# Patient Record
Sex: FEMALE | Race: ASIAN | NOT HISPANIC OR LATINO | Employment: FULL TIME | URBAN - METROPOLITAN AREA
[De-identification: names, ages, dates, MRNs, and addresses within clinical notes are randomized per-mention and may not be internally consistent; named-entity substitution may affect disease eponyms.]

---

## 2017-11-03 ENCOUNTER — HOSPITAL ENCOUNTER (OUTPATIENT)
Dept: RADIOLOGY | Age: 57
Discharge: HOME | End: 2017-11-03
Payer: COMMERCIAL

## 2017-11-03 DIAGNOSIS — C50.919 MALIGNANT NEOPLASM OF FEMALE BREAST (HCC): ICD-10-CM

## 2017-11-03 DIAGNOSIS — Z12.31 ENCOUNTER FOR SCREENING MAMMOGRAM FOR MALIGNANT NEOPLASM OF BREAST: ICD-10-CM

## 2017-11-03 PROCEDURE — G0202 SCR MAMMO BI INCL CAD: HCPCS

## 2017-12-23 ENCOUNTER — LAB CONVERSION - ENCOUNTER (OUTPATIENT)
Dept: OTHER | Facility: OTHER | Age: 57
End: 2017-12-23

## 2017-12-23 LAB
25(OH)D3 SERPL-MCNC: 23 NG/ML (ref 30–100)
A/G RATIO (HISTORICAL): 1.4 (CALC) (ref 1–2.5)
ALBUMIN SERPL BCP-MCNC: 4.2 G/DL (ref 3.6–5.1)
ALP SERPL-CCNC: 115 U/L (ref 33–130)
ALT SERPL W P-5'-P-CCNC: 18 U/L (ref 6–29)
AST SERPL W P-5'-P-CCNC: 20 U/L (ref 10–35)
BASOPHILS # BLD AUTO: 1.2 %
BASOPHILS # BLD AUTO: 78 CELLS/UL (ref 0–200)
BILIRUB SERPL-MCNC: 0.4 MG/DL (ref 0.2–1.2)
BUN SERPL-MCNC: 17 MG/DL (ref 7–25)
BUN/CREA RATIO (HISTORICAL): NORMAL (CALC) (ref 6–22)
CALCIUM SERPL-MCNC: 9.2 MG/DL (ref 8.6–10.4)
CHLORIDE SERPL-SCNC: 106 MMOL/L (ref 98–110)
CHOLEST SERPL-MCNC: 173 MG/DL
CHOLEST/HDLC SERPL: 3 (CALC)
CLINICAL COMMENT (HISTORICAL): NORMAL
CO2 SERPL-SCNC: 26 MMOL/L (ref 20–31)
COMMENT (HISTORICAL): ABNORMAL
CREAT SERPL-MCNC: 0.74 MG/DL (ref 0.5–1.05)
DEPRECATED RDW RBC AUTO: 19.4 % (ref 11–15)
EGFR AFRICAN AMERICAN (HISTORICAL): 103 ML/MIN/1.73M2
EGFR-AMERICAN CALC (HISTORICAL): 89 ML/MIN/1.73M2
EOSINOPHIL # BLD AUTO: 364 CELLS/UL (ref 15–500)
EOSINOPHIL # BLD AUTO: 5.6 %
GAMMA GLOBULIN (HISTORICAL): 3.1 G/DL (CALC) (ref 1.9–3.7)
GLUCOSE (HISTORICAL): 90 MG/DL (ref 65–99)
HCT VFR BLD AUTO: 37.8 % (ref 35–45)
HDLC SERPL-MCNC: 58 MG/DL
HGB BLD-MCNC: 11.6 G/DL (ref 11.7–15.5)
LDL CHOLESTEROL (HISTORICAL): 92 MG/DL (CALC)
LYMPHOCYTES # BLD AUTO: 2412 CELLS/UL (ref 850–3900)
LYMPHOCYTES # BLD AUTO: 37.1 %
MCH RBC QN AUTO: 19.3 PG (ref 27–33)
MCHC RBC AUTO-ENTMCNC: 30.7 G/DL (ref 32–36)
MCV RBC AUTO: 62.9 FL (ref 80–100)
MONOCYTES # BLD AUTO: 494 CELLS/UL (ref 200–950)
MONOCYTES (HISTORICAL): 7.6 %
NEUTROPHILS # BLD AUTO: 3153 CELLS/UL (ref 1500–7800)
NEUTROPHILS # BLD AUTO: 48.5 %
NON-HDL-CHOL (CHOL-HDL) (HISTORICAL): 115 MG/DL (CALC)
PLATELET # BLD AUTO: 292 THOUSAND/UL (ref 140–400)
PMV BLD AUTO: 12 FL (ref 7.5–12.5)
POTASSIUM SERPL-SCNC: 4.3 MMOL/L (ref 3.5–5.3)
RBC # BLD AUTO: 6.01 MILLION/UL (ref 3.8–5.1)
SODIUM SERPL-SCNC: 141 MMOL/L (ref 135–146)
T3 SERPL-MCNC: 107 NG/DL (ref 76–181)
TOTAL PROTEIN (HISTORICAL): 7.3 G/DL (ref 6.1–8.1)
TRIGL SERPL-MCNC: 133 MG/DL
TSH SERPL DL<=0.05 MIU/L-ACNC: 2 MIU/L (ref 0.4–4.5)
WBC # BLD AUTO: 6.5 THOUSAND/UL (ref 3.8–10.8)

## 2017-12-26 ENCOUNTER — LAB CONVERSION - ENCOUNTER (OUTPATIENT)
Dept: OTHER | Facility: OTHER | Age: 57
End: 2017-12-26

## 2017-12-26 LAB
25(OH)D3 SERPL-MCNC: 23 NG/ML (ref 30–100)
A/G RATIO (HISTORICAL): 1.4 (CALC) (ref 1–2.5)
ALBUMIN SERPL BCP-MCNC: 4.2 G/DL (ref 3.6–5.1)
ALP SERPL-CCNC: 115 U/L (ref 33–130)
ALT SERPL W P-5'-P-CCNC: 18 U/L (ref 6–29)
AST SERPL W P-5'-P-CCNC: 20 U/L (ref 10–35)
BASOPHILS # BLD AUTO: 1.2 %
BASOPHILS # BLD AUTO: 78 CELLS/UL (ref 0–200)
BILIRUB SERPL-MCNC: 0.4 MG/DL (ref 0.2–1.2)
BUN SERPL-MCNC: 17 MG/DL (ref 7–25)
BUN/CREA RATIO (HISTORICAL): NORMAL (CALC) (ref 6–22)
CALCIUM SERPL-MCNC: 9.2 MG/DL (ref 8.6–10.4)
CHLORIDE SERPL-SCNC: 106 MMOL/L (ref 98–110)
CHOLEST SERPL-MCNC: 173 MG/DL
CHOLEST/HDLC SERPL: 3 (CALC)
CLINICAL COMMENT (HISTORICAL): NORMAL
CO2 SERPL-SCNC: 26 MMOL/L (ref 20–31)
COMMENT (HISTORICAL): ABNORMAL
CREAT SERPL-MCNC: 0.74 MG/DL (ref 0.5–1.05)
DEPRECATED RDW RBC AUTO: 19.4 % (ref 11–15)
EGFR AFRICAN AMERICAN (HISTORICAL): 103 ML/MIN/1.73M2
EGFR-AMERICAN CALC (HISTORICAL): 89 ML/MIN/1.73M2
EOSINOPHIL # BLD AUTO: 364 CELLS/UL (ref 15–500)
EOSINOPHIL # BLD AUTO: 5.6 %
FERRITIN SERPL-MCNC: 27 NG/ML (ref 10–232)
GAMMA GLOBULIN (HISTORICAL): 3.1 G/DL (CALC) (ref 1.9–3.7)
GLUCOSE (HISTORICAL): 90 MG/DL (ref 65–99)
HCT VFR BLD AUTO: 37.8 % (ref 35–45)
HDLC SERPL-MCNC: 58 MG/DL
HGB BLD-MCNC: 11.6 G/DL (ref 11.7–15.5)
LDL CHOLESTEROL (HISTORICAL): 92 MG/DL (CALC)
LYMPHOCYTES # BLD AUTO: 2412 CELLS/UL (ref 850–3900)
LYMPHOCYTES # BLD AUTO: 37.1 %
MCH RBC QN AUTO: 19.3 PG (ref 27–33)
MCHC RBC AUTO-ENTMCNC: 30.7 G/DL (ref 32–36)
MCV RBC AUTO: 62.9 FL (ref 80–100)
MONOCYTES # BLD AUTO: 494 CELLS/UL (ref 200–950)
MONOCYTES (HISTORICAL): 7.6 %
NEUTROPHILS # BLD AUTO: 3153 CELLS/UL (ref 1500–7800)
NEUTROPHILS # BLD AUTO: 48.5 %
NON-HDL-CHOL (CHOL-HDL) (HISTORICAL): 115 MG/DL (CALC)
PLATELET # BLD AUTO: 292 THOUSAND/UL (ref 140–400)
PMV BLD AUTO: 12 FL (ref 7.5–12.5)
POTASSIUM SERPL-SCNC: 4.3 MMOL/L (ref 3.5–5.3)
RBC # BLD AUTO: 6.01 MILLION/UL (ref 3.8–5.1)
SODIUM SERPL-SCNC: 141 MMOL/L (ref 135–146)
T3 SERPL-MCNC: 107 NG/DL (ref 76–181)
TOTAL PROTEIN (HISTORICAL): 7.3 G/DL (ref 6.1–8.1)
TRIGL SERPL-MCNC: 133 MG/DL
TSH SERPL DL<=0.05 MIU/L-ACNC: 2 MIU/L (ref 0.4–4.5)
VIT B12 SERPL-MCNC: 474 PG/ML (ref 200–1100)
WBC # BLD AUTO: 6.5 THOUSAND/UL (ref 3.8–10.8)

## 2018-01-11 NOTE — RESULT NOTES
Verified Results  (1) TISSUE EXAM [id number] 03:30PM Cathleen Zelaya     Test Name Result Flag Reference   LAB AP CASE REPORT (Report)     Surgical Pathology Report             Case: [id number]                   Authorizing Provider: Neil Kiser DO    Collected:      09/26/2016 1530        Ordering Location:   [facility]    Received:      09/26/2016 1613                    [facility]                              Pathologist:      Tosha Gomez DO                               Specimens:  A) - Large Intestine, Cecum, Cecal bx                                 B) - Large Intestine, Right/Ascending Colon, Ascending colon bx                    C) - Large Intestine, Transverse Colon, Transverse colon bx                      D) - Large Intestine, Left/Descending Colon, Descending colon bx                    E) - Large Intestine, Sigmoid Colon, Sigmoid colon bx                         F) - Rectum, Rectal bx   LAB AP FINAL DIAGNOSIS (Report)     A-F: Colon, cecum, ascending, transverse, descending, sigmoid & rectum,   biopsies:   - Colonic mucosa with crypt distortion, cryptitis and crypt abscesses  - No granulomas or pathologic organisms identified   - No dysplasia identified    - See note  Note: Colonic mucosa with lamina propria expansion by lymphoplasmacytic   infiltrate with admixed eosinophils and neutrophils  Foci of cryptitis   and crypt abscesses are present  Basal plasmacytosis is present  Paneth   cell metaplasia is present  Granulomas and pathologic organisms are not   identified  The patient's recent travel to [facility] with subsequent development of   diarrhea is noted along with the colonoscopy impression of pancolitis  A   small proportion of persons with infectious colitis may develop subsequent   classic inflammatory bowel disease (IBD)   The features of the current   biopsy raise the possibility of IBD, however, other etiologies such as   drug induced colitis and helminth infection should be excluded  The   number of eosinophils is increased within the lamina propria, and involve   crypts  This may go along with a diagnosis of IBD, however, it may also   be seen in drug or helminth infections  Correlation with ova and parasite   exam and clinical impression needed  Intradepartmental consultation is in agreement with the above diagnosis   (LS)  Interpretation performed at [facility], [address] [place] [address]  Electronically signed by Lisa Aiken DO on 9/29/2016 at 11:34 AM   LAB AP SURGICAL ADDITIONAL INFORMATION (Report)     These tests were developed and their performance characteristics   determined by [facility] or [facility]  They may not be cleared or approved by the [place]  Food and   Drug Administration  The FDA has determined that such clearance or   approval is not necessary  These tests are used for clinical purposes  They should not be regarded as investigational or for research  This   laboratory has been approved by CLIA 88, designated as a high-complexity   laboratory and is qualified to perform these tests  LAB AP GROSS DESCRIPTION (Report)     A  The specimen is received in formalin, labeled with the patient's name   and hospital number, and is designated cecal biopsy  The specimen   consists of multiple tan soft tissue fragments measuring in aggregate 0 5   x 0 4 x 0 1 cm  Entirely submitted  One cassette  B  The specimen is received in formalin, labeled with the patient's name   and hospital number, and is designated ascending colon biopsy  The   specimen consists of multiple tan soft tissue fragments measuring in   aggregate 0 4 x 0 4 x 0 1 cm  Entirely submitted  One cassette  C  The specimen is received in formalin, labeled with the patient's name   and hospital number, and is designated transverse colon biopsy   The   specimen consists of multiple tan soft tissue fragments measuring in aggregate 0 5 x 0 4 x 0 1 cm  Entirely submitted  One cassette  D  The specimen is received in formalin, labeled with the patient's name   and hospital number, and is designated descending colon biopsy  The   specimen consists of multiple tan soft tissue fragments measuring in   aggregate 0 5 x 0 4 x 0 1 cm  Entirely submitted  One cassette  E  The specimen is received in formalin, labeled with the patient's name   and hospital number, and is designated sigmoid colon biopsy  The   specimen consists of 3 tan soft tissue fragments each measuring 0 3 cm  Entirely submitted  One cassette  F  The specimen is received in formalin, labeled with the patient's name   and hospital number, and is designated rectal biopsy  The specimen   consists of multiple tan soft tissue fragments measuring in aggregate 0 5   x 0 4 x 0 1 cm  Entirely submitted  One cassette  Note: The estimated total formalin fixation time based upon information   provided by the submitting clinician and the standard processing schedule   is 22 5 hours  MAC   LAB AP CLINICAL INFORMATION      Diffuse pan colitis throughout the entire colon     Active colitis

## 2018-04-10 ENCOUNTER — OFFICE VISIT (OUTPATIENT)
Dept: GASTROENTEROLOGY | Facility: CLINIC | Age: 58
End: 2018-04-10
Payer: COMMERCIAL

## 2018-04-10 VITALS
DIASTOLIC BLOOD PRESSURE: 88 MMHG | WEIGHT: 134.2 LBS | HEART RATE: 77 BPM | BODY MASS INDEX: 24.69 KG/M2 | SYSTOLIC BLOOD PRESSURE: 133 MMHG | HEIGHT: 62 IN | TEMPERATURE: 98.8 F

## 2018-04-10 DIAGNOSIS — R19.7 DIARRHEA, UNSPECIFIED TYPE: Primary | ICD-10-CM

## 2018-04-10 PROCEDURE — 99214 OFFICE O/P EST MOD 30 MIN: CPT | Performed by: PHYSICIAN ASSISTANT

## 2018-04-10 RX ORDER — CHOLESTYRAMINE 4 G/9G
1 POWDER, FOR SUSPENSION ORAL 2 TIMES DAILY WITH MEALS
Qty: 60 EACH | Refills: 1 | Status: SHIPPED | OUTPATIENT
Start: 2018-04-10 | End: 2018-04-10 | Stop reason: CLARIF

## 2018-04-10 RX ORDER — CHOLESTYRAMINE 4 G/9G
1 POWDER, FOR SUSPENSION ORAL
Qty: 60 EACH | Refills: 0 | Status: SHIPPED | OUTPATIENT
Start: 2018-04-10 | End: 2018-11-14

## 2018-04-10 NOTE — PROGRESS NOTES
[address] [facility]  Kodak Pearce 61 y o  female MRN: [id number]       CC: Acute diarrhea, last seen in 2016    HPI: James Canela is a 61year old female with a past history of breast cancer who was first seen in 2016 for similar symptoms  Patient developed acute diarrhea after traveling to [facility]  Her stools were positive for fecal WBCs  Unable to access results of C diff PCR  Patient underwent colonoscopy as she was not improved on antidiarrheals  Colonoscopy performed by Dr Adam Yang showed diffuse pan colitis  Biopsies showed cryptitis with mixed eosinophils and neutrophils  Per report, IBD was a possibility  She did not return for follow-up and reports feeling better gradually on her own  She is here now with similar symptoms  She is going up to 4 times daily  This is accompanied by left sided abdominal pain  She denies hematochezia, but there is bright red blood per rectum when she wipes  She denies fevers or chills  She denies recent travel or antibiotic use  She was using an old prescription of Lomotil, which was not helping her  Review of Systems:    CONSTITUTIONAL: Denies any fever, chills, or rigors  Good appetite, and no recent weight loss  HEENT: No earache or tinnitus  Denies hearing loss or visual disturbances  CARDIOVASCULAR: No chest pain or palpitations  RESPIRATORY: Denies any cough, hemoptysis, shortness of breath or dyspnea on exertion  GASTROINTESTINAL: As noted in the History of Present Illness  GENITOURINARY: No problems with urination  Denies any hematuria or dysuria  NEUROLOGIC: No dizziness or vertigo, denies headaches  MUSCULOSKELETAL: Denies any muscle or joint pain  SKIN: Denies skin rashes or itching  ENDOCRINE: Denies excessive thirst  Denies intolerance to heat or cold  PSYCHOSOCIAL: Denies depression or anxiety  Denies any recent memory loss         Current Outpatient Prescriptions   Medication Sig Dispense Refill    aspirin 81 MG tablet Take 81 mg by mouth daily   ATORVASTATIN CALCIUM PO Take 20 mg by mouth daily   metoprolol tartrate (LOPRESSOR) 25 mg tablet Take 25 mg by mouth 2 (two) times a day   mometasone (NASONEX) 50 mcg/act nasal spray 2 sprays into each nostril daily   cholestyramine (QUESTRAN) 4 g packet Take 1 packet (4 g total) by mouth 3 (three) times a day with meals 60 each 0     No current facility-administered medications for this visit  Past Medical History:   Diagnosis Date    Allergic rhinitis     Breast CA ([facility] [address] )     R breast CA-chemo/radiaition    Diarrhea     for 6 weeks-colonosocpy today 9/26/2016 and EGD    Hypertension     Thalassemia     Vitamin D deficiency      Past Surgical History:   Procedure Laterality Date    BREAST LUMPECTOMY Right     BREAST LUMPECTOMY Right     BREAST LUMPECTOMY Right     with lymph nodes  2007    COLONOSCOPY      CORONARY ARTERY BYPASS GRAFT      CORONARY ARTERY BYPASS GRAFT      x3, 11/2015    HYSTERECTOMY      b/l ovaries removed    [facility] COLONOSCOPY FLX DX W/COLLJ SPEC WHEN PFRMD N/A 9/26/2016    Procedure: COLONOSCOPY;  Surgeon: Jay Corona DO;  [facility]; Service: Gastroenterology     Social History     Social History    Marital status: /Civil Union     Spouse name: N/A    Number of children: N/A    Years of education: N/A     Social History Main Topics    Smoking status: Never Smoker    Smokeless tobacco: Never Used    Alcohol use 2 4 oz/week     4 Glasses of wine per week      Comment: per year    Drug use: No    Sexual activity: Not Asked     Other Topics Concern    None     Social History Narrative    None     History reviewed  No pertinent family history           PHYSICAL EXAM:    Vitals:    04/10/18 1528   BP: 133/88   BP Location: Left arm   Patient Position: Sitting   Cuff Size: Standard   Pulse: 77   Temp: 98 8 °F (37 1 °C)   TempSrc: Tympanic   Weight: 60 9 kg (134 lb 3 2 oz)   Height: 5' 2" (1 575 m)     General Appearance:   Alert and oriented x 3  Cooperative, and in no respiratory distress   HEENT:   Normocephalic, atraumatic, anicteric      Neck:  Supple, symmetrical, trachea midline   Lungs:   Clear to auscultation bilaterally; no rales, rhonchi or wheezing; respirations unlabored    Heart[de-identified]   S1 and S2 normal; regular rate and rhythm; no murmur, rub, or gallop  Abdomen:   Soft, non-tender, non-distended; normal bowel sounds; no masses, no organomegaly    Genitalia:   Deferred    Rectal:   Deferred    Extremities:  No cyanosis, clubbing or edema    Pulses:  2+ and symmetric all extremities    Skin:  Skin color, texture, turgor normal, no rashes or lesions    Lymph nodes:  No palpable cervical or supraclavicular lymphadenopathy        Lab Results:     Results from last 6 Months  Lab Units 12/22/17  0657   WBC Thousand/uL 6 5  6 5   HEMOGLOBIN g/dL 11 6*  11 6*   HEMATOCRIT % 37 8  37 8   PLATELETS Thousand/uL 292  292       Results from last 6 Months  Lab Units 12/22/17  0657   SODIUM mmol/L 141  141   POTASSIUM mmol/L 4 3  4 3   CHLORIDE mmol/L 106  106   CO2 mmol/L 26  26   BUN mg/dL 17  17   CREATININE mg/dL 0 74  0 74   CALCIUM mg/dL 9 2  9 2   TOTAL PROTEIN g/dL 7 3  7 3   BILIRUBIN TOTAL mg/dL 0 4  0 4   ALK PHOS U/L 115  115   ALT U/L 18  18   AST U/L 20  20               ASSESSMENT and PLAN:      1) Acute diarrhea - Patient was last seen by us in 2016 for possible infectious vs inflammatory colitis  Biopsies performed at that time suggestive of IBD  Dr Tim Singer impression seemed to be ulcerative colitis as she saw pancolitis on exam    - ESR, CRP  - Stool enteric panel, O&P and C diff PCR  - Fecal calprotectin  - Questran 4 g BID after stool testing comes back negative  - CT of the abdomen offered to the patient, but refused  She agrees to go to the ED if her abdominal pain worsens  I will also order CT of the abdomen if pain is persistent when I speak to her regarding results   She is agreeable to this plan            Follow up with Dr Akira Beasley

## 2018-04-11 ENCOUNTER — TELEPHONE (OUTPATIENT)
Dept: GASTROENTEROLOGY | Facility: CLINIC | Age: 58
End: 2018-04-11

## 2018-04-11 NOTE — PROGRESS NOTES
Chest 2V

 

FINDINGS: The heart and vascular structures are normal in appearance. No infiltrates or effusions ar
e demonstrated. The skeletal structures are unremarkable.

 

IMPRESSION: Negative exam. I called the pharmacy and left a message with patient information as well as physician and medication information to stop refilling the medication

## 2018-04-11 NOTE — TELEPHONE ENCOUNTER
DR BRUCE'S PT    Pt called because the [facility] need more information on the blood orders from yesterday visit  [phone]   Pt will be going back to the lab around 2pm

## 2018-04-12 ENCOUNTER — TELEPHONE (OUTPATIENT)
Dept: GASTROENTEROLOGY | Facility: CLINIC | Age: 58
End: 2018-04-12

## 2018-04-12 ENCOUNTER — TELEPHONE (OUTPATIENT)
Dept: GASTROENTEROLOGY | Facility: MEDICAL CENTER | Age: 58
End: 2018-04-12

## 2018-04-12 DIAGNOSIS — K58.0 IRRITABLE BOWEL SYNDROME WITH DIARRHEA: ICD-10-CM

## 2018-04-12 DIAGNOSIS — K51.00 ULCERATIVE PANCOLITIS WITHOUT COMPLICATION (HCC): Primary | ICD-10-CM

## 2018-04-12 DIAGNOSIS — R19.7 DIARRHEA, UNSPECIFIED TYPE: Primary | ICD-10-CM

## 2018-04-12 RX ORDER — PREDNISONE 10 MG/1
TABLET ORAL
Qty: 30 TABLET | Refills: 0 | Status: SHIPPED | OUTPATIENT
Start: 2018-04-12 | End: 2018-04-12 | Stop reason: CLARIF

## 2018-04-12 RX ORDER — PREDNISONE 10 MG/1
40 TABLET ORAL DAILY
Qty: 112 TABLET | Refills: 1 | Status: SHIPPED | OUTPATIENT
Start: 2018-04-12 | End: 2018-04-26 | Stop reason: HOSPADM

## 2018-04-12 NOTE — TELEPHONE ENCOUNTER
Dr Sheron Jones pt called stating the medication Rosie Bowen that was given to her on 04/10/18 is not working and wanted to know what else can she take or do  Pt is still having Diarrhea and becoming very frustrated   Pt would like to speak to someone and can be reached at [phone]

## 2018-04-12 NOTE — TELEPHONE ENCOUNTER
I spoke with Beau Moment  She was seen in office this week  She is still having diarrhea and wants to know what she can take  She had colonoscopy done in 9/2016 which revealed pancolitis, bx revealed colonic mucosa with crypt distortion, cryptitis and crypt abscesses concerning for IBD, she did not follow up after procedure  This acute diarrhea started 6 weeks ago  This could be a bacterial infection or IBD flare  I discussed that we are awaiting her stool studies and inflammatory markers and she is very insistent on having anti-diarrheals  I offered her questran, imodium, lomotil to use sparingly until c diff ruled out, which she states will not help her  She insists on xifaxan course, which I will try  She may need steroids, would recommend waiting until bacterial infection is ruled out

## 2018-04-12 NOTE — TELEPHONE ENCOUNTER
Spoke to patient regarding lab results available to me  Her C diff was negative  ESR and CRP elevated  We are still waiting on stool enteric and fecal calprotectin  I instructed her to not take Xifaxan  I will send prednisone 40 mg  She will need an office appointment before seeing Dr Jesica Mosqueda in May to ensure she is improving  She is to continue Questran three times daily  Patient understands this plan

## 2018-04-16 ENCOUNTER — TELEPHONE (OUTPATIENT)
Dept: GASTROENTEROLOGY | Facility: CLINIC | Age: 58
End: 2018-04-16

## 2018-04-17 ENCOUNTER — OFFICE VISIT (OUTPATIENT)
Dept: GASTROENTEROLOGY | Facility: CLINIC | Age: 58
End: 2018-04-17
Payer: COMMERCIAL

## 2018-04-17 VITALS
HEIGHT: 62 IN | BODY MASS INDEX: 24.03 KG/M2 | SYSTOLIC BLOOD PRESSURE: 116 MMHG | WEIGHT: 130.6 LBS | TEMPERATURE: 98.8 F | HEART RATE: 70 BPM | DIASTOLIC BLOOD PRESSURE: 78 MMHG

## 2018-04-17 DIAGNOSIS — K62.89 RECTAL DISCOMFORT: ICD-10-CM

## 2018-04-17 DIAGNOSIS — R10.815 PERIUMBILICAL ABDOMINAL TENDERNESS WITHOUT REBOUND TENDERNESS: ICD-10-CM

## 2018-04-17 DIAGNOSIS — K52.9 COLITIS: Primary | ICD-10-CM

## 2018-04-17 PROBLEM — R79.82 CRP ELEVATED: Status: ACTIVE | Noted: 2018-04-17

## 2018-04-17 PROCEDURE — 99214 OFFICE O/P EST MOD 30 MIN: CPT | Performed by: INTERNAL MEDICINE

## 2018-04-17 RX ORDER — DIPHENOXYLATE HYDROCHLORIDE AND ATROPINE SULFATE 2.5; .025 MG/1; MG/1
1 TABLET ORAL 3 TIMES DAILY
Qty: 90 TABLET | Refills: 0 | Status: SHIPPED | OUTPATIENT
Start: 2018-04-17 | End: 2018-04-26 | Stop reason: HOSPADM

## 2018-04-17 RX ORDER — DIPHENOXYLATE HYDROCHLORIDE AND ATROPINE SULFATE 2.5; .025 MG/1; MG/1
1 TABLET ORAL 3 TIMES DAILY
Qty: 90 TABLET | Refills: 4 | Status: SHIPPED | OUTPATIENT
Start: 2018-04-17 | End: 2018-04-17 | Stop reason: SDUPTHER

## 2018-04-17 RX ORDER — DICYCLOMINE HYDROCHLORIDE 10 MG/1
10 CAPSULE ORAL
Qty: 90 CAPSULE | Refills: 1 | Status: ON HOLD | OUTPATIENT
Start: 2018-04-17 | End: 2018-04-26

## 2018-04-17 NOTE — PATIENT INSTRUCTIONS
Colon scheduled with Dr Gauthier Counts 5/1/18 at [address] [place] [address] rescheduled COLON with Saravanan Yun at [facility] on 4/23/18

## 2018-04-17 NOTE — LETTER
April 17, 2018     Ariana Barbosa MD  [address] [place] [address]    Patient: Vita Ramirez   YOB: 1960   Date of Visit: 4/17/2018       Dear Dr Xavi Clark: Thank you for referring Vita Ramirez to me for evaluation  Below are my notes for this consultation  If you have questions, please do not hesitate to call me  I look forward to following your patient along with you  Sincerely,        Telly Parker MD        CC: Delila Peeks, Claudean Franklin, MD  4/17/2018 10:21 PM  Sign at close encounter  [facility] - Outpatient Follow-up Note  Vita Ramirez 61 y o  female MRN: [id number]  Encounter: [id number]          ASSESSMENT AND PLAN:      1  Colitis-differential diagnosis for colitis is likely related to inflammatory bowel disease versus acute infectious colitis  Currently symptoms have been ongoing for 6 weeks usually symptoms longer than 3 months are more typical of inflammatory bowel disease  She had 1 episode of pan colitis 2 years ago which was treated with Lomotil and resolved  Currently symptoms are debilitating but she is not interested in an extensive evaluation  I had extensive discussion that currently we are not treating his symptoms based on objective data but rather treating her disease by trying different medications without any definitive diagnosis  During the entire visit encouraged her to undergo colonoscopy evaluation so we could potentially make a diagnosis of inflammatory bowel disease or rule out infectious causes which may not be diagnosed by culture  Her daughter is getting  in June and she has several events planned prior to this  As a result she states that she is very busy and would like limited evaluation  She was offered CT scan and colonoscopy over the last week but has refused to deferred for now    She is well-known to Dr Andrew Quijano as she saw her 2 years ago but has been lost to follow-up as well  She has been asymptomatic for the last 2 years so this potentially could be another infectious process  But due to relapse of symptoms within 2 years with symptoms ongoing for more than 6 weeks something more is concerning  She does not have any extraintestinal manifestation of inflammatory bowel disease  She has tried anti diarrheal stools such as Imodium, cholestyramine without any significant benefit  She was also empirically started on steroids but has not been consistently taking 40 mg of prednisone but rather taking anywhere from 20-30 mg daily  She has not had any improvement from prednisone but has been only taking for 4 days  Previously she improved with Lomotil so requesting this at this time  She has had stool studies including C diff, ova parasites, enteric bacteria which have all been negative  CRP and ESR are elevated concerning for infectious versus inflammatory process  She is hoping for quick fix but I have emphasized that her symptoms require stepwise process for appropriate care  She plans to follow up with Dr Vik Thurman and has agreed to undergo colonoscopy  - Calprotectin,Fecal; Future  - diphenoxylate-atropine (LOMOTIL) 2 5-0 025 mg per tablet; Take 1 tablet by mouth 3 (three) times a day for 30 days  Dispense: 90 tablet; Refill: 0  - dicyclomine (BENTYL) 10 mg capsule; Take 1 capsule (10 mg total) by mouth 3 (three) times a day before meals for 30 days  Dispense: 90 capsule; Refill: 1    ______________________________________________________________________    SUBJECTIVE:      Mrs Ubaldo Voss is a 58-year-old female presents here for follow-up  She was seen by us 2 years ago for pan colitis at that time thought to be possibly secondary to infectious etiology but inflammatory bowel disease was a potential consideration  She was lost to follow-up    She presents here with similar episode of colitis but has not been compliant with her recommendation as she is very overwhelmed with her daughter's wedding which is in June  She has had stool studies which were negative including C diff and inflammatory markers are elevated  She was started empirically on steroids but has not been consistently taking 40 mg of prednisone  She has only been on steroids for 4 days and has not had significant improvement  She has also tried antispasm medications, cholestyramine, Imodium with limited response  Last episode of colitis 2 years ago she improved with Lomotil and is requesting this at this time  She needs evaluation to rule out inflammatory bowel disease versus infectious etiology  Currently she is interested in a quick fix for her symptoms but is willing to undergo extensive evaluation in May  No extraintestinal manifestation of IBD  She is currently having nonbloody diarrhea with some blood due to irritation secondary to outlet bleeding from hemorrhoids  She has started nifedipine/lidocaine ointment which has helped  She can have anywhere from over 10 episodes of nonbloody diarrhea on a daily basis  Denies any sick contact, travel  REVIEW OF SYSTEMS IS OTHERWISE NEGATIVE  Historical Information   Past Medical History:   Diagnosis Date    Allergic rhinitis     Breast CA ([facility] [address] )     R breast CA-chemo/radiaition    Diarrhea     for 6 weeks-colonosocpy today 9/26/2016 and EGD    Hypertension     Thalassemia     Vitamin D deficiency      Past Surgical History:   Procedure Laterality Date    BREAST LUMPECTOMY Right     BREAST LUMPECTOMY Right     BREAST LUMPECTOMY Right     with lymph nodes  2007    COLONOSCOPY      CORONARY ARTERY BYPASS GRAFT      CORONARY ARTERY BYPASS GRAFT      x3, 11/2015    HYSTERECTOMY      b/l ovaries removed    [place] COLONOSCOPY FLX DX W/COLLJ SPEC WHEN PFRMD N/A 9/26/2016    Procedure: COLONOSCOPY;  Surgeon: Jeffery Wise DO;  Location: [facility];   Service: Gastroenterology     Social History   History   Alcohol Use    2 4 oz/week    4 Glasses of wine per week     Comment: per year     History   Drug Use No     History   Smoking Status    Never Smoker   Smokeless Tobacco    Never Used     History reviewed  No pertinent family history  Meds/Allergies       Current Outpatient Prescriptions:     aspirin 81 MG tablet    cholestyramine (QUESTRAN) 4 g packet    predniSONE 10 mg tablet    ATORVASTATIN CALCIUM PO    dicyclomine (BENTYL) 10 mg capsule    diphenoxylate-atropine (LOMOTIL) 2 5-0 025 mg per tablet    metoprolol tartrate (LOPRESSOR) 25 mg tablet    mometasone (NASONEX) 50 mcg/act nasal spray    Allergies   Allergen Reactions    Iodinated Diagnostic Agents            Objective     Blood pressure 116/78, pulse 70, temperature 98 8 °F (37 1 °C), temperature source Tympanic, height 5' 2" (1 575 m), weight 59 2 kg (130 lb 9 6 oz)  Body mass index is 23 89 kg/m²  PHYSICAL EXAM:      General Appearance:   Alert, cooperative, no distress   HEENT:   Normocephalic, atraumatic, anicteric      Neck:  Supple, symmetrical, trachea midline   Lungs:   Clear to auscultation bilaterally; no rales, rhonchi or wheezing; respirations unlabored    Heart[de-identified]   Regular rate and rhythm; no murmur, rub, or gallop  Abdomen:   Soft, periumbilical discomfort, non-distended; normal bowel sounds; no masses, no organomegaly    Genitalia:   Deferred    Rectal:   Deferred    Extremities:  No cyanosis, clubbing or edema    Pulses:  2+ and symmetric    Skin:  No jaundice, rashes, or lesions    Lymph nodes:  No palpable cervical lymphadenopathy        Lab Results:   No visits with results within 1 Day(s) from this visit     Latest known visit with results is:   Lab Conversion - Encounter on 12/26/2017   Component Date Value    Vit D, 25-Hydroxy 12/22/2017 23*    TSH 3RD GENERATON 12/22/2017 2 00     CLINICAL COMMENT (HISTOR* 12/22/2017 Although an "automated CBC" was ordered, ourinstrumentation detected an abnormality onyour patient's specimen requiring us to performa manual review   Glucose 12/22/2017 90     BUN 12/22/2017 17     Creatinine 12/22/2017 0 74     EGFR-AMERICAN CALC 12/22/2017 89     eGFR  12/22/2017 103     BUN/CREA Ratio 07/03/2060 NOT APPLICABLE     Sodium 21/08/3546 141     Potassium 12/22/2017 4 3     Chloride 12/22/2017 106     CO2 12/22/2017 26     Calcium 12/22/2017 9 2     Total Protein 12/22/2017 7 3     Albumin 12/22/2017 4 2     GAMMA GLOBULIN 12/22/2017 3 1     A/G RATIO 12/22/2017 1 4     Total Bilirubin 12/22/2017 0 4     Alkaline Phosphatase 12/22/2017 115     AST 12/22/2017 20     ALT 12/22/2017 18     WBC 12/22/2017 6 5     RBC 12/22/2017 6 01*    Hemoglobin 12/22/2017 11 6*    Hematocrit 12/22/2017 37 8     MCV 12/22/2017 62 9*    MCH 12/22/2017 19 3*    MCHC 12/22/2017 30 7*    RDW 12/22/2017 19 4*    Platelets 82/59/8643 292     Neutrophils Absolute 12/22/2017 3153     Lymphocytes Absolute 12/22/2017 2412     Monocytes Absolute 12/22/2017 494     Eosinophils Absolute 12/22/2017 364     Basophils Absolute 12/22/2017 78     Neutrophils Absolute 12/22/2017 48 5     Lymphocytes Absolute 12/22/2017 37 1     MONOCYTES 12/22/2017 7 6     Eosinophils Absolute 12/22/2017 5 6     Basophils Relative 12/22/2017 1 2     COMMENT 12/22/2017 Evaluate results with caution  The white blood cell count and plate-let count may be altered due to interferences caused by the presenceof significant numbers of large/giant platelets   MPV 12/22/2017 12 0     Cholesterol 12/22/2017 173     HDL 12/22/2017 58     Triglycerides 12/22/2017 133     LDL CHOLESTEROL 12/22/2017 92     Chol/HDL Ratio 12/22/2017 3 0     NON-HDL-CHOL (CHOL-HDL) 12/22/2017 115     Vitamin B-12 12/22/2017 474     T3, Total 12/22/2017 107     Ferritin 12/22/2017 27          Radiology Results:   No results found

## 2018-04-18 ENCOUNTER — TELEPHONE (OUTPATIENT)
Dept: GASTROENTEROLOGY | Facility: CLINIC | Age: 58
End: 2018-04-18

## 2018-04-18 ENCOUNTER — ANESTHESIA EVENT (OUTPATIENT)
Dept: GASTROENTEROLOGY | Facility: MEDICAL CENTER | Age: 58
End: 2018-04-18

## 2018-04-18 NOTE — TELEPHONE ENCOUNTER
DR GOETZ'S PT    DR Julio Orlando from [facility] called requesting got speak to Dr Katie Bautista regarding pt care   [facility] was aware Dr Hallie Menchaca with pt  [phone]  Doctor was txt

## 2018-04-18 NOTE — PROGRESS NOTES
[facility] - Outpatient Follow-up Note  Madeline Santa 61 y o  female MRN: [id number]  Encounter: [id number]          ASSESSMENT AND PLAN:      1  Colitis-differential diagnosis for colitis is likely related to inflammatory bowel disease versus acute infectious colitis  Currently symptoms have been ongoing for 6 weeks usually symptoms longer than 3 months are more typical of inflammatory bowel disease  She had 1 episode of pan colitis 2 years ago which was treated with Lomotil and resolved  Currently symptoms are debilitating but she is not interested in an extensive evaluation  I had extensive discussion that currently we are not treating his symptoms based on objective data but rather treating her disease by trying different medications without any definitive diagnosis  During the entire visit encouraged her to undergo colonoscopy evaluation so we could potentially make a diagnosis of inflammatory bowel disease or rule out infectious causes which may not be diagnosed by culture  Her daughter is getting  in June and she has several events planned prior to this  As a result she states that she is very busy and would like limited evaluation  She was offered CT scan and colonoscopy over the last week but has refused to deferred for now  She is well-known to Dr Amado Diego as she saw her 2 years ago but has been lost to follow-up as well  She has been asymptomatic for the last 2 years so this potentially could be another infectious process  But due to relapse of symptoms within 2 years with symptoms ongoing for more than 6 weeks something more is concerning  She does not have any extraintestinal manifestation of inflammatory bowel disease  She has tried anti diarrheal stools such as Imodium, cholestyramine without any significant benefit    She was also empirically started on steroids but has not been consistently taking 40 mg of prednisone but rather taking anywhere from 20-30 mg daily  She has not had any improvement from prednisone but has been only taking for 4 days  Previously she improved with Lomotil so requesting this at this time  She has had stool studies including C diff, ova parasites, enteric bacteria which have all been negative  CRP and ESR are elevated concerning for infectious versus inflammatory process  She is hoping for quick fix but I have emphasized that her symptoms require stepwise process for appropriate care  She plans to follow up with Dr Jesica Mosqueda and has agreed to undergo colonoscopy  - Calprotectin,Fecal; Future  - diphenoxylate-atropine (LOMOTIL) 2 5-0 025 mg per tablet; Take 1 tablet by mouth 3 (three) times a day for 30 days  Dispense: 90 tablet; Refill: 0  - dicyclomine (BENTYL) 10 mg capsule; Take 1 capsule (10 mg total) by mouth 3 (three) times a day before meals for 30 days  Dispense: 90 capsule; Refill: 1    ______________________________________________________________________    SUBJECTIVE:      Mrs Arlin Downey is a 50-year-old female presents here for follow-up  She was seen by us 2 years ago for pan colitis at that time thought to be possibly secondary to infectious etiology but inflammatory bowel disease was a potential consideration  She was lost to follow-up  She presents here with similar episode of colitis but has not been compliant with her recommendation as she is very overwhelmed with her daughter's wedding which is in June  She has had stool studies which were negative including C diff and inflammatory markers are elevated  She was started empirically on steroids but has not been consistently taking 40 mg of prednisone  She has only been on steroids for 4 days and has not had significant improvement  She has also tried antispasm medications, cholestyramine, Imodium with limited response  Last episode of colitis 2 years ago she improved with Lomotil and is requesting this at this time    She needs evaluation to rule out inflammatory bowel disease versus infectious etiology  Currently she is interested in a quick fix for her symptoms but is willing to undergo extensive evaluation in May  No extraintestinal manifestation of IBD  She is currently having nonbloody diarrhea with some blood due to irritation secondary to outlet bleeding from hemorrhoids  She has started nifedipine/lidocaine ointment which has helped  She can have anywhere from over 10 episodes of nonbloody diarrhea on a daily basis  Denies any sick contact, travel  REVIEW OF SYSTEMS IS OTHERWISE NEGATIVE  Historical Information   Past Medical History:   Diagnosis Date    Allergic rhinitis     Breast CA ([address] )     R breast CA-chemo/radiaition    Diarrhea     for 6 weeks-colonosocpy today 2016 and EGD    Hypertension     Thalassemia     Vitamin D deficiency      Past Surgical History:   Procedure Laterality Date    BREAST LUMPECTOMY Right     BREAST LUMPECTOMY Right     BREAST LUMPECTOMY Right     with lymph nodes      COLONOSCOPY      CORONARY ARTERY BYPASS GRAFT      CORONARY ARTERY BYPASS GRAFT      x3, 2015    HYSTERECTOMY      b/l ovaries removed    OK COLONOSCOPY FLX DX W/COLLJ SPEC WHEN PFRMD N/A 2016    Procedure: COLONOSCOPY;  Surgeon: Louretta Apgar, DO;  Location: [facility]; Service: Gastroenterology     Social History   History   Alcohol Use    2 4 oz/week    4 Glasses of wine per week     Comment: per year     History   Drug Use No     History   Smoking Status    Never Smoker   Smokeless Tobacco    Never Used     History reviewed  No pertinent family history      Meds/Allergies       Current Outpatient Prescriptions:     aspirin 81 MG tablet    cholestyramine (QUESTRAN) 4 g packet    predniSONE 10 mg tablet    ATORVASTATIN CALCIUM PO    dicyclomine (BENTYL) 10 mg capsule    diphenoxylate-atropine (LOMOTIL) 2 5-0 025 mg per tablet    metoprolol tartrate (LOPRESSOR) 25 mg tablet    mometasone (NASONEX) 50 mcg/act nasal spray    Allergies   Allergen Reactions    Iodinated Diagnostic Agents            Objective     Blood pressure 116/78, pulse 70, temperature 98 8 °F (37 1 °C), temperature source Tympanic, height 5' 2" (1 575 m), weight 59 2 kg (130 lb 9 6 oz)  Body mass index is 23 89 kg/m²  PHYSICAL EXAM:      General Appearance:   Alert, cooperative, no distress   HEENT:   Normocephalic, atraumatic, anicteric      Neck:  Supple, symmetrical, trachea midline   Lungs:   Clear to auscultation bilaterally; no rales, rhonchi or wheezing; respirations unlabored    Heart[de-identified]   Regular rate and rhythm; no murmur, rub, or gallop  Abdomen:   Soft, periumbilical discomfort, non-distended; normal bowel sounds; no masses, no organomegaly    Genitalia:   Deferred    Rectal:   Deferred    Extremities:  No cyanosis, clubbing or edema    Pulses:  2+ and symmetric    Skin:  No jaundice, rashes, or lesions    Lymph nodes:  No palpable cervical lymphadenopathy        Lab Results:   No visits with results within 1 Day(s) from this visit  Latest known visit with results is:   Lab Conversion - Encounter on 12/26/2017   Component Date Value    Vit D, 25-Hydroxy 12/22/2017 23*    TSH 3RD GENERATON 12/22/2017 2 00     CLINICAL COMMENT (HISTOR* 12/22/2017 Although an "automated CBC" was ordered, ourinstrumentation detected an abnormality onyour patient's specimen requiring us to performa manual review       Glucose 12/22/2017 90     BUN 12/22/2017 17     Creatinine 12/22/2017 0 74     EGFR-AMERICAN CALC 12/22/2017 89     eGFR  12/22/2017 103     BUN/CREA Ratio 66/11/5642 NOT APPLICABLE     Sodium 60/37/3686 141     Potassium 12/22/2017 4 3     Chloride 12/22/2017 106     CO2 12/22/2017 26     Calcium 12/22/2017 9 2     Total Protein 12/22/2017 7 3     Albumin 12/22/2017 4 2     GAMMA GLOBULIN 12/22/2017 3 1     A/G RATIO 12/22/2017 1 4     Total Bilirubin 12/22/2017 0 4     Alkaline Phosphatase 12/22/2017 115     AST 12/22/2017 20     ALT 12/22/2017 18     WBC 12/22/2017 6 5     RBC 12/22/2017 6 01*    Hemoglobin 12/22/2017 11 6*    Hematocrit 12/22/2017 37 8     MCV 12/22/2017 62 9*    MCH 12/22/2017 19 3*    MCHC 12/22/2017 30 7*    RDW 12/22/2017 19 4*    Platelets 05/00/4847 292     Neutrophils Absolute 12/22/2017 3153     Lymphocytes Absolute 12/22/2017 2412     Monocytes Absolute 12/22/2017 494     Eosinophils Absolute 12/22/2017 364     Basophils Absolute 12/22/2017 78     Neutrophils Absolute 12/22/2017 48 5     Lymphocytes Absolute 12/22/2017 37 1     MONOCYTES 12/22/2017 7 6     Eosinophils Absolute 12/22/2017 5 6     Basophils Relative 12/22/2017 1 2     COMMENT 12/22/2017 Evaluate results with caution  The white blood cell count and plate-let count may be altered due to interferences caused by the presenceof significant numbers of large/giant platelets   MPV 12/22/2017 12 0     Cholesterol 12/22/2017 173     HDL 12/22/2017 58     Triglycerides 12/22/2017 133     LDL CHOLESTEROL 12/22/2017 92     Chol/HDL Ratio 12/22/2017 3 0     NON-HDL-CHOL (CHOL-HDL) 12/22/2017 115     Vitamin B-12 12/22/2017 474     T3, Total 12/22/2017 107     Ferritin 12/22/2017 27          Radiology Results:   No results found

## 2018-04-19 ENCOUNTER — APPOINTMENT (EMERGENCY)
Dept: RADIOLOGY | Facility: HOSPITAL | Age: 58
End: 2018-04-19
Payer: COMMERCIAL

## 2018-04-19 ENCOUNTER — APPOINTMENT (EMERGENCY)
Dept: CT IMAGING | Facility: HOSPITAL | Age: 58
End: 2018-04-19
Payer: COMMERCIAL

## 2018-04-19 ENCOUNTER — TELEPHONE (OUTPATIENT)
Dept: GASTROENTEROLOGY | Facility: CLINIC | Age: 58
End: 2018-04-19

## 2018-04-19 ENCOUNTER — HOSPITAL ENCOUNTER (EMERGENCY)
Facility: HOSPITAL | Age: 58
Discharge: HOME | End: 2018-04-19
Attending: EMERGENCY MEDICINE
Payer: COMMERCIAL

## 2018-04-19 VITALS
RESPIRATION RATE: 18 BRPM | HEART RATE: 70 BPM | WEIGHT: 138.45 LBS | SYSTOLIC BLOOD PRESSURE: 144 MMHG | TEMPERATURE: 98.1 F | BODY MASS INDEX: 25.32 KG/M2 | DIASTOLIC BLOOD PRESSURE: 87 MMHG | OXYGEN SATURATION: 96 %

## 2018-04-19 DIAGNOSIS — R07.9 CHEST PAIN: ICD-10-CM

## 2018-04-19 DIAGNOSIS — K52.9 COLITIS: ICD-10-CM

## 2018-04-19 DIAGNOSIS — R10.9 ABDOMINAL PAIN: Primary | ICD-10-CM

## 2018-04-19 LAB
ALBUMIN SERPL BCP-MCNC: 2.9 G/DL (ref 3.5–5)
ALP SERPL-CCNC: 96 U/L (ref 46–116)
ALT SERPL W P-5'-P-CCNC: 27 U/L (ref 12–78)
ANION GAP SERPL CALCULATED.3IONS-SCNC: 8 MMOL/L (ref 4–13)
AST SERPL W P-5'-P-CCNC: 19 U/L (ref 5–45)
ATRIAL RATE: 66 BPM
ATRIAL RATE: 68 BPM
BACTERIA UR QL AUTO: ABNORMAL /HPF
BASOPHILS # BLD MANUAL: 0.09 THOUSAND/UL (ref 0–0.1)
BASOPHILS NFR MAR MANUAL: 1 % (ref 0–1)
BILIRUB SERPL-MCNC: 0.4 MG/DL (ref 0.2–1)
BILIRUB UR QL STRIP: NEGATIVE
BUN SERPL-MCNC: 18 MG/DL (ref 5–25)
CALCIUM SERPL-MCNC: 8.3 MG/DL (ref 8.3–10.1)
CAOX CRY URNS QL MICRO: ABNORMAL /HPF
CHLORIDE SERPL-SCNC: 105 MMOL/L (ref 100–108)
CLARITY UR: CLEAR
CO2 SERPL-SCNC: 27 MMOL/L (ref 21–32)
COLOR UR: YELLOW
CREAT SERPL-MCNC: 0.83 MG/DL (ref 0.6–1.3)
DACRYOCYTES BLD QL SMEAR: PRESENT
EOSINOPHIL # BLD MANUAL: 0.09 THOUSAND/UL (ref 0–0.4)
EOSINOPHIL NFR BLD MANUAL: 1 % (ref 0–6)
ERYTHROCYTE [DISTWIDTH] IN BLOOD BY AUTOMATED COUNT: 16.4 % (ref 11.6–15.1)
GFR SERPL CREATININE-BSD FRML MDRD: 78 ML/MIN/1.73SQ M
GLUCOSE SERPL-MCNC: 117 MG/DL (ref 65–140)
GLUCOSE UR STRIP-MCNC: NEGATIVE MG/DL
HCT VFR BLD AUTO: 31.1 % (ref 34.8–46.1)
HGB BLD-MCNC: 10.2 G/DL (ref 11.5–15.4)
HGB UR QL STRIP.AUTO: ABNORMAL
HOLD SPECIMEN: NORMAL
HYALINE CASTS #/AREA URNS LPF: ABNORMAL /LPF
KETONES UR STRIP-MCNC: NEGATIVE MG/DL
LEUKOCYTE ESTERASE UR QL STRIP: NEGATIVE
LIPASE SERPL-CCNC: 85 U/L (ref 73–393)
LYMPHOCYTES # BLD AUTO: 1.02 THOUSAND/UL (ref 0.6–4.47)
LYMPHOCYTES # BLD AUTO: 11 % (ref 14–44)
MCH RBC QN AUTO: 18.9 PG (ref 26.8–34.3)
MCHC RBC AUTO-ENTMCNC: 32.8 G/DL (ref 31.4–37.4)
MCV RBC AUTO: 58 FL (ref 82–98)
METAMYELOCYTES NFR BLD MANUAL: 1 % (ref 0–1)
MONOCYTES # BLD AUTO: 0.47 THOUSAND/UL (ref 0–1.22)
MONOCYTES NFR BLD: 5 % (ref 4–12)
MUCOUS THREADS UR QL AUTO: ABNORMAL
MYELOCYTES NFR BLD MANUAL: 6 % (ref 0–1)
NEUTROPHILS # BLD MANUAL: 6.98 THOUSAND/UL (ref 1.85–7.62)
NEUTS BAND NFR BLD MANUAL: 38 % (ref 0–8)
NEUTS SEG NFR BLD AUTO: 37 % (ref 43–75)
NITRITE UR QL STRIP: NEGATIVE
NON-SQ EPI CELLS URNS QL MICRO: ABNORMAL /HPF
NRBC BLD AUTO-RTO: 1 /100 WBC (ref 0–2)
P AXIS: 43 DEGREES
P AXIS: 53 DEGREES
PH UR STRIP.AUTO: 5.5 [PH] (ref 4.5–8)
PLATELET # BLD AUTO: 293 THOUSANDS/UL (ref 149–390)
PLATELET BLD QL SMEAR: ADEQUATE
PMV BLD AUTO: 10.2 FL (ref 8.9–12.7)
POLYCHROMASIA BLD QL SMEAR: PRESENT
POTASSIUM SERPL-SCNC: 3.8 MMOL/L (ref 3.5–5.3)
PR INTERVAL: 146 MS
PR INTERVAL: 172 MS
PROT SERPL-MCNC: 6.9 G/DL (ref 6.4–8.2)
PROT UR STRIP-MCNC: NEGATIVE MG/DL
QRS AXIS: 30 DEGREES
QRS AXIS: 32 DEGREES
QRSD INTERVAL: 78 MS
QRSD INTERVAL: 82 MS
QT INTERVAL: 390 MS
QT INTERVAL: 398 MS
QTC INTERVAL: 408 MS
QTC INTERVAL: 442 MS
RBC # BLD AUTO: 5.41 MILLION/UL (ref 3.81–5.12)
RBC #/AREA URNS AUTO: ABNORMAL /HPF
SODIUM SERPL-SCNC: 140 MMOL/L (ref 136–145)
SP GR UR STRIP.AUTO: <=1.005 (ref 1–1.03)
T WAVE AXIS: 190 DEGREES
T WAVE AXIS: 62 DEGREES
TARGETS BLD QL SMEAR: PRESENT
TOTAL CELLS COUNTED SPEC: 100
TROPONIN I SERPL-MCNC: <0.02 NG/ML
UROBILINOGEN UR QL STRIP.AUTO: 0.2 E.U./DL
VENTRICULAR RATE: 63 BPM
VENTRICULAR RATE: 77 BPM
WBC # BLD AUTO: 9.3 THOUSAND/UL (ref 4.31–10.16)
WBC #/AREA URNS AUTO: ABNORMAL /HPF

## 2018-04-19 PROCEDURE — 74176 CT ABD & PELVIS W/O CONTRAST: CPT

## 2018-04-19 PROCEDURE — 99285 EMERGENCY DEPT VISIT HI MDM: CPT

## 2018-04-19 PROCEDURE — 96374 THER/PROPH/DIAG INJ IV PUSH: CPT

## 2018-04-19 PROCEDURE — 36415 COLL VENOUS BLD VENIPUNCTURE: CPT | Performed by: EMERGENCY MEDICINE

## 2018-04-19 PROCEDURE — 83690 ASSAY OF LIPASE: CPT | Performed by: EMERGENCY MEDICINE

## 2018-04-19 PROCEDURE — 85652 RBC SED RATE AUTOMATED: CPT | Performed by: EMERGENCY MEDICINE

## 2018-04-19 PROCEDURE — 93005 ELECTROCARDIOGRAM TRACING: CPT

## 2018-04-19 PROCEDURE — 85027 COMPLETE CBC AUTOMATED: CPT | Performed by: EMERGENCY MEDICINE

## 2018-04-19 PROCEDURE — 80053 COMPREHEN METABOLIC PANEL: CPT | Performed by: EMERGENCY MEDICINE

## 2018-04-19 PROCEDURE — 85007 BL SMEAR W/DIFF WBC COUNT: CPT | Performed by: EMERGENCY MEDICINE

## 2018-04-19 PROCEDURE — 71046 X-RAY EXAM CHEST 2 VIEWS: CPT

## 2018-04-19 PROCEDURE — 84484 ASSAY OF TROPONIN QUANT: CPT | Performed by: PHYSICIAN ASSISTANT

## 2018-04-19 PROCEDURE — 93010 ELECTROCARDIOGRAM REPORT: CPT | Performed by: INTERNAL MEDICINE

## 2018-04-19 PROCEDURE — 93005 ELECTROCARDIOGRAM TRACING: CPT | Performed by: PHYSICIAN ASSISTANT

## 2018-04-19 PROCEDURE — 81001 URINALYSIS AUTO W/SCOPE: CPT

## 2018-04-19 PROCEDURE — 96361 HYDRATE IV INFUSION ADD-ON: CPT

## 2018-04-19 PROCEDURE — 96375 TX/PRO/DX INJ NEW DRUG ADDON: CPT

## 2018-04-19 RX ORDER — ONDANSETRON 2 MG/ML
4 INJECTION INTRAMUSCULAR; INTRAVENOUS ONCE
Status: COMPLETED | OUTPATIENT
Start: 2018-04-19 | End: 2018-04-19

## 2018-04-19 RX ORDER — HYDROCODONE BITARTRATE AND ACETAMINOPHEN 5; 325 MG/1; MG/1
1 TABLET ORAL EVERY 6 HOURS PRN
Qty: 8 TABLET | Refills: 0 | Status: SHIPPED | OUTPATIENT
Start: 2018-04-19 | End: 2018-04-26 | Stop reason: HOSPADM

## 2018-04-19 RX ORDER — MORPHINE SULFATE 2 MG/ML
2 INJECTION, SOLUTION INTRAMUSCULAR; INTRAVENOUS ONCE
Status: COMPLETED | OUTPATIENT
Start: 2018-04-19 | End: 2018-04-19

## 2018-04-19 RX ORDER — METRONIDAZOLE 500 MG/1
500 TABLET ORAL 3 TIMES DAILY
Qty: 30 TABLET | Refills: 0 | Status: SHIPPED | OUTPATIENT
Start: 2018-04-19 | End: 2018-04-26 | Stop reason: HOSPADM

## 2018-04-19 RX ORDER — CIPROFLOXACIN 500 MG/1
500 TABLET, FILM COATED ORAL 2 TIMES DAILY
Qty: 20 TABLET | Refills: 0 | Status: SHIPPED | OUTPATIENT
Start: 2018-04-19 | End: 2018-04-26 | Stop reason: HOSPADM

## 2018-04-19 RX ORDER — ONDANSETRON HYDROCHLORIDE 8 MG/1
8 TABLET, FILM COATED ORAL EVERY 8 HOURS PRN
Qty: 9 TABLET | Refills: 0 | Status: SHIPPED | OUTPATIENT
Start: 2018-04-19 | End: 2018-11-14

## 2018-04-19 RX ADMIN — SODIUM CHLORIDE 500 ML: 0.9 INJECTION, SOLUTION INTRAVENOUS at 13:06

## 2018-04-19 RX ADMIN — IOHEXOL 50 ML: 240 INJECTION, SOLUTION INTRATHECAL; INTRAVASCULAR; INTRAVENOUS; ORAL at 14:49

## 2018-04-19 RX ADMIN — MORPHINE SULFATE 2 MG: 2 INJECTION, SOLUTION INTRAMUSCULAR; INTRAVENOUS at 16:56

## 2018-04-19 RX ADMIN — ONDANSETRON 4 MG: 2 INJECTION INTRAMUSCULAR; INTRAVENOUS at 16:44

## 2018-04-19 NOTE — ED PROVIDER NOTES
History  Chief Complaint   Patient presents with    Abdominal Pain     Pt  reports to ED via EMS with LUQ pain starting today  c/o decreased appetite and nausea  Due for a colonoscopy next week  Given 4mg Zofran and 4mg Morphine pain=310     30-year-old female presents emergency room for evaluation of left upper abdominal pain  Onset just over 3 hr ago  Pain is improved after ambulance gave morphine  At time of onset of pain it felt like a spasm and radiated a pressure to her sternum  Patient states she felt nauseous however did not throw up  She has been having a problem with diarrhea for the past month  States it is watery and quantitates it as 10 times every hour  States her appetite is decreased and she has lost about 7 or 8 lb  She has been seen by her primary physician as well as her GI doctor who performed cultures of the stool which was normal  She was prescribed prednisone and bentyl without relief  Denies recent travel, camping, abnormal food intake, or antibiotic use  History provided by:  Patient      Prior to Admission Medications   Prescriptions Last Dose Informant Patient Reported? Taking? ATORVASTATIN CALCIUM PO Past Month at Unknown time Self Yes Yes   Sig: Take 20 mg by mouth daily  aspirin 81 MG tablet Unknown at Unknown time Self Yes No   Sig: Take 81 mg by mouth daily  cholestyramine (QUESTRAN) 4 g packet 4/18/2018 at Unknown time Self No Yes   Sig: Take 1 packet (4 g total) by mouth 3 (three) times a day with meals   dicyclomine (BENTYL) 10 mg capsule Unknown at Unknown time  No No   Sig: Take 1 capsule (10 mg total) by mouth 3 (three) times a day before meals for 30 days   diphenoxylate-atropine (LOMOTIL) 2 5-0 025 mg per tablet 4/19/2018 at Unknown time  No Yes   Sig: Take 1 tablet by mouth 3 (three) times a day for 30 days   metoprolol tartrate (LOPRESSOR) 25 mg tablet 4/19/2018 at Unknown time Self Yes Yes   Sig: Take 25 mg by mouth 2 (two) times a day     predniSONE 10 mg tablet 4/19/2018 at Unknown time Self No Yes   Sig: Take 4 tablets (40 mg total) by mouth daily      Facility-Administered Medications: None       Past Medical History:   Diagnosis Date    Allergic rhinitis     Breast CA ([address] )     R breast CA-chemo/radiaition    Diarrhea     for 6 weeks-colonosocpy today 9/26/2016 and EGD    Hypertension     Thalassemia     Vitamin D deficiency        Past Surgical History:   Procedure Laterality Date    BREAST LUMPECTOMY Right     BREAST LUMPECTOMY Right     BREAST LUMPECTOMY Right     with lymph nodes  2007    COLONOSCOPY      CORONARY ARTERY BYPASS GRAFT      CORONARY ARTERY BYPASS GRAFT      x3, 11/2015    HYSTERECTOMY      b/l ovaries removed    NC COLONOSCOPY FLX DX W/COLLJ SPEC WHEN PFRMD N/A 9/26/2016    Procedure: COLONOSCOPY;  Surgeon: Jennifer Becker DO;  Location: [facility]; Service: Gastroenterology       History reviewed  No pertinent family history  I have reviewed and agree with the history as documented  Social History   Substance Use Topics    Smoking status: Never Smoker    Smokeless tobacco: Never Used    Alcohol use 2 4 oz/week     4 Glasses of wine per week      Comment: per year        Review of Systems   Constitutional: Negative for chills and fever  Respiratory: Negative for shortness of breath  Cardiovascular: Positive for chest pain  Gastrointestinal: Positive for abdominal pain, diarrhea and nausea  Negative for vomiting  Genitourinary: Negative for difficulty urinating  Musculoskeletal: Positive for back pain  Skin: Negative for rash and wound  Neurological: Negative for dizziness and syncope  All other systems reviewed and are negative        Physical Exam  ED Triage Vitals   Temperature Pulse Respirations Blood Pressure SpO2   04/19/18 1200 04/19/18 1115 04/19/18 1115 04/19/18 1115 04/19/18 1115   98 1 °F (36 7 °C) 65 16 152/88 97 %      Temp Source Heart Rate Source Patient Position - Orthostatic VS BP Location FiO2 (%)   04/19/18 1200 04/19/18 1115 04/19/18 1115 04/19/18 1115 --   Oral Monitor Lying Left arm       Pain Score       04/19/18 1115       4           Orthostatic Vital Signs  Vitals:    04/19/18 1500 04/19/18 1515 04/19/18 1530 04/19/18 1545   BP: 136/78  131/94    Pulse: 70 72 68 70   Patient Position - Orthostatic VS:           Physical Exam   Constitutional: She appears well-developed and well-nourished  HENT:   Right Ear: External ear normal    Left Ear: External ear normal    Eyes: Conjunctivae are normal    Neck: Neck supple  Cardiovascular: Normal rate, regular rhythm and normal heart sounds  Pulmonary/Chest: Effort normal and breath sounds normal  She exhibits no tenderness  Abdominal: Soft  Bowel sounds are normal  She exhibits no distension and no mass  There is tenderness in the left upper quadrant  There is no rebound, no guarding and no CVA tenderness  Musculoskeletal: She exhibits no edema  Neurological: She is alert  Skin: Skin is warm and dry  No rash noted  Psychiatric: She has a normal mood and affect  Nursing note and vitals reviewed        ED Medications  Medications   sodium chloride 0 9 % bolus 500 mL (0 mL Intravenous Stopped 4/19/18 1553)   iohexol (OMNIPAQUE) 240 MG/ML solution 50 mL (50 mL Oral Given 4/19/18 1449)   ondansetron (ZOFRAN) injection 4 mg (4 mg Intravenous Given 4/19/18 1644)   morphine injection 2 mg (2 mg Intravenous Given 4/19/18 1656)       Diagnostic Studies  Results Reviewed     Procedure Component Value Units Date/Time    Urine Microscopic [[id number]]  (Abnormal) Collected:  04/19/18 1412    Lab Status:  Final result Specimen:  Urine from Urine, Other Updated:  04/19/18 1500     RBC, UA 0-1 (A) /hpf      WBC, UA 0-5 /hpf      Epithelial Cells Occasional /hpf      Bacteria, UA Occasional /hpf      Hyaline Casts, UA 2-4 (A) /lpf      Ca Oxalate Aaliyah, UA Moderate (A) /hpf      MUCOUS THREADS Innumerable (A)    CBC and differential [[id number]] (Abnormal) Collected:  04/19/18 1419    Lab Status:  Final result Specimen:  Blood from Arm, Left Updated:  04/19/18 1459     WBC 9 30 Thousand/uL      RBC 5 41 (H) Million/uL      Hemoglobin 10 2 (L) g/dL      Hematocrit 31 1 (L) %      MCV 58 (L) fL      MCH 18 9 (L) pg      MCHC 32 8 g/dL      RDW 16 4 (H) %      MPV 10 2 fL      Platelets 200 Thousands/uL     ED Urine Macroscopic [[id number]]  (Abnormal) Collected:  04/19/18 1412    Lab Status:  Final result Specimen:  Urine Updated:  04/19/18 1411     Color, UA Yellow     Clarity, UA Clear     pH, UA 5 5     Leukocytes, UA Negative     Nitrite, UA Negative     Protein, UA Negative mg/dl      Glucose, UA Negative mg/dl      Ketones, UA Negative mg/dl      Urobilinogen, UA 0 2 E U /dl      Bilirubin, UA Negative     Blood, UA Trace (A)     Specific [place], UA <=1 005    Narrative:       CLINITEK RESULT    Troponin I [[id number]]  (Normal) Collected:  04/19/18 1347    Lab Status:  Final result Specimen:  Blood from Arm, Left Updated:  04/19/18 1407     Troponin I <0 02 ng/mL     Narrative:         [employer] Chemistry analyzer 99% cutoff is > 0 04 ng/mL in network labs    o cTnI 99% cutoff is useful only when applied to patients in the clinical setting of myocardial ischemia  o cTnI 99% cutoff should be interpreted in the context of clinical history, ECG findings and possibly cardiac imaging to establish correct diagnosis  o cTnI 99% cutoff may be suggestive but clearly not indicative of a coronary event without the clinical setting of myocardial ischemia      Lipase [[id number]]  (Normal) Collected:  04/19/18 1306    Lab Status:  Final result Specimen:  Blood from Arm, Left Updated:  04/19/18 1331     Lipase 85 u/L     Comprehensive metabolic panel [[id number]]  (Abnormal) Collected:  04/19/18 1306    Lab Status:  Final result Specimen:  Blood from Arm, Left Updated:  04/19/18 1331     Sodium 140 mmol/L      Potassium 3 8 mmol/L      Chloride 105 mmol/L      CO2 27 mmol/L Anion Gap 8 mmol/L      BUN 18 mg/dL      Creatinine 0 83 mg/dL      Glucose 117 mg/dL      Calcium 8 3 mg/dL      AST 19 U/L      ALT 27 U/L      Alkaline Phosphatase 96 U/L      Total Protein 6 9 g/dL      Albumin 2 9 (L) g/dL      Total Bilirubin 0 40 mg/dL      eGFR 78 ml/min/1 73sq m     Narrative:         National Kidney Disease Education Program recommendations are as follows:  GFR calculation is accurate only with a steady state creatinine  Chronic Kidney disease less than 60 ml/min/1 73 sq  meters  Kidney failure less than 15 ml/min/1 73 sq  meters  XR chest 2 views   ED Interpretation by Tiki Vanegas PA-C (04/19 1705)   No acute disease      CT abdomen pelvis wo contrast   Final Result by Maria E Smallwood MD (04/19 1653)      Questionable mild wall thickening of the descending colon with minimal pericolonic inflammatory stranding suspicious for an infectious or inflammatory colitis  No free air or free fluid  Workstation performed: [id number]                    Procedures  ECG 12 Lead Documentation  Date/Time: 4/19/2018 2:11 PM  Performed by: Hansa Wood  Authorized by: Yvette Villatoro     Indications / Diagnosis:  Chest pain  ECG reviewed by me, the ED Provider: yes    Patient location:  ED  Interpretation:     Interpretation: normal    Rate:     ECG rate:  63    ECG rate assessment: normal    Rhythm:     Rhythm: sinus rhythm    Ectopy:     Ectopy: none    QRS:     QRS axis:  Normal  Conduction:     Conduction: normal    ST segments:     ST segments:  Normal  T waves:     T waves: normal             Phone Contacts  ED Phone Contact    ED Course  ED Course as of Apr 19 1727   Thu Apr 19, 2018   1541 Reviewed current results with patient  She now admits to recent chest pain yesterday in addition to today, along with some shortness of breath which she did not actually have today  NO other new complaints  A/w CT  Will order CXR to r/o pnx, pna, or effusion  MDM  Number of Diagnoses or Management Options  Abdominal pain:   Chest pain:   Colitis: new and requires workup     Amount and/or Complexity of Data Reviewed  Clinical lab tests: reviewed and ordered  Tests in the radiology section of CPT®: ordered and reviewed  Review and summarize past medical records: yes    Risk of Complications, Morbidity, and/or Mortality  Presenting problems: moderate  Diagnostic procedures: moderate  Management options: moderate  General comments: Differential diagnosis includes but is not limited to: pancreatitis, cholecystitis, diverticulitis, colitis, ibs, crohns, acs    Patient Progress  Patient progress: stable    CritCare Time    Disposition  Final diagnoses:   Abdominal pain   Colitis   Chest pain     Time reflects when diagnosis was documented in both MDM as applicable and the Disposition within this note     Time User Action Codes Description Comment    4/19/2018  5:16 PM Conchis Whitt L Add [R10 9] Abdominal pain     4/19/2018  5:16 PM Cristina Villagomez Add [K52 9] Colitis     4/19/2018  5:16 PM Cristina Villagomez Add [R07 9] Chest pain       ED Disposition     ED Disposition Condition Comment    Discharge  [facility] discharge to home/self care      Condition at discharge: Good        Follow-up Information     Follow up With Specialties Details Why Contact Info Additional [address], DO Gastroenterology In 3 days  [address] [facility] 107 Emergency Department Emergency Medicine  If symptoms worsen [address]  [phone] [facility] ED, [address], [place], [place], [address]    Teresa Conrad MD Family Medicine In 3 days  [address]  [address] [facility]   [phone]           Patient's Medications   Discharge Prescriptions    CIPROFLOXACIN (CIPRO) 500 MG TABLET    Take 1 tablet (500 mg total) by mouth 2 (two) times a day for 10 days       Start Date: 4/19/2018 End Date: 4/29/2018       Order Dose: 500 mg       Quantity: 20 tablet    Refills: 0    HYDROCODONE-ACETAMINOPHEN (NORCO) 5-325 MG PER TABLET    Take 1 tablet by mouth every 6 (six) hours as needed for pain for up to 2 days Max Daily Amount: 4 tablets       Start Date: 4/19/2018 End Date: 4/21/2018       Order Dose: 1 tablet       Quantity: 8 tablet    Refills: 0    METRONIDAZOLE (FLAGYL) 500 MG TABLET    Take 1 tablet (500 mg total) by mouth 3 (three) times a day for 10 days       Start Date: 4/19/2018 End Date: 4/29/2018       Order Dose: 500 mg       Quantity: 30 tablet    Refills: 0    ONDANSETRON (ZOFRAN) 8 MG TABLET    Take 1 tablet (8 mg total) by mouth every 8 (eight) hours as needed for nausea or vomiting for up to 3 days       Start Date: 4/19/2018 End Date: 4/22/2018       Order Dose: 8 mg       Quantity: 9 tablet    Refills: 0     No discharge procedures on file      ED Provider  Electronically Signed by           Aniyah Kirkland PA-C  04/19/18 8992

## 2018-04-19 NOTE — ED NOTES
Patient to bathroom via wheelchair per patient request to urinate       [id number] [facility] 28, RN  04/19/18 6912

## 2018-04-19 NOTE — ED NOTES
Patient requesting pain medication and anti-nausea medication-will let miquel QUINN aware       [id number] [facility] 28, RN  04/19/18 7411

## 2018-04-19 NOTE — DISCHARGE INSTRUCTIONS
Colitis   WHAT YOU NEED TO KNOW:   Colitis is swelling and irritation of your colon  Colitis may be caused by ulcers or a problem with your immune system  Bacteria, a virus, or a parasite may also cause colitis  The cause may not be known  You may have diarrhea, abdominal pain, fever, or blood or mucus in your bowel movement  DISCHARGE INSTRUCTIONS:   Return to the emergency department if:   · You have sudden trouble breathing  · Your bowel movements are black or have blood in them  · You have blood in your vomit  · You have severe abdominal pain or your abdomen is swollen and feels hard  · You have any of the following signs of dehydration:     ¨ Dizziness or weakness    ¨ Dry mouth, cracked lips, or severe thirst    ¨ Fast heartbeat or breathing    ¨ Urinating very little or not at all  Contact your healthcare provider if:   · Your symptoms get worse or do not go away  · You have a fever, chills, cough, or feel weak and achy  · You suddenly lose weight without trying  · You have questions or concerns about your condition or care  Medicines:   · Medicines  may be given to decrease inflammation in your colon and treat diarrhea  · Take your medicine as directed  Contact your healthcare provider if you think your medicine is not helping or if you have side effects  Tell him of her if you are allergic to any medicine  Keep a list of the medicines, vitamins, and herbs you take  Include the amounts, and when and why you take them  Bring the list or the pill bottles to follow-up visits  Carry your medicine list with you in case of an emergency  Manage your symptoms:   · Drink liquids as directed  to help prevent dehydration  Good liquids to drink include water, juice, and broth  Ask how much liquid to drink each day  You may need to drink an oral rehydration solution (ORS)  An ORS contains a balance of water, salt, and sugar to replace body fluids lost during diarrhea       · Eat a variety of healthy foods  Healthy foods include fruits, vegetables, whole-grain breads, beans, low-fat dairy products, lean meats, and fish  You may need to eat several small meals throughout the day instead of large meals  Avoid spicy foods, caffeine, chocolate, and foods high in fat  · Talk to your healthcare provider before you take NSAIDs  NSAIDs can cause worsen your symptoms if ulcers are causing your colitis  · Start to exercise when you feel better  Regular exercise helps your bowels work normally  Ask about the best exercise plan for you  Follow up with your healthcare provider as directed: You may need to return for a colonoscopy or other tests  Write down how often you have a bowel movements and what they look like  Bring this to your follow-up visits  Write down your questions so you remember to ask them during your visits  © 2017 [employer] Information is for End User's use only and may not be sold, redistributed or otherwise used for commercial purposes  All illustrations and images included in CareNotes® are the copyrighted property of [employer]  or [employer]  The above information is an  only  It is not intended as medical advice for individual conditions or treatments  Talk to your doctor, nurse or pharmacist before following any medical regimen to see if it is safe and effective for you

## 2018-04-20 ENCOUNTER — HOSPITAL ENCOUNTER (INPATIENT)
Facility: HOSPITAL | Age: 58
LOS: 6 days | Discharge: HOME HEALTH | DRG: 386 | End: 2018-04-26
Attending: EMERGENCY MEDICINE | Admitting: INTERNAL MEDICINE
Payer: COMMERCIAL

## 2018-04-20 ENCOUNTER — APPOINTMENT (INPATIENT)
Dept: RADIOLOGY | Facility: HOSPITAL | Age: 58
DRG: 386 | End: 2018-04-20
Payer: COMMERCIAL

## 2018-04-20 DIAGNOSIS — R10.9 ABDOMINAL PAIN: ICD-10-CM

## 2018-04-20 DIAGNOSIS — M79.10 MYALGIA: Primary | ICD-10-CM

## 2018-04-20 DIAGNOSIS — D50.9 IRON DEFICIENCY ANEMIA, UNSPECIFIED IRON DEFICIENCY ANEMIA TYPE: ICD-10-CM

## 2018-04-20 DIAGNOSIS — K52.9 COLITIS: ICD-10-CM

## 2018-04-20 DIAGNOSIS — E87.6 HYPOKALEMIA: ICD-10-CM

## 2018-04-20 DIAGNOSIS — R79.89 ABNORMAL TSH: ICD-10-CM

## 2018-04-20 DIAGNOSIS — R63.4 WEIGHT LOSS: ICD-10-CM

## 2018-04-20 DIAGNOSIS — M54.10 BILATERAL RADIATING LEG PAIN: ICD-10-CM

## 2018-04-20 PROBLEM — I10 ESSENTIAL HYPERTENSION: Chronic | Status: ACTIVE | Noted: 2018-04-20

## 2018-04-20 PROBLEM — I25.10 CORONARY ARTERY DISEASE INVOLVING NATIVE CORONARY ARTERY OF NATIVE HEART WITHOUT ANGINA PECTORIS: Chronic | Status: ACTIVE | Noted: 2018-04-20

## 2018-04-20 LAB
ALBUMIN SERPL BCP-MCNC: 2.9 G/DL (ref 3.5–5)
ALP SERPL-CCNC: 96 U/L (ref 46–116)
ALT SERPL W P-5'-P-CCNC: 24 U/L (ref 12–78)
ANION GAP SERPL CALCULATED.3IONS-SCNC: 11 MMOL/L (ref 4–13)
AST SERPL W P-5'-P-CCNC: 27 U/L (ref 5–45)
BASOPHILS # BLD MANUAL: 0 THOUSAND/UL (ref 0–0.1)
BASOPHILS NFR MAR MANUAL: 0 % (ref 0–1)
BILIRUB SERPL-MCNC: 0.6 MG/DL (ref 0.2–1)
BUN SERPL-MCNC: 17 MG/DL (ref 5–25)
CALCIUM SERPL-MCNC: 8.4 MG/DL (ref 8.3–10.1)
CHLORIDE SERPL-SCNC: 98 MMOL/L (ref 100–108)
CK SERPL-CCNC: 38 U/L (ref 26–192)
CO2 SERPL-SCNC: 29 MMOL/L (ref 21–32)
CREAT SERPL-MCNC: 1.06 MG/DL (ref 0.6–1.3)
CRP SERPL QL: 71.8 MG/L
DEPRECATED D DIMER PPP: ABNORMAL NG/ML (FEU) (ref 0–424)
EOSINOPHIL # BLD MANUAL: 0.1 THOUSAND/UL (ref 0–0.4)
EOSINOPHIL NFR BLD MANUAL: 2 % (ref 0–6)
ERYTHROCYTE [DISTWIDTH] IN BLOOD BY AUTOMATED COUNT: 16 % (ref 11.6–15.1)
ERYTHROCYTE [SEDIMENTATION RATE] IN BLOOD: 20 MM/HOUR (ref 0–20)
GFR SERPL CREATININE-BSD FRML MDRD: 58 ML/MIN/1.73SQ M
GLUCOSE SERPL-MCNC: 104 MG/DL (ref 65–140)
HCT VFR BLD AUTO: 30.9 % (ref 34.8–46.1)
HGB BLD-MCNC: 10.1 G/DL (ref 11.5–15.4)
LIPASE SERPL-CCNC: 77 U/L (ref 73–393)
LYMPHOCYTES # BLD AUTO: 0.87 THOUSAND/UL (ref 0.6–4.47)
LYMPHOCYTES # BLD AUTO: 17 % (ref 14–44)
MAGNESIUM SERPL-MCNC: 2 MG/DL (ref 1.6–2.6)
MCH RBC QN AUTO: 18.6 PG (ref 26.8–34.3)
MCHC RBC AUTO-ENTMCNC: 32.7 G/DL (ref 31.4–37.4)
MCV RBC AUTO: 57 FL (ref 82–98)
METAMYELOCYTES NFR BLD MANUAL: 4 % (ref 0–1)
MONOCYTES # BLD AUTO: 0.77 THOUSAND/UL (ref 0–1.22)
MONOCYTES NFR BLD: 15 % (ref 4–12)
MYELOCYTES NFR BLD MANUAL: 3 % (ref 0–1)
NEUTROPHILS # BLD MANUAL: 3.01 THOUSAND/UL (ref 1.85–7.62)
NEUTS BAND NFR BLD MANUAL: 15 % (ref 0–8)
NEUTS SEG NFR BLD AUTO: 44 % (ref 43–75)
PLATELET # BLD AUTO: 194 THOUSANDS/UL (ref 149–390)
PLATELET # BLD AUTO: 203 THOUSANDS/UL (ref 149–390)
PLATELET BLD QL SMEAR: ADEQUATE
PMV BLD AUTO: 10 FL (ref 8.9–12.7)
PMV BLD AUTO: 10.7 FL (ref 8.9–12.7)
POLYCHROMASIA BLD QL SMEAR: PRESENT
POTASSIUM SERPL-SCNC: 3 MMOL/L (ref 3.5–5.3)
PROT SERPL-MCNC: 7 G/DL (ref 6.4–8.2)
RBC # BLD AUTO: 5.42 MILLION/UL (ref 3.81–5.12)
SODIUM SERPL-SCNC: 138 MMOL/L (ref 136–145)
TOTAL CELLS COUNTED SPEC: 100
TROPONIN I SERPL-MCNC: <0.02 NG/ML
WBC # BLD AUTO: 5.1 THOUSAND/UL (ref 4.31–10.16)

## 2018-04-20 PROCEDURE — 99223 1ST HOSP IP/OBS HIGH 75: CPT | Performed by: INTERNAL MEDICINE

## 2018-04-20 PROCEDURE — 82550 ASSAY OF CK (CPK): CPT | Performed by: PHYSICIAN ASSISTANT

## 2018-04-20 PROCEDURE — 85049 AUTOMATED PLATELET COUNT: CPT | Performed by: PHYSICIAN ASSISTANT

## 2018-04-20 PROCEDURE — 72072 X-RAY EXAM THORAC SPINE 3VWS: CPT

## 2018-04-20 PROCEDURE — 96375 TX/PRO/DX INJ NEW DRUG ADDON: CPT

## 2018-04-20 PROCEDURE — 72100 X-RAY EXAM L-S SPINE 2/3 VWS: CPT

## 2018-04-20 PROCEDURE — 96374 THER/PROPH/DIAG INJ IV PUSH: CPT

## 2018-04-20 PROCEDURE — 85027 COMPLETE CBC AUTOMATED: CPT | Performed by: EMERGENCY MEDICINE

## 2018-04-20 PROCEDURE — 85379 FIBRIN DEGRADATION QUANT: CPT | Performed by: EMERGENCY MEDICINE

## 2018-04-20 PROCEDURE — 84484 ASSAY OF TROPONIN QUANT: CPT | Performed by: EMERGENCY MEDICINE

## 2018-04-20 PROCEDURE — 99285 EMERGENCY DEPT VISIT HI MDM: CPT

## 2018-04-20 PROCEDURE — 86140 C-REACTIVE PROTEIN: CPT | Performed by: EMERGENCY MEDICINE

## 2018-04-20 PROCEDURE — 80053 COMPREHEN METABOLIC PANEL: CPT | Performed by: EMERGENCY MEDICINE

## 2018-04-20 PROCEDURE — 36415 COLL VENOUS BLD VENIPUNCTURE: CPT | Performed by: EMERGENCY MEDICINE

## 2018-04-20 PROCEDURE — 93005 ELECTROCARDIOGRAM TRACING: CPT | Performed by: EMERGENCY MEDICINE

## 2018-04-20 PROCEDURE — 83735 ASSAY OF MAGNESIUM: CPT | Performed by: EMERGENCY MEDICINE

## 2018-04-20 PROCEDURE — 83690 ASSAY OF LIPASE: CPT | Performed by: EMERGENCY MEDICINE

## 2018-04-20 PROCEDURE — 85007 BL SMEAR W/DIFF WBC COUNT: CPT | Performed by: EMERGENCY MEDICINE

## 2018-04-20 RX ORDER — SODIUM CHLORIDE 9 MG/ML
100 INJECTION, SOLUTION INTRAVENOUS ONCE
Status: COMPLETED | OUTPATIENT
Start: 2018-04-20 | End: 2018-04-21

## 2018-04-20 RX ORDER — LORAZEPAM 2 MG/ML
1 INJECTION INTRAMUSCULAR EVERY 6 HOURS PRN
Status: DISCONTINUED | OUTPATIENT
Start: 2018-04-20 | End: 2018-04-26 | Stop reason: HOSPADM

## 2018-04-20 RX ORDER — ACETAMINOPHEN 325 MG/1
650 TABLET ORAL ONCE
Status: COMPLETED | OUTPATIENT
Start: 2018-04-20 | End: 2018-04-20

## 2018-04-20 RX ORDER — METHOCARBAMOL 500 MG/1
500 TABLET, FILM COATED ORAL EVERY 6 HOURS PRN
Status: DISCONTINUED | OUTPATIENT
Start: 2018-04-20 | End: 2018-04-26 | Stop reason: HOSPADM

## 2018-04-20 RX ORDER — POTASSIUM CHLORIDE 14.9 MG/ML
20 INJECTION INTRAVENOUS ONCE
Status: COMPLETED | OUTPATIENT
Start: 2018-04-20 | End: 2018-04-21

## 2018-04-20 RX ORDER — DICYCLOMINE HYDROCHLORIDE 10 MG/1
10 CAPSULE ORAL
Status: DISCONTINUED | OUTPATIENT
Start: 2018-04-20 | End: 2018-04-26 | Stop reason: HOSPADM

## 2018-04-20 RX ORDER — KETOROLAC TROMETHAMINE 30 MG/ML
15 INJECTION, SOLUTION INTRAMUSCULAR; INTRAVENOUS ONCE
Status: COMPLETED | OUTPATIENT
Start: 2018-04-20 | End: 2018-04-20

## 2018-04-20 RX ORDER — ONDANSETRON 2 MG/ML
4 INJECTION INTRAMUSCULAR; INTRAVENOUS EVERY 6 HOURS PRN
Status: DISCONTINUED | OUTPATIENT
Start: 2018-04-20 | End: 2018-04-26 | Stop reason: HOSPADM

## 2018-04-20 RX ORDER — ASPIRIN 81 MG/1
81 TABLET, CHEWABLE ORAL DAILY
Status: DISCONTINUED | OUTPATIENT
Start: 2018-04-21 | End: 2018-04-21

## 2018-04-20 RX ORDER — LIDOCAINE 50 MG/G
1 PATCH TOPICAL DAILY
Status: DISCONTINUED | OUTPATIENT
Start: 2018-04-21 | End: 2018-04-26 | Stop reason: HOSPADM

## 2018-04-20 RX ORDER — ATORVASTATIN CALCIUM 20 MG/1
20 TABLET, FILM COATED ORAL DAILY
Status: DISCONTINUED | OUTPATIENT
Start: 2018-04-21 | End: 2018-04-24

## 2018-04-20 RX ORDER — CHOLESTYRAMINE LIGHT 4 G/5.7G
4 POWDER, FOR SUSPENSION ORAL 3 TIMES DAILY
Status: DISCONTINUED | OUTPATIENT
Start: 2018-04-20 | End: 2018-04-26 | Stop reason: HOSPADM

## 2018-04-20 RX ORDER — KETOROLAC TROMETHAMINE 30 MG/ML
15 INJECTION, SOLUTION INTRAMUSCULAR; INTRAVENOUS EVERY 6 HOURS PRN
Status: COMPLETED | OUTPATIENT
Start: 2018-04-20 | End: 2018-04-21

## 2018-04-20 RX ORDER — ACETAMINOPHEN 325 MG/1
650 TABLET ORAL EVERY 6 HOURS PRN
Status: DISCONTINUED | OUTPATIENT
Start: 2018-04-20 | End: 2018-04-26 | Stop reason: HOSPADM

## 2018-04-20 RX ORDER — POTASSIUM CHLORIDE 20 MEQ/1
40 TABLET, EXTENDED RELEASE ORAL ONCE
Status: COMPLETED | OUTPATIENT
Start: 2018-04-20 | End: 2018-04-20

## 2018-04-20 RX ADMIN — DICYCLOMINE HYDROCHLORIDE 10 MG: 10 CAPSULE ORAL at 16:34

## 2018-04-20 RX ADMIN — ACETAMINOPHEN 650 MG: 325 TABLET, FILM COATED ORAL at 11:47

## 2018-04-20 RX ADMIN — METOPROLOL TARTRATE 25 MG: 25 TABLET, FILM COATED ORAL at 17:40

## 2018-04-20 RX ADMIN — LORAZEPAM 1 MG: 2 INJECTION INTRAMUSCULAR; INTRAVENOUS at 22:46

## 2018-04-20 RX ADMIN — CHOLESTYRAMINE 4 G: 4 POWDER, FOR SUSPENSION ORAL at 16:33

## 2018-04-20 RX ADMIN — POTASSIUM CHLORIDE 20 MEQ: 200 INJECTION, SOLUTION INTRAVENOUS at 15:17

## 2018-04-20 RX ADMIN — ONDANSETRON 4 MG: 2 INJECTION INTRAMUSCULAR; INTRAVENOUS at 16:31

## 2018-04-20 RX ADMIN — LORAZEPAM 1 MG: 2 INJECTION INTRAMUSCULAR; INTRAVENOUS at 16:40

## 2018-04-20 RX ADMIN — KETOROLAC TROMETHAMINE 15 MG: 30 INJECTION, SOLUTION INTRAMUSCULAR at 16:38

## 2018-04-20 RX ADMIN — KETOROLAC TROMETHAMINE 15 MG: 30 INJECTION, SOLUTION INTRAMUSCULAR at 22:46

## 2018-04-20 RX ADMIN — KETOROLAC TROMETHAMINE 15 MG: 30 INJECTION, SOLUTION INTRAMUSCULAR at 12:56

## 2018-04-20 RX ADMIN — POTASSIUM CHLORIDE 40 MEQ: 1500 TABLET, EXTENDED RELEASE ORAL at 15:27

## 2018-04-20 RX ADMIN — METHOCARBAMOL 500 MG: 500 TABLET ORAL at 19:49

## 2018-04-20 RX ADMIN — ACETAMINOPHEN 650 MG: 325 TABLET, FILM COATED ORAL at 18:57

## 2018-04-20 RX ADMIN — HYDROMORPHONE HYDROCHLORIDE 1 MG: 1 INJECTION, SOLUTION INTRAMUSCULAR; INTRAVENOUS; SUBCUTANEOUS at 12:03

## 2018-04-20 RX ADMIN — SODIUM CHLORIDE 100 ML/HR: 0.9 INJECTION, SOLUTION INTRAVENOUS at 16:35

## 2018-04-20 NOTE — H&P
[facility]  H&P- [facility] 1960, 61 y o  female MRN: [id number]    Unit/Bed#: -01 Encounter: [id number]    Primary Care Provider: Saint Coma, MD   Date and time admitted to hospital: 4/20/2018 10:28 AM        * Bilateral radiating leg pain   Assessment & Plan    · ? Present prior to admission as well as after admission to ER  According to RN patient was not having leg complaints upon entering the hospital, however she had noted that the patient only started complaining and writhing in pain after a certain relative entered the room  Possibly had started after the patient had taken a dosage of Hydrocodone this morning, however continues to beg for pain medication and ask what pain medication I will be giving her  Patient moans and writhes, however her speech is mumbling  She and her  had noticed improvement with Toradol given in the ER  No prior occurrence  No decreased passive ROM  Active ROM is absent, however when holding the patient's heels and asking her to raise a leg there is no downward force on the contralateral leg  PDMP reviewed and she has only filled the Lomotil and Hydrocodone in the last year, however there is some suspicion for seeking behavior  · Admit patient to med/surg under inpatient status   · Hold narcotics at this time given suspicious behavior, non-focal exam with possible symptom magnification, and possibly etiology of complaint   · Toradol 15 mg IV Q6 PRN severe pain   · Monitor GFR closely   · Lidoderm  · Robaxin 500 mg Q6 PRN   · Ativan 1 mg IV Q6 PRN agitation/anxiety   · Check CT scan thoracic and lumbar spine   · Check CK-MB to rule out rhabdo          Colitis   Assessment & Plan    · POA, noted on CT scan  Follows Dr Louise Selby  No fever or leukocytosis noted  No signs of sepsis     · Given concerning symptoms that may or may not have been related to antibiotics will hold for now   · Would consider restarting antibiotics if she spikes a temp  · Consult gastroenterology   · Clear liquid diet   · Supportive care   · Bentyl         Coronary artery disease involving native coronary artery of native heart without angina pectoris   Assessment & Plan    · Patient with history of triple bypass  No symptoms  EKG and troponin within normal limits  · Continue home regimen   · Metoprolol   · Lipitor   · ASA         Essential hypertension   Assessment & Plan    · BP slightly elevated likely secondary to pain   · Continue Metoprolol        Hypokalemia   Assessment & Plan    · POA, 3 0   · Repleted in ER  · Recheck BMP in AM            VTE Prophylaxis: Enoxaparin (Lovenox)  / sequential compression device   Code Status: Full Code   POLST: POLST form is not discussed and not completed at this time  Discussion with family: Discussed with  at bedside     Anticipated Length of Stay:  Patient will be admitted on an Inpatient basis with an anticipated length of stay of  Greater than 2 midnights  Justification for Hospital Stay: Intractable pain in legs, colitis needing further emergent work up     Total Time for Visit, including Counseling / Coordination of Care: 1 hour  Greater than 50% of this total time spent on direct patient counseling and coordination of care  Chief Complaint:   Bilateral Leg Pain     History of Present Illness:    Go Mejia is a 61 y o  female with a history of Coronary Artery Disease, HTN who presents with bilateral leg pain  The patient reports that she had been seen yesterday in the ER for abdominal pain  She had been having diarrhea for 6 weeks and had been following a GI specialist  She was worked up in the ER, diagnosed with colitis, and sent home with PO antibiotics (Cipro and Flagyl), Norco, and Colace   She reports that this morning she took her medications and shortly after, the Norco specifically she mentioned, she started having bilateral shooting leg pain that radiated up and down her legs as well as other random areas of her body  She reports that she was unable to walk  Patient continues to writhe on the bed while speaking  She denies prior occurrence of this happening  She denies numbness or tingling in any of her extremities  Both her and her  noted improvement with toradol earlier in the ER  As for her abdominal complaints she reports that she has minimal abdominal pain, denies nausea and vomiting, and only had a small amount of diarrhea this morning that was non-bloody  She denies fevers or chills  Denies recent falls or injury  Of note, the nurse caring for the patient in the ER reports that when the patient came in she was not behaving this way  She had noticed that the patient had only started moaning and writhing after a certain relative had come in  She too reports that the patient's  had come out after the patient had received Toradol and said "I think you've got it with that"  Patient's PDMP reviewed and during the last 365 days the patient had only filled Norce prescribed yesterday and Lomotil from her GI specialist      Review of Systems:    Review of Systems   Constitutional: Negative for appetite change, chills, diaphoresis, fatigue and fever  HENT: Negative for congestion, rhinorrhea and sore throat  Eyes: Negative for visual disturbance  Respiratory: Negative for cough, chest tightness, shortness of breath and wheezing  Cardiovascular: Negative for chest pain, palpitations and leg swelling  Gastrointestinal: Positive for abdominal pain and diarrhea  Negative for blood in stool, constipation, nausea and vomiting  Genitourinary: Negative for dysuria  Musculoskeletal: Positive for arthralgias, gait problem and myalgias  Negative for joint swelling  Neurological: Positive for weakness  Negative for dizziness, syncope, light-headedness, numbness and headaches  All other systems reviewed and are negative        Past Medical and Surgical History:     Past Medical History: Diagnosis Date    Allergic rhinitis     Breast CA ([facility] [address] )     R breast CA-chemo/radiaition    Diarrhea     for 6 weeks-colonosocpy today 9/26/2016 and EGD    Hypertension     Thalassemia     Vitamin D deficiency        Past Surgical History:   Procedure Laterality Date    BREAST LUMPECTOMY Right     BREAST LUMPECTOMY Right     BREAST LUMPECTOMY Right     with lymph nodes  2007    COLONOSCOPY      CORONARY ARTERY BYPASS GRAFT      CORONARY ARTERY BYPASS GRAFT      x3, 11/2015    HYSTERECTOMY      b/l ovaries removed    [place] COLONOSCOPY FLX DX W/COLLJ SPEC WHEN PFRMD N/A 9/26/2016    Procedure: COLONOSCOPY;  Surgeon: Diane Deng DO;  Location: [facility]; Service: Gastroenterology       Meds/Allergies:    Prior to Admission medications    Medication Sig Start Date End Date Taking? Authorizing Provider   aspirin 81 MG tablet Take 81 mg by mouth daily  Historical Provider, MD   ATORVASTATIN CALCIUM PO Take 20 mg by mouth daily  Historical Provider, MD   cholestyramine (QUESTRAN) 4 g packet Take 1 packet (4 g total) by mouth 3 (three) times a day with meals 4/10/18   Kenia Dudley PA-C   ciprofloxacin (CIPRO) 500 mg tablet Take 1 tablet (500 mg total) by mouth 2 (two) times a day for 10 days 4/19/18 4/29/18  Jamar Damon PA-C   dicyclomine (BENTYL) 10 mg capsule Take 1 capsule (10 mg total) by mouth 3 (three) times a day before meals for 30 days 4/17/18 5/17/18  Russ Jim MD   diphenoxylate-atropine (LOMOTIL) 2 5-0 025 mg per tablet Take 1 tablet by mouth 3 (three) times a day for 30 days 4/17/18 5/17/18  Russ Jim MD   HYDROcodone-acetaminophen (NORCO) 5-325 mg per tablet Take 1 tablet by mouth every 6 (six) hours as needed for pain for up to 2 days Max Daily Amount: 4 tablets 4/19/18 4/21/18  Jamar Damon PA-C   metoprolol tartrate (LOPRESSOR) 25 mg tablet Take 25 mg by mouth 2 (two) times a day      Historical Provider, MD   metroNIDAZOLE (FLAGYL) 500 mg tablet Take 1 tablet (500 mg total) by mouth 3 (three) times a day for 10 days 4/19/18 4/29/18  Jimmy Robbins PA-C   ondansetron [facility]) 8 mg tablet Take 1 tablet (8 mg total) by mouth every 8 (eight) hours as needed for nausea or vomiting for up to 3 days 4/19/18 4/22/18  Jimmy Robbins PA-C   predniSONE 10 mg tablet Take 4 tablets (40 mg total) by mouth daily 4/12/18   Kylee Rowland PA-C     I have reviewed home medications with patient personally  Allergies: Allergies   Allergen Reactions    Iodinated Diagnostic Agents Anaphylaxis       Social History:     Marital Status: /Civil Union   Occupation: noncontributory   Patient Pre-hospital Living Situation: Home with    Patient Pre-hospital Level of Mobility: Full up until now  Patient Pre-hospital Diet Restrictions: None  Substance Use History:   History   Alcohol Use    2 4 oz/week    4 Glasses of wine per week     Comment: per year     History   Smoking Status    Never Smoker   Smokeless Tobacco    Never Used     History   Drug Use No       Family History:    History reviewed  No pertinent family history  Physical Exam:     Vitals:   Blood Pressure: 162/96 (04/20/18 1500)  Pulse: 64 (04/20/18 1500)  Temperature: 99 6 °F (37 6 °C) (04/20/18 1041)  Temp Source: Oral (04/20/18 1041)  Respirations: 22 (04/20/18 1500)  Weight - Scale: 62 3 kg (137 lb 5 6 oz) (04/20/18 1041)  SpO2: 95 % (04/20/18 1500)    Physical Exam   Constitutional: She is oriented to person, place, and time  Vital signs are normal  She appears well-developed and well-nourished  Non-toxic appearance  She appears distressed  HENT:   Head: Normocephalic and atraumatic  Mouth/Throat: Mucous membranes are not dry  Eyes: Conjunctivae and EOM are normal  Pupils are equal, round, and reactive to light  Right pupil is round and reactive  Left pupil is round and reactive  Pupils are equal    Neck: Neck supple     Cardiovascular: Normal rate, regular rhythm, S1 normal, S2 normal, normal heart sounds and intact distal pulses  Exam reveals no S3 and no S4  No murmur heard  Pulmonary/Chest: Effort normal and breath sounds normal  No accessory muscle usage  No respiratory distress  She has no decreased breath sounds  She has no wheezes  She has no rhonchi  She has no rales  She exhibits no tenderness  Abdominal: Soft  Bowel sounds are normal  She exhibits no distension and no mass  There is tenderness in the left lower quadrant  There is no rigidity, no rebound and no guarding  Musculoskeletal:        Right hip: She exhibits normal range of motion (passive intact, no active ROM)  Left hip: She exhibits normal range of motion (passive intact, no active ROM)  Active ROM is absent in all lower extremity joints  Passive ROM is intact in all lower extremity joints  There are no signs of any crepitus or joint effusions in the lower extremity  There is no contralateral downward pressure during [employer]  Neurological: She is alert and oriented to person, place, and time  She is not disoriented  GCS eye subscore is 4  GCS verbal subscore is 5  GCS motor subscore is 6  Skin: Skin is warm and dry  Additional Data:     Lab Results: I have personally reviewed pertinent reports  Results from last 7 days  Lab Units 04/20/18  1201   WBC Thousand/uL 5 10   HEMOGLOBIN g/dL 10 1*   HEMATOCRIT % 30 9*   PLATELETS Thousands/uL 203   LYMPHO PCT % 17   MONO PCT MAN % 15*   EOSINO PCT MANUAL % 2       Results from last 7 days  Lab Units 04/20/18  1240   SODIUM mmol/L 138   POTASSIUM mmol/L 3 0*   CHLORIDE mmol/L 98*   CO2 mmol/L 29   BUN mg/dL 17   CREATININE mg/dL 1 06   CALCIUM mg/dL 8 4   TOTAL PROTEIN g/dL 7 0   BILIRUBIN TOTAL mg/dL 0 60   ALK PHOS U/L 96   ALT U/L 24   AST U/L 27   GLUCOSE RANDOM mg/dL 104           Imaging: I have personally reviewed pertinent reports        XR spine thoracic 3 vw    (Results Pending)   XR spine lumbar 2 or 3 views injury    (Results Pending) EKG, Pathology, and Other Studies Reviewed on Admission:   · EKG: NSR  · CT abdomen pelvis without contrast: Possible wall thickening of descending colon with minimal pericolonic inflammatory stranding suspicous for infection/inflammatory colitis  No free air/fluid   · CXR: No active pulmonary disease     Allscripts / Epic Records Reviewed: Yes     ** Please Note: This note has been constructed using a voice recognition system   **

## 2018-04-20 NOTE — ED PROVIDER NOTES
History  Chief Complaint   Patient presents with    Pain     Pt here with c/o pain to her left arm, bilateral legs, and lower back  Was just here for for abd pain yesterday and had full work up  Sent home dx colitis  58-year-old female presents with chief complaint of diffuse myalgias worse and bilateral thighs and low back  Patient is also having pain in her shoulder girdle as well  Onset of pain was last night  Patient was seen here yesterday for abdominal pain was diagnosed with mild colitis and discharged with ciprofloxacin and metronidazole  Patient developed pain a few hours after initial dosing of these medications  Patient took Tylenol with codeine without relief of pain  I spoke with the patient's primary care physician, Dr Manish Deleon, who indicated the patient has been worked up for her abdominal pain and has a colonoscopy/endoscopy scheduled for this upcoming week  Dr Manish Deleon indicated the patient is not overly histrionic or traumatic and Dr del toro experience  Patient is quite demonstrably in distress during my evaluation  History provided by:  Patient   used: No    Medical Problem   Location:  Bilateral thighs, shoulders, upper arm and lower back  Quality:  Severe crampy muscle pain  Severity:  Severe  Onset quality:  Sudden  Duration:  2 days  Timing:  Constant  Progression:  Unchanged  Chronicity:  New  Context:  Recent pain   Relieved by:  Nothing  Worsened by:  Nothing  Ineffective treatments:  Tylenol with codeine  Associated symptoms: abdominal pain (chronic), headaches, myalgias and nausea (chronic)    Associated symptoms: no chest pain, no diarrhea, no fever, no rash, no shortness of breath and no vomiting        Prior to Admission Medications   Prescriptions Last Dose Informant Patient Reported? Taking? ATORVASTATIN CALCIUM PO  Self Yes No   Sig: Take 20 mg by mouth daily     HYDROcodone-acetaminophen (NORCO) 5-325 mg per tablet   No No   Sig: Take 1 tablet by mouth every 6 (six) hours as needed for pain for up to 2 days Max Daily Amount: 4 tablets   aspirin 81 MG tablet  Self Yes No   Sig: Take 81 mg by mouth daily  cholestyramine (QUESTRAN) 4 g packet  Self No No   Sig: Take 1 packet (4 g total) by mouth 3 (three) times a day with meals   ciprofloxacin (CIPRO) 500 mg tablet   No No   Sig: Take 1 tablet (500 mg total) by mouth 2 (two) times a day for 10 days   dicyclomine (BENTYL) 10 mg capsule   No No   Sig: Take 1 capsule (10 mg total) by mouth 3 (three) times a day before meals for 30 days   diphenoxylate-atropine (LOMOTIL) 2 5-0 025 mg per tablet   No No   Sig: Take 1 tablet by mouth 3 (three) times a day for 30 days   metoprolol tartrate (LOPRESSOR) 25 mg tablet  Self Yes No   Sig: Take 25 mg by mouth 2 (two) times a day  metroNIDAZOLE (FLAGYL) 500 mg tablet   No No   Sig: Take 1 tablet (500 mg total) by mouth 3 (three) times a day for 10 days   ondansetron (ZOFRAN) 8 mg tablet   No No   Sig: Take 1 tablet (8 mg total) by mouth every 8 (eight) hours as needed for nausea or vomiting for up to 3 days   predniSONE 10 mg tablet  Self No No   Sig: Take 4 tablets (40 mg total) by mouth daily      Facility-Administered Medications: None       Past Medical History:   Diagnosis Date    Allergic rhinitis     Breast CA ([address] )     R breast CA-chemo/radiaition    Diarrhea     for 6 weeks-colonosocpy today 9/26/2016 and EGD    Hypertension     Thalassemia     Vitamin D deficiency        Past Surgical History:   Procedure Laterality Date    BREAST LUMPECTOMY Right     BREAST LUMPECTOMY Right     BREAST LUMPECTOMY Right     with lymph nodes  2007    COLONOSCOPY      CORONARY ARTERY BYPASS GRAFT      CORONARY ARTERY BYPASS GRAFT      x3, 11/2015    HYSTERECTOMY      b/l ovaries removed    LA COLONOSCOPY FLX DX W/COLLJ SPEC WHEN PFRMD N/A 9/26/2016    Procedure: COLONOSCOPY;  Surgeon: Dulce Olivares DO;  Location: [facility];   Service: Gastroenterology History reviewed  No pertinent family history  I have reviewed and agree with the history as documented  Social History   Substance Use Topics    Smoking status: Never Smoker    Smokeless tobacco: Never Used    Alcohol use 2 4 oz/week     4 Glasses of wine per week      Comment: per year        Review of Systems   Constitutional: Negative for chills, diaphoresis and fever  Respiratory: Negative for shortness of breath  Cardiovascular: Negative for chest pain and palpitations  Gastrointestinal: Positive for abdominal pain (chronic) and nausea (chronic)  Negative for diarrhea and vomiting  Genitourinary: Negative for dysuria and frequency  Musculoskeletal: Positive for arthralgias (bilateral knees, elbows and shoulder) and myalgias  Negative for joint swelling and neck stiffness  Skin: Negative for rash  Neurological: Positive for headaches  All other systems reviewed and are negative  Physical Exam  ED Triage Vitals [04/20/18 1041]   Temperature Pulse Respirations Blood Pressure SpO2   99 6 °F (37 6 °C) 66 18 140/79 98 %      Temp Source Heart Rate Source Patient Position - Orthostatic VS BP Location FiO2 (%)   Oral Monitor Sitting Right arm --      Pain Score       Worst Possible Pain           Orthostatic Vital Signs  Vitals:    04/20/18 1041 04/20/18 1200 04/20/18 1300   BP: 140/79 108/67 163/82   Pulse: 66 66 68   Patient Position - Orthostatic VS: Sitting Lying Lying       Physical Exam   Constitutional: She is oriented to person, place, and time  She appears well-developed and well-nourished  She appears distressed (severe)  HENT:   Head: Normocephalic and atraumatic  Eyes: EOM are normal  Pupils are equal, round, and reactive to light  Neck: Normal range of motion  No JVD present  Cardiovascular: Normal rate, regular rhythm, normal heart sounds and intact distal pulses  Exam reveals no gallop and no friction rub  No murmur heard    Pulmonary/Chest: Effort normal and breath sounds normal  No respiratory distress  She has no wheezes  She has no rales  She exhibits no tenderness  Musculoskeletal: Normal range of motion  She exhibits no tenderness  Neurological: She is alert and oriented to person, place, and time  Skin: Skin is warm and dry  Psychiatric: She has a normal mood and affect  Her behavior is normal  Judgment and thought content normal    Nursing note and vitals reviewed  ED Medications  Medications   potassium chloride (K-DUR,KLOR-CON) CR tablet 40 mEq (not administered)   potassium chloride 20 mEq IVPB (premix) (not administered)   acetaminophen (TYLENOL) tablet 650 mg (650 mg Oral Given 4/20/18 1147)   HYDROmorphone (DILAUDID) injection 1 mg (1 mg Intravenous Given 4/20/18 1203)   ketorolac (TORADOL) injection 15 mg (15 mg Intravenous Given 4/20/18 1256)       Diagnostic Studies  Results Reviewed     Procedure Component Value Units Date/Time    Comprehensive metabolic panel [[id number]]  (Abnormal) Collected:  04/20/18 1240    Lab Status:  Final result Specimen:  Blood from Arm, Left Updated:  04/20/18 1340     Sodium 138 mmol/L      Potassium 3 0 (L) mmol/L      Chloride 98 (L) mmol/L      CO2 29 mmol/L      Anion Gap 11 mmol/L      BUN 17 mg/dL      Creatinine 1 06 mg/dL      Glucose 104 mg/dL      Calcium 8 4 mg/dL      AST 27 U/L      ALT 24 U/L      Alkaline Phosphatase 96 U/L      Total Protein 7 0 g/dL      Albumin 2 9 (L) g/dL      Total Bilirubin 0 60 mg/dL      eGFR 58 ml/min/1 73sq m     Narrative:         National Kidney Disease Education Program recommendations are as follows:  GFR calculation is accurate only with a steady state creatinine  Chronic Kidney disease less than 60 ml/min/1 73 sq  meters  Kidney failure less than 15 ml/min/1 73 sq  meters      Lipase [[id number]]  (Normal) Collected:  04/20/18 1240    Lab Status:  Final result Specimen:  Blood from Arm, Left Updated:  04/20/18 1340     Lipase 77 u/L     Magnesium [[id number]] (Normal) Collected:  04/20/18 1240    Lab Status:  Final result Specimen:  Blood from Arm, Left Updated:  04/20/18 1340     Magnesium 2 0 mg/dL     C-reactive protein [[id number]]  (Abnormal) Collected:  04/20/18 1240    Lab Status:  Final result Specimen:  Blood from Arm, Left Updated:  04/20/18 1340     CRP 71 8 (H) mg/L     Sedimentation rate, automated [[id number]]  (Normal) Collected:  04/19/18 1419    Lab Status:  Final result Specimen:  Blood from Arm, Left Updated:  04/20/18 1340     Sed Rate 20 mm/hour     Troponin I [[id number]]  (Normal) Collected:  04/20/18 1240    Lab Status:  Final result Specimen:  Blood from Arm, Left Updated:  04/20/18 1314     Troponin I <0 02 ng/mL     Narrative:         [employer] Chemistry analyzer 99% cutoff is > 0 04 ng/mL in network labs    o cTnI 99% cutoff is useful only when applied to patients in the clinical setting of myocardial ischemia  o cTnI 99% cutoff should be interpreted in the context of clinical history, ECG findings and possibly cardiac imaging to establish correct diagnosis  o cTnI 99% cutoff may be suggestive but clearly not indicative of a coronary event without the clinical setting of myocardial ischemia  CBC and differential [[id number]]  (Abnormal) Collected:  04/20/18 1201    Lab Status:  Final result Specimen:  Blood from Arm, Left Updated:  04/20/18 1254     WBC 5 10 Thousand/uL      RBC 5 42 (H) Million/uL      Hemoglobin 10 1 (L) g/dL      Hematocrit 30 9 (L) %      MCV 57 (L) fL      MCH 18 6 (L) pg      MCHC 32 7 g/dL      RDW 16 0 (H) %      MPV 10 7 fL      Platelets 328 Thousands/uL     D-dimer, quantitative [[id number]] Collected:  04/20/18 1201    Lab Status:   In process Specimen:  Blood from Arm, Left Updated:  04/20/18 1206                 No orders to display              Procedures  ECG 12 Lead Documentation  Date/Time: 4/20/2018 12:08 PM  Performed by: Raul Rizzo by: Gabby Forrest     Indications / Diagnosis:  Diffuse pain, including chest  ECG reviewed by me, the ED Provider: yes    Patient location:  ED  Previous ECG:     Previous ECG:  Unavailable  Interpretation:     Interpretation: normal    Rate:     ECG rate:  68    ECG rate assessment: normal    Rhythm:     Rhythm: sinus rhythm    Ectopy:     Ectopy: none    QRS:     QRS axis:  Normal    QRS intervals:  Normal  Conduction:     Conduction: normal    ST segments:     ST segments:  Normal  T waves:     T waves: normal             Phone Contacts  ED Phone Contact    ED Course  ED Course                                MDM  Number of Diagnoses or Management Options  Abdominal pain: new and requires workup  Hypokalemia: new and requires workup  Myalgia: new and requires workup  Weight loss: new and requires workup  Diagnosis management comments: Background: 61 y o  female presents with generalized myalgias (bilateral thighs, low back, shoulder girdle)  Differential DX includes but is not limited to: hypokalemia, inflammatory disease, side effect of medication    Plan: cbc, metabolic panel, symptomatic treatment         Amount and/or Complexity of Data Reviewed  Clinical lab tests: ordered and reviewed    Risk of Complications, Morbidity, and/or Mortality  Diagnostic procedures: high  Management options: high  General comments: I found a case report of ciprofloxacin induced severe myalgias in the International Journal of Clinical Pharmacology and Therapeutics titled Ciprofloxacin-related acute severe myalgia necessitating emergency care treatment: a case report and review of the literature      Patient Progress  Patient progress: improved    CritCare Time    Disposition  Final diagnoses:   Myalgia - acute severe diffuse   Abdominal pain   Hypokalemia   Weight loss     Time reflects when diagnosis was documented in both MDM as applicable and the Disposition within this note     Time User Action Codes Description Comment    4/20/2018  2:26 PM Clyde Canela Add [M79 1] Myalgia 4/20/2018  2:26 PM Kassidycarmen Callum POWELL Modify [M79 1] Myalgia acute severe diffuse    4/20/2018  2:26 PM Niya POWELL Add [R10 9] Abdominal pain     4/20/2018  2:26 PM Geovani Andrew Add [E87 6] Hypokalemia     4/20/2018  2:26 PM Niya POWELL Add [R63 4] Weight loss       ED Disposition     ED Disposition Condition Comment    Admit  Case was discussed with Dr Ana Snowden and the patient's admission status was agreed to be Admission Status: inpatient status to the service of Dr Ana Snowden   Follow-up Information    None       Patient's Medications   Discharge Prescriptions    No medications on file     No discharge procedures on file      ED Provider  Electronically Signed by           Sadiq Tan MD  04/20/18 [address] [facility] [place] [address], [place]  04/20/18 9993

## 2018-04-20 NOTE — ASSESSMENT & PLAN NOTE
· ?Present prior to admission as well as after admission to ER  According to RN patient was not having leg complaints upon entering the hospital, however she had noted that the patient only started complaining and writhing in pain after a certain relative entered the room  Possibly had started after the patient had taken a dosage of Hydrocodone this morning, however continues to beg for pain medication and ask what pain medication I will be giving her  Patient moans and writhes, however her speech is mumbling  She and her  had noticed improvement with Toradol given in the ER  No prior occurrence  No decreased passive ROM  Active ROM is absent, however when holding the patient's heels and asking her to raise a leg there is no downward force on the contralateral leg  PDMP reviewed and she has only filled the Lomotil and Hydrocodone in the last year, however there is some suspicion for seeking behavior    · Admit patient to med/surg under inpatient status   · Hold narcotics at this time given suspicious behavior, non-focal exam with possible symptom magnification, and possibly etiology of complaint   · Toradol 15 mg IV Q6 PRN severe pain   · Monitor GFR closely   · Lidoderm  · Robaxin 500 mg Q6 PRN   · Ativan 1 mg IV Q6 PRN agitation/anxiety   · Check CT scan thoracic and lumbar spine   · Check CK-MB to rule out rhabdo

## 2018-04-21 ENCOUNTER — APPOINTMENT (INPATIENT)
Dept: ULTRASOUND IMAGING | Facility: HOSPITAL | Age: 58
DRG: 386 | End: 2018-04-21
Payer: COMMERCIAL

## 2018-04-21 PROBLEM — I25.10 CORONARY ARTERY DISEASE: Status: ACTIVE | Noted: 2018-04-20

## 2018-04-21 PROBLEM — R79.89 ELEVATED D-DIMER: Status: ACTIVE | Noted: 2018-04-21

## 2018-04-21 PROBLEM — M06.9 RHEUMATOID ARTHRITIS (HCC): Status: ACTIVE | Noted: 2018-04-21

## 2018-04-21 LAB
ANION GAP SERPL CALCULATED.3IONS-SCNC: 11 MMOL/L (ref 4–13)
ATRIAL RATE: 71 BPM
BUN SERPL-MCNC: 10 MG/DL (ref 5–25)
CALCIUM SERPL-MCNC: 8.3 MG/DL (ref 8.3–10.1)
CHLORIDE SERPL-SCNC: 102 MMOL/L (ref 100–108)
CO2 SERPL-SCNC: 25 MMOL/L (ref 21–32)
CREAT SERPL-MCNC: 0.76 MG/DL (ref 0.6–1.3)
ERYTHROCYTE [DISTWIDTH] IN BLOOD BY AUTOMATED COUNT: 16 % (ref 11.6–15.1)
FERRITIN SERPL-MCNC: 326 NG/ML (ref 8–388)
FOLATE SERPL-MCNC: 10.7 NG/ML (ref 3.1–17.5)
GFR SERPL CREATININE-BSD FRML MDRD: 87 ML/MIN/1.73SQ M
GLUCOSE SERPL-MCNC: 104 MG/DL (ref 65–140)
HCT VFR BLD AUTO: 32.7 % (ref 34.8–46.1)
HGB BLD-MCNC: 10.7 G/DL (ref 11.5–15.4)
IRON SATN MFR SERPL: 5 %
IRON SERPL-MCNC: 13 UG/DL (ref 50–170)
MCH RBC QN AUTO: 18.6 PG (ref 26.8–34.3)
MCHC RBC AUTO-ENTMCNC: 32.7 G/DL (ref 31.4–37.4)
MCV RBC AUTO: 57 FL (ref 82–98)
P AXIS: 53 DEGREES
PLATELET # BLD AUTO: 163 THOUSANDS/UL (ref 149–390)
PMV BLD AUTO: 10 FL (ref 8.9–12.7)
POTASSIUM SERPL-SCNC: 4 MMOL/L (ref 3.5–5.3)
PR INTERVAL: 166 MS
QRS AXIS: 56 DEGREES
QRSD INTERVAL: 84 MS
QT INTERVAL: 390 MS
QTC INTERVAL: 415 MS
RBC # BLD AUTO: 5.76 MILLION/UL (ref 3.81–5.12)
SODIUM SERPL-SCNC: 138 MMOL/L (ref 136–145)
T WAVE AXIS: 63 DEGREES
TIBC SERPL-MCNC: 269 UG/DL (ref 250–450)
VENTRICULAR RATE: 68 BPM
VIT B12 SERPL-MCNC: 1346 PG/ML (ref 100–900)
WBC # BLD AUTO: 4.84 THOUSAND/UL (ref 4.31–10.16)

## 2018-04-21 PROCEDURE — 82306 VITAMIN D 25 HYDROXY: CPT | Performed by: INTERNAL MEDICINE

## 2018-04-21 PROCEDURE — 83550 IRON BINDING TEST: CPT | Performed by: INTERNAL MEDICINE

## 2018-04-21 PROCEDURE — 87493 C DIFF AMPLIFIED PROBE: CPT | Performed by: INTERNAL MEDICINE

## 2018-04-21 PROCEDURE — 80048 BASIC METABOLIC PNL TOTAL CA: CPT | Performed by: PHYSICIAN ASSISTANT

## 2018-04-21 PROCEDURE — 99254 IP/OBS CNSLTJ NEW/EST MOD 60: CPT | Performed by: INTERNAL MEDICINE

## 2018-04-21 PROCEDURE — 82607 VITAMIN B-12: CPT | Performed by: INTERNAL MEDICINE

## 2018-04-21 PROCEDURE — 83020 HEMOGLOBIN ELECTROPHORESIS: CPT | Performed by: INTERNAL MEDICINE

## 2018-04-21 PROCEDURE — 83540 ASSAY OF IRON: CPT | Performed by: INTERNAL MEDICINE

## 2018-04-21 PROCEDURE — 82728 ASSAY OF FERRITIN: CPT | Performed by: INTERNAL MEDICINE

## 2018-04-21 PROCEDURE — 85027 COMPLETE CBC AUTOMATED: CPT | Performed by: PHYSICIAN ASSISTANT

## 2018-04-21 PROCEDURE — 82746 ASSAY OF FOLIC ACID SERUM: CPT | Performed by: INTERNAL MEDICINE

## 2018-04-21 PROCEDURE — 99232 SBSQ HOSP IP/OBS MODERATE 35: CPT | Performed by: INTERNAL MEDICINE

## 2018-04-21 PROCEDURE — 93010 ELECTROCARDIOGRAM REPORT: CPT | Performed by: INTERNAL MEDICINE

## 2018-04-21 PROCEDURE — 93970 EXTREMITY STUDY: CPT

## 2018-04-21 RX ORDER — ASPIRIN 81 MG/1
81 TABLET, CHEWABLE ORAL DAILY
Status: DISCONTINUED | OUTPATIENT
Start: 2018-04-22 | End: 2018-04-26 | Stop reason: HOSPADM

## 2018-04-21 RX ORDER — KETOROLAC TROMETHAMINE 30 MG/ML
15 INJECTION, SOLUTION INTRAMUSCULAR; INTRAVENOUS EVERY 6 HOURS PRN
Status: DISPENSED | OUTPATIENT
Start: 2018-04-21 | End: 2018-04-22

## 2018-04-21 RX ORDER — ZOLPIDEM TARTRATE 5 MG/1
5 TABLET ORAL
Status: DISCONTINUED | OUTPATIENT
Start: 2018-04-21 | End: 2018-04-26 | Stop reason: HOSPADM

## 2018-04-21 RX ORDER — KETOROLAC TROMETHAMINE 30 MG/ML
15 INJECTION, SOLUTION INTRAMUSCULAR; INTRAVENOUS EVERY 6 HOURS SCHEDULED
Status: DISCONTINUED | OUTPATIENT
Start: 2018-04-21 | End: 2018-04-21

## 2018-04-21 RX ORDER — METRONIDAZOLE 500 MG/1
500 TABLET ORAL EVERY 8 HOURS SCHEDULED
Status: DISCONTINUED | OUTPATIENT
Start: 2018-04-21 | End: 2018-04-26 | Stop reason: HOSPADM

## 2018-04-21 RX ADMIN — LIDOCAINE 1 PATCH: 50 PATCH TOPICAL at 08:56

## 2018-04-21 RX ADMIN — METOPROLOL TARTRATE 25 MG: 25 TABLET, FILM COATED ORAL at 08:56

## 2018-04-21 RX ADMIN — CHOLESTYRAMINE 4 G: 4 POWDER, FOR SUSPENSION ORAL at 16:09

## 2018-04-21 RX ADMIN — METHOCARBAMOL 500 MG: 500 TABLET ORAL at 09:00

## 2018-04-21 RX ADMIN — DICYCLOMINE HYDROCHLORIDE 10 MG: 10 CAPSULE ORAL at 08:57

## 2018-04-21 RX ADMIN — LORAZEPAM 1 MG: 2 INJECTION INTRAMUSCULAR; INTRAVENOUS at 16:10

## 2018-04-21 RX ADMIN — DICYCLOMINE HYDROCHLORIDE 10 MG: 10 CAPSULE ORAL at 16:10

## 2018-04-21 RX ADMIN — METHOCARBAMOL 500 MG: 500 TABLET ORAL at 16:10

## 2018-04-21 RX ADMIN — METRONIDAZOLE 500 MG: 500 TABLET ORAL at 17:30

## 2018-04-21 RX ADMIN — ASPIRIN 81 MG 81 MG: 81 TABLET ORAL at 08:56

## 2018-04-21 RX ADMIN — METOPROLOL TARTRATE 25 MG: 25 TABLET, FILM COATED ORAL at 18:26

## 2018-04-21 RX ADMIN — KETOROLAC TROMETHAMINE 15 MG: 30 INJECTION, SOLUTION INTRAMUSCULAR at 11:56

## 2018-04-21 RX ADMIN — METHOCARBAMOL 500 MG: 500 TABLET ORAL at 02:01

## 2018-04-21 RX ADMIN — METHOCARBAMOL 500 MG: 500 TABLET ORAL at 23:56

## 2018-04-21 RX ADMIN — DICYCLOMINE HYDROCHLORIDE 10 MG: 10 CAPSULE ORAL at 11:56

## 2018-04-21 RX ADMIN — KETOROLAC TROMETHAMINE 15 MG: 30 INJECTION, SOLUTION INTRAMUSCULAR at 05:21

## 2018-04-21 RX ADMIN — ENOXAPARIN SODIUM 40 MG: 40 INJECTION SUBCUTANEOUS at 08:57

## 2018-04-21 RX ADMIN — CEFTRIAXONE 1000 MG: 1 INJECTION, SOLUTION INTRAVENOUS at 17:30

## 2018-04-21 RX ADMIN — KETOROLAC TROMETHAMINE 15 MG: 30 INJECTION, SOLUTION INTRAMUSCULAR at 18:27

## 2018-04-21 RX ADMIN — LORAZEPAM 1 MG: 2 INJECTION INTRAMUSCULAR; INTRAVENOUS at 23:57

## 2018-04-21 RX ADMIN — METRONIDAZOLE 500 MG: 500 TABLET ORAL at 21:44

## 2018-04-21 RX ADMIN — CHOLESTYRAMINE 4 G: 4 POWDER, FOR SUSPENSION ORAL at 08:56

## 2018-04-21 RX ADMIN — CHOLESTYRAMINE 4 G: 4 POWDER, FOR SUSPENSION ORAL at 21:44

## 2018-04-21 NOTE — ASSESSMENT & PLAN NOTE
· POA, noted on CT scan from 4/19  · GI following - discussed with them today  · There was concern on admission that the above symptoms may have been related to antibiotics? · GI recommended discussion with ID regarding abx choice   Case discussed - will place on ceftriaxone and flagyl  · GI planning for scope Monday

## 2018-04-21 NOTE — CASE MANAGEMENT
Thank you,  [facility] in the [employer] by [place] for 2017  Network Utilization Review Department  Phone: [phone]; Fax [phone]  ATTENTION: The Network Utilization Review Department is now centralized for our 7 Facilities  Make a note that we have a new phone and fax numbers for our Department  Please call with any questions or concerns to [phone] and carefully follow the prompts so that you are directed to the right person  All voicemails are confidential  Fax any determinations, approvals, denials, and requests for initial or continue stay review clinical to [phone]  Due to HIGH CALL volume, it would be easier if you could please send faxed requests to expedite your requests and in part, help us provide discharge notifications faster     ================================================================    Initial Clinical Review    Admission: Date/Time/Statement: 4/20/18 @ 1427   Orders Placed This Encounter   Procedures    Inpatient Admission (expected length of stay for this patient is greater than two midnights)     Standing Status:   Standing     Number of Occurrences:   1     Order Specific Question:   Admitting Physician     Answer:   Juanito Redmond     Order Specific Question:   Level of Care     Answer:   Med Surg [16]     Order Specific Question:   Estimated length of stay     Answer:   More than 2 Midnights     Order Specific Question:   Certification     Answer:   I certify that inpatient services are medically necessary for this patient for a duration of greater than two midnights  See H&P and MD Progress Notes for additional information about the patient's course of treatment           ED: Date/Time/Mode of Arrival:   ED Arrival Information     Expected Arrival Acuity Means of Arrival Escorted By Service Admission Type    - 4/20/2018 10:28 Emergent Ambulance [facility] Medicine Emergency    Arrival Complaint    - leg pain          Chief Complaint:   Chief Complaint   Patient presents with    Pain     Pt here with c/o pain to her left arm, bilateral legs, and lower back  Was just here for for abd pain yesterday and had full work up  Sent home dx colitis  History of Illness:   Rosey Lopez is a 61 y o  female with a history of Coronary Artery Disease, HTN who presents with bilateral leg pain  The patient reports that she had been seen yesterday in the ER for abdominal pain  She had been having diarrhea for 6 weeks and had been following a GI specialist  She was worked up in the ER, diagnosed with colitis, and sent home with PO antibiotics (Cipro and Flagyl), Norco, and Colace  She reports that this morning she took her medications and shortly after, the Norco specifically she mentioned, she started having bilateral shooting leg pain that radiated up and down her legs as well as other random areas of her body  She reports that she was unable to walk  Patient continues to writhe on the bed while speaking  She denies prior occurrence of this happening  She denies numbness or tingling in any of her extremities  Both her and her  noted improvement with toradol earlier in the ER   As for her abdominal complaints she reports that she has minimal abdominal pain,    ED Vital Signs:   ED Triage Vitals [04/20/18 1041]   Temperature Pulse Respirations Blood Pressure SpO2   99 6 °F (37 6 °C) 66 18 140/79 98 %      Temp Source Heart Rate Source Patient Position - Orthostatic VS BP Location FiO2 (%)   Oral Monitor Sitting Right arm --      Pain Score       Worst Possible Pain        Wt Readings from Last 1 Encounters:   04/20/18 62 3 kg (137 lb 5 6 oz)       Abnormal Labs/Diagnostic Test Results:   WBC Thousand/uL 5 10   HEMOGLOBIN g/dL 10 1*   HEMATOCRIT % 30 9*   PLATELETS Thousands/uL 203     SODIUM mmol/L 138   POTASSIUM mmol/L 3 0*   CHLORIDE mmol/L 98*   CO2 mmol/L 29   BUN mg/dL 17   CREATININE mg/dL 1 06   CALCIUM mg/dL 8 4   TOTAL PROTEIN g/dL 7 0   BILIRUBIN TOTAL mg/dL 0 60   ALK PHOS U/L 96   ALT U/L 24   AST U/L 27   GLUCOSE RANDOM mg/dL 104     EKG: NSR    CT abdomen pelvis without contrast: Possible wall thickening of descending colon with minimal pericolonic inflammatory stranding suspicous for infection/inflammatory colitis  No free air/fluid     CXR: No active pulmonary disease       ED Treatment:   Medication Administration from 04/20/2018 1028 to 04/20/2018 1545       Date/Time Order Dose Route Action Action by Comments     04/20/2018 1147 acetaminophen (TYLENOL) tablet 650 mg 650 mg Oral Given April  Edgardo Caceres RN      04/20/2018 1203 HYDROmorphone (DILAUDID) injection 1 mg 1 mg Intravenous Given April  Edgardo Caceres RN      04/20/2018 1256 ketorolac (TORADOL) injection 15 mg 15 mg Intravenous Given April  Edgardo Caceres RN      04/20/2018 1527 potassium chloride (K-DUR,KLOR-CON) CR tablet 40 mEq 40 mEq Oral Given April ELE Caceres RN      04/20/2018 1517 potassium chloride 20 mEq IVPB (premix) 20 mEq Intravenous New Bag April John Anne RN           Past Medical/Surgical History: Active Ambulatory Problems     Diagnosis Date Noted    Colitis 87/43/3828    Periumbilic abdominal tenderness 04/17/2018    Rectal discomfort 04/17/2018    CRP elevated 04/17/2018     Past Medical History:   Diagnosis Date    Allergic rhinitis     Breast CA ([address] )     Diarrhea     Hypertension     Thalassemia     Vitamin D deficiency        Admitting Diagnosis: Hypokalemia [E87 6]  Myalgia [M79 1]  Colitis [K52 9]  Abdominal pain [R10 9]  Multiple complaints [R68 89]  Weight loss [R63 4]    Age/Sex: 61 y o  female    Assessment/Plan:        * Bilateral radiating leg pain   Assessment & Plan     · ? Present prior to admission as well as after admission to ER   According to RN patient was not having leg complaints upon entering the hospital, however she had noted that the patient only started complaining and writhing in pain after a certain relative entered the room  Possibly had started after the patient had taken a dosage of Hydrocodone this morning, however continues to beg for pain medication and ask what pain medication I will be giving her  Patient moans and writhes, however her speech is mumbling  She and her  had noticed improvement with Toradol given in the ER  No prior occurrence  No decreased passive ROM  Active ROM is absent, however when holding the patient's heels and asking her to raise a leg there is no downward force on the contralateral leg  PDMP reviewed and she has only filled the Lomotil and Hydrocodone in the last year, however there is some suspicion for seeking behavior  ? Admit patient to med/surg under inpatient status   ? Hold narcotics at this time given suspicious behavior, non-focal exam with possible symptom magnification, and possibly etiology of complaint   ? Toradol 15 mg IV Q6 PRN severe pain   § Monitor GFR closely   ? Lidoderm  ? Robaxin 500 mg Q6 PRN   ? Ativan 1 mg IV Q6 PRN agitation/anxiety   ? Check CT scan thoracic and lumbar spine   ? Check CK-MB to rule out rhabdo            Colitis   Assessment & Plan     · POA, noted on CT scan  Follows Dr Seun Shetty  No fever or leukocytosis noted  No signs of sepsis  ? Given concerning symptoms that may or may not have been related to antibiotics will hold for now   ? Would consider restarting antibiotics if she spikes a temp  ? Consult gastroenterology   ? Clear liquid diet   ? Supportive care   § Bentyl           Coronary artery disease involving native coronary artery of native heart without angina pectoris   Assessment & Plan     · Patient with history of triple bypass  No symptoms  EKG and troponin within normal limits  ? Continue home regimen   § Metoprolol   § Lipitor   § ASA           Essential hypertension   Assessment & Plan     · BP slightly elevated likely secondary to pain   ?  Continue Metoprolol          Hypokalemia Assessment & Plan     · POA, 3 0   ? Repleted in ER  ? Recheck BMP in AM                VTE Prophylaxis: Enoxaparin (Lovenox)  / harvey sequential compression device      Anticipated Length of Stay:  Patient will be admitted on an Inpatient basis with an anticipated length of stay of  Greater than 2 midnights  Justification for Hospital Stay: Intractable pain in legs, colitis needing further emergent work up        Admission Orders:  Scheduled Meds:   Current Facility-Administered Medications:  acetaminophen 650 mg Oral Q6H PRN Thelma Tate PA-C   [START ON 4/22/2018] aspirin 81 mg Oral Daily Tyler Quinn MD   atorvastatin 20 mg Oral Daily Bharat Ignacio PA-C   cholestyramine sugar free 4 g Oral TID Bharat Ignacio PA-C   dicyclomine 10 mg Oral TID AC Bharat Godoy PA-C   enoxaparin 40 mg Subcutaneous Daily Bharat Ignacio PA-C   ketorolac 15 mg Intravenous Q6H PRN Catalinawilfredo Armstrong PA-C   lidocaine 1 patch Transdermal Daily Bharat Godoy PA-C   LORazepam 1 mg Intravenous Q6H PRN Bharat Ignacio PA-C   methocarbamol 500 mg Oral Q6H PRN Bharat Ignacio PA-C   metoprolol tartrate 25 mg Oral BID Bharat Godoy PA-C   ondansetron 4 mg Intravenous Q6H PRN Thelma Tate PA-C     Lower limb venous duplex: pending  Consult PT  Consult neuro      04/21/208  Consult GI  ASSESSMENT/PLAN:   1  Watery Diarrhea for 6 weeks, with elevated CRP and bandemia on most recent labs:  Concerning for infection versus inflammatory bowel disease  Patient underwent colonoscopy in 2016 and was noted to have pan colitis, biopsies showed crypt distortion however there was concern that it may have been infectious in etiology as well as patient had recent travel  Patient was then lost to follow-up and was only recently seen by Dr Amelia Sainz in the office  She was started on prednisone which she only took intermittently    She was scheduled for colonoscopy this coming Monday with Dr Noni Hand to assess whether this is inflammatory bowel disease versus infection  Stool studies including ova and parasites, bacteria were negative  I do not see results of C diff yet  Inflammatory markers including ESR and CRP were elevated in the outpatient setting  CT scan 2 days ago showed mild thickening of the descending colon with minimal pericolonic inflammatory stranding  Given persistent symptoms, would recommend to check stool for C diff  Most recently, she was in the emergency room 2 days ago and was discharged home with Cipro and Flagyl however patient developed severe bilateral lower extremity pain and is currently admitted for the same  It is unclear if lower extremity pain is secondary to an extra intestinal manifestation of inflammatory bowel disease versus separate rheumatologic process   -would recommend IV antibiotics given bandemia, diarrhea and elevated inflammatory markers however given onset of lower extremity weakness followed by Cipro and Flagyl, would defer to Infectious Disease regarding appropriate antibiotic choice  Would hold off on cipro due to side effect of tendon rupture   -pending workup for lower extremity weakness, she may benefit from inpatient colonoscopy if C diff is negative   -this can be scheduled tentatively for Monday if LE weakness improves, this was discussed with the patient and family   -check vitamin-D levels, vitamin B12, folate and iron studies      2  Severe microcytic anemia:  As per patient report this is chronic, denies melena, hematemesis or hematochezia  Hemoglobin is at baseline   -check vitamin B12, folate, iron studies

## 2018-04-21 NOTE — PROGRESS NOTES
Progress Note - Homa Ocampo 1960, 61 y o  female MRN: [id number]    Unit/Bed#: -01 Encounter: [id number]    Primary Care Provider: Jacob Fuller MD   Date and time admitted to hospital: 4/20/2018 10:28 AM        * Bilateral radiating leg pain   Assessment & Plan    · Please refer to H&P in regards to onset of these symptoms  · Patient reports improvement in pain with Toradol 15 mg IV Q6 PRN - continue   · Renal function stable  · XR thoracic and lumbar spine without acute findings  · I discussed with neurology today - they recommended MRI thoracic and lumbar spine with gadolinium  I informed neurology of patient's documented anaphylactic allergy to iodinated diagnostic agents  Per neurology, gadolinium OK  · Neurology consult in AM  · Will check vitamin D, B12, folate  · Patient also reports history of RA, not on any medications, follows at [facility]  · ESR 20, CRP 71 8        Colitis   Assessment & Plan    · POA, noted on CT scan from 4/19  · GI following - discussed with them today  · There was concern on admission that the above symptoms may have been related to antibiotics? · GI recommended discussion with ID regarding abx choice  Case discussed - will place on ceftriaxone and flagyl  · GI planning for scope Monday        Elevated d-dimer   Assessment & Plan    · D-dimer noted to be >10,000 on admission  · Given b/l LE pain, will check dopplers  · Patient not hypoxic  Denies CP   If respiratory symptoms or hypoxia, would check VQ (patient has anaphylactic allergy to iodinated diagnostic agents)        Rheumatoid arthritis [facility])   Assessment & Plan    · Follows doctor at [facility]  · Patient denies being on any medications for this as outpatient  · If symptoms persist and negative work up as per above, rheumatology consult        Hypokalemia   Assessment & Plan    · Resolved with repletion        Essential hypertension   Assessment & Plan    · /89  · Continue Metoprolol Coronary artery disease   Assessment & Plan    · S/p 3-vessel CABG 2015  · Continue BB, aspirin, statin  · Trop <0 02          VTE Pharmacologic Prophylaxis:   Pharmacologic: Enoxaparin (Lovenox)  Mechanical VTE Prophylaxis in Place: No - painful b/l LE    Patient Centered Rounds: I have performed bedside rounds with nursing staff today  Discussions with Specialists or Other Care Team Provider: I have discussed this case with GI, neurology, ID, and my attending  My attending to see and evaluate the patient  Nursing  Case management  Education and Discussions with Family / Patient: Patient and her , daughter, and sister present at bedside    Time Spent for Care: 45 minutes  More than 50% of total time spent on counseling and coordination of care as described above  Current Length of Stay: 1 day(s)    Current Patient Status: Inpatient   Certification Statement: The patient will continue to require additional inpatient hospital stay due to plan as per above    Discharge Plan: Pending clinical course    Code Status: Level 1 - Full Code      Subjective:   Ms Rosendo Holman reports continued bilateral lower extremity pain  She describes is as being "pounded with a hammer"  Pain is from her hips down to her feet  It is an 10, down from 11/10 on admission she reports  She also reports weakness of LE and "numbness"  She denies any pain in her arms  Prior, she was ambulating without difficulty and working out  She denies any bowel or bladder incontinence or pelvic or anal numbness/tingling  She denies back pain  Objective:     Vitals:   Temp (24hrs), Av °F (37 2 °C), Min:98 5 °F (36 9 °C), Max:99 8 °F (37 7 °C)    HR:  [] 107  Resp:  [18] 18  BP: (119-155)/(81-89) 133/89  SpO2:  [92 %-97 %] 92 %  Body mass index is 25 12 kg/m²  Input and Output Summary (last 24 hours):        Intake/Output Summary (Last 24 hours) at 18 1645  Last data filed at 18 1100   Gross per 24 hour Intake              480 ml   Output                0 ml   Net              480 ml       Physical Exam:     Physical Exam   Constitutional: She is oriented to person, place, and time  Patient seen lying on her back in bed, uncomfortable appearing, rocking back and forth in bed   Eyes: Pupils are equal, round, and reactive to light  Cardiovascular: Normal rate and regular rhythm  Pulmonary/Chest: Effort normal and breath sounds normal  No respiratory distress  She has no wheezes  Abdominal: Soft  Bowel sounds are normal  There is no tenderness  Musculoskeletal: She exhibits tenderness (bilateral lower extremities diffusely )  She exhibits no edema  She does not sit up in bed, but when she rolls on to her right side, she denies tenderness to palpation of spine   Neurological: She is alert and oriented to person, place, and time  Patient is alert and oriented to person, place, year, and current [address] [facility] president  She reports equal sensation to light touch on b/l LE diffusely but that light touch causes her pain  She initially states that she is unable to straighten her legs, however she is able to move her legs from flexed at the hips and knees to straight out in bed on her own  With her hips and knees flexed she was able to rock her legs laterally from side to side  Lower extremity strength with poor effort: patient is able to lift her legs off the bed bilaterally and plantar and dorsiflex her feet bilaterally against resistance but only does so for a second - exam is limited as a result but the strength she exhibits appears equal b/l  She is able to move her upper extremities and denies weakness or pain there but again has poor effort and only shrugs her shoulders briefly, squeezes hands for  strength, and flexes and extends at the elbows  Speech is soft but understandable   Skin: Skin is warm  Vitals reviewed        Additional Data:     Labs:      Results from last 7 days  Lab Units 04/21/18  6201 04/20/18  1201   WBC Thousand/uL 4 84  --  5 10   HEMOGLOBIN g/dL 10 7*  --  10 1*   HEMATOCRIT % 32 7*  --  30 9*   PLATELETS Thousands/uL 163  < > 203   LYMPHO PCT %  --   --  17   MONO PCT MAN %  --   --  15*   EOSINO PCT MANUAL %  --   --  2   < > = values in this interval not displayed  Results from last 7 days  Lab Units 04/21/18  0449 04/20/18  1240   SODIUM mmol/L 138 138   POTASSIUM mmol/L 4 0 3 0*   CHLORIDE mmol/L 102 98*   CO2 mmol/L 25 29   BUN mg/dL 10 17   CREATININE mg/dL 0 76 1 06   CALCIUM mg/dL 8 3 8 4   TOTAL PROTEIN g/dL  --  7 0   BILIRUBIN TOTAL mg/dL  --  0 60   ALK PHOS U/L  --  96   ALT U/L  --  24   AST U/L  --  27   GLUCOSE RANDOM mg/dL 104 104           * I Have Reviewed All Lab Data Listed Above  * Additional Pertinent Lab Tests Reviewed:  All Labs Within Last 24 Hours Reviewed    Imaging:    Imaging Reports Reviewed Today Include: XR thoracic spine, XR lumbar spine, CT ab/pelv  Imaging Personally Reviewed by Myself Includes:  None    Recent Cultures (last 7 days):           Last 24 Hours Medication List:     Current Facility-Administered Medications:  acetaminophen 650 mg Oral Q6H PRN Sonal Etienne PA-C   [START ON 4/22/2018] aspirin 81 mg Oral Daily Maisha Summers MD   atorvastatin 20 mg Oral Daily Bharat Savilla Pro, [place]   cefTRIAXone 1,000 mg Intravenous Q24H Najma Trujillo PA-C   cholestyramine sugar free 4 g Oral TID Bharat Savilla Pro, [place]   dicyclomine 10 mg Oral TID AC Bharat Godoy PA-C   enoxaparin 40 mg Subcutaneous Daily Bharat Savilla Pro, [place]   ketorolac 15 mg Intravenous Q6H PRN Najma Trujillo PA-C   lidocaine 1 patch Transdermal Daily Bharat Godoy PA-C   LORazepam 1 mg Intravenous Q6H PRN Bharat Savilla Pro, [place]   methocarbamol 500 mg Oral Q6H PRN Bharat Savilla Pro, [place]   metoprolol tartrate 25 mg Oral BID Bharat Godoy PA-C   metroNIDAZOLE 500 mg Oral Q8H [facility] Najma Trujillo PA-C   ondansetron 4 mg Intravenous Q6H PRN Sonal Etienne PA-C        Today, Patient Was Seen By: Jessica Ellis Thedore Cabot, PA-C    ** Please Note: Dictation voice to text software may have been used in the creation of this document   **

## 2018-04-21 NOTE — ASSESSMENT & PLAN NOTE
· Please refer to H&P in regards to onset of these symptoms  · Patient reports improvement in pain with Toradol 15 mg IV Q6 PRN - continue   · Renal function stable  · XR thoracic and lumbar spine without acute findings  · I discussed with neurology today - they recommended MRI thoracic and lumbar spine with gadolinium  I informed neurology of patient's documented anaphylactic allergy to iodinated diagnostic agents   Per neurology, gadolinium OK  · Neurology consult in AM  · Will check vitamin D, B12, folate  · Patient also reports history of RA, not on any medications, follows at [facility]  · ESR 20, CRP 71 8

## 2018-04-21 NOTE — ASSESSMENT & PLAN NOTE
· D-dimer noted to be >10,000 on admission  · Given b/l LE pain, will check dopplers  · Patient not hypoxic  Denies CP   If respiratory symptoms or hypoxia, would check VQ (patient has anaphylactic allergy to iodinated diagnostic agents)

## 2018-04-21 NOTE — CONSULTS
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, biopsies showed crypt distortion however there was concern that it may have been infectious in etiology as well as patient had recent travel  Patient was then lost to follow-up and was only recently seen by Dr Parviz Stringer in the office  She was started on prednisone which she only took intermittently  She was scheduled for colonoscopy this coming Monday with Dr Melissa Recinos to assess whether this is inflammatory bowel disease versus infection  Stool studies including ova and parasites, bacteria were negative  I do not see results of C diff yet  Inflammatory markers including ESR and CRP were elevated in the outpatient setting  CT scan 2 days ago showed mild thickening of the descending colon with minimal pericolonic inflammatory stranding  Given persistent symptoms, would recommend to check stool for C diff  Most recently, she was in the emergency room 2 days ago and was discharged home with Cipro and Flagyl however patient developed severe bilateral lower extremity pain and is currently admitted for the same  It is unclear if lower extremity pain is secondary to an extra intestinal manifestation of inflammatory bowel disease versus separate rheumatologic process   -would recommend IV antibiotics given bandemia, diarrhea and elevated inflammatory markers however given onset of lower extremity weakness followed by Cipro and Flagyl, would defer to Infectious Disease regarding appropriate antibiotic choice   Would hold off on cipro due to side effect of tendon rupture   -pending workup for lower extremity weakness, she may benefit from inpatient colonoscopy if C diff is negative   -this can be scheduled tentatively for Monday if LE weakness improves, this was discussed with the patient and family   -check vitamin-D levels, vitamin B12, folate and iron studies  2   Severe microcytic anemia:  As per patient report this is chronic, denies melena, hematemesis or hematochezia  Hemoglobin is at baseline   -check vitamin B12, folate, iron studies  ______________________________________________________________________    Reason for Consult / Principal Problem: Bilateral radiating leg pain    HPI: Graeme Hernández is a 61y o  year old female who presents with Bilateral radiating leg pain  This is a 30-year-old female with history of CAD, hypertension comes in with acute worsening of bilateral lower extremity pain  Patient has been seen by GI recently for chronic diarrhea  Patient states that she began having nonbloody diarrhea 6 weeks ago  She states that symptoms began suddenly, with no recent travel  She underwent stool testing in outpatient setting including ova and parasites, culture which were negative  Inflammatory markers were elevated and patient was subsequently started on prednisone which she only took intermittently for 3-4 days  She subsequently came to the emergency room 2 days ago with similar complaints  She underwent CT of abdomen and pelvis and was noted to have a mild left-sided colitis and was discharged home with Cipro and Flagyl  Patient states that she took this for 1 day -followed by immediate onset of bilateral lower extremity pain and weakness  She describes it as shooting pain from the hip stool her ankles  She denies numbness or tingling  She denies fever, chills, nausea or vomiting  She denies any abdominal pain  She had undergone colonoscopy in 2016 and was noted to have pan colitis  Biopsies at that time were concerning for crypt distortion which is suggestive of inflammatory bowel disease however in setting of a recent travel at that time, there was concern that it may have been infectious in etiology  Patient was subsequently lost to follow-up until recently in our office  On admission, she was hemodynamically stable and afebrile, most recently however she has been tachycardic however blood pressure has maintained and she has remained afebrile  Labs are notable for WBC of 5 1, hemoglobin 10 1, MCV of 57, bands of 15 from 38 the day prior  Liver function tests are normal, potassium was low at 3  CRP is 71 8  Review of Systems: The remainder of the review of systems was negative except for the pertinent positives noted in HPI  Historical Information   Past Medical History:   Diagnosis Date    Allergic rhinitis     Breast CA ([facility] [address] )     R breast CA-chemo/radiaition    Diarrhea     for 6 weeks-colonosocpy today 9/26/2016 and EGD    Hypertension     Thalassemia     Vitamin D deficiency      Past Surgical History:   Procedure Laterality Date    BREAST LUMPECTOMY Right     BREAST LUMPECTOMY Right     BREAST LUMPECTOMY Right     with lymph nodes  2007    COLONOSCOPY      CORONARY ARTERY BYPASS GRAFT      CORONARY ARTERY BYPASS GRAFT      x3, 11/2015    HYSTERECTOMY      b/l ovaries removed    PA COLONOSCOPY FLX DX W/COLLJ SPEC WHEN PFRMD N/A 9/26/2016    Procedure: COLONOSCOPY;  Surgeon: Kathe Reeder DO;  Location: [facility]; Service: Gastroenterology     Social History   History   Alcohol Use    2 4 oz/week    4 Glasses of wine per week     Comment: per year     History   Drug Use No     History   Smoking Status    Never Smoker   Smokeless Tobacco    Never Used     History reviewed  No pertinent family history      Meds/Allergies     Prescriptions Prior to Admission   Medication    aspirin 81 MG tablet    ATORVASTATIN CALCIUM PO    cholestyramine (QUESTRAN) 4 g packet    ciprofloxacin (CIPRO) 500 mg tablet    dicyclomine (BENTYL) 10 mg capsule    diphenoxylate-atropine (LOMOTIL) 2 5-0 025 mg per tablet    HYDROcodone-acetaminophen (NORCO) 5-325 mg per tablet    metoprolol tartrate (LOPRESSOR) 25 mg tablet    metroNIDAZOLE (FLAGYL) 500 mg tablet    ondansetron (ZOFRAN) 8 mg tablet    predniSONE 10 mg tablet     Current Facility-Administered Medications   Medication Dose Route Frequency    acetaminophen (TYLENOL) tablet 650 mg  650 mg Oral Q6H PRN    [START ON 4/22/2018] aspirin chewable tablet 81 mg  81 mg Oral Daily    atorvastatin (LIPITOR) tablet 20 mg  20 mg Oral Daily    cefTRIAXone (ROCEPHIN) IVPB (premix) 1,000 mg  1,000 mg Intravenous Q24H    cholestyramine sugar free (QUESTRAN LIGHT) packet 4 g  4 g Oral TID    dicyclomine (BENTYL) capsule 10 mg  10 mg Oral TID AC    enoxaparin (LOVENOX) subcutaneous injection 40 mg  40 mg Subcutaneous Daily    ketorolac (TORADOL) injection 15 mg  15 mg Intravenous Q6H PRN    lidocaine (LIDODERM) 5 % patch 1 patch  1 patch Transdermal Daily    LORazepam (ATIVAN) 2 mg/mL injection 1 mg  1 mg Intravenous Q6H PRN    methocarbamol (ROBAXIN) tablet 500 mg  500 mg Oral Q6H PRN    metoprolol tartrate (LOPRESSOR) tablet 25 mg  25 mg Oral BID    metroNIDAZOLE (FLAGYL) tablet 500 mg  500 mg Oral Q8H [facility]    ondansetron (ZOFRAN) injection 4 mg  4 mg Intravenous Q6H PRN       Allergies   Allergen Reactions    Iodinated Diagnostic Agents Anaphylaxis       Objective     Blood pressure 133/89, pulse (!) 107, temperature 99 8 °F (37 7 °C), temperature source Oral, resp  rate 18, height 5' 2" (1 575 m), weight 62 3 kg (137 lb 5 6 oz), SpO2 92 %        Intake/Output Summary (Last 24 hours) at 04/21/18 1643  Last data filed at 04/21/18 1100   Gross per 24 hour   Intake              480 ml   Output                0 ml   Net              480 ml       PHYSICAL EXAM     GEN: well nourished, well developed, no acute distress  HEENT: anicteric, MMM, no thrush  CV: RRR, no m/r/g  CHEST: CTA b/l, no WRR  ABD: +BS, soft, NT/ND, no ascites, no hepatomegaly    EXT: no c/c/e  SKIN: no rashes,  NEURO: aaox3    Lab Results:   Admission on 04/20/2018   Component Date Value    WBC 04/20/2018 5 10     RBC 04/20/2018 5 42*    Hemoglobin 04/20/2018 10 1*    Hematocrit 04/20/2018 30 9*    MCV 04/20/2018 57*    MCH 04/20/2018 18 6*    MCHC 04/20/2018 32 7     RDW 04/20/2018 16 0*    MPV 04/20/2018 10 7     Platelets 92/32/3227 203     Sodium 04/20/2018 138     Potassium 04/20/2018 3 0*    Chloride 04/20/2018 98*    CO2 04/20/2018 29     Anion Gap 04/20/2018 11     BUN 04/20/2018 17     Creatinine 04/20/2018 1 06     Glucose 04/20/2018 104     Calcium 04/20/2018 8 4     AST 04/20/2018 27     ALT 04/20/2018 24     Alkaline Phosphatase 04/20/2018 96     Total Protein 04/20/2018 7 0     Albumin 04/20/2018 2 9*    Total Bilirubin 04/20/2018 0 60     eGFR 04/20/2018 58     Lipase 04/20/2018 77     Magnesium 04/20/2018 2 0     Sed Rate 04/19/2018 20     CRP 04/20/2018 71 8*    D-Dimer, Quant 04/20/2018 >07420*    Troponin I 04/20/2018 <0 02     Segmented % 04/20/2018 44     Bands % 04/20/2018 15*    Lymphocytes % 04/20/2018 17     Monocytes % 04/20/2018 15*    Eosinophils % 04/20/2018 2     Basophils % 04/20/2018 0     Metamyelocytes% 04/20/2018 4*    Myelocytes % 04/20/2018 3*    Absolute Neutrophils 04/20/2018 3 01     Lymphocytes Absolute 04/20/2018 0 87     Monocytes Absolute 04/20/2018 0 77     Eosinophils Absolute 04/20/2018 0 10     Basophils Absolute 04/20/2018 0 00     Total Counted 04/20/2018 100     Polychromasia 04/20/2018 Present     Platelet Estimate 28/90/0552 Adequate     Platelets 00/06/8122 194     MPV 04/20/2018 10 0     Total CK 04/20/2018 38     Sodium 04/21/2018 138     Potassium 04/21/2018 4 0     Chloride 04/21/2018 102     CO2 04/21/2018 25     Anion Gap 04/21/2018 11     BUN 04/21/2018 10     Creatinine 04/21/2018 0 76     Glucose 04/21/2018 104     Calcium 04/21/2018 8 3     eGFR 04/21/2018 87     WBC 04/21/2018 4 84     RBC 04/21/2018 5 76*    Hemoglobin 04/21/2018 10 7*    Hematocrit 04/21/2018 32 7*    MCV 04/21/2018 57*    MCH 04/21/2018 18 6*    MCHC 04/21/2018 32 7     RDW 04/21/2018 16 0*    Platelets 80/78/4792 163     MPV 04/21/2018 10 0      Imaging Studies: I have personally reviewed pertinent films in PACS

## 2018-04-22 ENCOUNTER — APPOINTMENT (INPATIENT)
Dept: MRI IMAGING | Facility: HOSPITAL | Age: 58
DRG: 386 | End: 2018-04-22
Payer: COMMERCIAL

## 2018-04-22 PROBLEM — R10.9 ABDOMINAL PAIN: Status: ACTIVE | Noted: 2018-04-20

## 2018-04-22 LAB — C DIFF TOX GENS STL QL NAA+PROBE: NORMAL

## 2018-04-22 PROCEDURE — 83655 ASSAY OF LEAD: CPT | Performed by: PSYCHIATRY & NEUROLOGY

## 2018-04-22 PROCEDURE — 84207 ASSAY OF VITAMIN B-6: CPT | Performed by: PSYCHIATRY & NEUROLOGY

## 2018-04-22 PROCEDURE — 82175 ASSAY OF ARSENIC: CPT | Performed by: PSYCHIATRY & NEUROLOGY

## 2018-04-22 PROCEDURE — 83825 ASSAY OF MERCURY: CPT | Performed by: PSYCHIATRY & NEUROLOGY

## 2018-04-22 PROCEDURE — 99232 SBSQ HOSP IP/OBS MODERATE 35: CPT | Performed by: INTERNAL MEDICINE

## 2018-04-22 PROCEDURE — 93970 EXTREMITY STUDY: CPT | Performed by: SURGERY

## 2018-04-22 PROCEDURE — 82300 ASSAY OF CADMIUM: CPT | Performed by: PSYCHIATRY & NEUROLOGY

## 2018-04-22 PROCEDURE — 99223 1ST HOSP IP/OBS HIGH 75: CPT | Performed by: PSYCHIATRY & NEUROLOGY

## 2018-04-22 RX ORDER — LORAZEPAM 2 MG/ML
1 INJECTION INTRAMUSCULAR
Status: COMPLETED | OUTPATIENT
Start: 2018-04-22 | End: 2018-04-22

## 2018-04-22 RX ORDER — DOCUSATE SODIUM 100 MG/1
100 CAPSULE, LIQUID FILLED ORAL 2 TIMES DAILY
Status: DISCONTINUED | OUTPATIENT
Start: 2018-04-22 | End: 2018-04-26 | Stop reason: HOSPADM

## 2018-04-22 RX ORDER — FERROUS SULFATE 325(65) MG
325 TABLET ORAL 2 TIMES DAILY
Status: DISCONTINUED | OUTPATIENT
Start: 2018-04-22 | End: 2018-04-26 | Stop reason: HOSPADM

## 2018-04-22 RX ORDER — DIPHENHYDRAMINE HYDROCHLORIDE 50 MG/ML
25 INJECTION INTRAMUSCULAR; INTRAVENOUS ONCE
Status: COMPLETED | OUTPATIENT
Start: 2018-04-22 | End: 2018-04-22

## 2018-04-22 RX ADMIN — LORAZEPAM 1 MG: 2 INJECTION INTRAMUSCULAR; INTRAVENOUS at 17:29

## 2018-04-22 RX ADMIN — LIDOCAINE 1 PATCH: 50 PATCH TOPICAL at 08:02

## 2018-04-22 RX ADMIN — POLYETHYLENE GLYCOL 3350, SODIUM SULFATE ANHYDROUS, SODIUM BICARBONATE, SODIUM CHLORIDE, POTASSIUM CHLORIDE 4000 ML: 236; 22.74; 6.74; 5.86; 2.97 POWDER, FOR SOLUTION ORAL at 22:30

## 2018-04-22 RX ADMIN — BISACODYL 10 MG: 5 TABLET, COATED ORAL at 22:24

## 2018-04-22 RX ADMIN — METRONIDAZOLE 500 MG: 500 TABLET ORAL at 22:24

## 2018-04-22 RX ADMIN — METRONIDAZOLE 500 MG: 500 TABLET ORAL at 06:00

## 2018-04-22 RX ADMIN — METHOCARBAMOL 500 MG: 500 TABLET ORAL at 23:24

## 2018-04-22 RX ADMIN — CEFTRIAXONE 1000 MG: 1 INJECTION, SOLUTION INTRAVENOUS at 18:44

## 2018-04-22 RX ADMIN — ACETAMINOPHEN 650 MG: 325 TABLET, FILM COATED ORAL at 14:21

## 2018-04-22 RX ADMIN — METHOCARBAMOL 500 MG: 500 TABLET ORAL at 10:12

## 2018-04-22 RX ADMIN — DICYCLOMINE HYDROCHLORIDE 10 MG: 10 CAPSULE ORAL at 06:00

## 2018-04-22 RX ADMIN — ACETAMINOPHEN 650 MG: 325 TABLET, FILM COATED ORAL at 08:02

## 2018-04-22 RX ADMIN — CHOLESTYRAMINE 4 G: 4 POWDER, FOR SUSPENSION ORAL at 08:02

## 2018-04-22 RX ADMIN — DICYCLOMINE HYDROCHLORIDE 10 MG: 10 CAPSULE ORAL at 16:11

## 2018-04-22 RX ADMIN — KETOROLAC TROMETHAMINE 15 MG: 30 INJECTION, SOLUTION INTRAMUSCULAR at 10:12

## 2018-04-22 RX ADMIN — CHOLESTYRAMINE 4 G: 4 POWDER, FOR SUSPENSION ORAL at 22:24

## 2018-04-22 RX ADMIN — METOPROLOL TARTRATE 25 MG: 25 TABLET, FILM COATED ORAL at 08:02

## 2018-04-22 RX ADMIN — ATORVASTATIN CALCIUM 20 MG: 20 TABLET, FILM COATED ORAL at 08:02

## 2018-04-22 RX ADMIN — DIPHENHYDRAMINE HYDROCHLORIDE 25 MG: 50 INJECTION, SOLUTION INTRAMUSCULAR; INTRAVENOUS at 16:53

## 2018-04-22 RX ADMIN — LORAZEPAM 1 MG: 2 INJECTION INTRAMUSCULAR; INTRAVENOUS at 16:53

## 2018-04-22 RX ADMIN — CHOLESTYRAMINE 4 G: 4 POWDER, FOR SUSPENSION ORAL at 16:11

## 2018-04-22 RX ADMIN — METRONIDAZOLE 500 MG: 500 TABLET ORAL at 14:21

## 2018-04-22 NOTE — PROGRESS NOTES
At [phone] initiated preparation of patient for MRI  Patient administered Ativan and Benedryl pre-procedure as ordered by neuro  Received call from [address] in MRI control room verbalizing patient was restless and uncooperative  Administered additional 1 mg Ativan as ordered  Patient remained combative and uncontrollably restless  At [phone] patient deemed unable to endure study  Barry Rattler covering for [employer]  If appropriate consider transfer to [place] for study under anesthesia  Safely returned patient to bed, bed alarm initiated, call bell within reach and patient's family retrieved from cafeteria

## 2018-04-22 NOTE — PROGRESS NOTES
[facility]  Progress Note - Victoriano Valadez 61 y o  female MRN: [id number]    Unit/Bed#: -01 Encounter: [id number]    Assessment/Plan:  1  Diarrhea for 6 weeks along with history of pan colitis on colonoscopy 2 years ago concerning for inflammatory bowel disease -but lost to follow-up  Now with recurrent symptoms  Only took prednisone intermittently for few days  On admission, noted to have bandemia and left-sided colitis and has therefore been started on antibiotics  C diff is pending  I suspect symptoms may be due to infectious process versus inflammatory bowel disease  Has had watery 5-6 bowel movements today  Will plan for colonoscopy tomorrow if cleared from neurology standpoint  Patient is scheduled for MRI at 5:00 p m   If the MRI is unremarkable, will prep for colonoscopy for tomorrow  Meanwhile, aggressive IV fluid resuscitation and continue antibiotics  Follow up blood cultures  Follow-up stool studies including ova and parasites, culture and C diff  Plan discussed with the patient and the nurse  Clear liquid diet today  NPO past midnight  Subjective:   Patient seen and examined at bedside  She states that she has had 5-6 nonbloody bowel movements today  She continues to complain of bilateral leg pain and weakness  She requires assistance with walking  Neurology consult has been called  She is awaiting MRI at 5:00 p m  today  She had low-grade fever of 100 6 this morning and continues to be tachycardic  Objective:     Vitals: Blood pressure 142/72, pulse 100, temperature (!) 100 6 °F (38 1 °C), temperature source Oral, resp  rate 18, height 5' 2" (1 575 m), weight 62 3 kg (137 lb 5 6 oz), SpO2 92 %, not currently breastfeeding  ,Body mass index is 25 12 kg/m²        Intake/Output Summary (Last 24 hours) at 04/22/18 1357  Last data filed at 04/22/18 1042   Gross per 24 hour   Intake              480 ml   Output                0 ml   Net 480 ml       Physical Exam:    GEN: wn/wd, NAD  HEENT: MMM, anciteric  ABD: +BS, soft, NT/ND, no ascites  Extremities:  No cyanosis, clubbing or edema    NEURO: aaox3      Invasive Devices     Peripheral Intravenous Line            Peripheral IV 04/20/18 Left Antecubital 2 days                        Lab, Imaging and other studies:     Admission on 04/20/2018   Component Date Value    WBC 04/20/2018 5 10     RBC 04/20/2018 5 42*    Hemoglobin 04/20/2018 10 1*    Hematocrit 04/20/2018 30 9*    MCV 04/20/2018 57*    MCH 04/20/2018 18 6*    MCHC 04/20/2018 32 7     RDW 04/20/2018 16 0*    MPV 04/20/2018 10 7     Platelets 22/63/9772 203     Sodium 04/20/2018 138     Potassium 04/20/2018 3 0*    Chloride 04/20/2018 98*    CO2 04/20/2018 29     Anion Gap 04/20/2018 11     BUN 04/20/2018 17     Creatinine 04/20/2018 1 06     Glucose 04/20/2018 104     Calcium 04/20/2018 8 4     AST 04/20/2018 27     ALT 04/20/2018 24     Alkaline Phosphatase 04/20/2018 96     Total Protein 04/20/2018 7 0     Albumin 04/20/2018 2 9*    Total Bilirubin 04/20/2018 0 60     eGFR 04/20/2018 58     Lipase 04/20/2018 77     Magnesium 04/20/2018 2 0     Sed Rate 04/19/2018 20     CRP 04/20/2018 71 8*    D-Dimer, Quant 04/20/2018 >71351*    Ventricular Rate 04/20/2018 68     Atrial Rate 04/20/2018 71     OK Interval 04/20/2018 166     QRSD Interval 04/20/2018 84     QT Interval 04/20/2018 390     QTC Interval 04/20/2018 415     P Axis 04/20/2018 53     QRS Axis 04/20/2018 56     T Wave Axis 04/20/2018 63     Troponin I 04/20/2018 <0 02     Segmented % 04/20/2018 44     Bands % 04/20/2018 15*    Lymphocytes % 04/20/2018 17     Monocytes % 04/20/2018 15*    Eosinophils % 04/20/2018 2     Basophils % 04/20/2018 0     Metamyelocytes% 04/20/2018 4*    Myelocytes % 04/20/2018 3*    Absolute Neutrophils 04/20/2018 3 01     Lymphocytes Absolute 04/20/2018 0 87     Monocytes Absolute 04/20/2018 0 77     Eosinophils Absolute 04/20/2018 0 10     Basophils Absolute 04/20/2018 0 00     Total Counted 04/20/2018 100     Polychromasia 04/20/2018 Present     Platelet Estimate 00/35/4439 Adequate     Platelets 84/60/1917 194     MPV 04/20/2018 10 0     Total CK 04/20/2018 38     Sodium 04/21/2018 138     Potassium 04/21/2018 4 0     Chloride 04/21/2018 102     CO2 04/21/2018 25     Anion Gap 04/21/2018 11     BUN 04/21/2018 10     Creatinine 04/21/2018 0 76     Glucose 04/21/2018 104     Calcium 04/21/2018 8 3     eGFR 04/21/2018 87     WBC 04/21/2018 4 84     RBC 04/21/2018 5 76*    Hemoglobin 04/21/2018 10 7*    Hematocrit 04/21/2018 32 7*    MCV 04/21/2018 57*    MCH 04/21/2018 18 6*    MCHC 04/21/2018 32 7     RDW 04/21/2018 16 0*    Platelets 68/37/2202 163     MPV 04/21/2018 10 0     Folate 04/21/2018 10 7     Vitamin B-12 04/21/2018 1346*    Iron Saturation 04/21/2018 5     TIBC 04/21/2018 269     Iron 04/21/2018 13*    Ferritin 04/21/2018 326          I have personally reviewed pertinent films in PACS    Current Facility-Administered Medications   Medication Dose Route Frequency    acetaminophen (TYLENOL) tablet 650 mg  650 mg Oral Q6H PRN    aspirin chewable tablet 81 mg  81 mg Oral Daily    atorvastatin (LIPITOR) tablet 20 mg  20 mg Oral Daily    bisacodyl (DULCOLAX) EC tablet 10 mg  10 mg Oral Once    cefTRIAXone (ROCEPHIN) IVPB (premix) 1,000 mg  1,000 mg Intravenous Q24H    cholestyramine sugar free (QUESTRAN LIGHT) packet 4 g  4 g Oral TID    dicyclomine (BENTYL) capsule 10 mg  10 mg Oral TID AC    diphenhydrAMINE (BENADRYL) injection 25 mg  25 mg Intravenous Once    docusate sodium (COLACE) capsule 100 mg  100 mg Oral BID    enoxaparin (LOVENOX) subcutaneous injection 40 mg  40 mg Subcutaneous Daily    ferrous sulfate tablet 325 mg  325 mg Oral BID    ketorolac (TORADOL) injection 15 mg  15 mg Intravenous Q6H PRN    lidocaine (LIDODERM) 5 % patch 1 patch  1 patch Transdermal Daily    LORazepam (ATIVAN) 2 mg/mL injection 1 mg  1 mg Intravenous Q6H PRN    LORazepam (ATIVAN) 2 mg/mL injection 1 mg  1 mg Intravenous On Call    methocarbamol (ROBAXIN) tablet 500 mg  500 mg Oral Q6H PRN    metoprolol tartrate (LOPRESSOR) tablet 25 mg  25 mg Oral BID    metroNIDAZOLE (FLAGYL) tablet 500 mg  500 mg Oral Q8H [address]    ondansetron (ZOFRAN) injection 4 mg  4 mg Intravenous Q6H PRN    polyethylene glycol (GOLYTELY) bowel prep 4,000 mL  4,000 mL Oral Once    zolpidem (AMBIEN) tablet 5 mg  5 mg Oral HS PRN

## 2018-04-22 NOTE — CONSULTS
Consultation - Neurology   Madeline Santa 61 y o  female MRN: [id number]  Unit/Bed#: -01 Encounter: [id number]      Assessment/Plan   Assessment/Plan:  39-year-old female with bilateral leg pain  Patient is unable to provide many qualitative details regarding her symptoms  Strength is slightly decreased in the bilateral lower extremities right worse than left, however it may be limited by pain  No Upper or lower motor neuron signs present on exam   Reflexes are intact  -unclear etiology,  MRI T and L-spine with gadolinium pending    -vitamin-D deficiency? Documented muscle cramps/pain associated with low vitamin-D levels  -patient with microcytic anemia, MCV of 57 and iron of 13, concerns for iron deficiency anemia superimposed on patient's history of thalassemia?  -lead toxicity? Can cause gastroenteritis and neuropathies  Heavy metal screen pending  -vitamin B6 level pending  -B12, folate, CK, ESR were normal   -preserved reflexes make a Guillain-Benzonia picture less likely  Can defer lp for now   -will follow above-mentioned workup  -PT OT eval and treat     History of Present Illness     Reason for Consult / Principal Problem:  Leg weakness  Hx and PE limited by:   Not applicable  HPI:(as per H&P verbatim, patient, , daughter )  Madeline Santa is a 61 y o  right handed female with a history of Coronary Artery Disease, HTN who presents with bilateral leg pain  The patient reports that she had been seen yesterday in the ER for abdominal pain  She had been having diarrhea for 6 weeks and had been following a GI specialist  She was worked up in the ER, diagnosed with colitis, and sent home with PO antibiotics (Cipro and Flagyl), Norco, and Colace  She reports that this morning she took her medications and shortly after, the Norco specifically she mentioned, she started having bilateral shooting leg pain that radiated up and down her legs as well as other random areas of her body   She reports that she was unable to walk  Patient continues to writhe on the bed while speaking  She denies prior occurrence of this happening  She denies numbness or tingling in any of her extremities  Both her and her  noted improvement with toradol earlier in the ER  As for her abdominal complaints she reports that she has minimal abdominal pain, denies nausea and vomiting, and only had a small amount of diarrhea this morning that was non-bloody  She denies fevers or chills  Denies recent falls or injury       Of note, the nurse caring for the patient in the ER reports that when the patient came in she was not behaving this way  She had noticed that the patient had only started moaning and writhing after a certain relative had come in  Inpatient consult to Neurology  Consult performed by: Mahad Quiñonez ordered by: Hillary Snowden          Review of Systems   Constitutional: Negative for chills and fever  HENT: Negative for facial swelling and hearing loss  Eyes: Negative for pain and discharge  Respiratory: Negative for cough, choking and shortness of breath  Cardiovascular: Negative for chest pain  Gastrointestinal: Positive for diarrhea  Negative for constipation, nausea and vomiting  Endocrine: Negative for cold intolerance and heat intolerance  Genitourinary: Negative for difficulty urinating, frequency and urgency  Musculoskeletal: Positive for gait problem and myalgias  Skin: Negative for color change and rash  Neurological: Positive for weakness  Negative for dizziness, facial asymmetry, light-headedness, numbness and headaches  All other systems reviewed and are negative        Historical Information   Past Medical History:   Diagnosis Date    Allergic rhinitis     Breast CA ([facility] [address] )     R breast CA-chemo/radiaition    Diarrhea     for 6 weeks-colonosocpy today 9/26/2016 and EGD    Hypertension     Thalassemia     Vitamin D deficiency      Past Surgical History: Procedure Laterality Date    BREAST LUMPECTOMY Right     BREAST LUMPECTOMY Right     BREAST LUMPECTOMY Right     with lymph nodes  2007    COLONOSCOPY      CORONARY ARTERY BYPASS GRAFT      CORONARY ARTERY BYPASS GRAFT      x3, 11/2015    HYSTERECTOMY      b/l ovaries removed    [place] COLONOSCOPY FLX DX W/COLLJ SPEC WHEN PFRMD N/A 9/26/2016    Procedure: COLONOSCOPY;  Surgeon: [place] Life Jose, DO;  Location: [facility]; Service: Gastroenterology     Social History   History   Alcohol Use    2 4 oz/week    4 Glasses of wine per week     Comment: per year     History   Drug Use No     History   Smoking Status    Never Smoker   Smokeless Tobacco    Never Used     Family History: History reviewed  No pertinent family history      Meds/Allergies   current meds:   Current Facility-Administered Medications   Medication Dose Route Frequency    acetaminophen (TYLENOL) tablet 650 mg  650 mg Oral Q6H PRN    aspirin chewable tablet 81 mg  81 mg Oral Daily    atorvastatin (LIPITOR) tablet 20 mg  20 mg Oral Daily    cefTRIAXone (ROCEPHIN) IVPB (premix) 1,000 mg  1,000 mg Intravenous Q24H    cholestyramine sugar free (QUESTRAN LIGHT) packet 4 g  4 g Oral TID    dicyclomine (BENTYL) capsule 10 mg  10 mg Oral TID AC    diphenhydrAMINE (BENADRYL) injection 25 mg  25 mg Intravenous Once    docusate sodium (COLACE) capsule 100 mg  100 mg Oral BID    enoxaparin (LOVENOX) subcutaneous injection 40 mg  40 mg Subcutaneous Daily    ferrous sulfate tablet 325 mg  325 mg Oral BID    ketorolac (TORADOL) injection 15 mg  15 mg Intravenous Q6H PRN    lidocaine (LIDODERM) 5 % patch 1 patch  1 patch Transdermal Daily    LORazepam (ATIVAN) 2 mg/mL injection 1 mg  1 mg Intravenous Q6H PRN    LORazepam (ATIVAN) 2 mg/mL injection 1 mg  1 mg Intravenous On Call    methocarbamol (ROBAXIN) tablet 500 mg  500 mg Oral Q6H PRN    metoprolol tartrate (LOPRESSOR) tablet 25 mg  25 mg Oral BID    metroNIDAZOLE (FLAGYL) tablet 500 mg 500 mg Oral Q8H [address]    ondansetron (ZOFRAN) injection 4 mg  4 mg Intravenous Q6H PRN    zolpidem (AMBIEN) tablet 5 mg  5 mg Oral HS PRN       Allergies   Allergen Reactions    Iodinated Diagnostic Agents Anaphylaxis       Objective   Vitals:Blood pressure 142/72, pulse 100, temperature (!) 100 6 °F (38 1 °C), temperature source Oral, resp  rate 18, height 5' 2" (1 575 m), weight 62 3 kg (137 lb 5 6 oz), SpO2 92 %, not currently breastfeeding  ,Body mass index is 25 12 kg/m²  Intake/Output Summary (Last 24 hours) at 04/22/18 1306  Last data filed at 04/22/18 1042   Gross per 24 hour   Intake              480 ml   Output                0 ml   Net              480 ml       Invasive Devices: Invasive Devices     Peripheral Intravenous Line            Peripheral IV 04/20/18 Left Antecubital 2 days                Physical Exam   Constitutional: Vital signs are normal  She appears well-developed and well-nourished  Afebrile  HENT:   Head: Normocephalic and atraumatic  Cardiovascular: Normal rate, regular rhythm and normal heart sounds  No murmur heard  Pulmonary/Chest: Effort normal and breath sounds normal      Neurologic Exam     Mental Status   - Oriented to person, place, and date  - level of consciousness alert  - follows commands appropriately  - Attention normal  - Fund of knowledge appropriate    - No evidence of Aphasia  Cranial Nerves   -Visual Fields full, PERRLA, EOMI  -Fundoscopic exam attempted, unable to visualize disks  -Face is symmetric with respect to motor and sensory   -Audition intact and symmetric    -Tongue, palate, uvula midline   -Shoulder shrug intact and symmetric        Motor Exam - 4/5 strength in all in all muscle groups of the right leg   - 4+/5 strength in all muscle groups of the left leg   - 5/5 in the bilateral upper extremities   - normal bulk throughout  - normal tone throughout     Sensory Exam   Sensation intact to touch, pin, temp, vib, in all four extremities  Gait, Coordination, and Reflexes - DTR's in all four extremities are intact and symmetric   - Toes down-going bilaterally  - No Gross ataxia per finger to nose    - gait defered  Lab Results: I have personally reviewed pertinent reports  Imaging Studies: I have personally reviewed pertinent films in PACS  EKG, Pathology, and Other Studies: I have personally reviewed pertinent reports      VTE Prophylaxis: Sequential compression device (Venodyne)

## 2018-04-22 NOTE — PROGRESS NOTES
[facility] Internal Medicine Progress Note  Patient: Rinku Frausto 61 y o  female   MRN: [id number]  PCP: Onelia Amador MD  Unit/Bed#: -01 Encounter: [id number]  Date Of Visit: 04/22/18    Assessment:    Principal Problem:    Bilateral radiating leg pain  Active Problems:    Colitis    Coronary artery disease    Essential hypertension    Hypokalemia    Rheumatoid arthritis (HCC)    Elevated d-dimer      Plan:    · Lower extremity pain ambulatory dysfunction follow-up on MRI thoracic and lumbar spine, neurology consulted, physical therapy, analgesics for pain relief  · Colitis follow up on stool studies, GI input noted continue IV antibiotics for possible colonoscopy tomorrow  · Elevated D-dimer  · History of rheumatoid arthritis  · Anemia of chronic disease  · Microcytosis MCV disproportionately low to hematocrit follow-up on hemoglobin electrophoresis  · Carotid disease status post CABG continue aspirin statin beta-blocker       VTE Pharmacologic Prophylaxis:   Pharmacologic: Enoxaparin (Lovenox)  Mechanical VTE Prophylaxis in Place: Yes    Patient Centered Rounds: I have performed bedside rounds with nursing staff today  Discussions with Specialists or Other Care Team Provider:     Education and Discussions with Family / Patient:  Discussed with the patient and  at bedside, questions answered    Time Spent for Care: 30 minutes  More than 50% of total time spent on counseling and coordination of care as described above      Current Length of Stay: 2 day(s)    Current Patient Status: Inpatient   Certification Statement: The patient will continue to require additional inpatient hospital stay due to As mentioned    Discharge Plan / Estimated Discharge Date:  Patient with lower extremity pain ambulatory dysfunction colitis likely requiring colonoscopy tomorrow further workup of her lower extremity symptoms, neurology consultation    Code Status: Level 1 - Full Code      Subjective:     Reports mild improvement in lower extremity pain still with significant ambulatory dysfunction unable to bear weight, "my knees give away"  Still with diarrhea  Appetite fair    Objective:     Vitals:   Temp (24hrs), Av 2 °F (37 9 °C), Min:99 8 °F (37 7 °C), Max:100 6 °F (38 1 °C)    HR:  [100-107] 100  Resp:  [18] 18  BP: (133-142)/(72-89) 142/72  SpO2:  [92 %] 92 %  Body mass index is 25 12 kg/m²  Input and Output Summary (last 24 hours): Intake/Output Summary (Last 24 hours) at 18 1234  Last data filed at 18 1042   Gross per 24 hour   Intake              480 ml   Output                0 ml   Net              480 ml       Physical Exam:     Physical Exam    Comfortably lying in bed  Neck supple  Lungs clear to auscultation diminished breath sounds at bases  Heart sounds S1 and S2 noted   Abdomen soft nontender  Pulses present  Awake obeys simple commands  No rash  No pedal edema    Additional Data:     Labs:      Results from last 7 days  Lab Units 18  0449  18  1201   WBC Thousand/uL 4 84  --  5 10   HEMOGLOBIN g/dL 10 7*  --  10 1*   HEMATOCRIT % 32 7*  --  30 9*   PLATELETS Thousands/uL 163  < > 203   LYMPHO PCT %  --   --  17   MONO PCT MAN %  --   --  15*   EOSINO PCT MANUAL %  --   --  2   < > = values in this interval not displayed  Results from last 7 days  Lab Units 18  0449 18  1240   SODIUM mmol/L 138 138   POTASSIUM mmol/L 4 0 3 0*   CHLORIDE mmol/L 102 98*   CO2 mmol/L 25 29   BUN mg/dL 10 17   CREATININE mg/dL 0 76 1 06   CALCIUM mg/dL 8 3 8 4   TOTAL PROTEIN g/dL  --  7 0   BILIRUBIN TOTAL mg/dL  --  0 60   ALK PHOS U/L  --  96   ALT U/L  --  24   AST U/L  --  27   GLUCOSE RANDOM mg/dL 104 104           * I Have Reviewed All Lab Data Listed Above  * Additional Pertinent Lab Tests Reviewed:  All Labs Within Last 24 Hours Reviewed    Imaging:    Imaging Reports Reviewed Today Include:   Imaging Personally Reviewed by Myself Includes:    Recent Cultures (last 7 days): Last 24 Hours Medication List:     Current Facility-Administered Medications:  acetaminophen 650 mg Oral Q6H PRN Bharat Neil Flurry, [place]    aspirin 81 mg Oral Daily Qiana Griffin MD    atorvastatin 20 mg Oral Daily Bharat Neil Flurry, PA-TERESE    cefTRIAXone 1,000 mg Intravenous Q24H Mirza Arshad, [place] Last Rate: 1,000 mg (04/21/18 1730)   cholestyramine sugar free 4 g Oral TID Bharat Neil Flurry, PA-C    dicyclomine 10 mg Oral TID AC Bharat P Godoy, PA-TERESE    enoxaparin 40 mg Subcutaneous Daily Bharat Neil Flurry, [place]    ketorolac 15 mg Intravenous Q6H PRN Mirza Arshad, PA-TERESE    lidocaine 1 patch Transdermal Daily Bharat Godoy, PA-TERESE    LORazepam 1 mg Intravenous Q6H PRN Bharat Neil Flurry, PA-C    methocarbamol 500 mg Oral Q6H PRN Bharat Neil Flurry, PA-TERESE    metoprolol tartrate 25 mg Oral BID Bharat [facility] Godoy, [place]    metroNIDAZOLE 500 mg Oral [facility] Mirza Arshad, [place]    ondansetron 4 mg Intravenous Q6H PRN Bharat Neil Flurry, PA-TERESE    zolpidem 5 mg Oral HS PRN Qiana Griffin MD         Today, Patient Was Seen By: Qiana Griffin MD    ** Please Note: This note has been constructed using a voice recognition system   **

## 2018-04-22 NOTE — PROGRESS NOTES
Pt asleep upon arrival for bedside rounds with previous RN  Pt was in bed with call bell within reach and bed alarm activated to ensure safety  Respiratory Therapist found Pt self ambulating to commode (bed alarm sounding) and talked to RN about provide assistance for Pt to commode  No complaints of pain reported at that time  RN went into Pt room to administer evening/night medications  Pt stated needed "sleeping pill", RN attempted to administer PRN ambien to aid with sleep, pt raised voice and cried out about legs being in pain and wanted all medications at once  RN provided education and gave PRN ativan and Robaxin to assist with pain  During hourly round at 0000 pt found asleep in room with call bell within reach, will continue to monitor

## 2018-04-23 ENCOUNTER — ANESTHESIA (INPATIENT)
Dept: GASTROENTEROLOGY | Facility: HOSPITAL | Age: 58
DRG: 386 | End: 2018-04-23
Payer: COMMERCIAL

## 2018-04-23 ENCOUNTER — APPOINTMENT (INPATIENT)
Dept: MRI IMAGING | Facility: HOSPITAL | Age: 58
DRG: 386 | End: 2018-04-23
Payer: COMMERCIAL

## 2018-04-23 ENCOUNTER — ANESTHESIA EVENT (INPATIENT)
Dept: GASTROENTEROLOGY | Facility: HOSPITAL | Age: 58
DRG: 386 | End: 2018-04-23
Payer: COMMERCIAL

## 2018-04-23 LAB
ANION GAP SERPL CALCULATED.3IONS-SCNC: 11 MMOL/L (ref 4–13)
BUN SERPL-MCNC: 16 MG/DL (ref 5–25)
CALCIUM SERPL-MCNC: 8.3 MG/DL (ref 8.3–10.1)
CHLORIDE SERPL-SCNC: 99 MMOL/L (ref 100–108)
CO2 SERPL-SCNC: 25 MMOL/L (ref 21–32)
CREAT SERPL-MCNC: 0.75 MG/DL (ref 0.6–1.3)
GFR SERPL CREATININE-BSD FRML MDRD: 88 ML/MIN/1.73SQ M
GLUCOSE SERPL-MCNC: 112 MG/DL (ref 65–140)
HCT VFR BLD AUTO: 30.3 % (ref 34.8–46.1)
HGB BLD-MCNC: 9.8 G/DL (ref 11.5–15.4)
POTASSIUM SERPL-SCNC: 3.3 MMOL/L (ref 3.5–5.3)
SODIUM SERPL-SCNC: 135 MMOL/L (ref 136–145)

## 2018-04-23 PROCEDURE — 0DBE8ZX EXCISION OF LARGE INTESTINE, VIA NATURAL OR ARTIFICIAL OPENING ENDOSCOPIC, DIAGNOSTIC: ICD-10-PCS | Performed by: INTERNAL MEDICINE

## 2018-04-23 PROCEDURE — 72158 MRI LUMBAR SPINE W/O & W/DYE: CPT

## 2018-04-23 PROCEDURE — 99232 SBSQ HOSP IP/OBS MODERATE 35: CPT | Performed by: INTERNAL MEDICINE

## 2018-04-23 PROCEDURE — 85014 HEMATOCRIT: CPT | Performed by: INTERNAL MEDICINE

## 2018-04-23 PROCEDURE — A9585 GADOBUTROL INJECTION: HCPCS | Performed by: PHYSICIAN ASSISTANT

## 2018-04-23 PROCEDURE — 80048 BASIC METABOLIC PNL TOTAL CA: CPT | Performed by: PHYSICIAN ASSISTANT

## 2018-04-23 PROCEDURE — 88305 TISSUE EXAM BY PATHOLOGIST: CPT | Performed by: PATHOLOGY

## 2018-04-23 PROCEDURE — 85018 HEMOGLOBIN: CPT | Performed by: INTERNAL MEDICINE

## 2018-04-23 PROCEDURE — 99233 SBSQ HOSP IP/OBS HIGH 50: CPT | Performed by: PSYCHIATRY & NEUROLOGY

## 2018-04-23 PROCEDURE — 45380 COLONOSCOPY AND BIOPSY: CPT | Performed by: INTERNAL MEDICINE

## 2018-04-23 RX ORDER — DEXTROSE AND SODIUM CHLORIDE 5; .9 G/100ML; G/100ML
75 INJECTION, SOLUTION INTRAVENOUS CONTINUOUS
Status: DISCONTINUED | OUTPATIENT
Start: 2018-04-23 | End: 2018-04-26

## 2018-04-23 RX ORDER — SODIUM CHLORIDE 9 MG/ML
125 INJECTION, SOLUTION INTRAVENOUS CONTINUOUS
Status: DISCONTINUED | OUTPATIENT
Start: 2018-04-23 | End: 2018-04-25

## 2018-04-23 RX ORDER — LORAZEPAM 2 MG/ML
2 INJECTION INTRAMUSCULAR
Status: COMPLETED | OUTPATIENT
Start: 2018-04-23 | End: 2018-04-23

## 2018-04-23 RX ORDER — PROPOFOL 10 MG/ML
INJECTION, EMULSION INTRAVENOUS AS NEEDED
Status: DISCONTINUED | OUTPATIENT
Start: 2018-04-23 | End: 2018-04-23 | Stop reason: SURG

## 2018-04-23 RX ORDER — METHYLPREDNISOLONE SODIUM SUCCINATE 40 MG/ML
20 INJECTION, POWDER, LYOPHILIZED, FOR SOLUTION INTRAMUSCULAR; INTRAVENOUS EVERY 8 HOURS SCHEDULED
Status: DISCONTINUED | OUTPATIENT
Start: 2018-04-23 | End: 2018-04-26 | Stop reason: HOSPADM

## 2018-04-23 RX ORDER — ONDANSETRON 2 MG/ML
INJECTION INTRAMUSCULAR; INTRAVENOUS AS NEEDED
Status: DISCONTINUED | OUTPATIENT
Start: 2018-04-23 | End: 2018-04-23 | Stop reason: SURG

## 2018-04-23 RX ORDER — SODIUM CHLORIDE 9 MG/ML
INJECTION, SOLUTION INTRAVENOUS CONTINUOUS PRN
Status: DISCONTINUED | OUTPATIENT
Start: 2018-04-23 | End: 2018-04-23 | Stop reason: SURG

## 2018-04-23 RX ORDER — PROPOFOL 10 MG/ML
INJECTION, EMULSION INTRAVENOUS CONTINUOUS PRN
Status: DISCONTINUED | OUTPATIENT
Start: 2018-04-23 | End: 2018-04-23 | Stop reason: SURG

## 2018-04-23 RX ORDER — MAGNESIUM CARB/ALUMINUM HYDROX 105-160MG
296 TABLET,CHEWABLE ORAL ONCE
Status: COMPLETED | OUTPATIENT
Start: 2018-04-23 | End: 2018-04-23

## 2018-04-23 RX ADMIN — CHOLESTYRAMINE 4 G: 4 POWDER, FOR SUSPENSION ORAL at 21:15

## 2018-04-23 RX ADMIN — ACETAMINOPHEN 650 MG: 325 TABLET, FILM COATED ORAL at 09:27

## 2018-04-23 RX ADMIN — DICYCLOMINE HYDROCHLORIDE 10 MG: 10 CAPSULE ORAL at 16:06

## 2018-04-23 RX ADMIN — METOPROLOL TARTRATE 25 MG: 25 TABLET, FILM COATED ORAL at 09:28

## 2018-04-23 RX ADMIN — LIDOCAINE 1 PATCH: 50 PATCH TOPICAL at 09:27

## 2018-04-23 RX ADMIN — FERROUS SULFATE TAB 325 MG (65 MG ELEMENTAL FE) 325 MG: 325 (65 FE) TAB at 17:19

## 2018-04-23 RX ADMIN — METRONIDAZOLE 500 MG: 500 TABLET ORAL at 21:14

## 2018-04-23 RX ADMIN — LORAZEPAM 2 MG: 2 INJECTION INTRAMUSCULAR; INTRAVENOUS at 17:20

## 2018-04-23 RX ADMIN — CHOLESTYRAMINE 4 G: 4 POWDER, FOR SUSPENSION ORAL at 16:06

## 2018-04-23 RX ADMIN — ASPIRIN 81 MG 81 MG: 81 TABLET ORAL at 09:28

## 2018-04-23 RX ADMIN — METHYLPREDNISOLONE SODIUM SUCCINATE 20 MG: 40 INJECTION, POWDER, FOR SOLUTION INTRAMUSCULAR; INTRAVENOUS at 17:27

## 2018-04-23 RX ADMIN — CEFTRIAXONE 1000 MG: 1 INJECTION, SOLUTION INTRAVENOUS at 16:28

## 2018-04-23 RX ADMIN — PROPOFOL 60 MG: 10 INJECTION, EMULSION INTRAVENOUS at 15:02

## 2018-04-23 RX ADMIN — DOCUSATE SODIUM 100 MG: 100 CAPSULE, LIQUID FILLED ORAL at 09:28

## 2018-04-23 RX ADMIN — METHYLPREDNISOLONE SODIUM SUCCINATE 20 MG: 40 INJECTION, POWDER, FOR SOLUTION INTRAMUSCULAR; INTRAVENOUS at 21:15

## 2018-04-23 RX ADMIN — METRONIDAZOLE 500 MG: 500 TABLET ORAL at 06:01

## 2018-04-23 RX ADMIN — GADOBUTROL 6 ML: 604.72 INJECTION INTRAVENOUS at 19:22

## 2018-04-23 RX ADMIN — METRONIDAZOLE 500 MG: 500 TABLET ORAL at 16:06

## 2018-04-23 RX ADMIN — METOPROLOL TARTRATE 25 MG: 25 TABLET, FILM COATED ORAL at 17:19

## 2018-04-23 RX ADMIN — ONDANSETRON 4 MG: 2 INJECTION INTRAMUSCULAR; INTRAVENOUS at 15:06

## 2018-04-23 RX ADMIN — PROPOFOL 100 MCG/KG/MIN: 10 INJECTION, EMULSION INTRAVENOUS at 15:02

## 2018-04-23 RX ADMIN — MAGESIUM CITRATE 296 ML: 1.75 LIQUID ORAL at 11:01

## 2018-04-23 RX ADMIN — SODIUM CHLORIDE: 0.9 INJECTION, SOLUTION INTRAVENOUS at 14:58

## 2018-04-23 RX ADMIN — DEXTROSE AND SODIUM CHLORIDE 125 ML/HR: 5; .9 INJECTION, SOLUTION INTRAVENOUS at 16:29

## 2018-04-23 RX ADMIN — FERROUS SULFATE TAB 325 MG (65 MG ELEMENTAL FE) 325 MG: 325 (65 FE) TAB at 09:28

## 2018-04-23 RX ADMIN — ENOXAPARIN SODIUM 40 MG: 40 INJECTION SUBCUTANEOUS at 09:27

## 2018-04-23 RX ADMIN — ATORVASTATIN CALCIUM 20 MG: 20 TABLET, FILM COATED ORAL at 09:28

## 2018-04-23 RX ADMIN — METHOCARBAMOL 500 MG: 500 TABLET ORAL at 06:01

## 2018-04-23 NOTE — ANESTHESIA PREPROCEDURE EVALUATION
Review of Systems/Medical History  Patient summary reviewed  Chart reviewed  History of anesthetic complications PONV    Cardiovascular  Hypertension , No past MI , CAD, , History of CABG (2015), No angina ,    Pulmonary  Negative pulmonary ROS        GI/Hepatic    Bowel prep  Comment: Long standing diarrhea, colitis - inflammatory vs infectious, for colonoscopy          Endo/Other     GYN    Hysterectomy, Breast cancer (right)        Hematology  Anemia (thalassemia) ,     Musculoskeletal    Comment: Leg pain, being followed acutely by neurology but no dx at this time Arthritis     Neurology   Psychology         Physical Exam    Airway    Mallampati score: III  TM Distance: >3 FB  Neck ROM: full     Dental   No notable dental hx     Cardiovascular      Pulmonary      Other Findings       Lab Results   Component Value Date    WBC 4 84 04/21/2018    HGB 10 7 (L) 04/21/2018     04/21/2018     Lab Results   Component Value Date     (L) 04/23/2018    K 3 3 (L) 04/23/2018    BUN 16 04/23/2018    CREATININE 0 75 04/23/2018    GLUCOSE 112 04/23/2018     Anesthesia Plan  ASA Score- 2     Anesthesia Type- IV sedation with anesthesia with ASA Monitors  Additional Monitors:   Airway Plan:     Comment: Pt requests zofran for hx PONV  Plan Factors-    Induction- intravenous  Postoperative Plan-     Informed Consent- Anesthetic plan and risks discussed with patient  I personally reviewed this patient with the CRNA  Discussed and agreed on the Anesthesia Plan with the CRNA  Marianne Damon

## 2018-04-23 NOTE — PROGRESS NOTES
Patient's  concerned that bowel prep will make patient weaker after being on a clear liquid diet for three days  [facility]IM paged and they called GI  Colonoscopy canceled for tomorrow, prep will not be given  Will continue to monitor

## 2018-04-23 NOTE — PROGRESS NOTES
Discussed with on-call gastroenterologist, Dr Romeo Frederick  Patient did not have MRI this evening  Per GI, hold off colonoscopy until MRI completed  Bowel prep discontinued  Discontinue NPO  RN aware

## 2018-04-23 NOTE — PROGRESS NOTES
[facility] Internal Medicine Progress Note  Patient: Rosey Lopez 61 y o  female   MRN: [id number]  PCP: Milana Carrasco MD  Unit/Bed#: -01 Encounter: [id number]  Date Of Visit: 18    Assessment:    Principal Problem:    Bilateral radiating leg pain  Active Problems:    Colitis    Coronary artery disease    Essential hypertension    Hypokalemia    Rheumatoid arthritis (HCC)    Elevated d-dimer    Abdominal pain      Plan:    · Colitis for colonoscopy later today, continue antibiotics, GI following  · Lower extremity pain with ambulatory dysfunction follow-up on imaging studies, neurology following analgesics for pain relief  · Elevated D-dimer likely nonspecific monitor  · Microcytosis follow hemoglobin  · History of rheumatoid arthritis  · Coronary artery disease status post CABG continue statin beta-blocker aspirin  · Out of bed as able       VTE Pharmacologic Prophylaxis:   Pharmacologic: Enoxaparin (Lovenox)  Mechanical VTE Prophylaxis in Place: Yes    Patient Centered Rounds: I have performed bedside rounds with nursing staff today  Discussions with Specialists or Other Care Team Provider:     Education and Discussions with Family / Patient:  Discussed with the patient and family at bedside in detail questions answered    Time Spent for Care: 30 minutes  More than 50% of total time spent on counseling and coordination of care as described above      Current Length of Stay: 3 day(s)    Current Patient Status: Inpatient   Certification Statement: The patient will continue to require additional inpatient hospital stay due to As mentioned    Discharge Plan / Estimated Discharge Date:  For colonoscopy later today    Code Status: Level 1 - Full Code      Subjective:     Complains of lower extremity pain      Objective:     Vitals:   Temp (24hrs), Av 4 °F (37 4 °C), Min:98 2 °F (36 8 °C), Max:100 2 °F (37 9 °C)    HR:  [] 103  Resp:  [18-19] 19  BP: (106-136)/(74-90) 124/82  SpO2:  [93 %-95 %] 93 %  Body mass index is 25 12 kg/m²  Input and Output Summary (last 24 hours):     No intake or output data in the 24 hours ending 04/23/18 1338    Physical Exam:     Physical Exam    Patient reports distress due to pain  Neck supple  Lungs diminished breath sounds at bases no additional sounds  Heart sounds S1-S2 noted  Abdomen soft nontender  Awake alert obeys simple commands  No rash  No pedal edema    Additional Data:     Labs:      Results from last 7 days  Lab Units 04/21/18  0449  04/20/18  1201   WBC Thousand/uL 4 84  --  5 10   HEMOGLOBIN g/dL 10 7*  --  10 1*   HEMATOCRIT % 32 7*  --  30 9*   PLATELETS Thousands/uL 163  < > 203   LYMPHO PCT %  --   --  17   MONO PCT MAN %  --   --  15*   EOSINO PCT MANUAL %  --   --  2   < > = values in this interval not displayed  Results from last 7 days  Lab Units 04/23/18  0543  04/20/18  1240   SODIUM mmol/L 135*  < > 138   POTASSIUM mmol/L 3 3*  < > 3 0*   CHLORIDE mmol/L 99*  < > 98*   CO2 mmol/L 25  < > 29   BUN mg/dL 16  < > 17   CREATININE mg/dL 0 75  < > 1 06   CALCIUM mg/dL 8 3  < > 8 4   TOTAL PROTEIN g/dL  --   --  7 0   BILIRUBIN TOTAL mg/dL  --   --  0 60   ALK PHOS U/L  --   --  96   ALT U/L  --   --  24   AST U/L  --   --  27   GLUCOSE RANDOM mg/dL 112  < > 104   < > = values in this interval not displayed  * I Have Reviewed All Lab Data Listed Above  * Additional Pertinent Lab Tests Reviewed: All Labs Within Last 24 Hours Reviewed    Imaging:    Imaging Reports Reviewed Today Include:   Imaging Personally Reviewed by Myself Includes:     Recent Cultures (last 7 days):       Results from last 7 days  Lab Units 04/21/18 2020   C DIFF TOXIN B  NEGATIVE for C difficle toxin by PCR          Last 24 Hours Medication List:     Current Facility-Administered Medications:  acetaminophen 650 mg Oral Q6H PRN Bharat Desouza PA-C    aspirin 81 mg Oral Daily Dianna Eldridge MD    atorvastatin 20 mg Oral Daily Bharat Desouza PA-C cefTRIAXone 1,000 mg Intravenous Q24H Mo Martini PA-C Last Rate: 1,000 mg (04/22/18 1844)   cholestyramine sugar free 4 g Oral TID Bharat Godoy PA-C    dicyclomine 10 mg Oral TID AC Bharat Godoy PA-C    docusate sodium 100 mg Oral BID Nicolas Faust, DO    enoxaparin 40 mg Subcutaneous Daily Bharat Godoy PA-C    ferrous sulfate 325 mg Oral BID Nicolas Faust, DO    lidocaine 1 patch Transdermal Daily Bharat Godoy PA-C    LORazepam 1 mg Intravenous Q6H PRN Bharat Fisher PA-C    methocarbamol 500 mg Oral Q6H PRN Bharat Fisher PA-C    metoprolol tartrate 25 mg Oral BID Bharat Godoy PA-C    metroNIDAZOLE 500 mg Oral [facility] Mo Martini PA-C    ondansetron 4 mg Intravenous Q6H PRN Bharat Fisher PA-C    zolpidem 5 mg Oral HS PRN Christian Romero MD         Today, Patient Was Seen By: Christian Romero MD    ** Please Note: This note has been constructed using a voice recognition system   **

## 2018-04-23 NOTE — OP NOTE
**** GI/ENDOSCOPY REPORT ****     PATIENT NAME: Roman Mcintosh ------ VISIT ID:  Patient ID:   [id number] YOB: 1960     INTRODUCTION: Colonoscopy - A 61 female patient presents for an inpatient   Colonoscopy at [facility]  PREVIOUS COLONOSCOPY:     INDICATIONS: Rectal bleeding  CONSENT:  The benefits, risks, and alternatives to the procedure were   discussed and informed consent was obtained from the patient  PREPARATION: EKG, pulse, pulse oximetry and blood pressure were monitored   throughout the procedure  The patient was identified by myself both   verbally and by visual inspection of ID band  ASA Classification: Class 2   - Patient has mild to moderate systemic disturbance that may or may not be   related to the disorder requiring surgery  Airway Assessment   Classification: Airway class 2 - Visualization of the soft palate, fauces   and uvula  MEDICATIONS: Anesthesia-check records     PROCEDURE:  The endoscope was passed without difficulty through the anus   under direct visualization and advanced to the terminal ileum  The scope   was withdrawn and the mucosa was carefully examined  The quality of the   preparation was adequate prep  Cecal Intubation Time: 4 minutes(s) Scope   Withdrawal Time: 12 minutes(s)     RECTAL EXAM: Normal rectal exam      FINDINGS:  The terminal ileum appeared to be normal  Moderate inflammation   with increased vascularity noted throughout; more diffuse in right and   left colon, but patchy in rectum, transverse colon and cecum with areas of   normal mucosa s/p multiple biopsies  COMPLICATIONS: There were no complications  IMPRESSIONS: Normal terminal ileum  Moderate inflammation with increased   vascularity noted throughout; more diffuse in right and left colon, but   patchy in rectum, transverse colon and cecum with areas of normal mucosa   s/p multiple biopsies       RECOMMENDATIONS: Follow-up on the results of the biopsy specimens  Start   on steroids  PATHOLOGY SPECIMENS:     PROCEDURE CODES:     ICD-9 Codes: 569 3 Hemorrhage of rectum and anus     ICD-10 Codes: K62 5 Hemorrhage of anus and rectum     PERFORMED BY: ELE Sotelo  on 04/23/2018  Version 1, electronically signed by ELE Hull  on 04/23/2018   at 15:27

## 2018-04-23 NOTE — PROGRESS NOTES
Patient's family requesting to speak with provider  I informed them that per my discussion with GI, colonoscopy was cancelled for tomorrow as the patient did not have her MRI  The patient's family discussed with Dr Radha Hilton  I spoke with Dr Radha Hilton again this evening, who informed me that after her discussion with the family, the patient is placed back on the schedule for colonoscopy tomorrow afternoon  Per GI recs, orders placed for PO Dulcolax 10mg, GoLytely 4,000mL 1 cup every 15 minutes until completed  Patient made NPO after midnight  Discussed with nursing

## 2018-04-23 NOTE — PROGRESS NOTES
Progress Note - Neurology   Mita Bar 61 y o  female MRN: [id number]  Unit/Bed#: ENDO POOL Encounter: [id number]    Assessment/Plan:  1  B/l leg pain  -Considering myopathy from viral etiology (GI related) vs medication induced (atorvastatin)  Patient recently on steroids, but not long enough to cause steroid induced myopathy  -MRI T- and L-spine w and wo contrast pending  Patient unable to tolerate yesterday secondary to anxiety  Will attempt again with Ativan 2mg 1 hour prior to study   -Serum studies:   -Abnormal: CRP elevated at 71 8    -WNL: B12, folate, initial CK, ESR   -Pending: TSH, PTH, ACTH, CK, Aldolase, LFTs, LDH pending, heavy metal screen, B6, vitamin D  -Considering medication induced myalgias secondary to atorvastatin  If above labs negative, will likely d/c atorvastatin to see if symptoms resolved  -PT/OT eval and treat  -Continue to monitor and notify with changes    Subjective:   No acute events overnight  Continues to complain of b/l leg pain  Denies CP, SOB, headache, dizziness, vision changes  ROS:  12 point ROS performed, as stated above, all others negative  Medications:   All current active meds have been reviewed and current meds:  Scheduled Meds:  Current Facility-Administered Medications:  acetaminophen 650 mg Oral Q6H PRN Bharat Fallon PA-C    aspirin 81 mg Oral Daily Donya Barnes MD    atorvastatin 20 mg Oral Daily Bharat Fallon PA-C    cefTRIAXone 1,000 mg Intravenous Q24H Melissa Gentile PA-C Last Rate: 1,000 mg (04/22/18 1844)   cholestyramine sugar free 4 g Oral TID Bharat Flalon PA-C    dicyclomine 10 mg Oral TID AC Bharat Godoy PA-C    docusate sodium 100 mg Oral BID Nicolas Faust, DO    enoxaparin 40 mg Subcutaneous Daily Bharat Fallon PA-C    ferrous sulfate 325 mg Oral BID Nicolas Faust, DO    lidocaine 1 patch Transdermal Daily Bharat Godoy PA-C    LORazepam 1 mg Intravenous Q6H PRN Bharat Godoy PA-C    methocarbamol 500 mg Oral Q6H PRN Bharat MELENDEZ Rafael Sparks PA-C    metoprolol tartrate 25 mg Oral BID Bharat Godoy PA-C    metroNIDAZOLE 500 mg Oral [facility] PA-C    ondansetron 4 mg Intravenous Q6H PRN Bharat Woo PA-C    zolpidem 5 mg Oral HS PRN Angelo Dsouza MD      Continuous Infusions:   PRN Meds:   acetaminophen    LORazepam    methocarbamol    ondansetron    zolpidem     Vitals: Blood pressure 131/91, pulse 81, temperature 97 8 °F (36 6 °C), resp  rate 18, height 5' 2" (1 575 m), weight 62 3 kg (137 lb 5 6 oz), SpO2 93 %, not currently breastfeeding  ,Body mass index is 25 12 kg/m²  Physical Exam:  Exam limited secondary to seeing patient prior to colonoscopy  Physical Exam   Constitutional: No distress  HENT:   Head: Normocephalic and atraumatic  Neck: Normal range of motion  Neck supple  Musculoskeletal:   No significant tenderness to palpation to bilateral lower extremities  Skin: Skin is warm and dry  She is not diaphoretic  Psychiatric: She has a normal mood and affect  Her behavior is normal  Judgment and thought content normal        Neurologic Exam     Mental Status   Patient alert and oriented  Following commands  Attention and concentration intact  Speech full with no dysarthria or difficulty word finding  Cranial Nerves   Cranial nerves II-XII grossly intact  Motor Exam   Moving all extremities x4 to antigravity  Sensory Exam   Sensation grossly intact  Gait, Coordination, and Reflexes   Gait deferred  Lab Results: I have personally reviewed pertinent reports    Recent Results (from the past 24 hour(s))   Basic metabolic panel    Collection Time: 04/23/18  5:43 AM   Result Value Ref Range    Sodium 135 (L) 136 - 145 mmol/L    Potassium 3 3 (L) 3 5 - 5 3 mmol/L    Chloride 99 (L) 100 - 108 mmol/L    CO2 25 21 - 32 mmol/L    Anion Gap 11 4 - 13 mmol/L    BUN 16 5 - 25 mg/dL    Creatinine 0 75 0 60 - 1 30 mg/dL    Glucose 112 65 - 140 mg/dL    Calcium 8 3 8 3 - 10 1 mg/dL    eGFR 88 ml/min/1 73sq m     Imaging Studies: No new neuro imaging for review    EKG, Pathology, and Other Studies: I have personally reviewed pertinent reports      VTE Prophylaxis: Enoxaparin (Lovenox)

## 2018-04-23 NOTE — PROGRESS NOTES
PIERRE lucas  talked to  over phone to explain reasoning for holding colonoscopy  Family has decided to go ahead with the procedure without doing MRI  RN talked to GI over phone to confirm and made SLIM aware  SLIM reordered prep  Will be NPO at midnight and will continue to monitor

## 2018-04-23 NOTE — PROGRESS NOTES
Paged GI due to patient having 3 small bowel movements which were bright red blood  Spoke to Dr Becki Jones and was instructed to continue to monitor her over the next couple of hours and call him back if this gets worse  Dr Becki Jones will order an H&H and we will notify him again if needed

## 2018-04-23 NOTE — PLAN OF CARE

## 2018-04-23 NOTE — ANESTHESIA POSTPROCEDURE EVALUATION
Post-Op Assessment Note      CV Status:  Stable    Mental Status:  Somnolent    Hydration Status:  Euvolemic    PONV Controlled:  Controlled    Airway Patency:  Patent    Post Op Vitals Reviewed: Yes          Staff: CRNA, Anesthesiologist           BP   140/88   Temp      Pulse  80   Resp   18   SpO2   99

## 2018-04-24 ENCOUNTER — APPOINTMENT (INPATIENT)
Dept: MRI IMAGING | Facility: HOSPITAL | Age: 58
DRG: 386 | End: 2018-04-24
Payer: COMMERCIAL

## 2018-04-24 ENCOUNTER — TELEPHONE (OUTPATIENT)
Dept: GASTROENTEROLOGY | Facility: CLINIC | Age: 58
End: 2018-04-24

## 2018-04-24 PROBLEM — D64.9 ANEMIA: Status: ACTIVE | Noted: 2018-04-24

## 2018-04-24 PROBLEM — R79.89 ABNORMAL TSH: Status: ACTIVE | Noted: 2018-04-24

## 2018-04-24 LAB
ABO GROUP BLD: NORMAL
ALBUMIN SERPL BCP-MCNC: 1.9 G/DL (ref 3.5–5)
ALP SERPL-CCNC: 108 U/L (ref 46–116)
ALT SERPL W P-5'-P-CCNC: 39 U/L (ref 12–78)
AST SERPL W P-5'-P-CCNC: 31 U/L (ref 5–45)
BASOPHILS # BLD AUTO: 0.01 THOUSANDS/ΜL (ref 0–0.1)
BASOPHILS NFR BLD AUTO: 0 % (ref 0–1)
BILIRUB DIRECT SERPL-MCNC: 0.13 MG/DL (ref 0–0.2)
BILIRUB SERPL-MCNC: 0.3 MG/DL (ref 0.2–1)
BLD GP AB SCN SERPL QL: NEGATIVE
CK SERPL-CCNC: 8 U/L (ref 26–192)
EOSINOPHIL # BLD AUTO: 0 THOUSAND/ΜL (ref 0–0.61)
EOSINOPHIL NFR BLD AUTO: 0 % (ref 0–6)
ERYTHROCYTE [DISTWIDTH] IN BLOOD BY AUTOMATED COUNT: 14.9 % (ref 11.6–15.1)
HCT VFR BLD AUTO: 25.6 % (ref 34.8–46.1)
HGB BLD-MCNC: 8.3 G/DL (ref 11.5–15.4)
INR PPP: 1.28 (ref 0.86–1.16)
LYMPHOCYTES # BLD AUTO: 1.06 THOUSANDS/ΜL (ref 0.6–4.47)
LYMPHOCYTES NFR BLD AUTO: 22 % (ref 14–44)
MCH RBC QN AUTO: 18.4 PG (ref 26.8–34.3)
MCHC RBC AUTO-ENTMCNC: 32.4 G/DL (ref 31.4–37.4)
MCV RBC AUTO: 57 FL (ref 82–98)
MONOCYTES # BLD AUTO: 0.6 THOUSAND/ΜL (ref 0.17–1.22)
MONOCYTES NFR BLD AUTO: 13 % (ref 4–12)
NEUTROPHILS # BLD AUTO: 3.1 THOUSANDS/ΜL (ref 1.85–7.62)
NEUTS SEG NFR BLD AUTO: 65 % (ref 43–75)
PLATELET # BLD AUTO: 92 THOUSANDS/UL (ref 149–390)
PROT SERPL-MCNC: 5.6 G/DL (ref 6.4–8.2)
PROTHROMBIN TIME: 16.4 SECONDS (ref 12.1–14.4)
PTH-INTACT SERPL-MCNC: 104.5 PG/ML (ref 18.4–80.1)
RBC # BLD AUTO: 4.52 MILLION/UL (ref 3.81–5.12)
RH BLD: POSITIVE
SPECIMEN EXPIRATION DATE: NORMAL
T4 FREE SERPL-MCNC: 1.52 NG/DL (ref 0.76–1.46)
TSH SERPL DL<=0.05 MIU/L-ACNC: 0.33 UIU/ML (ref 0.36–3.74)
WBC # BLD AUTO: 4.77 THOUSAND/UL (ref 4.31–10.16)

## 2018-04-24 PROCEDURE — 83970 ASSAY OF PARATHORMONE: CPT | Performed by: PHYSICIAN ASSISTANT

## 2018-04-24 PROCEDURE — 97163 PT EVAL HIGH COMPLEX 45 MIN: CPT

## 2018-04-24 PROCEDURE — 86900 BLOOD TYPING SEROLOGIC ABO: CPT | Performed by: INTERNAL MEDICINE

## 2018-04-24 PROCEDURE — 80076 HEPATIC FUNCTION PANEL: CPT | Performed by: PHYSICIAN ASSISTANT

## 2018-04-24 PROCEDURE — 82085 ASSAY OF ALDOLASE: CPT | Performed by: PHYSICIAN ASSISTANT

## 2018-04-24 PROCEDURE — G8978 MOBILITY CURRENT STATUS: HCPCS

## 2018-04-24 PROCEDURE — G8979 MOBILITY GOAL STATUS: HCPCS

## 2018-04-24 PROCEDURE — 82550 ASSAY OF CK (CPK): CPT | Performed by: PHYSICIAN ASSISTANT

## 2018-04-24 PROCEDURE — 84439 ASSAY OF FREE THYROXINE: CPT | Performed by: PHYSICIAN ASSISTANT

## 2018-04-24 PROCEDURE — 83615 LACTATE (LD) (LDH) ENZYME: CPT | Performed by: PHYSICIAN ASSISTANT

## 2018-04-24 PROCEDURE — 99232 SBSQ HOSP IP/OBS MODERATE 35: CPT | Performed by: FAMILY MEDICINE

## 2018-04-24 PROCEDURE — 84443 ASSAY THYROID STIM HORMONE: CPT | Performed by: PHYSICIAN ASSISTANT

## 2018-04-24 PROCEDURE — 86901 BLOOD TYPING SEROLOGIC RH(D): CPT | Performed by: INTERNAL MEDICINE

## 2018-04-24 PROCEDURE — 99232 SBSQ HOSP IP/OBS MODERATE 35: CPT | Performed by: INTERNAL MEDICINE

## 2018-04-24 PROCEDURE — 82024 ASSAY OF ACTH: CPT | Performed by: PHYSICIAN ASSISTANT

## 2018-04-24 PROCEDURE — 85025 COMPLETE CBC W/AUTO DIFF WBC: CPT | Performed by: PHYSICIAN ASSISTANT

## 2018-04-24 PROCEDURE — 85610 PROTHROMBIN TIME: CPT | Performed by: INTERNAL MEDICINE

## 2018-04-24 PROCEDURE — 83625 ASSAY OF LDH ENZYMES: CPT | Performed by: PHYSICIAN ASSISTANT

## 2018-04-24 PROCEDURE — 86850 RBC ANTIBODY SCREEN: CPT | Performed by: INTERNAL MEDICINE

## 2018-04-24 PROCEDURE — 99233 SBSQ HOSP IP/OBS HIGH 50: CPT | Performed by: PSYCHIATRY & NEUROLOGY

## 2018-04-24 RX ORDER — PANTOPRAZOLE SODIUM 40 MG/1
40 TABLET, DELAYED RELEASE ORAL
Status: DISCONTINUED | OUTPATIENT
Start: 2018-04-24 | End: 2018-04-26 | Stop reason: HOSPADM

## 2018-04-24 RX ORDER — PANTOPRAZOLE SODIUM 40 MG/1
40 TABLET, DELAYED RELEASE ORAL
Status: DISCONTINUED | OUTPATIENT
Start: 2018-04-25 | End: 2018-04-24

## 2018-04-24 RX ADMIN — DICYCLOMINE HYDROCHLORIDE 10 MG: 10 CAPSULE ORAL at 12:24

## 2018-04-24 RX ADMIN — METRONIDAZOLE 500 MG: 500 TABLET ORAL at 14:33

## 2018-04-24 RX ADMIN — METRONIDAZOLE 500 MG: 500 TABLET ORAL at 06:23

## 2018-04-24 RX ADMIN — FERROUS SULFATE TAB 325 MG (65 MG ELEMENTAL FE) 325 MG: 325 (65 FE) TAB at 16:53

## 2018-04-24 RX ADMIN — DEXTROSE AND SODIUM CHLORIDE 125 ML/HR: 5; .9 INJECTION, SOLUTION INTRAVENOUS at 19:03

## 2018-04-24 RX ADMIN — CHOLESTYRAMINE 4 G: 4 POWDER, FOR SUSPENSION ORAL at 16:51

## 2018-04-24 RX ADMIN — METRONIDAZOLE 500 MG: 500 TABLET ORAL at 22:54

## 2018-04-24 RX ADMIN — PANTOPRAZOLE SODIUM 40 MG: 40 TABLET, DELAYED RELEASE ORAL at 16:51

## 2018-04-24 RX ADMIN — METHYLPREDNISOLONE SODIUM SUCCINATE 20 MG: 40 INJECTION, POWDER, FOR SOLUTION INTRAMUSCULAR; INTRAVENOUS at 22:55

## 2018-04-24 RX ADMIN — DICYCLOMINE HYDROCHLORIDE 10 MG: 10 CAPSULE ORAL at 16:51

## 2018-04-24 RX ADMIN — METHYLPREDNISOLONE SODIUM SUCCINATE 20 MG: 40 INJECTION, POWDER, FOR SOLUTION INTRAMUSCULAR; INTRAVENOUS at 06:24

## 2018-04-24 RX ADMIN — METHYLPREDNISOLONE SODIUM SUCCINATE 20 MG: 40 INJECTION, POWDER, FOR SOLUTION INTRAMUSCULAR; INTRAVENOUS at 14:33

## 2018-04-24 RX ADMIN — CEFTRIAXONE 1000 MG: 1 INJECTION, SOLUTION INTRAVENOUS at 16:51

## 2018-04-24 RX ADMIN — DEXTROSE AND SODIUM CHLORIDE 125 ML/HR: 5; .9 INJECTION, SOLUTION INTRAVENOUS at 02:12

## 2018-04-24 RX ADMIN — CHOLESTYRAMINE 4 G: 4 POWDER, FOR SUSPENSION ORAL at 22:54

## 2018-04-24 RX ADMIN — LIDOCAINE 1 PATCH: 50 PATCH TOPICAL at 09:34

## 2018-04-24 RX ADMIN — DEXTROSE AND SODIUM CHLORIDE 125 ML/HR: 5; .9 INJECTION, SOLUTION INTRAVENOUS at 10:50

## 2018-04-24 NOTE — CASE MANAGEMENT
Continued Stay Review    Date: 4/24/2018  CC: Patient reports mild improvement in her abdominal pain  She did have 2 bms this am around 5-6am with bright red bood   She has continued pain in her legs  Vital Signs: /63 (BP Location: Left arm)   Pulse 85   Temp 99 3 °F (37 4 °C) (Oral)   Resp 19   Ht 5' 2" (1 575 m)   Wt 62 3 kg (137 lb 5 6 oz)   SpO2 95%   Breastfeeding?  No   BMI 25 12 kg/m²     Medications:   Scheduled Meds:   Current Facility-Administered Medications:  acetaminophen 650 mg Oral Q6H PRN Bharat Merida PA-C    aspirin 81 mg Oral Daily Qiana Griffin MD    atorvastatin 20 mg Oral Daily Bharat Merida PA-C    cefTRIAXone 1,000 mg Intravenous Q24H Mirza Arshad PA-C Last Rate: 1,000 mg (04/23/18 1628)   cholestyramine sugar free 4 g Oral TID Bharat Godoy PA-C    dextrose 5 % and sodium chloride 0 9 % 125 mL/hr Intravenous Continuous Nikolai Austin MD Last Rate: 125 mL/hr (04/24/18 1050)   dicyclomine 10 mg Oral TID AC Bharat Godoy PA-C    docusate sodium 100 mg Oral BID Nicolas Faust, DO    ferrous sulfate 325 mg Oral BID Nicolas Ritenuti, DO    lidocaine 1 patch Transdermal Daily Bharat Godoy PA-C    LORazepam 1 mg Intravenous Q6H PRN Bharat Merida PA-C    methocarbamol 500 mg Oral Q6H PRN Bharat Merida PA-C    methylPREDNISolone sodium succinate 20 mg Intravenous Q8H Saida Hill MD    metoprolol tartrate 25 mg Oral BID Bharat Godoy PA-C    metroNIDAZOLE 500 mg Oral [facility] Mirza Arshad PA-C    ondansetron 4 mg Intravenous Q6H PRN Bharat Merida PA-C    sodium chloride 125 mL/hr Intravenous Continuous Kam Yost DO Last Rate: Stopped (04/23/18 1728)   zolpidem 5 mg Oral HS PRN Qiana Griffin MD      Continuous Infusions:   dextrose 5 % and sodium chloride 0 9 % 125 mL/hr Last Rate: 125 mL/hr (04/24/18 1050)   sodium chloride 125 mL/hr Last Rate: Stopped (04/23/18 1728)     PRN Meds: not used:    acetaminophen    LORazepam    methocarbamol   ondansetron    zolpidem    Abnormal Labs/Diagnostic Results:   INR 1 28  TSH 3rd generation 0 331  Free T4 - 1 52   5  Total CK 8  Total protein 5 6  Albumin 1 9  hgb 8 3, hct 25 6  Platelets 92    Age/Sex: 61 y o  female     Assessment/Plan:  Per GI:  Colitis  -suspect IBD given chronicity of symptoms and appearance on colonoscopy  -colonoscopy 4/23 with moderate inflammation throughout the colon with normal terminal ileum  -f/u pathology, rule out CMV colitis  -Continue solumedrol 20mg q8 hours     Acute blood loss anemia  Rectal bleeding  -Did have 2 episodes of rectal bleeding this AM that has resolved  -Continue to monitor bms  -Monitor H/H  -Okay for clear liquid diet at this time  Discharge Plan: to be determined  Thank you,  [facility] in the [employer] [facility] [place] for 2017  Network Utilization Review Department  Phone: [phone]; Fax [phone]  ATTENTION: The Network Utilization Review Department is now centralized for our 7 Facilities  Make a note that we have a new phone and fax numbers for our Department  Please call with any questions or concerns to [phone] and carefully follow the prompts so that you are directed to the right person  All voicemails are confidential  Fax any determinations, approvals, denials, and requests for initial or continue stay review clinical to [phone]  Due to HIGH CALL volume, it would be easier if you could please send faxed requests to expedite your requests and in part, help us provide discharge notifications faster

## 2018-04-24 NOTE — ASSESSMENT & PLAN NOTE
· Please refer to H&P in regards to onset of these symptoms  · Patient reports improvement in pain with Toradol 15 mg IV Q6 PRN - continue   · Neurology evaluation appreciated-MRI lumbar spine reviewed    Plan is to hold off on the thoracic spine MRI  · Patient also reports history of RA, not on any medications, follows at [facility]  · ESR 20, CRP 71 8  · Continue with PT OT evaluation and therapy

## 2018-04-24 NOTE — PROGRESS NOTES
Progress Note - Neurology   Angel Solis 61 y o  female MRN: [id number]  Unit/Bed#: -01 Encounter: [id number]    Assessment/Plan:   3 61year old female with B/L LE pain    - Considering myopathy from viral etiology (GI related) vs medication induced (atorvastatin)   - MRI lumbar spine with and without contrast completed with no cause for symptoms identified     - Lab results thus far: CRP elevated at 71 8  CK normal at 38, folate normal at 10 7, Vit B12 1346,    - Pending labs: Vit D panel, hemoglobin electrophoresis, heavy metals screen, Vit B6, ACTH, PTH, aldolase  - Can defer MRI T spine at this time, do not feel spinal pathology cause of symptoms     - Further plan as per attending neurologist      2  Abdominal pain and nausea   - Management as per GI team      Subjective:   Angel Solis is a 61year old female with PMH of CAD, HTN who presents with B/L LE pain  Patient notes that bilateral lower extremity pain is slightly improved compared to yesterday, but that she still notes discomfort from her hips down to her knees, and then her knees down to her feet and at times the pain will move upwards  She notes that the pain is aching in nature  She continues to note left-sided abdominal pain and nausea      ROS:  General ROS: negative for - chills, fatigue or fever  Ophthalmic ROS: negative for - blurry vision, decreased vision or double vision  Respiratory ROS: negative for - shortness of breath  Cardiovascular ROS: negative for - chest pain  Gastrointestinal ROS: positive for - abdominal pain and nausea/vomiting  Musculoskeletal ROS: positive for - muscle pain  negative for - muscular weakness  Neurological ROS: negative for - headaches, numbness/tingling, speech problems, visual changes or weakness    Medications  Scheduled Meds:  Current Facility-Administered Medications:  acetaminophen 650 mg Oral Q6H PRN Bharat Godoy PA-C    aspirin 81 mg Oral Daily Asaf Zapata MD    atorvastatin 20 mg Oral Daily Bharat Ignacio PA-C    cefTRIAXone 1,000 mg Intravenous Q24H Catalina Armstrong PA-C Last Rate: 1,000 mg (04/23/18 1628)   cholestyramine sugar free 4 g Oral TID Bharat Godoy PA-C    dextrose 5 % and sodium chloride 0 9 % 125 mL/hr Intravenous Continuous Emerson Ortiz MD Last Rate: 125 mL/hr (04/24/18 0944)   dicyclomine 10 mg Oral TID AC Bharat Godoy PA-C    docusate sodium 100 mg Oral BID Nicolas Faust, DO    enoxaparin 40 mg Subcutaneous Daily Bharat Godoy PA-C    ferrous sulfate 325 mg Oral BID Nicolas Faust, DO    lidocaine 1 patch Transdermal Daily Bharat Gdooy PA-C    LORazepam 1 mg Intravenous Q6H PRN Bharat Ignacio PA-C    methocarbamol 500 mg Oral Q6H PRN Bharat Ignacio PA-C    methylPREDNISolone sodium succinate 20 mg Intravenous Q8H Jn Taylor MD    metoprolol tartrate 25 mg Oral BID Bharat Godoy PA-C    metroNIDAZOLE 500 mg Oral [facility] Catalina Armstrong PA-C    ondansetron 4 mg Intravenous Q6H PRN Bharat Ignacio PA-C    sodium chloride 125 mL/hr Intravenous Continuous Saniya Hartland, DO Last Rate: Stopped (04/23/18 1728)   zolpidem 5 mg Oral HS PRN Tyler Quinn MD      Continuous Infusions:  dextrose 5 % and sodium chloride 0 9 % 125 mL/hr Last Rate: 125 mL/hr (04/24/18 0212)   sodium chloride 125 mL/hr Last Rate: Stopped (04/23/18 1728)     PRN Meds:   acetaminophen    LORazepam    methocarbamol    ondansetron    zolpidem    Vitals: Blood pressure 104/70, pulse 84, temperature 98 3 °F (36 8 °C), temperature source Oral, resp  rate 18, height 5' 2" (1 575 m), weight 62 3 kg (137 lb 5 6 oz), SpO2 93 %, not currently breastfeeding  ,Body mass index is 25 12 kg/m²  Physical Exam: /63 (BP Location: Left arm)   Pulse 85   Temp 99 3 °F (37 4 °C) (Oral)   Resp 19   Ht 5' 2" (1 575 m)   Wt 62 3 kg (137 lb 5 6 oz)   SpO2 95%   Breastfeeding?  No   BMI 25 12 kg/m²   General appearance: alert and oriented, in no acute distress  Head: Normocephalic, without obvious abnormality, atraumatic  Eyes: negative findings: lids and lashes normal, conjunctivae and sclerae normal and pupils equal, round, reactive to light and accomodation  Lungs: clear to auscultation bilaterally  Heart: regular rate and rhythm  Abdomen: soft, non-tender; bowel sounds normal; no masses,  no organomegaly  Extremities: extremities normal, warm and well-perfused; no cyanosis, clubbing, or edema  Skin: Skin color, texture, turgor normal  No rashes or lesions  Neurologic: Mental status: Alert, oriented, thought content appropriate  Cranial nerves: II: pupils equal, round, reactive to light and accommodation, III,VII: ptosis no ptosis noted, III,IV,VI: extraocular muscles extra-ocular motions intact, VII: upper facial muscle function normal bilaterally, VII: lower facial muscle function normal bilaterally, XII: tongue strength normal  Sensory: normal, Grossly intact to crude touch  Motor: Motor strength 5/5 B/L UE and LE    Labs  Recent Results (from the past 24 hour(s))   Hemoglobin and hematocrit, blood    Collection Time: 04/23/18  6:17 PM   Result Value Ref Range    Hemoglobin 9 8 (L) 11 5 - 15 4 g/dL    Hematocrit 30 3 (L) 34 8 - 46 1 %   TSH, 3rd generation with T4 reflex    Collection Time: 04/24/18  4:03 AM   Result Value Ref Range    TSH 3RD GENERATON 0 331 (L) 0 358 - 3 740 uIU/mL   CK (with reflex to MB)    Collection Time: 04/24/18  4:03 AM   Result Value Ref Range    Total CK 8 (L) 26 - 192 U/L   Hepatic function panel    Collection Time: 04/24/18  4:03 AM   Result Value Ref Range    Total Bilirubin 0 30 0 20 - 1 00 mg/dL    Bilirubin, Direct 0 13 0 00 - 0 20 mg/dL    Alkaline Phosphatase 108 46 - 116 U/L    AST 31 5 - 45 U/L    ALT 39 12 - 78 U/L    Total Protein 5 6 (L) 6 4 - 8 2 g/dL    Albumin 1 9 (L) 3 5 - 5 0 g/dL   CBC and differential    Collection Time: 04/24/18  4:03 AM   Result Value Ref Range    WBC 4 77 4 31 - 10 16 Thousand/uL    RBC 4 52 3 81 - 5 12 Million/uL    Hemoglobin 8 3 (L) 11 5 - 15 4 g/dL    Hematocrit 25 6 (L) 34 8 - 46 1 %    MCV 57 (L) 82 - 98 fL    MCH 18 4 (L) 26 8 - 34 3 pg    MCHC 32 4 31 4 - 37 4 g/dL    RDW 14 9 11 6 - 15 1 %    Platelets 92 (L) 180 - 390 Thousands/uL    Neutrophils Relative 65 43 - 75 %    Lymphocytes Relative 22 14 - 44 %    Monocytes Relative 13 (H) 4 - 12 %    Eosinophils Relative 0 0 - 6 %    Basophils Relative 0 0 - 1 %    Neutrophils Absolute 3 10 1 85 - 7 62 Thousands/µL    Lymphocytes Absolute 1 06 0 60 - 4 47 Thousands/µL    Monocytes Absolute 0 60 0 17 - 1 22 Thousand/µL    Eosinophils Absolute 0 00 0 00 - 0 61 Thousand/µL    Basophils Absolute 0 01 0 00 - 0 10 Thousands/µL     Imaging   Personally reviewed images and reports in PACs  MRI lumbar spine with and without contrast- Mild non-compressive lumbar degenerative change at L4-5 and L5-S1  Incidentally noted fatty filum terminale  VTE Prophylaxis: Enoxaparin (Lovenox)    Counseling / Coordination of Care  Total time spent today 24 minutes  Greater than 50% of total time was spent with the patient and / or family counseling and / or coordination of care  A description of the counseling / coordination of care: Reviewed results of MRI L spine, no pathology noted to account for patient's symptoms  Awaiting further labs at this time

## 2018-04-24 NOTE — ASSESSMENT & PLAN NOTE
· Status post colonoscopy  · Status post biopsy-likely inflammatory bowel disease   Continue with the steroid  · Continue with PPI  · Discussed with Gastroenterology  · Symptomatic management

## 2018-04-24 NOTE — SOCIAL WORK
CM met with patient at bedside  Patient lives in a 2 story home with 2 BIB with hand rail  Patient reports her main bedroom and bathroom are on the 2nd floor but she has the ability to stay on the first floor in the guest room  Patient has no HX of DME or STR although in 2015 she went to outpatient therapy at [place] [address]  Patient had VNA back in 2015  Patient has RX coverage and uses [facility] with no issues  Patient denies any Mental Health or Substance Abuse issues  Patient does not have a POA but reported her  would make decisions  Patient works part time as a  and is able to drive herself  CM reviewed PT recommendation of STR  Patient at this time does not feel she will need this  A STR list was left for patient to review in case she decided she wanted STR  CM also discussed setting up VNA with home PT if she decides to go home vs  STR  Patient is agreeable to the referral but stated to CM that she isn't sure if she will need this  CM explained that referral can be cancelled if she chooses to do so  CM reviewed d/c planning process including the following: identifying help at home, patient preference for d/c planning needs, [facility] Meds to Bed program, availability of treatment team to discuss questions or concerns patient and/or family may have regarding diagnosis, plan for care, old or new medications and discharge planning   CM name and role reviewed  CM will follow needs at discharge

## 2018-04-24 NOTE — PHYSICAL THERAPY NOTE
PHYSICAL THERAPY EVALUATION  NAME: Yaritza   AGE:   61 y o  MRN:  [id number]  ADMIT DX: Hypokalemia [E87 6]  Myalgia [M79 1]  Colitis [K52 9]  Abdominal pain [R10 9]  Multiple complaints [R68 89]  Weight loss [R63 4]    PMH:   Past Medical History:   Diagnosis Date    Allergic rhinitis     Breast CA ([address] )     R breast CA-chemo/radiaition    Diarrhea     for 6 weeks-colonosocpy today 2016 and EGD    Hypertension     Thalassemia     Vitamin D deficiency      LENGTH OF STAY: 4     18 1426   Pain Assessment   Pain Assessment 0-10   Pain Score 1   Pain Type Acute pain   Pain Location Leg;Back   Hospital Pain Intervention(s) Ambulation/increased activity;Repositioned   Response to Interventions tolerated   Home Living   [facility];Stairs to enter with rails  (2 BIB)   Additional Comments Ambulates independently without AD at baseline  Prior Function   Level of [place] Independent with ADLs and functional mobility   Lives With Spouse   Receives Help From Family   ADL Assistance Independent   IADLs Independent   Falls in the last 6 months 0   Vocational Full time employment  ()   Restrictions/Precautions   Wells Pema Bearing Precautions Per Order No   Other Precautions Multiple lines;Telemetry; Fall Risk;Pain   General   Family/Caregiver Present Yes   Cognition   Overall Cognitive Status WFL   Arousal/Participation Cooperative   Orientation Level Oriented X4   Memory Within functional limits   Following Commands Follows all commands and directions without difficulty   Comments Pt identified by name and   RLE Assessment   RLE Assessment X   Strength RLE   RLE Overall Strength 4-/5  (grossly)   LLE Assessment   LLE Assessment X   Strength LLE   LLE Overall Strength 3+/5  (grossly)   Bed Mobility   Supine to Sit 5  Supervision   Additional items HOB elevated; Increased time required;Verbal cues   Sit to Supine Unable to assess  (left OOB in chair post evaluation )   Transfers   Sit to Stand 4  Minimal assistance   Additional items Assist x 1; Increased time required;Verbal cues   Stand to Sit 4  Minimal assistance   Additional items Assist x 1; Increased time required;Verbal cues   Stand pivot 4  Minimal assistance   Additional items Assist x 1; Increased time required;Verbal cues   Ambulation/Elevation   Gait pattern Improper Weight shift;L Knee Tacho;Decreased foot clearance;Decreased L stance; Short stride; Excessively slow   Gait Assistance 4  Minimal assist   Additional items Assist x 1;Verbal cues   Assistive Device (IV pole for support, declining RW at this time)   Distance 10` x1 and 12` x1    Balance   Static Sitting Fair +   Dynamic Sitting Fair   Static Standing Fair -   Dynamic Standing Poor +   Ambulatory Poor +   Endurance Deficit   Endurance Deficit Yes   Endurance Deficit Description limited ambulation distance   Activity Tolerance   Activity Tolerance Patient limited by fatigue   Nurse Made Aware Per RN, pt appropriate to evaluate   Assessment   Prognosis Good   Problem List Decreased strength;Decreased endurance; Impaired balance;Decreased mobility;Pain   Goals   Patient Goals to get stronger   STG Expiration Date 05/03/18   Short Term Goal #1 Pt will be able to: (1) perform bed mobililty with mod I (2) perform sit to stand with mod I (3) ambulate 300` with mod I and least restrictive AD (4) initiate HEP (5) PT to see to assess stair negotiation as appropriate    Treatment Day 0   Plan   Treatment/Interventions Functional transfer training;LE strengthening/ROM; Therapeutic exercise; Endurance training;Patient/family training;Equipment eval/education; Bed mobility;Gait training   PT Frequency 5x/wk   Recommendation   Recommendation Post acute IP rehab  (vs  home PT pending progress)   Equipment Recommended Walker   Barthel Index   Feeding 10   Bathing 0   Grooming Score 5   Dressing Score 5   Bladder Score 10   Bowels Score 10   Toilet Use Score 5 Transfers (Bed/Chair) Score 10   Mobility (Level Surface) Score 0   Stairs Score 0   Barthel Index Score 55       Assessment: Pt is a 61 y o  female seen for PT evaluation s/p admit to [address] [place] [address] on 4/20/2018 w/ Bilateral radiating leg pain  Order placed for PT  Comorbidities affecting pt's physical performance at time of assessment listed above  Personal factors affecting pt at time of IE include: steps to enter environment, multi-level environment, limited home support, inability to perform IADLs, inability to perform ADLs and inability to ambulate household distances  Prior to admission, pt was was independent w/ all functional mobility w/ out AD, ambulated unrestricted distances and all terrain, lived in multi-level home, had 2 BIB 1 railing and lived with   Upon evaluation: Pt requires SBA for bed mobility, min A for sit to stand, and min A for ambulation with IV pole for support  Currently declining RW use, however will most likely benefit from RW trial   (Please find full objective findings from PT assessment regarding body systems outlined above)  Impairments and limitations also listed above, especially due to  weakness, impaired balance, decreased endurance, gait deviations, pain, decreased activity tolerance and fall risk  The following objective measures performed on IE also reveal limitations: Barthel Index 55/100  Pt's clinical presentation is currently unstable/unpredictable seen in pt's presentation of L knee buckling with ambulation and abnormal lab values  Pt to benefit from continued skilled PT tx while in hospital and upon DC to address deficits as defined above and maximize level of functional mobility  From PT/mobility standpoint, recommendation at time of d/c would be inpatient rehab vs  Home PT pending progress in order to maximize pt's functional independence and consistency w/ mobility in order to facilitate return to PLOF    Recommend progression of ambulation, initiation of HEP, and PT to see for stair negotiation as appropriate        Maryam Goldberg, PT,DPT

## 2018-04-24 NOTE — PLAN OF CARE
Problem: Potential for Falls  Goal: Patient will remain free of falls  INTERVENTIONS:  - Assess patient frequently for physical needs  -  Identify cognitive and physical deficits and behaviors that affect risk of falls  -  [place] fall precautions as indicated by assessment   - Educate patient/family on patient safety including physical limitations  - Instruct patient to call for assistance with activity based on assessment  - Modify environment to reduce risk of injury  - Consider OT/PT consult to assist with strengthening/mobility   Outcome: Progressing      Problem: Nutrition/Hydration-ADULT  Goal: Nutrient/Hydration intake appropriate for improving, restoring or maintaining nutritional needs  Monitor and assess patient's nutrition/hydration status for malnutrition (ex- brittle hair, bruises, dry skin, pale skin and conjunctiva, muscle wasting, smooth red tongue, and disorientation)  Collaborate with interdisciplinary team and initiate plan and interventions as ordered  Monitor patient's weight and dietary intake as ordered or per policy  Utilize nutrition screening tool and intervene per policy  Determine patient's food preferences and provide high-protein, high-caloric foods as appropriate       INTERVENTIONS:  - Monitor oral intake, urinary output, labs, and treatment plans  - Assess nutrition and hydration status and recommend course of action  - Evaluate amount of meals eaten  - Assist patient with eating if necessary   - Allow adequate time for meals  - Recommend/ encourage appropriate diets, oral nutritional supplements, and vitamin/mineral supplements  - Order, calculate, and assess calorie counts as needed  - Recommend, monitor, and adjust tube feedings and TPN/PPN based on assessed needs  - Assess need for intravenous fluids  - Provide specific nutrition/hydration education as appropriate  - Include patient/family/caregiver in decisions related to nutrition   Outcome: Progressing      Problem: PAIN - ADULT  Goal: Verbalizes/displays adequate comfort level or baseline comfort level  Interventions:  - Encourage patient to monitor pain and request assistance  - Assess pain using appropriate pain scale  - Administer analgesics based on type and severity of pain and evaluate response  - Implement non-pharmacological measures as appropriate and evaluate response  - Consider cultural and social influences on pain and pain management  - Notify physician/advanced practitioner if interventions unsuccessful or patient reports new pain   Outcome: Progressing      Problem: INFECTION - ADULT  Goal: Absence or prevention of progression during hospitalization  INTERVENTIONS:  - Assess and monitor for signs and symptoms of infection  - Monitor lab/diagnostic results  - Monitor all insertion sites, i e  indwelling lines, tubes, and drains  - Monitor endotracheal (as able) and nasal secretions for changes in amount and color  - [place] appropriate cooling/warming therapies per order  - Administer medications as ordered  - Instruct and encourage patient and family to use good hand hygiene technique  - Identify and instruct in appropriate isolation precautions for identified infection/condition   Outcome: Progressing    Goal: Absence of fever/infection during neutropenic period  INTERVENTIONS:  - Monitor WBC  - Implement neutropenic guidelines   Outcome: Progressing      Problem: SAFETY ADULT  Goal: Maintain or return to baseline ADL function  INTERVENTIONS:  -  Assess patient's ability to carry out ADLs; assess patient's baseline for ADL function and identify physical deficits which impact ability to perform ADLs (bathing, care of mouth/teeth, toileting, grooming, dressing, etc )  - Assess/evaluate cause of self-care deficits   - Assess range of motion  - Assess patient's mobility; develop plan if impaired  - Assess patient's need for assistive devices and provide as appropriate  - Encourage maximum independence but intervene and supervise when necessary  ¯ Involve family in performance of ADLs  ¯ Assess for home care needs following discharge   ¯ Request OT consult to assist with ADL evaluation and planning for discharge  ¯ Provide patient education as appropriate   Outcome: Progressing    Goal: Maintain or return mobility status to optimal level  INTERVENTIONS:  - Assess patient's baseline mobility status (ambulation, transfers, stairs, etc )    - Identify cognitive and physical deficits and behaviors that affect mobility  - Identify mobility aids required to assist with transfers and/or ambulation (gait belt, sit-to-stand, lift, walker, cane, etc )  - [place] fall precautions as indicated by assessment  - Record patient progress and toleration of activity level on Mobility SBAR; progress patient to next Phase/Stage  - Instruct patient to call for assistance with activity based on assessment  - Request Rehabilitation consult to assist with strengthening/weightbearing, etc    Outcome: Progressing

## 2018-04-24 NOTE — ASSESSMENT & PLAN NOTE
· POA, noted on CT scan from 4/19  · GI following   · Status post colonoscopy yesterday  · Currently having bright red blood per rectum-plan is to hold Lovenox sq-likely from the biopsy  · Monitor H and H  · Monitor the blood loss/bleeding

## 2018-04-24 NOTE — PLAN OF CARE
Problem: DISCHARGE PLANNING - CARE MANAGEMENT  Goal: Discharge to post-acute care or home with appropriate resources  INTERVENTIONS:  - Conduct assessment to determine patient/family and health care team treatment goals, and need for post-acute services based on payer coverage, community resources, and patient preferences, and barriers to discharge  - Address psychosocial, clinical, and financial barriers to discharge as identified in assessment in conjunction with the patient/family and health care team  - Arrange appropriate level of post-acute services according to patients   needs and preference and payer coverage in collaboration with the physician and health care team  - Communicate with and update the patient/family, physician, and health care team regarding progress on the discharge plan  - Arrange appropriate transportation to post-acute venues  Outcome: Progressing  CM met with patient at bedside  Patient lives in a 2 story home with 2 BIB with hand rail  Patient reports her main bedroom and bathroom are on the 2nd floor but she has the ability to stay on the first floor in the guest room  Patient has no HX of DME or STR although in 2015 she went to outpatient therapy at [facility]  Patient had VNA back in 2015  Patient has RX coverage and uses [facility] with no issues  Patient denies any Mental Health or Substance Abuse issues  Patient does not have a POA but reported her  would make decisions  Patient works part time as a  and is able to drive herself  CM reviewed PT recommendation of STR  Patient at this time does not feel she will need this  A STR list was left for patient to review in case she decided she wanted STR  CM also discussed setting up VNA with home PT if she decides to go home vs  STR  Patient is agreeable to the referral but stated to CM that she isn't sure if she will need this    CM explained that referral can be cancelled if she chooses to do so     CM reviewed d/c planning process including the following: identifying help at home, patient preference for d/c planning needs, [facility] Meds to Bed program, availability of treatment team to discuss questions or concerns patient and/or family may have regarding diagnosis, plan for care, old or new medications and discharge planning   CM name and role reviewed  CM will follow needs at discharge

## 2018-04-24 NOTE — ASSESSMENT & PLAN NOTE
· Follows doctor at [facility]  · Patient denies being on any medications for this as outpatient  · If symptoms persist and negative work up as per above, rheumatology consult

## 2018-04-24 NOTE — PROGRESS NOTES
Progress Note - Madeline Santa 61 y o  female MRN: [id number]    Unit/Bed#: -01 Encounter: [id number]      Subjective:    Patient reports mild improvement in her abdominal pain  She did have 2 bms this am around 5-6am with bright red bood without recurrence  No symptoms overnight  She has continued pain in her legs  Objective:     Vitals: Blood pressure 100/63, pulse 85, temperature 99 3 °F (37 4 °C), temperature source Oral, resp  rate 19, height 5' 2" (1 575 m), weight 62 3 kg (137 lb 5 6 oz), SpO2 95 %, not currently breastfeeding  ,Body mass index is 25 12 kg/m²        Intake/Output Summary (Last 24 hours) at 04/24/18 1111  Last data filed at 04/24/18 1050   Gross per 24 hour   Intake          2943 75 ml   Output              500 ml   Net          2443 75 ml       Physical Exam:    General Appearance: Alert, appears stated age and cooperative  Lungs: Clear to auscultation bilaterally, no rales or rhonchi, no labored breathing/accessory muscle use  Heart: Regular rate and rhythm, S1, S2 normal, no murmur, click, rub or gallop  Abdomen: Soft, mild llq tenderness, non-distended; bowel sounds normal; no masses or no organomegaly  Extremities: No cyanosis, edema    Invasive Devices     Peripheral Intravenous Line            Peripheral IV 04/23/18 Left Forearm less than 1 day                Lab Results:      Results from last 7 days  Lab Units 04/24/18  0403   WBC Thousand/uL 4 77   HEMOGLOBIN g/dL 8 3*   HEMATOCRIT % 25 6*   PLATELETS Thousands/uL 92*   NEUTROS PCT % 65   LYMPHS PCT % 22   MONOS PCT % 13*   EOS PCT % 0       Results from last 7 days  Lab Units 04/24/18  0403 04/23/18  0543   SODIUM mmol/L  --  135*   POTASSIUM mmol/L  --  3 3*   CHLORIDE mmol/L  --  99*   CO2 mmol/L  --  25   BUN mg/dL  --  16   CREATININE mg/dL  --  0 75   CALCIUM mg/dL  --  8 3   TOTAL PROTEIN g/dL 5 6*  --    BILIRUBIN TOTAL mg/dL 0 30  --    ALK PHOS U/L 108  --    ALT U/L 39  --    AST U/L 31  --    GLUCOSE RANDOM mg/dL --  112       Results from last 7 days  Lab Units 04/24/18  0917   INR  1 28*       Results from last 7 days  Lab Units 04/20/18  1240   LIPASE u/L 77       Imaging Studies: I have personally reviewed pertinent imaging studies  Xr Spine Thoracic 3 Vw    Result Date: 4/21/2018  Impression: No acute osseous abnormality  Workstation performed: [id number]     Xr Spine Lumbar 2 Or 3 Views Injury    Result Date: 4/21/2018  Impression: 1  Mild disc space narrowing at L4-L5 consistent with degenerative disc disease 2  No compression fracture or subluxation Workstation performed: [id number]     Mri Lumbar Spine W Wo Contrast    Result Date: 4/23/2018  Impression: Mild noncompressive lumbar degenerative change at L4-5 and L5-S1  Incidentally noted fatty filum terminale  Workstation performed: [id number]       ASSESMENT/ PLAN:   Principal Problem:    Bilateral radiating leg pain  Active Problems:    Colitis    Coronary artery disease    Essential hypertension    Hypokalemia    Rheumatoid arthritis (HCC)    Elevated d-dimer    Abdominal pain    Colitis  -suspect IBD given chronicity of symptoms and appearance on colonoscopy  -colonoscopy 4/23 with moderate inflammation throughout the colon with normal terminal ileum  -f/u pathology, rule out CMV colitis  -Continue solumedrol 20mg q8 hours    Acute blood loss anemia  Rectal bleeding  -Did have 2 episodes of rectal bleeding this AM that has resolved  -Continue to monitor bms  -Monitor H/H  -Okay for clear liquid diet at this time

## 2018-04-24 NOTE — NURSING NOTE
Patient had 2 bowel movements which consisted of deep red liquid  GI was consulted and will see patient this morning

## 2018-04-24 NOTE — PLAN OF CARE
Problem: PHYSICAL THERAPY ADULT  Goal: Performs mobility at highest level of function for planned discharge setting  See evaluation for individualized goals  Treatment/Interventions: Functional transfer training, LE strengthening/ROM, Therapeutic exercise, Endurance training, Patient/family training, Equipment eval/education, Bed mobility, Gait training  Equipment Recommended: [facility] Elizabeth       See flowsheet documentation for full assessment, interventions and recommendations  Prognosis: Good  Problem List: Decreased strength, Decreased endurance, Impaired balance, Decreased mobility, Pain  Assessment: Pt is a 61 y o  female seen for PT evaluation s/p admit to [address] [place] [address] on 4/20/2018 w/ Bilateral radiating leg pain  Order placed for PT  Comorbidities affecting pt's physical performance at time of assessment listed above  Personal factors affecting pt at time of IE include: steps to enter environment, multi-level environment, limited home support, inability to perform IADLs, inability to perform ADLs and inability to ambulate household distances  Prior to admission, pt was was independent w/ all functional mobility w/ out AD, ambulated unrestricted distances and all terrain, lived in multi-level home, had 2 BIB 1 railing and lived with   Upon evaluation: Pt requires SBA for bed mobility, min A for sit to stand, and min A for ambulation with IV pole for support  Currently declining RW use, however will most likely benefit from RW trial   (Please find full objective findings from PT assessment regarding body systems outlined above)  Impairments and limitations also listed above, especially due to  weakness, impaired balance, decreased endurance, gait deviations, pain, decreased activity tolerance and fall risk  The following objective measures performed on IE also reveal limitations: Barthel Index 55/100   Pt's clinical presentation is currently unstable/unpredictable seen in pt's presentation of L knee buckling with ambulation and abnormal lab values  Pt to benefit from continued skilled PT tx while in hospital and upon DC to address deficits as defined above and maximize level of functional mobility  From PT/mobility standpoint, recommendation at time of d/c would be inpatient rehab vs  Home PT pending progress in order to maximize pt's functional independence and consistency w/ mobility in order to facilitate return to PLOF  Recommend progression of ambulation, initiation of HEP, and PT to see for stair negotiation as appropriate  Recommendation: Post acute IP rehab (vs  home PT pending progress)          See flowsheet documentation for full assessment

## 2018-04-25 LAB
ACTH PLAS-MCNC: <1.1 PG/ML (ref 7.2–63.3)
ALDOLASE SERPL-CCNC: 5.3 U/L (ref 3.3–10.3)
BASOPHILS # BLD MANUAL: 0 THOUSAND/UL (ref 0–0.1)
BASOPHILS NFR MAR MANUAL: 0 % (ref 0–1)
EOSINOPHIL # BLD MANUAL: 0 THOUSAND/UL (ref 0–0.4)
EOSINOPHIL NFR BLD MANUAL: 0 % (ref 0–6)
ERYTHROCYTE [DISTWIDTH] IN BLOOD BY AUTOMATED COUNT: 15.1 % (ref 11.6–15.1)
GLUCOSE SERPL-MCNC: 132 MG/DL (ref 65–140)
GLUCOSE SERPL-MCNC: 217 MG/DL (ref 65–140)
HBV CORE IGM SER QL: NORMAL
HBV SURFACE AB SER-ACNC: 3.13 MIU/ML
HBV SURFACE AG SER QL: NORMAL
HCT VFR BLD AUTO: 24 % (ref 34.8–46.1)
HGB A MFR BLD: 5.2 % (ref 1.8–3.2)
HGB A MFR BLD: 94.8 % (ref 96.4–98.8)
HGB BLD-MCNC: 7.6 G/DL (ref 11.5–15.4)
HGB C MFR BLD: 0 %
HGB F MFR BLD: 0 % (ref 0–2)
HGB FRACT BLD-IMP: ABNORMAL
HGB S BLD QL SOLY: NEGATIVE
HGB S MFR BLD: 0 %
HYPERCHROMIA BLD QL SMEAR: PRESENT
LDH SERPL-CCNC: 310 IU/L (ref 119–226)
LDH1 CFR SERPL ELPH: 17 % (ref 17–32)
LDH2 CFR SERPL ELPH: 38 % (ref 25–40)
LDH3 CFR SERPL ELPH: 27 % (ref 17–27)
LDH4 CFR SERPL ELPH: 8 % (ref 5–13)
LDH5 CFR SERPL ELPH: 10 % (ref 4–20)
LG PLATELETS BLD QL SMEAR: PRESENT
LYMPHOCYTES # BLD AUTO: 1.62 THOUSAND/UL (ref 0.6–4.47)
LYMPHOCYTES # BLD AUTO: 18 % (ref 14–44)
MAGNESIUM SERPL-MCNC: 1.8 MG/DL (ref 1.6–2.6)
MCH RBC QN AUTO: 18.4 PG (ref 26.8–34.3)
MCHC RBC AUTO-ENTMCNC: 31.7 G/DL (ref 31.4–37.4)
MCV RBC AUTO: 58 FL (ref 82–98)
METAMYELOCYTES NFR BLD MANUAL: 2 % (ref 0–1)
MONOCYTES # BLD AUTO: 0.9 THOUSAND/UL (ref 0–1.22)
MONOCYTES NFR BLD: 10 % (ref 4–12)
NEUTROPHILS # BLD MANUAL: 6.2 THOUSAND/UL (ref 1.85–7.62)
NEUTS BAND NFR BLD MANUAL: 3 % (ref 0–8)
NEUTS SEG NFR BLD AUTO: 66 % (ref 43–75)
NRBC BLD AUTO-RTO: 2 /100 WBC (ref 0–2)
PLATELET # BLD AUTO: 169 THOUSANDS/UL (ref 149–390)
PLATELET BLD QL SMEAR: ADEQUATE
POLYCHROMASIA BLD QL SMEAR: PRESENT
RBC # BLD AUTO: 4.13 MILLION/UL (ref 3.81–5.12)
T3FREE SERPL-MCNC: 0.87 PG/ML (ref 2.3–4.2)
T4 FREE SERPL-MCNC: 1.45 NG/DL (ref 0.76–1.46)
TOTAL CELLS COUNTED SPEC: 100
TSH SERPL DL<=0.05 MIU/L-ACNC: 0.75 UIU/ML (ref 0.36–3.74)
VARIANT LYMPHS # BLD AUTO: 1 %
WBC # BLD AUTO: 8.98 THOUSAND/UL (ref 4.31–10.16)

## 2018-04-25 PROCEDURE — 97166 OT EVAL MOD COMPLEX 45 MIN: CPT

## 2018-04-25 PROCEDURE — 86705 HEP B CORE ANTIBODY IGM: CPT | Performed by: INTERNAL MEDICINE

## 2018-04-25 PROCEDURE — 86480 TB TEST CELL IMMUN MEASURE: CPT | Performed by: INTERNAL MEDICINE

## 2018-04-25 PROCEDURE — 87340 HEPATITIS B SURFACE AG IA: CPT | Performed by: INTERNAL MEDICINE

## 2018-04-25 PROCEDURE — 99232 SBSQ HOSP IP/OBS MODERATE 35: CPT | Performed by: FAMILY MEDICINE

## 2018-04-25 PROCEDURE — 99232 SBSQ HOSP IP/OBS MODERATE 35: CPT | Performed by: INTERNAL MEDICINE

## 2018-04-25 PROCEDURE — 83735 ASSAY OF MAGNESIUM: CPT | Performed by: FAMILY MEDICINE

## 2018-04-25 PROCEDURE — 84439 ASSAY OF FREE THYROXINE: CPT | Performed by: INTERNAL MEDICINE

## 2018-04-25 PROCEDURE — 84443 ASSAY THYROID STIM HORMONE: CPT | Performed by: INTERNAL MEDICINE

## 2018-04-25 PROCEDURE — 99222 1ST HOSP IP/OBS MODERATE 55: CPT | Performed by: INTERNAL MEDICINE

## 2018-04-25 PROCEDURE — 82948 REAGENT STRIP/BLOOD GLUCOSE: CPT

## 2018-04-25 PROCEDURE — 86706 HEP B SURFACE ANTIBODY: CPT | Performed by: INTERNAL MEDICINE

## 2018-04-25 PROCEDURE — 84481 FREE ASSAY (FT-3): CPT | Performed by: INTERNAL MEDICINE

## 2018-04-25 PROCEDURE — G8989 SELF CARE D/C STATUS: HCPCS

## 2018-04-25 PROCEDURE — G8988 SELF CARE GOAL STATUS: HCPCS

## 2018-04-25 PROCEDURE — G8987 SELF CARE CURRENT STATUS: HCPCS

## 2018-04-25 PROCEDURE — 85007 BL SMEAR W/DIFF WBC COUNT: CPT | Performed by: FAMILY MEDICINE

## 2018-04-25 PROCEDURE — 85027 COMPLETE CBC AUTOMATED: CPT | Performed by: FAMILY MEDICINE

## 2018-04-25 RX ORDER — MESALAMINE 400 MG/1
1200 CAPSULE, DELAYED RELEASE ORAL 3 TIMES DAILY
Status: DISCONTINUED | OUTPATIENT
Start: 2018-04-25 | End: 2018-04-26

## 2018-04-25 RX ADMIN — PANTOPRAZOLE SODIUM 40 MG: 40 TABLET, DELAYED RELEASE ORAL at 06:30

## 2018-04-25 RX ADMIN — METRONIDAZOLE 500 MG: 500 TABLET ORAL at 13:35

## 2018-04-25 RX ADMIN — DEXTROSE AND SODIUM CHLORIDE 125 ML/HR: 5; .9 INJECTION, SOLUTION INTRAVENOUS at 11:34

## 2018-04-25 RX ADMIN — CHOLESTYRAMINE 4 G: 4 POWDER, FOR SUSPENSION ORAL at 16:22

## 2018-04-25 RX ADMIN — DICYCLOMINE HYDROCHLORIDE 10 MG: 10 CAPSULE ORAL at 11:33

## 2018-04-25 RX ADMIN — METHYLPREDNISOLONE SODIUM SUCCINATE 20 MG: 40 INJECTION, POWDER, FOR SOLUTION INTRAMUSCULAR; INTRAVENOUS at 06:30

## 2018-04-25 RX ADMIN — METRONIDAZOLE 500 MG: 500 TABLET ORAL at 21:06

## 2018-04-25 RX ADMIN — METHYLPREDNISOLONE SODIUM SUCCINATE 20 MG: 40 INJECTION, POWDER, FOR SOLUTION INTRAMUSCULAR; INTRAVENOUS at 13:35

## 2018-04-25 RX ADMIN — MESALAMINE 1200 MG: 400 CAPSULE, DELAYED RELEASE ORAL at 16:21

## 2018-04-25 RX ADMIN — FERROUS SULFATE TAB 325 MG (65 MG ELEMENTAL FE) 325 MG: 325 (65 FE) TAB at 18:26

## 2018-04-25 RX ADMIN — LIDOCAINE 1 PATCH: 50 PATCH TOPICAL at 08:21

## 2018-04-25 RX ADMIN — ASPIRIN 81 MG 81 MG: 81 TABLET ORAL at 08:20

## 2018-04-25 RX ADMIN — DEXTROSE AND SODIUM CHLORIDE 125 ML/HR: 5; .9 INJECTION, SOLUTION INTRAVENOUS at 03:01

## 2018-04-25 RX ADMIN — DEXTROSE AND SODIUM CHLORIDE 75 ML/HR: 5; .9 INJECTION, SOLUTION INTRAVENOUS at 23:34

## 2018-04-25 RX ADMIN — MESALAMINE 1200 MG: 400 CAPSULE, DELAYED RELEASE ORAL at 21:06

## 2018-04-25 RX ADMIN — DICYCLOMINE HYDROCHLORIDE 10 MG: 10 CAPSULE ORAL at 16:21

## 2018-04-25 RX ADMIN — CHOLESTYRAMINE 4 G: 4 POWDER, FOR SUSPENSION ORAL at 21:06

## 2018-04-25 RX ADMIN — METRONIDAZOLE 500 MG: 500 TABLET ORAL at 06:30

## 2018-04-25 RX ADMIN — CEFTRIAXONE 1000 MG: 1 INJECTION, SOLUTION INTRAVENOUS at 16:22

## 2018-04-25 RX ADMIN — FERROUS SULFATE TAB 325 MG (65 MG ELEMENTAL FE) 325 MG: 325 (65 FE) TAB at 08:20

## 2018-04-25 RX ADMIN — PANTOPRAZOLE SODIUM 40 MG: 40 TABLET, DELAYED RELEASE ORAL at 16:21

## 2018-04-25 RX ADMIN — CHOLESTYRAMINE 4 G: 4 POWDER, FOR SUSPENSION ORAL at 08:21

## 2018-04-25 RX ADMIN — DICYCLOMINE HYDROCHLORIDE 10 MG: 10 CAPSULE ORAL at 06:30

## 2018-04-25 RX ADMIN — METHYLPREDNISOLONE SODIUM SUCCINATE 20 MG: 40 INJECTION, POWDER, FOR SOLUTION INTRAMUSCULAR; INTRAVENOUS at 21:07

## 2018-04-25 NOTE — CASE MANAGEMENT
Please note this was originally faxed on 4/21/18 @17:22 by our RN Chente to same fax that Luna used on 4/24/18      Chana Shah, RN Registered Nurse Signed   Case Management Date of Service: 4/21/2018  4:39 PM         []Hide copied text  Thank you,  [facility] in the [facility] [place] for 2017  Network Utilization Review Department  Phone: [phone]; Fax [phone]  ATTENTION: The Network Utilization Review Department is now centralized for our 7 Facilities  Make a note that we have a new phone and fax numbers for our Department  Please call     with any questions or concerns to [phone] and carefully follow the prompts so that you are directed to the right person  All voicemails are confidential  Fax any determinations, approvals, denials, and requests for initial or continue stay review clinical to [phone]  Due to HIGH CALL volume, it would be easier if you could please send faxed requests to expedite your requests and in part, help us provide discharge notifications faster      ================================================================     Initial Clinical Review     Admission: Date/Time/Statement: 4/20/18 @ 1427         Orders Placed This Encounter   Procedures    Inpatient Admission (expected length of stay for this patient is greater than two midnights)       Standing Status:   Standing       Number of Occurrences:   1       Order Specific Question:   Admitting Physician       Answer:   Meme Naylor       Order Specific Question:   Level of Care       Answer:   Med Surg [16]       Order Specific Question:   Estimated length of stay       Answer:   More than 2 Midnights       Order Specific Question:   Certification       Answer:   I certify that inpatient services are medically necessary for this patient for a duration of greater than two midnights   See H&P and MD Progress Notes for additional information about the patient's course of treatment             ED: Date/Time/Mode of Arrival:             ED Arrival Information      Expected Arrival Acuity Means of Arrival Escorted By Service Admission Type     - 4/20/2018 10:28 Emergent Ambulance [facility] General Medicine Emergency     Arrival Complaint     - leg pain                Chief Complaint:        Chief Complaint   Patient presents with    Pain       Pt here with c/o pain to her left arm, bilateral legs, and lower back  Was just here for for abd pain yesterday and had full work up  Sent home dx colitis          History of Illness:   Beau Zhou is a 61 y  o  female with a history of Coronary Artery Disease, HTN who presents with bilateral leg pain  The patient reports that she had been seen yesterday in the ER for abdominal pain  She had been having diarrhea for 6 weeks and had been following a GI specialist  She was worked up in the ER, diagnosed with colitis, and sent home with PO antibiotics (Cipro and Flagyl), Norco, and Colace  She reports that this morning she took her medications and shortly after, the Norco specifically she mentioned, she started having bilateral shooting leg pain that radiated up and down her legs as well as other random areas of her body  She reports that she was unable to walk  Patient continues to writhe on the bed while speaking  She denies prior occurrence of this happening  She denies numbness or tingling in any of her extremities  Both her and her  noted improvement with toradol earlier in the ER   As for her abdominal complaints she reports that she has minimal abdominal pain,     ED Vital Signs:            ED Triage Vitals [04/20/18 1041]   Temperature Pulse Respirations Blood Pressure SpO2   99 6 °F (37 6 °C) 66 18 140/79 98 %       Temp Source Heart Rate Source Patient Position - Orthostatic VS BP Location FiO2 (%)   Oral Monitor Sitting Right arm --       Pain Score           Worst Possible Pain                Wt Readings from Last 1 Encounters:   04/20/18 62 3 kg (137 lb 5 6 oz)         Abnormal Labs/Diagnostic Test Results:   WBC Thousand/uL 5 10   HEMOGLOBIN g/dL 10 1*   HEMATOCRIT % 30 9*   PLATELETS Thousands/uL 203      SODIUM mmol/L 138   POTASSIUM mmol/L 3 0*   CHLORIDE mmol/L 98*   CO2 mmol/L 29   BUN mg/dL 17   CREATININE mg/dL 1 06   CALCIUM mg/dL 8 4   TOTAL PROTEIN g/dL 7 0   BILIRUBIN TOTAL mg/dL 0 60   ALK PHOS U/L 96   ALT U/L 24   AST U/L 27   GLUCOSE RANDOM mg/dL 104      EKG: NSR     CT abdomen pelvis without contrast: Possible wall thickening of descending colon with minimal pericolonic inflammatory stranding suspicous for infection/inflammatory colitis  No free air/fluid      CXR: No active pulmonary disease         ED Treatment:              Medication Administration from 04/20/2018 1028 to 04/20/2018 1545        Date/Time Order Dose Route Action Action by Comments       04/20/2018 1147 acetaminophen (TYLENOL) tablet 650 mg 650 mg Oral Given April ELE Caceres RN         04/20/2018 1203 HYDROmorphone (DILAUDID) injection 1 mg 1 mg Intravenous Given Kerri Caceres RN         04/20/2018 1256 ketorolac (TORADOL) injection 15 mg 15 mg Intravenous Given Kerri Caceres RN         04/20/2018 1527 potassium chloride (K-DUR,KLOR-CON) CR tablet 40 mEq 40 mEq Oral Given Kerri Caceres RN         04/20/2018 1517 potassium chloride 20 mEq IVPB (premix) 20 mEq Intravenous New Bag April Kenny Belle RN               Past Medical/Surgical History:         Active Ambulatory Problems     Diagnosis Date Noted    Colitis 08/52/5110    Periumbilic abdominal tenderness 04/17/2018    Rectal discomfort 04/17/2018    CRP elevated 04/17/2018           Past Medical History:   Diagnosis Date    Allergic rhinitis      Breast CA (HCC)      Diarrhea      Hypertension      Thalassemia      Vitamin D deficiency           Admitting Diagnosis: Hypokalemia [E87 6]  Myalgia [M79 1]  Colitis [K52 9]  Abdominal pain [R10 9]  Multiple complaints [R68 89]  Weight loss [R63 4]     Age/Sex: 61 y o  female     Assessment/Plan:          * Bilateral radiating leg pain   Assessment & Plan     · ? Present prior to admission as well as after admission to ER  According to RN patient was not having leg complaints upon entering the hospital, however she had noted that the patient only started complaining and writhing in pain after a certain relative entered the room  Possibly had started after the patient had taken a dosage of Hydrocodone this morning, however continues to beg for pain medication and ask what pain medication I will be giving her  Patient moans and writhes, however her speech is mumbling  She and her  had noticed improvement with Toradol given in the ER  No prior occurrence  No decreased passive ROM  Active ROM is absent, however when holding the patient's heels and asking her to raise a leg there is no downward force on the contralateral leg  PDMP reviewed and she has only filled the Lomotil and Hydrocodone in the last year, however there is some suspicion for seeking behavior  ? Admit patient to med/surg under inpatient status   ? Hold narcotics at this time given suspicious behavior, non-focal exam with possible symptom magnification, and possibly etiology of complaint   ? Toradol 15 mg IV Q6 PRN severe pain   § Monitor GFR closely   ? Lidoderm  ? Robaxin 500 mg Q6 PRN   ? Ativan 1 mg IV Q6 PRN agitation/anxiety   ? Check CT scan thoracic and lumbar spine   ? Check CK-MB to rule out rhabdo            Colitis   Assessment & Plan     · POA, noted on CT scan  Follows Dr Gonsalo Morrison  No fever or leukocytosis noted  No signs of sepsis  ? Given concerning symptoms that may or may not have been related to antibiotics will hold for now   ? Would consider restarting antibiotics if she spikes a temp  ? Consult gastroenterology   ? Clear liquid diet   ?  Supportive care   § Bentyl           Coronary artery disease involving native coronary artery of native heart without angina pectoris   Assessment & Plan     · Patient with history of triple bypass  No symptoms  EKG and troponin within normal limits  ? Continue home regimen   § Metoprolol   § Lipitor   § ASA           Essential hypertension   Assessment & Plan     · BP slightly elevated likely secondary to pain   ? Continue Metoprolol          Hypokalemia   Assessment & Plan     · POA, 3 0   ? Repleted in ER  ? Recheck BMP in AM                VTE Prophylaxis: Enoxaparin (Lovenox)  / harvey sequential compression device      Anticipated Length of Stay: Eleanor Bishop will be admitted on an Inpatient basis with an anticipated length of stay of  Greater than 2 midnights    Justification for Hospital Stay: Intractable pain in legs, colitis needing further emergent work up         Admission Orders:  Scheduled Meds:   Current Facility-Administered Medications:  acetaminophen 650 mg Oral Q6H PRN Marco Iqbal PA-C   [START ON 4/22/2018] aspirin 81 mg Oral Daily Karley Zaragoza MD   atorvastatin 20 mg Oral Daily Bharat Laguna PA-C   cholestyramine sugar free 4 g Oral TID Bharat Laguna PA-C   dicyclomine 10 mg Oral TID AC Bharat Godoy PA-C   enoxaparin 40 mg Subcutaneous Daily Bharat Laguna PA-C   ketorolac 15 mg Intravenous Q6H PRN Nathan Lucas PA-C   lidocaine 1 patch Transdermal Daily Bharat Godoy PA-C   LORazepam 1 mg Intravenous Q6H PRN Bharat Laguna PA-C   methocarbamol 500 mg Oral Q6H PRN Bharat Laguna PA-C   metoprolol tartrate 25 mg Oral BID Bharat Godoy PA-C   ondansetron 4 mg Intravenous Q6H PRN Bharat Laguna PA-C      Lower limb venous duplex: pending  Consult PT  Consult neuro        04/21/208  Consult GI  ASSESSMENT/PLAN:   1   Watery Diarrhea for 6 weeks, with elevated CRP and bandemia on most recent labs:  Concerning for infection versus inflammatory bowel disease   Patient underwent colonoscopy in 2016 and was noted to have pan colitis, biopsies showed crypt distortion however there was concern that it may have been infectious in etiology as well as patient had recent travel  Augie Soria was then lost to follow-up and was only recently seen by Dr Lizette Perla the office  Ivy Gonzales was started on prednisone which she only took intermittently  Ivy Gonzales was scheduled for colonoscopy this coming Monday with Dr Nadia Negron to assess whether this is inflammatory bowel disease versus infection   Stool studies including ova and parasites, bacteria were negative   I do not see results of C diff yet   Inflammatory markers including ESR and CRP were elevated in the outpatient setting   CT scan 2 days ago showed mild thickening of the descending colon with minimal pericolonic inflammatory stranding   Given persistent symptoms, would recommend to check stool for C diff   Most recently, she was in the emergency room 2 days ago and was discharged home with Cipro and Flagyl however patient developed severe bilateral lower extremity pain and is currently admitted for the same  Daved Loges is unclear if lower extremity pain is secondary to an extra intestinal manifestation of inflammatory bowel disease versus separate rheumatologic process   -would recommend IV antibiotics given bandemia, diarrhea and elevated inflammatory markers however given onset of lower extremity weakness followed by Cipro and Flagyl, would defer to Infectious Disease regarding appropriate antibiotic choice  Would hold off on cipro due to side effect of tendon rupture   -pending workup for lower extremity weakness, she may benefit from inpatient colonoscopy if C diff is negative   -this can be scheduled tentatively for Monday if LE weakness improves, this was discussed with the patient and family   -check vitamin-D levels, vitamin B12, folate and iron studies      2   Severe microcytic anemia:  As per patient report this is chronic, denies melena, hematemesis or hematochezia   Hemoglobin is at baseline   -check vitamin B12, folate, iron studies

## 2018-04-25 NOTE — PROGRESS NOTES
Progress Note - Rupert Joel 61 y o  female MRN: [id number]    Unit/Bed#: -01 Encounter: [id number]      Subjective:    She had an episode of diarrhea this am with a small amount of bright red blood  She has persistent left sided abdominal discomfort    Objective:     Vitals: Blood pressure 103/69, pulse 69, temperature 98 4 °F (36 9 °C), temperature source Oral, resp  rate 18, height 5' 2" (1 575 m), weight 62 3 kg (137 lb 5 6 oz), SpO2 96 %, not currently breastfeeding  ,Body mass index is 25 12 kg/m²        Intake/Output Summary (Last 24 hours) at 04/25/18 0827  Last data filed at 04/24/18 2300   Gross per 24 hour   Intake          4353 75 ml   Output              900 ml   Net          3453 75 ml       Physical Exam:    General Appearance: Alert, appears stated age and cooperative  Lungs: Clear to auscultation bilaterally, no rales or rhonchi, no labored breathing/accessory muscle use  Heart: Regular rate and rhythm, S1, S2 normal, no murmur, click, rub or gallop  Abdomen: Soft, mild llq tenderness, non-distended; bowel sounds normal; no masses or no organomegaly  Extremities: No cyanosis, edema    Invasive Devices     Peripheral Intravenous Line            Peripheral IV 04/23/18 Left Forearm 1 day                Lab Results:      Results from last 7 days  Lab Units 04/25/18  0643 04/24/18  0403   WBC Thousand/uL 8 98 4 77   HEMOGLOBIN g/dL 7 6* 8 3*   HEMATOCRIT % 24 0* 25 6*   PLATELETS Thousands/uL 169 92*   NEUTROS PCT %  --  65   LYMPHS PCT %  --  22   LYMPHO PCT % 18  --    MONOS PCT %  --  13*   MONO PCT MAN % 10  --    EOS PCT %  --  0   EOSINO PCT MANUAL % 0  --        Results from last 7 days  Lab Units 04/24/18  0403 04/23/18  0543   SODIUM mmol/L  --  135*   POTASSIUM mmol/L  --  3 3*   CHLORIDE mmol/L  --  99*   CO2 mmol/L  --  25   BUN mg/dL  --  16   CREATININE mg/dL  --  0 75   CALCIUM mg/dL  --  8 3   TOTAL PROTEIN g/dL 5 6*  --    BILIRUBIN TOTAL mg/dL 0 30  --    ALK PHOS U/L 108  -- ALT U/L 39  --    AST U/L 31  --    GLUCOSE RANDOM mg/dL  --  112       Results from last 7 days  Lab Units 04/24/18  0917   INR  1 28*       Results from last 7 days  Lab Units 04/20/18  1240   LIPASE u/L 77       Imaging Studies: I have personally reviewed pertinent imaging studies  Xr Spine Thoracic 3 Vw    Result Date: 4/21/2018  Impression: No acute osseous abnormality  Workstation performed: [id number]     Xr Spine Lumbar 2 Or 3 Views Injury    Result Date: 4/21/2018  Impression: 1  Mild disc space narrowing at L4-L5 consistent with degenerative disc disease 2  No compression fracture or subluxation Workstation performed: [id number]     Mri Lumbar Spine W Wo Contrast    Result Date: 4/23/2018  Impression: Mild noncompressive lumbar degenerative change at L4-5 and L5-S1  Incidentally noted fatty filum terminale   Workstation performed: [id number]       ASSESMENT/ PLAN:   Principal Problem:    Bilateral radiating leg pain  Active Problems:    Colitis    Coronary artery disease    Essential hypertension    Hypokalemia    Rheumatoid arthritis (HCC)    Elevated d-dimer    Abdominal pain    Abnormal TSH    Anemia    Colitis  -suspect IBD (possibly crohns given patchy appearance) given chronicity of symptoms and appearance on colonoscopy  -colonoscopy 4/23 with moderate inflammation throughout the colon with normal terminal ileum  -f/u pathology, rule out CMV colitis  -Continue solumedrol 20mg q8 hours    Acute blood loss anemia  Rectal bleeding  -Did have episodes of rectal bleeding, possibly secondary to biopsies on lovenox  -Continue to monitor bms  -Monitor H/H  -Okay for soft diet at this time

## 2018-04-25 NOTE — OCCUPATIONAL THERAPY NOTE
[address]  Evaluation     Patient Name: Wendi Trujillo  TTKLU'B Date: 4/25/2018  Problem List  Patient Active Problem List   Diagnosis    Colitis    Periumbilic abdominal tenderness    Rectal discomfort    CRP elevated    Bilateral radiating leg pain    Coronary artery disease    Essential hypertension    Hypokalemia    Rheumatoid arthritis (HCC)    Elevated d-dimer    Abdominal pain    Abnormal TSH    Anemia     Past Medical History  Past Medical History:   Diagnosis Date    Allergic rhinitis     Breast CA ([address] )     R breast CA-chemo/radiaition    Diarrhea     for 6 weeks-colonosocpy today 9/26/2016 and EGD    Hypertension     Thalassemia     Vitamin D deficiency      Past Surgical History  Past Surgical History:   Procedure Laterality Date    BREAST LUMPECTOMY Right     BREAST LUMPECTOMY Right     BREAST LUMPECTOMY Right     with lymph nodes  2007    COLONOSCOPY      COLONOSCOPY N/A 4/23/2018    Procedure: COLONOSCOPY;  Surgeon: Lisa Padilla MD;  [facility]; Service: Gastroenterology    CORONARY ARTERY BYPASS GRAFT      CORONARY ARTERY BYPASS GRAFT      x3, 11/2015    HYSTERECTOMY      b/l ovaries removed    IN COLONOSCOPY FLX DX W/COLLJ SPEC WHEN PFRMD N/A 9/26/2016    Procedure: COLONOSCOPY;  Surgeon: Neil Kiser DO;  Location: [facility]; Service: Gastroenterology      04/25/18 6098   Note Type   Note type Eval only   Restrictions/Precautions   Weight Bearing Precautions Per Order No   Other Precautions Fall Risk;Pain   Pain Assessment   Pain Assessment No/denies pain   Pain Score No Pain   Home Living   Type of [address] [place] [address] Two level;Stairs to enter with rails; Other (Comment); Bed/bath upstairs  (1 BIB)   Bathroom Shower/Tub Walk-in shower   Bathroom Toilet Standard   Bathroom Equipment (no DME)   Bathroom Accessibility (up flight of stairs; bath on first floor)   Home Equipment Other (Comment)  (no AD or DME)   Additional Comments Pt reports living w/ her  in [facility] w/ 1 BIB   Prior Function   Level of [place] Independent with ADLs and functional mobility   Lives With Spouse   ADL Assistance Independent   IADLs Independent   Falls in the last 6 months 0   Vocational Full time employment   Comments Pt reports I w/ ADL/ IADL including driving   Lifestyle   Autonomy Pt reports I w/ADL/ IADL including driving and working full time   Reciprocal Relationships Pt reports living w/ spouse  Pt's mother present during eval   Service to Others 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   Intrinsic Gratification Pt reports enjoying active lifestyle including [employer] and spining classes  Pt added that she is looking forward to her daughter's wedding in June   ADL   Eating Assistance 6  Modified independent   Eating Deficit Setup   Grooming Assistance 6  Modified Independent   Grooming Deficit Increased time to complete;Setup   UB Bathing Assistance Unable to assess   LB Bathing Assistance Unable to assess   UB Dressing Assistance 6  Modified independent   UB Dressing Deficit Setup; Increased time to complete   LB Dressing Assistance 6  Modified independent   LB Dressing Deficit Increased time to complete;Setup   Bed Mobility   Supine to Sit Unable to assess   Sit to Supine Unable to assess   Additional Comments Pt seated OOB in chair post eval w/ call bell in reach w/ today's newspaper   Transfers   Sit to Stand 5  Supervision   Additional items Assist x 1; Armrests; Increased time required   Stand to Sit 5  Supervision   Additional items Assist x 1; Armrests; Increased time required   Functional Mobility   Functional Mobility 5  Supervision   Additional Comments houshold distances; slow   Pt engaged in functional mobility to elevators and back to room   Additional items (IV pole)   Balance   Static Sitting Good   Dynamic Sitting Fair   Static Standing Fair   Ambulatory Fair -   Activity Tolerance   Activity Tolerance Patient limited by pain   Medical Staff Made Aware spoke to PT, Stoney Perez and Mark MCCLENDON [address] spoke to RN, Luis A Davison / Cintia Oliva   RUE Assessment   RUE Assessment [facility]   RUE Strength   RUE Overall Strength Within Functional Limits - able to perform ADL tasks with strength   LUE Assessment   LUE Assessment WFL   LUE Strength   LUE Overall Strength Within Functional Limits - able to perform ADL tasks with strength   Hand Function   Fine Motor Coordination Functional   Sensation   Light Touch No apparent deficits  (B UE)   Sharp/Dull Not tested   Vision-Basic Assessment   Current Vision Wears glasses all the time   Cognition   Overall Cognitive Status [facility]   Arousal/Participation Alert; Cooperative   Attention Within functional limits   Orientation Level Oriented X4   Memory Within functional limits   Following Commands Follows all commands and directions without difficulty   Comments Identified pt by full name and birthdate  Pt able to participate in conersation appropriately and follow directions  Pt able to provide social history   Assessment   Assessment Pt is a 60yo female [facility] 4/20/2018  Pt presents w/ bilateral radiating leg pain and significant PMH impacting her occupational performance including CAD, s/p CABG, HTN, breast ca hx and / or treatment  Pt reports living w/ spouse in [address] w/ 1 BIB PTA  Pt reports I w/ ADL/ IADL including working full time  Upon evaluation, pt engaged in functional mobiltiy household distances using IV pole w/ S; slow  Pt completed UBD/ LBD w/ mod I after set- up to don robe, socks, pants  Pt reports using bathroom including personal hygiene w/ S for IV pole from nursing  Pt completing ADL at or near baseline level of I  From an OT perspective, pt can return home to [facility] when medically stable for discharge from acute care  No OT needs at this time   D/C OT   Goals   Patient Goals to go home and get a pedicure   Recommendation   OT Discharge Recommendation Home with family support Equipment Recommended Tub seat with back   OT - OK to Discharge Yes  (when medically stable)   Barthel Index   Feeding 10   Bathing 5   Grooming Score 5   Dressing Score 10   Bladder Score 10   Bowels Score 10   Toilet Use Score 5   Transfers (Bed/Chair) Score 10   Mobility (Level Surface) Score 10   Stairs Score 0   Barthel Index Score 75   Modified [place] Scale   Modified [place] Scale 2   No OT needs at this time   D/C OT    Shweta Husain, OT

## 2018-04-25 NOTE — CONSULTS
Consultation - Paola Akhtar 61 y o  female MRN: [id number]    Unit/Bed#: -01 Encounter: [id number]      Assessment/Plan     Assessment: This is a 61y o -year-old female with multiple medical problems, with low TSH in slightly elevated free T4  Repeat TSH is 0 7 which is within the normal limits, previous TSH could be slightly low from steroid use, as steroids 10 to suppressed TSH  I have also added free T4 and free T3 to the blood work which was done in the morning, it is pending  Will also order free T4 by equilibrium dialysis as it is more reliable in acute sitting, or during hospitalization  Plan:  -will monitor thyroid blood work, which is still in pending  -patient is clinically euthyroid  -will continue to monitor, no need to start any medication for thyroid at this time  -will follow-up and make further suggestions  -as she was started on steroids high dose, I started insulin sliding scale with meals and at bedtime as well as fingerstick monitoring 4 times a day , if blood sugar is continuously about target, will recommend to  start basal insulin at bedtime  Discussed with patient as well in detail    Thank you for consulting Endocrinology    CC: Diabetes Consult    History of Present Illness     HPI: Paola kAhtar is a 61y o  year old female with history of beta thalassemia, history of right breast cancer status post chemo and radiation, history of coronary artery disease was admitted to [facility] for ongoing complaint of diarrhea for past 6 weeks, with weight loss  She was evaluated by Gi, and underwent colonoscopy, which showed moderate inflammation throughout the colon, biopsies pending, she was started on Solu-Medrol 20 mg q 8 hours possible for Crohn's disease given patchy appearance, and chronicity of symptoms    TSH was drawn after steroid was started, and it was 0 3 which was mildly on lower side of normal limit  Free T4 was slightly elevated  She endocrinology consult was called for abnormal thyroid function test   She denies any history of thyroid problems in the past, she denies family history of thyroid problem  Last thyroid blood work which was done in December 2017 which was normal   She denies any a recent upper respiratory tract infection or tenderness over thyroid area suggestive of thyroiditis  She denies taking over-the-counter thyroid blisters or any supplementation which could interact with thyroid blood work  She does have symptoms of weight loss however she is trying intentionally to lose weight, also she has been having diarrhea for last 6 weeks  , She denies palpitations,chest pain anxiety or tremulousness      Consults    Review of Systems   Constitutional: Positive for activity change, appetite change, fatigue and unexpected weight change  Negative for fever  HENT: Negative  Eyes: Negative for visual disturbance  Respiratory: Negative for cough, shortness of breath and wheezing  Cardiovascular: Negative for chest pain, palpitations and leg swelling  Gastrointestinal: Positive for abdominal pain, anal bleeding and diarrhea  Negative for nausea  Endocrine: Negative for polydipsia, polyphagia and polyuria  Genitourinary: Negative  Musculoskeletal: Negative  Neurological: Negative for dizziness, tremors, weakness and numbness  Hematological: Negative  Psychiatric/Behavioral: Negative          Historical Information   Past Medical History:   Diagnosis Date    Allergic rhinitis     Breast CA ([facility] [address] )     R breast CA-chemo/radiaition    Diarrhea     for 6 weeks-colonosocpy today 9/26/2016 and EGD    Hypertension     Thalassemia     Vitamin D deficiency      Past Surgical History:   Procedure Laterality Date    BREAST LUMPECTOMY Right     BREAST LUMPECTOMY Right     BREAST LUMPECTOMY Right     with lymph nodes  2007    COLONOSCOPY      COLONOSCOPY N/A 4/23/2018    Procedure: COLONOSCOPY;  Surgeon: Dylan Vasquez MD;  Location: [facility]; Service: Gastroenterology    CORONARY ARTERY BYPASS GRAFT      CORONARY ARTERY BYPASS GRAFT      x3, 11/2015    HYSTERECTOMY      b/l ovaries removed    [place] COLONOSCOPY FLX DX W/COLLJ SPEC WHEN PFRMD N/A 9/26/2016    Procedure: COLONOSCOPY;  Surgeon: John Belle DO;  Location: [facility]; Service: Gastroenterology     Social History   History   Alcohol Use    2 4 oz/week    4 Glasses of wine per week     Comment: per year     History   Drug Use No     History   Smoking Status    Never Smoker   Smokeless Tobacco    Never Used     Family History: History reviewed  No pertinent family history      Meds/Allergies   Current Facility-Administered Medications   Medication Dose Route Frequency Provider Last Rate Last Dose    acetaminophen (TYLENOL) tablet 650 mg  650 mg Oral Q6H PRN Radha Anderson PA-C   650 mg at 04/23/18 7605    aspirin chewable tablet 81 mg  81 mg Oral Daily Sabrina Salazar MD   81 mg at 04/25/18 0820    cefTRIAXone (ROCEPHIN) IVPB (premix) 1,000 mg  1,000 mg Intravenous Q24H Arlyn Diaz PA-C 100 mL/hr at 04/24/18 1651 1,000 mg at 04/24/18 1651    cholestyramine sugar free (QUESTRAN LIGHT) packet 4 g  4 g Oral TID Radha Anderson PA-C   4 g at 04/25/18 1451    dextrose 5 % and sodium chloride 0 9 % infusion  75 mL/hr Intravenous Continuous Patsy Hinton MD 75 mL/hr at 04/25/18 1439 75 mL/hr at 04/25/18 1439    dicyclomine (BENTYL) capsule 10 mg  10 mg Oral TID AC Bharat Godoy PA-C   10 mg at 04/25/18 1133    docusate sodium (COLACE) capsule 100 mg  100 mg Oral BID Nicolas Faust DO   100 mg at 04/23/18 8348    ferrous sulfate tablet 325 mg  325 mg Oral BID Nicolas Faust DO   325 mg at 04/25/18 0820    insulin lispro (HumaLOG) 100 units/mL subcutaneous injection 1-6 Units  1-6 Units Subcutaneous TID AC Josefina Hernandes MD        lidocaine (LIDODERM) 5 % patch 1 patch  1 patch Transdermal Daily Radha Anderson PA-C   1 patch at 04/25/18 0821    LORazepam (ATIVAN) 2 mg/mL injection 1 mg  1 mg Intravenous Q6H PRN Miguel A Monge PA-C   1 mg at 04/22/18 1729    mesalamine (DELZICOL) delayed release capsule 1,200 mg  1,200 mg Oral TID Rafael Ashraf PA-C        methocarbamol (ROBAXIN) tablet 500 mg  500 mg Oral Q6H PRN Miguel A Monge PA-C   500 mg at 04/23/18 0601    methylPREDNISolone sodium succinate (Solu-MEDROL) injection 20 mg  20 mg Intravenous [facility] Marylene Alstrom, MD   20 mg at 04/25/18 1335    metoprolol tartrate (LOPRESSOR) tablet 25 mg  25 mg Oral BID Miguel A Monge PA-C   25 mg at 04/23/18 1719    metroNIDAZOLE (FLAGYL) tablet 500 mg  500 mg Oral [facility] Jordan Magallanes PA-C   500 mg at 04/25/18 1335    ondansetron (ZOFRAN) injection 4 mg  4 mg Intravenous Q6H PRN Miguel A Monge PA-C   4 mg at 04/20/18 1631    pantoprazole (PROTONIX) EC tablet 40 mg  40 mg Oral BID AC Ehsan Gale MD   40 mg at 04/25/18 0630    zolpidem (AMBIEN) tablet 5 mg  5 mg Oral HS PRN Ehsan Gale MD         Allergies   Allergen Reactions    Iodinated Diagnostic Agents Anaphylaxis       Objective   Vitals: Blood pressure 104/67, pulse 76, temperature 98 6 °F (37 °C), temperature source Oral, resp  rate 18, height 5' 2" (1 575 m), weight 62 3 kg (137 lb 5 6 oz), SpO2 95 %, not currently breastfeeding  Intake/Output Summary (Last 24 hours) at 04/25/18 1521  Last data filed at 04/24/18 2300   Gross per 24 hour   Intake             1460 ml   Output              900 ml   Net              560 ml     Invasive Devices     Peripheral Intravenous Line            Peripheral IV 04/23/18 Left Forearm 2 days                Physical Exam   Constitutional: She is oriented to person, place, and time  She appears well-developed and well-nourished  No distress  HENT:   Head: Normocephalic and atraumatic  Eyes: Conjunctivae and EOM are normal  Right eye exhibits no discharge  Left eye exhibits no discharge  Neck: Normal range of motion  Neck supple  No thyromegaly present     Cardiovascular: Normal rate, regular rhythm and normal heart sounds  No murmur heard  Pulmonary/Chest: Effort normal and breath sounds normal  No respiratory distress  She has no wheezes  Abdominal: Soft  Bowel sounds are normal  She exhibits no distension  There is no tenderness  Musculoskeletal: Normal range of motion  She exhibits no edema, tenderness or deformity  Neurological: She is alert and oriented to person, place, and time  She exhibits normal muscle tone  Skin: Skin is warm and dry  No rash noted  She is not diaphoretic  No erythema  Psychiatric: She has a normal mood and affect  Her behavior is normal    Vitals reviewed  The history was obtained from the review of the chart, patient  Lab Results:       Lab Results   Component Value Date    WBC 8 98 04/25/2018    HGB 7 6 (L) 04/25/2018    HCT 24 0 (L) 04/25/2018    MCV 58 (L) 04/25/2018     04/25/2018     Lab Results   Component Value Date/Time    BUN 16 04/23/2018 05:43 AM    BUN 17 12/22/2017 06:57 AM    BUN 17 12/22/2017 06:57 AM     (L) 04/23/2018 05:43 AM     12/22/2017 06:57 AM     12/22/2017 06:57 AM    K 3 3 (L) 04/23/2018 05:43 AM    K 4 3 12/22/2017 06:57 AM    K 4 3 12/22/2017 06:57 AM    CL 99 (L) 04/23/2018 05:43 AM     12/22/2017 06:57 AM     12/22/2017 06:57 AM    CO2 25 04/23/2018 05:43 AM    CO2 26 12/22/2017 06:57 AM    CO2 26 12/22/2017 06:57 AM    CREATININE 0 75 04/23/2018 05:43 AM    CREATININE 0 74 12/22/2017 06:57 AM    CREATININE 0 74 12/22/2017 06:57 AM    AST 31 04/24/2018 04:03 AM    AST 20 12/22/2017 06:57 AM    AST 20 12/22/2017 06:57 AM    ALT 39 04/24/2018 04:03 AM    ALT 18 12/22/2017 06:57 AM    ALT 18 12/22/2017 06:57 AM    ALB 1 9 (L) 04/24/2018 04:03 AM    ALB 4 2 12/22/2017 06:57 AM    ALB 4 2 12/22/2017 06:57 AM     No results for input(s): CHOL, HDL, LDL, TRIG, VLDL in the last 72 hours    No results found for: Melba Beal  POC Glucose   Date Value   04/25/2018 132 mg/dl 11/12/2015 91 mg/dL   11/12/2015 114 mg/dL   11/12/2015 128 mg/dL   11/12/2015 119 mg/dL   11/12/2015 104 mg/dL   11/12/2015 90 mg/dL   11/12/2015 104 mg/dL       Imaging Studies: I have personally reviewed pertinent reports  CT scan of abdomen showed  ABDOMEN     LOWER CHEST:  No clinically significant abnormality identified in the visualized lower chest      LIVER/BILIARY TREE:  Unremarkable      GALLBLADDER:  No calcified gallstones  No pericholecystic inflammatory change      SPLEEN:  Unremarkable      PANCREAS:  Unremarkable      ADRENAL GLANDS:  Unremarkable      KIDNEYS/URETERS:  Unremarkable  No hydronephrosis      STOMACH AND BOWEL:  There is questionable mild wall thickening of the descending colon with minimal pericolonic inflammatory stranding suspicious for a mild infectious or inflammatory colitis  However, evaluation of the colon was limited by under   distention  At the time of this study, the administered oral contrast had extended only to the level of the cecum  There is no large or small bowel obstruction      APPENDIX:  No findings to suggest appendicitis      ABDOMINOPELVIC CAVITY:  No ascites or free intraperitoneal air  No lymphadenopathy      VESSELS:  Atherosclerotic changes are present  No evidence of aneurysm      PELVIS     REPRODUCTIVE ORGANS:  Unremarkable for patient's age      URINARY BLADDER:  Unremarkable      ABDOMINAL WALL/INGUINAL REGIONS:  There is a tiny fat-containing umbilical hernia      OSSEOUS STRUCTURES:  No acute fracture or destructive osseous lesion      IMPRESSION:     Questionable mild wall thickening of the descending colon with minimal pericolonic inflammatory stranding suspicious for an infectious or inflammatory colitis      No free air or free fluid        Portions of the record may have been created with voice recognition software

## 2018-04-25 NOTE — PLAN OF CARE
Problem: Potential for Falls  Goal: Patient will remain free of falls  INTERVENTIONS:  - Assess patient frequently for physical needs  -  Identify cognitive and physical deficits and behaviors that affect risk of falls  -  [place] fall precautions as indicated by assessment   - Educate patient/family on patient safety including physical limitations  - Instruct patient to call for assistance with activity based on assessment  - Modify environment to reduce risk of injury  - Consider OT/PT consult to assist with strengthening/mobility   Outcome: Progressing      Problem: Nutrition/Hydration-ADULT  Goal: Nutrient/Hydration intake appropriate for improving, restoring or maintaining nutritional needs  Monitor and assess patient's nutrition/hydration status for malnutrition (ex- brittle hair, bruises, dry skin, pale skin and conjunctiva, muscle wasting, smooth red tongue, and disorientation)  Collaborate with interdisciplinary team and initiate plan and interventions as ordered  Monitor patient's weight and dietary intake as ordered or per policy  Utilize nutrition screening tool and intervene per policy  Determine patient's food preferences and provide high-protein, high-caloric foods as appropriate       INTERVENTIONS:  - Monitor oral intake, urinary output, labs, and treatment plans  - Assess nutrition and hydration status and recommend course of action  - Evaluate amount of meals eaten  - Assist patient with eating if necessary   - Allow adequate time for meals  - Recommend/ encourage appropriate diets, oral nutritional supplements, and vitamin/mineral supplements  - Order, calculate, and assess calorie counts as needed  - Recommend, monitor, and adjust tube feedings and TPN/PPN based on assessed needs  - Assess need for intravenous fluids  - Provide specific nutrition/hydration education as appropriate  - Include patient/family/caregiver in decisions related to nutrition   Outcome: Progressing      Problem: PAIN - ADULT  Goal: Verbalizes/displays adequate comfort level or baseline comfort level  Interventions:  - Encourage patient to monitor pain and request assistance  - Assess pain using appropriate pain scale  - Administer analgesics based on type and severity of pain and evaluate response  - Implement non-pharmacological measures as appropriate and evaluate response  - Consider cultural and social influences on pain and pain management  - Notify physician/advanced practitioner if interventions unsuccessful or patient reports new pain   Outcome: Progressing      Problem: INFECTION - ADULT  Goal: Absence or prevention of progression during hospitalization  INTERVENTIONS:  - Assess and monitor for signs and symptoms of infection  - Monitor lab/diagnostic results  - Monitor all insertion sites, i e  indwelling lines, tubes, and drains  - Monitor endotracheal (as able) and nasal secretions for changes in amount and color  - [place] appropriate cooling/warming therapies per order  - Administer medications as ordered  - Instruct and encourage patient and family to use good hand hygiene technique  - Identify and instruct in appropriate isolation precautions for identified infection/condition   Outcome: Progressing    Goal: Absence of fever/infection during neutropenic period  INTERVENTIONS:  - Monitor WBC  - Implement neutropenic guidelines   Outcome: Progressing

## 2018-04-26 VITALS
SYSTOLIC BLOOD PRESSURE: 110 MMHG | DIASTOLIC BLOOD PRESSURE: 66 MMHG | RESPIRATION RATE: 18 BRPM | WEIGHT: 137.35 LBS | HEART RATE: 75 BPM | BODY MASS INDEX: 25.27 KG/M2 | OXYGEN SATURATION: 97 % | TEMPERATURE: 98.2 F | HEIGHT: 62 IN

## 2018-04-26 PROBLEM — E87.6 HYPOKALEMIA: Status: RESOLVED | Noted: 2018-04-20 | Resolved: 2018-04-26

## 2018-04-26 LAB
ARSENIC BLD-MCNC: 7 UG/L (ref 2–23)
CADMIUM BLD-MCNC: NORMAL UG/L (ref 0–1.2)
GLUCOSE SERPL-MCNC: 113 MG/DL (ref 65–140)
LEAD BLD-MCNC: NORMAL UG/DL (ref 0–19)
MERCURY BLD-MCNC: NORMAL UG/L (ref 0–14.9)

## 2018-04-26 PROCEDURE — 99232 SBSQ HOSP IP/OBS MODERATE 35: CPT | Performed by: INTERNAL MEDICINE

## 2018-04-26 PROCEDURE — 97164 PT RE-EVAL EST PLAN CARE: CPT

## 2018-04-26 PROCEDURE — G8980 MOBILITY D/C STATUS: HCPCS

## 2018-04-26 PROCEDURE — 82948 REAGENT STRIP/BLOOD GLUCOSE: CPT

## 2018-04-26 PROCEDURE — 97110 THERAPEUTIC EXERCISES: CPT

## 2018-04-26 PROCEDURE — 99239 HOSP IP/OBS DSCHRG MGMT >30: CPT | Performed by: FAMILY MEDICINE

## 2018-04-26 PROCEDURE — G8979 MOBILITY GOAL STATUS: HCPCS

## 2018-04-26 PROCEDURE — G8978 MOBILITY CURRENT STATUS: HCPCS

## 2018-04-26 RX ORDER — PREDNISONE 1 MG/1
TABLET ORAL
Start: 2018-04-26 | End: 2018-07-13 | Stop reason: CLARIF

## 2018-04-26 RX ORDER — MESALAMINE 400 MG/1
1200 CAPSULE, DELAYED RELEASE ORAL 4 TIMES DAILY
Status: DISCONTINUED | OUTPATIENT
Start: 2018-04-26 | End: 2018-04-26 | Stop reason: HOSPADM

## 2018-04-26 RX ORDER — DICYCLOMINE HYDROCHLORIDE 10 MG/1
10 CAPSULE ORAL 3 TIMES DAILY PRN
Qty: 90 CAPSULE | Refills: 0 | Status: SHIPPED | OUTPATIENT
Start: 2018-04-26 | End: 2018-07-13 | Stop reason: SDUPTHER

## 2018-04-26 RX ORDER — MESALAMINE 400 MG/1
1200 CAPSULE, DELAYED RELEASE ORAL 4 TIMES DAILY
Qty: 270 CAPSULE | Refills: 0 | Status: SHIPPED | OUTPATIENT
Start: 2018-04-26 | End: 2018-05-17 | Stop reason: SDUPTHER

## 2018-04-26 RX ORDER — PANTOPRAZOLE SODIUM 40 MG/1
40 TABLET, DELAYED RELEASE ORAL
Qty: 60 TABLET | Refills: 0 | Status: SHIPPED | OUTPATIENT
Start: 2018-04-26 | End: 2018-05-22 | Stop reason: SDUPTHER

## 2018-04-26 RX ORDER — LIDOCAINE 50 MG/G
1 PATCH TOPICAL DAILY
Qty: 30 PATCH | Refills: 0 | Status: CANCELLED | OUTPATIENT
Start: 2018-04-27

## 2018-04-26 RX ORDER — METRONIDAZOLE 500 MG/1
500 TABLET ORAL EVERY 8 HOURS SCHEDULED
Qty: 15 TABLET | Refills: 0
Start: 2018-04-26 | End: 2018-05-01

## 2018-04-26 RX ORDER — CIPROFLOXACIN 500 MG/1
500 TABLET, FILM COATED ORAL EVERY 12 HOURS SCHEDULED
Status: DISCONTINUED | OUTPATIENT
Start: 2018-04-26 | End: 2018-04-26 | Stop reason: HOSPADM

## 2018-04-26 RX ORDER — FERROUS SULFATE 325(65) MG
325 TABLET ORAL 2 TIMES DAILY
Qty: 60 TABLET | Refills: 0 | Status: SHIPPED | OUTPATIENT
Start: 2018-04-26 | End: 2018-11-19 | Stop reason: SDUPTHER

## 2018-04-26 RX ORDER — CIPROFLOXACIN 500 MG/1
500 TABLET, FILM COATED ORAL EVERY 12 HOURS SCHEDULED
Qty: 10 TABLET | Refills: 0
Start: 2018-04-26 | End: 2018-05-01

## 2018-04-26 RX ADMIN — METRONIDAZOLE 500 MG: 500 TABLET ORAL at 06:35

## 2018-04-26 RX ADMIN — FERROUS SULFATE TAB 325 MG (65 MG ELEMENTAL FE) 325 MG: 325 (65 FE) TAB at 17:15

## 2018-04-26 RX ADMIN — MESALAMINE 1200 MG: 400 CAPSULE, DELAYED RELEASE ORAL at 17:19

## 2018-04-26 RX ADMIN — METHYLPREDNISOLONE SODIUM SUCCINATE 20 MG: 40 INJECTION, POWDER, FOR SOLUTION INTRAMUSCULAR; INTRAVENOUS at 06:36

## 2018-04-26 RX ADMIN — METRONIDAZOLE 500 MG: 500 TABLET ORAL at 13:36

## 2018-04-26 RX ADMIN — DICYCLOMINE HYDROCHLORIDE 10 MG: 10 CAPSULE ORAL at 17:15

## 2018-04-26 RX ADMIN — PANTOPRAZOLE SODIUM 40 MG: 40 TABLET, DELAYED RELEASE ORAL at 17:24

## 2018-04-26 RX ADMIN — METOPROLOL TARTRATE 25 MG: 25 TABLET, FILM COATED ORAL at 08:35

## 2018-04-26 RX ADMIN — LIDOCAINE 1 PATCH: 50 PATCH TOPICAL at 08:36

## 2018-04-26 RX ADMIN — CHOLESTYRAMINE 4 G: 4 POWDER, FOR SUSPENSION ORAL at 17:17

## 2018-04-26 RX ADMIN — CHOLESTYRAMINE 4 G: 4 POWDER, FOR SUSPENSION ORAL at 08:36

## 2018-04-26 RX ADMIN — METHYLPREDNISOLONE SODIUM SUCCINATE 20 MG: 40 INJECTION, POWDER, FOR SOLUTION INTRAMUSCULAR; INTRAVENOUS at 13:37

## 2018-04-26 RX ADMIN — DICYCLOMINE HYDROCHLORIDE 10 MG: 10 CAPSULE ORAL at 12:25

## 2018-04-26 RX ADMIN — FERROUS SULFATE TAB 325 MG (65 MG ELEMENTAL FE) 325 MG: 325 (65 FE) TAB at 08:36

## 2018-04-26 RX ADMIN — PANTOPRAZOLE SODIUM 40 MG: 40 TABLET, DELAYED RELEASE ORAL at 06:35

## 2018-04-26 RX ADMIN — DICYCLOMINE HYDROCHLORIDE 10 MG: 10 CAPSULE ORAL at 06:35

## 2018-04-26 RX ADMIN — MESALAMINE 1200 MG: 400 CAPSULE, DELAYED RELEASE ORAL at 08:36

## 2018-04-26 RX ADMIN — CIPROFLOXACIN 500 MG: 500 TABLET, FILM COATED ORAL at 12:25

## 2018-04-26 NOTE — PLAN OF CARE
Problem: Potential for Falls  Goal: Patient will remain free of falls  INTERVENTIONS:  - Assess patient frequently for physical needs  -  Identify cognitive and physical deficits and behaviors that affect risk of falls  -  [place] fall precautions as indicated by assessment   - Educate patient/family on patient safety including physical limitations  - Instruct patient to call for assistance with activity based on assessment  - Modify environment to reduce risk of injury  - Consider OT/PT consult to assist with strengthening/mobility   Outcome: Progressing      Problem: Nutrition/Hydration-ADULT  Goal: Nutrient/Hydration intake appropriate for improving, restoring or maintaining nutritional needs  Monitor and assess patient's nutrition/hydration status for malnutrition (ex- brittle hair, bruises, dry skin, pale skin and conjunctiva, muscle wasting, smooth red tongue, and disorientation)  Collaborate with interdisciplinary team and initiate plan and interventions as ordered  Monitor patient's weight and dietary intake as ordered or per policy  Utilize nutrition screening tool and intervene per policy  Determine patient's food preferences and provide high-protein, high-caloric foods as appropriate       INTERVENTIONS:  - Monitor oral intake, urinary output, labs, and treatment plans  - Assess nutrition and hydration status and recommend course of action  - Evaluate amount of meals eaten  - Assist patient with eating if necessary   - Allow adequate time for meals  - Recommend/ encourage appropriate diets, oral nutritional supplements, and vitamin/mineral supplements  - Order, calculate, and assess calorie counts as needed  - Recommend, monitor, and adjust tube feedings and TPN/PPN based on assessed needs  - Assess need for intravenous fluids  - Provide specific nutrition/hydration education as appropriate  - Include patient/family/caregiver in decisions related to nutrition   Outcome: Progressing      Problem: PAIN - ADULT  Goal: Verbalizes/displays adequate comfort level or baseline comfort level  Interventions:  - Encourage patient to monitor pain and request assistance  - Assess pain using appropriate pain scale  - Administer analgesics based on type and severity of pain and evaluate response  - Implement non-pharmacological measures as appropriate and evaluate response  - Consider cultural and social influences on pain and pain management  - Notify physician/advanced practitioner if interventions unsuccessful or patient reports new pain   Outcome: Progressing

## 2018-04-26 NOTE — ASSESSMENT & PLAN NOTE
· Monitor H and H  ·  Iron deficiency noted-iron repletion  · Hemoglobin down to 7  3-patient refused further blood work or blood transfusion  · Likely acute blood loss anemia from bloody bowel movement after the colonoscopy-

## 2018-04-26 NOTE — ASSESSMENT & PLAN NOTE
· Follows doctor at [facility]  · Patient denies being on any medications for this as outpatient  · If symptoms persist and negative work up as per above, rheumatology consult

## 2018-04-26 NOTE — PHYSICAL THERAPY NOTE
PHYSICAL THERAPY REEAVL NOTE    Patient Name: Rodney Goldstein  [id number] Date: 4/26/2018 04/26/18 1136   Pain Assessment   Pain Assessment No/denies pain   General   Chart Reviewed Yes   Additional Pertinent History Reeval completed due to progression of mobility status  Dx  Bilateral radiating leg pain, anemia, abnrmal TSH, and abdominal pain  Personal factors: steps to enter environment, multi-level environment, limited home support, inability to perform IADLs, inability to perform ADLs and inability to ambulate household distances  Comorbidities: breast cancer and HTN  UEs: 4-/5 (shoulders 3+/5)  LEs: 4-/5  Light touch normal UEs and LEs  Coordination normal all extremities  Room air ambulatory pulse ox 98% and 73 BPM  Clinical presentation: evolving (evident in need for input for safety, need for rest breaks w/ activity)  Timed up and go: 9 87 seconds  Family/Caregiver Present No   Cognition   Overall Cognitive Status WFL   Arousal/Participation Alert; Cooperative   Attention Within functional limits   Orientation Level Oriented X4;Other (Comment)  (pt was identified w/ full name and birth date)   Following Commands Follows all commands and directions without difficulty   Subjective   Subjective pt seen sitting in chair  agreed to PT session  denied pain or dizziness  Bed Mobility   Supine to Sit 7  Independent   Sit to Supine 7  Independent   Transfers   Sit to Stand 7  Independent   Stand to Sit 7  Independent   Additional Comments during ambulation, no loss of balance w/ head nods/turns, change in velocity/direction, and backwards  self corrected loss of balance w/ narrow base of support, stop and 180* turn, and eyes closed  Ambulation/Elevation   Gait pattern Fow[place] flexed   Gait Assistance 7  Independent   Assistive Device None   Distance 100, 150, 250 feet w/ standing rest breaks x 1 to 2 minutes each     Stair Management Assistance 7  Independent   Stair Management Technique One rail L;Alternating pattern   Number of Stairs 13   Balance   Static Sitting Normal   Dynamic Sitting Good   Static Standing Fair +   Dynamic Standing Fair   Ambulatory Fair   Activity Tolerance   Activity Tolerance Patient tolerated treatment well   Nurse Made Aware spoke to [facility] NSG   Equipment Use   Comments seated heel/toe raises 20 each  long arc quads and seated hip flexion 10 each  sit to stand 5  pt was provided w/ handout to expite understanding of technique  Assessment   Prognosis Good   Problem List Decreased endurance; Impaired balance   Assessment pt shows significant improvement in mobility status w/ increased ambulation distance and introduction of stair use  pt performed all phases of mobility w/o need for physical assistance and w/o losses of balance  self corrected losses of balance were noted w/ dynamic activities as noted  imrpovement in mobility status can be seen in Barthel Index score of 100/100 and TUG completion time  continued inpatient PT interevntion is no longer indicated due to pt's advanced mobility status  pt will benefit from continued independent ambulation to improve activity tolerance  pt would benefit from outpatient PT to address advanced balance training and conditioning  Goals   Patient Goals go home   Plan   Progress Discontinue PT  (Pt will continue independent ambulation )   Recommendation   Recommendation Outpatient PT     Skilled inpatient PT recommended while in hospital to progress pt toward treatment goals      Tulio Cool, PT

## 2018-04-26 NOTE — PROGRESS NOTES
Progress Note - Pili Cheng 1960, 61 y o  female MRN: [id number]    Unit/Bed#: -01 Encounter: [id number]    Primary Care Provider: Michelle Sorto MD   Date and time admitted to hospital: 4/20/2018 10:28 AM        Anemia   Assessment & Plan    · Monitor H and H  ·  Iron deficiency noted-iron repletion  · Hemoglobin down to 7  3-patient refused further blood work or blood transfusion  · Likely acute blood loss anemia from bloody bowel movement after the colonoscopy-        Abnormal TSH   Assessment & Plan    · Low TSH with high T4  · Obtain a free T3  · Endocrinology consultation        Abdominal pain   Assessment & Plan    · Status post colonoscopy  · Status post biopsy-likely inflammatory bowel disease  Continue with the steroid  · Continue with PPI  · Discussed with Gastroenterology  · Symptomatic management  · Discussed with Gastroenterology  · Currently on antibiotics-will discuss with Gastroenterology regarding continuation of the antibiotic        Elevated d-dimer   Assessment & Plan    · Doppler is negative  · No respiratory symptoms        Rheumatoid arthritis ([address] )   Assessment & Plan    · Follows doctor at [facility]  · Patient denies being on any medications for this as outpatient  · If symptoms persist and negative work up as per above, rheumatology consult        Coronary artery disease   Assessment & Plan    · S/p 3-vessel CABG 11/2015  · Continue BB, aspirin, statin  · Trop <0 02        Colitis   Assessment & Plan    · POA, noted on CT scan from 4/19  · GI following   · Status post colonoscopy yesterday  · Monitor H and H  · Patient had bloody bowel movement after the procedure-appears to be improving        * Bilateral radiating leg pain   Assessment & Plan    · Please refer to H&P in regards to onset of these symptoms  · Patient reports improvement in pain with Toradol 15 mg IV Q6 PRN - continue   · Neurology evaluation appreciated-MRI lumbar spine reviewed    Plan is to hold off on the thoracic spine MRI  · Patient also reports history of RA, not on any medications, follows at [address] [place] [address]  · ESR 20, CRP 71 8  · Continue with PT OT evaluation and therapy  · Pain is significantly better  · Able to ambulate              VTE Pharmacologic Prophylaxis:   Pharmacologic: On hold secondary to bright blood red blood per rectum  Mechanical VTE Prophylaxis in Place: Yes    Patient Centered Rounds: I have performed bedside rounds with nursing staff today  Discussions with Specialists or Other Care Team Provider:  Gastroenterology    Education and Discussions with Family / Patient:  Updated patient    Time Spent for Care: 30 minutes  More than 50% of total time spent on counseling and coordination of care as described above  Current Length of Stay: 5 day(s)    Current Patient Status: Inpatient   Certification Statement: The patient will continue to require additional inpatient hospital stay due to Decreased p o  intake    Discharge Plan:     Code Status: Level 1 - Full Code      Subjective:   Patient seen and examined  Patient reported that bloody bowel movements are improving but still has 1 episode of bloody bowel movement today  Patient reported that he has history of chronic anemia and she is on iron  Refused further blood work or blood transfusion  Objective:     Vitals:   Temp (24hrs), Av 6 °F (37 °C), Min:98 4 °F (36 9 °C), Max:98 8 °F (37 1 °C)    HR:  [69-76] 71  Resp:  [18-20] 18  BP: (103-120)/(65-72) 120/72  SpO2:  [95 %-96 %] 95 %  Body mass index is 25 12 kg/m²  Input and Output Summary (last 24 hours): Intake/Output Summary (Last 24 hours) at 18  Last data filed at 18 1545   Gross per 24 hour   Intake                0 ml   Output              750 ml   Net             -750 ml       Physical Exam:     Physical Exam   Constitutional: She appears well-developed and well-nourished  HENT:   Head: Normocephalic     Eyes: EOM are normal  Pupils are equal, round, and reactive to light  Neck: Normal range of motion  Cardiovascular: Normal rate  No murmur heard  Pulmonary/Chest: Effort normal  No respiratory distress  Abdominal: Soft  There is tenderness  There is no rebound and no guarding  Musculoskeletal: Normal range of motion  She exhibits no edema  Neurological: She is alert  No cranial nerve deficit  Skin: Skin is warm  No erythema  Additional Data:     Labs:      Results from last 7 days  Lab Units 04/25/18  0643 04/24/18  0403   WBC Thousand/uL 8 98 4 77   HEMOGLOBIN g/dL 7 6* 8 3*   HEMATOCRIT % 24 0* 25 6*   PLATELETS Thousands/uL 169 92*   NEUTROS PCT %  --  65   LYMPHS PCT %  --  22   LYMPHO PCT % 18  --    MONOS PCT %  --  13*   MONO PCT MAN % 10  --    EOS PCT %  --  0   EOSINO PCT MANUAL % 0  --        Results from last 7 days  Lab Units 04/24/18  0403 04/23/18  0543   SODIUM mmol/L  --  135*   POTASSIUM mmol/L  --  3 3*   CHLORIDE mmol/L  --  99*   CO2 mmol/L  --  25   BUN mg/dL  --  16   CREATININE mg/dL  --  0 75   CALCIUM mg/dL  --  8 3   TOTAL PROTEIN g/dL 5 6*  --    BILIRUBIN TOTAL mg/dL 0 30  --    ALK PHOS U/L 108  --    ALT U/L 39  --    AST U/L 31  --    GLUCOSE RANDOM mg/dL  --  112       Results from last 7 days  Lab Units 04/24/18  0917   INR  1 28*       * I Have Reviewed All Lab Data Listed Above  * Additional Pertinent Lab Tests Reviewed: [address] Admission Reviewed    Imaging:    Imaging Reports Reviewed Today Include:   Imaging Personally Reviewed by Myself Includes:      Recent Cultures (last 7 days):       Results from last 7 days  Lab Units 04/21/18 2020   C DIFF TOXIN B  NEGATIVE for C difficle toxin by PCR          Last 24 Hours Medication List:     Current Facility-Administered Medications:  acetaminophen 650 mg Oral Q6H PRN Shawn Alfredia Holter, PA-C    aspirin 81 mg Oral Daily Alin Andrea MD    cefTRIAXone 1,000 mg Intravenous Q24H Dorina Aguilar PA-C Last Rate: 1,000 mg (04/25/18 1622)   cholestyramine sugar free 4 g Oral TID Bharat Godoy PA-C    dextrose 5 % and sodium chloride 0 9 % 75 mL/hr Intravenous Continuous Mesfin Napier MD Last Rate: 75 mL/hr (04/25/18 1439)   dicyclomine 10 mg Oral TID AC Bharat Godoy PA-C    docusate sodium 100 mg Oral BID Nicolas Faust, DO    ferrous sulfate 325 mg Oral BID Nicolas Faust, DO    lidocaine 1 patch Transdermal Daily Bharat Godoy PA-C    LORazepam 1 mg Intravenous Q6H PRN Ana Maria Rodríguez PA-C    mesalamine 1,200 mg Oral TID Brody Argueta PA-C    methocarbamol 500 mg Oral Q6H PRN Bharat Fallon PA-C    methylPREDNISolone sodium succinate 20 mg Intravenous Q8H Justen Cabrera MD    metoprolol tartrate 25 mg Oral BID Bharat Godoy PA-C    metroNIDAZOLE 500 mg Oral [facility] Melissa Gentile PA-C    ondansetron 4 mg Intravenous Q6H PRN Bharat Fallon PA-C    pantoprazole 40 mg Oral BID AC Mesfin Napier MD    zolpidem 5 mg Oral HS PRN Mesfin Napier MD         Today, Patient Was Seen By: Mesfin Napier MD    ** Please Note: Dictation voice to text software may have been used in the creation of this document   **

## 2018-04-26 NOTE — ASSESSMENT & PLAN NOTE
· POA, noted on CT scan from 4/19  · GI following   · Status post colonoscopy yesterday  · Monitor H and H  · Patient had bloody bowel movement after the procedure-appears to be improving

## 2018-04-26 NOTE — ASSESSMENT & PLAN NOTE
· Low TSH with high T4  · Endocrinology consultation appreciated  · Patient refused further inpatient [id number] with the primary care physician or PCP as an outpatient for further workup

## 2018-04-26 NOTE — ASSESSMENT & PLAN NOTE
· POA, noted on CT scan from 4/19  · GI following   · Status post colonoscopy -biopsy confirming the presence of IBD  Patient is going to be on Asacol and also a slow steroid taper-plan is start on 50 mg daily and to taper 5 mg every week    · Patient had bloody bowel movement today morning but rest of the bowel movements were normal   Reported that the stool is more formed currently

## 2018-04-26 NOTE — ASSESSMENT & PLAN NOTE
· Please refer to H&P in regards to onset of these symptoms  · Patient reports improvement in pain with Toradol 15 mg IV Q6 PRN - continue   · Neurology evaluation appreciated-MRI lumbar spine reviewed    Plan is to hold off on the thoracic spine MRI  · Patient also reports history of RA, not on any medications, follows at [facility]  · ESR 20, CRP 71 8  · Continue with PT OT evaluation and therapy  · Pain is significantly better  · Able to ambulate

## 2018-04-26 NOTE — ASSESSMENT & PLAN NOTE
· Please refer to H&P in regards to onset of these symptoms  · Pain significantly better at this point  · Neurology evaluation appreciated-MRI lumbar spine reviewed   -no acute abnormality identified  · Patient also reports history of RA, not on any medications, follows at [facility]  · ESR 20, CRP 71 8  · Able to ambulate  · Follow-up with the primary care physician/Rheumatology as an outpatient

## 2018-04-26 NOTE — PROGRESS NOTES
Progress Note - Paola Akhtar 61 y o  female MRN: [id number]    Unit/Bed#: -01 Encounter: [id number]      Subjective:    Her abdominal pain has improved  Her bowel movement was more formed this am without blood  She is tolerating solid diet  Objective:     Vitals: Blood pressure 130/78, pulse 59, temperature 98 °F (36 7 °C), temperature source Oral, resp  rate 18, height 5' 2" (1 575 m), weight 62 3 kg (137 lb 5 6 oz), SpO2 97 %, not currently breastfeeding  ,Body mass index is 25 12 kg/m²        Intake/Output Summary (Last 24 hours) at 04/26/18 1119  Last data filed at 04/26/18 0801   Gross per 24 hour   Intake              540 ml   Output              150 ml   Net              390 ml       Physical Exam:    General Appearance: Alert, appears stated age and cooperative  Lungs: Clear to auscultation bilaterally, no rales or rhonchi, no labored breathing/accessory muscle use  Heart: Regular rate and rhythm, S1, S2 normal, no murmur, click, rub or gallop  Abdomen: Soft, mild llq tenderness, non-distended; bowel sounds normal; no masses or no organomegaly  Extremities: No cyanosis, edema    Invasive Devices     Peripheral Intravenous Line            Peripheral IV 04/25/18 Left Forearm less than 1 day                Lab Results:      Results from last 7 days  Lab Units 04/25/18  0643 04/24/18  0403   WBC Thousand/uL 8 98 4 77   HEMOGLOBIN g/dL 7 6* 8 3*   HEMATOCRIT % 24 0* 25 6*   PLATELETS Thousands/uL 169 92*   NEUTROS PCT %  --  65   LYMPHS PCT %  --  22   LYMPHO PCT % 18  --    MONOS PCT %  --  13*   MONO PCT MAN % 10  --    EOS PCT %  --  0   EOSINO PCT MANUAL % 0  --        Results from last 7 days  Lab Units 04/24/18  0403 04/23/18  0543   SODIUM mmol/L  --  135*   POTASSIUM mmol/L  --  3 3*   CHLORIDE mmol/L  --  99*   CO2 mmol/L  --  25   BUN mg/dL  --  16   CREATININE mg/dL  --  0 75   CALCIUM mg/dL  --  8 3   TOTAL PROTEIN g/dL 5 6*  --    BILIRUBIN TOTAL mg/dL 0 30  --    ALK PHOS U/L 108  -- ALT U/L 39  --    AST U/L 31  --    GLUCOSE RANDOM mg/dL  --  112       Results from last 7 days  Lab Units 04/24/18  0917   INR  1 28*       Results from last 7 days  Lab Units 04/20/18  1240   LIPASE u/L 77       Imaging Studies: I have personally reviewed pertinent imaging studies  Xr Spine Thoracic 3 Vw    Result Date: 4/21/2018  Impression: No acute osseous abnormality  Workstation performed: [id number]     Xr Spine Lumbar 2 Or 3 Views Injury    Result Date: 4/21/2018  Impression: 1  Mild disc space narrowing at L4-L5 consistent with degenerative disc disease 2  No compression fracture or subluxation Workstation performed: [id number]     Mri Lumbar Spine W Wo Contrast    Result Date: 4/23/2018  Impression: Mild noncompressive lumbar degenerative change at L4-5 and L5-S1  Incidentally noted fatty filum terminale   Workstation performed: [id number]       ASSESMENT/ PLAN:   Principal Problem:    Bilateral radiating leg pain  Active Problems:    Colitis    Coronary artery disease    Essential hypertension    Hypokalemia    Rheumatoid arthritis (HCC)    Elevated d-dimer    Abdominal pain    Abnormal TSH    Anemia    Colitis   -negative stool studies, O&P  -colonoscopy 4/23 with moderate inflammation throughout the colon with normal terminal ileum  -Pathology negative for CMV, shows chronic inflammation with crypt distortion consistent with IBD , likely crohns given patchy appearance on colonoscopy   -Continue solumedrol 20mg q8 hours, after doses today okay to transition to prednisone 40mg PO and to be discharged on a slow taper of 40mg x 2 weeks and then decrease by 5mg weekly   -Continue mesalamine  -Continue cipro and flagyl for 5 days  -Patient wishes to follow-up with Dr Vik Thurman at [place]    Acute blood loss anemia  Rectal bleeding  -Did have episodes of rectal bleeding, possibly secondary to biopsies on lovenox which has resolved  -Continue to monitor bms  -Monitor H/H  -Patient with iron deficiency, continue supplementation

## 2018-04-26 NOTE — DISCHARGE INSTRUCTIONS
Do not stop prednisone unless cleared by your doctor    Take prednisone with  food  Follow-up with Endocrinology regarding the abnormal TSH-  Obtain cbcd, cmp in a week

## 2018-04-26 NOTE — PROGRESS NOTES
Reviewed Repeat TSH , free T4 and Free T3,   TSH is normal , Free T4 is normal, Free T3 is sightly low, could be from Euthyroid sick syndrome / acute illness   Would not recommend any treatment at this time  Recommend to get  repeat TSH, free T4 and free T3 in 6 weeks on out pt basis      Goi Garcia MD

## 2018-04-26 NOTE — SOCIAL WORK
CM met with patient to make sure patient was still agreeable with [facility] at the time of d/c  Patient states she does not mind having them but does not believe she will need them after the first few weeks back home  Referral already made for [address] [place] [address] , and patient was accepted  by [address] [place] [address]

## 2018-04-26 NOTE — NUTRITION
04/26/18 1229   Assessment   Timepoint Full eval not required - patient not at nutrition risk  (Education)   Recommendations/Interventions   Summary Received message from KHRIS and PA pt requesting Low Fiber/Low Residue education  Completed education with patient,patient states she understands  Interventions Education initiated/completed   Nutrition Recommendations Continue diet as ordered; No new recommendations   Nutrition Complexity Risk   Nutrition complexity level High risk   Nutrition review: 04/27/18   Follow up date 04/30/18

## 2018-04-26 NOTE — ASSESSMENT & PLAN NOTE
· Status post colonoscopy  · Status post biopsy-likely inflammatory bowel disease   Biopsies back confirming the presence of inflammatory bowel disease  · Continue with PPI  · Discussed with Gastroenterology-stable for discharge from S a call PPI and steroid taper  · Symptomatic management  · Discussed with Gastroenterology  · Currently on antibiotics-antibiotics with ciprofloxacin and Flagyl for 5 more days

## 2018-04-26 NOTE — ASSESSMENT & PLAN NOTE
· Monitor H and H-likely secondary to acute blood loss anemia secondary to GI bleeding  · Iron deficiency noted-iron repletion  · Hemoglobin down to 7  3-patient refused further blood work or blood transfusion  · Repeat CBC CMP in a week as an outpatient through primary care physician  · Continue with the iron

## 2018-04-26 NOTE — DISCHARGE SUMMARY
Discharge- Selinanda Last 1960, 61 y o  female MRN: [id number]    Unit/Bed#: -01 Encounter: [id number]    Primary Care Provider: Denilson Mueller MD   Date and time admitted to hospital: 4/20/2018 10:28 AM        Anemia   Assessment & Plan    · Monitor H and H-likely secondary to acute blood loss anemia secondary to GI bleeding  · Iron deficiency noted-iron repletion  · Hemoglobin down to 7  3-patient refused further blood work or blood transfusion  · Repeat CBC CMP in a week as an outpatient through primary care physician  · Continue with the iron        Abnormal TSH   Assessment & Plan    · Low TSH with high T4  · Endocrinology consultation appreciated  · Patient refused further inpatient [id number] with the primary care physician or PCP as an outpatient for further workup        Abdominal pain   Assessment & Plan    · Status post colonoscopy  · Status post biopsy-likely inflammatory bowel disease  Biopsies back confirming the presence of inflammatory bowel disease  · Continue with PPI  · Discussed with Gastroenterology-stable for discharge from S a call PPI and steroid taper  · Symptomatic management  · Discussed with Gastroenterology  · Currently on antibiotics-antibiotics with ciprofloxacin and Flagyl for 5 more days        Essential hypertension   Assessment & Plan    · /89  · Continue Metoprolol        Coronary artery disease   Assessment & Plan    · S/p 3-vessel CABG 11/2015  · Continue BB, aspirin, statin  · Trop <0 02        Colitis   Assessment & Plan    · POA, noted on CT scan from 4/19  · GI following   · Status post colonoscopy -biopsy confirming the presence of IBD  Patient is going to be on Asacol and also a slow steroid taper-plan is start on 50 mg daily and to taper 5 mg every week    · Patient had bloody bowel movement today morning but rest of the bowel movements were normal   Reported that the stool is more formed currently        * Bilateral radiating leg pain   Assessment & Plan    · Please refer to H&P in regards to onset of these symptoms  · Pain significantly better at this point  · Neurology evaluation appreciated-MRI lumbar spine reviewed  -no acute abnormality identified  · Patient also reports history of RA, not on any medications, follows at [facility]  · ESR 20, CRP 71 8  · Able to ambulate  · Follow-up with the primary care physician/Rheumatology as an outpatient                Discharging Physician / Practitioner: Morgan Alanis MD  PCP: Joaquin Parker MD  Admission Date:   Admission Orders     Ordered        04/20/18 1427  Inpatient Admission (expected length of stay for this patient is greater than two midnights)  Once             Discharge Date: 04/26/18    Resolved Problems  Date Reviewed: 4/26/2018          Resolved    Hypokalemia 4/26/2018     Resolved by  Morgan Alanis MD          Consultations During Hospital Stay:  · Gastroenterology  · Neurology  · Endocrinology    Procedures Performed:     · Colonoscopy with cold biopsy-pathology is  concerning for IBD    Significant Findings / Test Results:   MRI lumbar spine-mild noncompressive lumbar degenerative changes L4-5 and L5-S1  Incidentally noted fatty filum terminale  Lower extremity Doppler-no DVT  X-ray of the lumbar spine-mild disc space narrowing at L4-5 consistent with degenerative disease disease no compression fracture or subluxation  X-ray thoracic spine-no acute osseous abnormality  CT abdomen and pelvis-Questionable mild wall thickening of the descending colon with minimal pericolonic inflammatory stranding suspicious for an infectious or inflammatory colitis    No free air or free fluid      Incidental Findings:       Test Results Pending at Discharge (will require follow up):         Outpatient Tests Requested:  · cbcd, cmp in a week  ·  TSH, T3 and  T4 in 2 weeks    Complications:  none    Reason for Admission: abdominal pain    Hospital Course:     Jimena Lopez is a 61 y o  female patient who originally presented to the hospital on 4/20/2018 due to intractable abdominal pain  Patient with colitis in the past   Unfortunately she was lost to follow-up  She was seen recently in the gastroenterology office at that time she she was scheduled to undergo repeat colonoscopy  But the pain got worse and she presented to the emergency room  Patient was seen in the emergency room the night before admission at the time she had a CT scan which was concerning for colitis and she was started on antibiotics  She did not have any significant improvement so she presented back  Patient was admitted to the hospital and gastroenterology evaluated the patient  While in the emergency room patient was also complaining of bilateral leg pain and weakness  Neurology evaluated the patient and the weakness was felt to be secondary to the pain  There was no definite etiology for the pain but her pain and weakness significantly improved and she was able to ambulate while she was in the hospital   PT OT evaluated the patient initially they recommended an rehabilitation but the patient functional status improved  And the plan is to continue with the vna as an outpatient  Patient was evaluated by Gastroenterology because of her abdominal pain  Patient had a colonoscopy done  Biopsy was concerning for IBD  There is a concern for Crohn's disease given her biopsy and also the appearance of the colon during the procedure  The patient was started on Asacol  And the plan is to of follow-up with Gastroenterology as an outpatient  At the time of discharge patient will be continued on a steroid taper  As part of her workup for the intractable pain and weakness a thyroid studies were obtained  Her TSH was found to be low with elevated T4  Endocrinology evaluated the patient patient refused further blood work this can be followed as an outpatient  Patient also with anemia-patient also had GI bleeding developed after the biopsy  At the time of discharge the GI bleeding is improving  Patient refused blood transfusion or further blood work while in the hospital recommended to get blood work done as an outpatient ensure stability of the hemoglobin and plan is to continue with the oral iron  This patient had a prolonged and complicated hospital stay  For details refer to the chart    Please see above list of diagnoses and related plan for additional information  Condition at Discharge: stable     Discharge Day Visit / Exam:     Subjective:    Vitals: Blood Pressure: 110/66 (04/26/18 1515)  Pulse: 75 (04/26/18 1515)  Temperature: 98 2 °F (36 8 °C) (04/26/18 1515)  Temp Source: Oral (04/26/18 1515)  Respirations: 18 (04/26/18 1515)  Height: 5' 2" (157 5 cm) (04/20/18 1618)  Weight - Scale: 62 3 kg (137 lb 5 6 oz) (04/20/18 1618)  SpO2: 97 % (04/26/18 1515)  Exam:   Physical Exam   Constitutional: She appears well-developed and well-nourished  HENT:   Head: Normocephalic and atraumatic  Eyes: EOM are normal  Pupils are equal, round, and reactive to light  Neck: Normal range of motion  Neck supple  Cardiovascular: Normal rate and regular rhythm  No murmur heard  Pulmonary/Chest: Effort normal and breath sounds normal  No respiratory distress  She has no wheezes  Abdominal: Soft  There is no tenderness (Tenderness in the left lower quadrant)  Musculoskeletal: Normal range of motion  She exhibits no edema  Neurological: She is alert  Skin: Skin is warm and dry  Discussion with Family: family updated in the room    Discharge instructions/Information to patient and family:   See after visit summary for information provided to patient and family  Provisions for Follow-Up Care:  See after visit summary for information related to follow-up care and any pertinent home health orders        Disposition:     Home    For Discharges to [facility] SNF:   · Not Applicable to this Patient - Not Applicable to this Patient    Planned Readmission:      Discharge Statement:  I spent 45  minutes discharging the patient  This time was spent on the day of discharge  I had direct contact with the patient on the day of discharge  Greater than 50% of the total time was spent examining patient, answering all patient questions, arranging and discussing plan of care with patient as well as directly providing post-discharge instructions  Additional time then spent on discharge activities  Discharge Medications:  See after visit summary for reconciled discharge medications provided to patient and family        ** Please Note: This note has been constructed using a voice recognition system **

## 2018-04-26 NOTE — PLAN OF CARE
Problem: DISCHARGE PLANNING - CARE MANAGEMENT  Goal: Discharge to post-acute care or home with appropriate resources  INTERVENTIONS:  - Conduct assessment to determine patient/family and health care team treatment goals, and need for post-acute services based on payer coverage, community resources, and patient preferences, and barriers to discharge  - Address psychosocial, clinical, and financial barriers to discharge as identified in assessment in conjunction with the patient/family and health care team  - Arrange appropriate level of post-acute services according to patient's   needs and preference and payer coverage in collaboration with the physician and health care team  - Communicate with and update the patient/family, physician, and health care team regarding progress on the discharge plan  - Arrange appropriate transportation to post-acute venues   Outcome: Progressing  CM met with patient to make sure patient was still agreeable with [facility] at the time of d/c  Patient states she does not mind having them but does not believe she will need them after the first few weeks back home  Referral already made for [facility], and patient was accepted  by [facility]

## 2018-04-26 NOTE — ASSESSMENT & PLAN NOTE
· Status post colonoscopy  · Status post biopsy-likely inflammatory bowel disease   Continue with the steroid  · Continue with PPI  · Discussed with Gastroenterology  · Symptomatic management  · Discussed with Gastroenterology  · Currently on antibiotics-will discuss with Gastroenterology regarding continuation of the antibiotic

## 2018-04-26 NOTE — PLAN OF CARE
Problem: PHYSICAL THERAPY ADULT  Goal: Performs mobility at highest level of function for planned discharge setting  See evaluation for individualized goals  Treatment/Interventions: Functional transfer training, LE strengthening/ROM, Therapeutic exercise, Endurance training, Patient/family training, Equipment eval/education, Bed mobility, Gait training  Equipment Recommended: Drea Douglas       See flowsheet documentation for full assessment, interventions and recommendations  Outcome: Completed Date Met: 04/26/18  Prognosis: Good  Problem List: Decreased endurance, Impaired balance  Assessment: pt shows significant improvement in mobility status w/ increased ambulation distance and introduction of stair use  pt performed all phases of mobility w/o need for physical assistance and w/o losses of balance  self corrected losses of balance were noted w/ dynamic activities as noted  imrpovement in mobility status can be seen in Barthel Index score of 100/100 and TUG completion time  continued inpatient PT interevntion is no longer indicated due to pt's advanced mobility status  pt will benefit from continued independent ambulation to improve activity tolerance  pt would benefit from outpatient PT to address advanced balance training and conditioning  Recommendation: Outpatient PT          See flowsheet documentation for full assessment

## 2018-04-27 ENCOUNTER — TELEPHONE (OUTPATIENT)
Dept: GASTROENTEROLOGY | Facility: AMBULARY SURGERY CENTER | Age: 58
End: 2018-04-27

## 2018-04-27 LAB
25(OH)D2 SERPL-MCNC: 21 NG/ML
25(OH)D3 SERPL-MCNC: 4 NG/ML
25(OH)D3+25(OH)D2 SERPL-MCNC: 25 NG/ML
ANNOTATION COMMENT IMP: NORMAL
GAMMA INTERFERON BACKGROUND BLD IA-ACNC: 0.04 IU/ML
M TB IFN-G BLD-IMP: NEGATIVE
M TB IFN-G CD4+ BCKGRND COR BLD-ACNC: 0 IU/ML
M TB IFN-G CD4+ T-CELLS BLD-ACNC: 0.04 IU/ML
MITOGEN IGNF BLD-ACNC: 6 IU/ML
QUANTIFERON-TB GOLD IN TUBE: NORMAL
SERVICE CMNT-IMP: NORMAL

## 2018-04-27 NOTE — TELEPHONE ENCOUNTER
----- Message from Elena Roth, DO sent at 4/27/2018  3:21 PM EDT -----  Will you follow up with pt next week and just see how she is doing    New diagnosis of uc I saw her like two years ago, lost to follow up, now with active inflammation  I spoke to her today, should be on mesalamine and pred 40mg daily until she sees me      Thanks,  Ashly Hawley    Also help her get follow up appt with me in 1-2 weeks

## 2018-04-28 ENCOUNTER — HOSPITAL ENCOUNTER (EMERGENCY)
Facility: HOSPITAL | Age: 58
Discharge: HOME | End: 2018-04-28
Attending: EMERGENCY MEDICINE | Admitting: EMERGENCY MEDICINE
Payer: COMMERCIAL

## 2018-04-28 VITALS
TEMPERATURE: 98.9 F | WEIGHT: 135.36 LBS | DIASTOLIC BLOOD PRESSURE: 55 MMHG | HEART RATE: 77 BPM | BODY MASS INDEX: 24.76 KG/M2 | SYSTOLIC BLOOD PRESSURE: 103 MMHG | OXYGEN SATURATION: 95 % | RESPIRATION RATE: 18 BRPM

## 2018-04-28 DIAGNOSIS — M54.50 ACUTE LEFT-SIDED LOW BACK PAIN WITHOUT SCIATICA: Primary | ICD-10-CM

## 2018-04-28 PROCEDURE — 96372 THER/PROPH/DIAG INJ SC/IM: CPT

## 2018-04-28 PROCEDURE — 99284 EMERGENCY DEPT VISIT MOD MDM: CPT

## 2018-04-28 RX ORDER — LIDOCAINE 50 MG/G
1 PATCH TOPICAL ONCE
Status: DISCONTINUED | OUTPATIENT
Start: 2018-04-28 | End: 2018-04-28 | Stop reason: HOSPADM

## 2018-04-28 RX ORDER — HALOPERIDOL 5 MG/ML
5 INJECTION INTRAMUSCULAR ONCE
Status: COMPLETED | OUTPATIENT
Start: 2018-04-28 | End: 2018-04-28

## 2018-04-28 RX ORDER — KETOROLAC TROMETHAMINE 30 MG/ML
30 INJECTION, SOLUTION INTRAMUSCULAR; INTRAVENOUS ONCE
Status: COMPLETED | OUTPATIENT
Start: 2018-04-28 | End: 2018-04-28

## 2018-04-28 RX ADMIN — HALOPERIDOL LACTATE 5 MG: 5 INJECTION, SOLUTION INTRAMUSCULAR at 03:07

## 2018-04-28 RX ADMIN — KETOROLAC TROMETHAMINE 30 MG: 30 INJECTION, SOLUTION INTRAMUSCULAR at 00:58

## 2018-04-28 RX ADMIN — LIDOCAINE 1 PATCH: 50 PATCH TOPICAL at 01:24

## 2018-04-28 NOTE — ED NOTES
Pt  requesting to talk with physician, Dr Ashlie Cheek aware of same        Malgorzata Schroeder, RN  04/28/18 7086

## 2018-04-28 NOTE — ED PROVIDER NOTES
History  Chief Complaint   Patient presents with    Back Pain     pt crying in pain states back began hurting at 64      61 yr female present s with sudden onset of low back/ left buttock pain -- no injury - recent admitfor abd pain - leg pain - with coloscopy with likrly inflammatory bowel disease-- states abd pain is unchanges- no b/b incont/ no saddle anethesia/ no ble weakness- just pain which has been ongoing        History provided by:  Patient, spouse and relative   used: No        Prior to Admission Medications   Prescriptions Last Dose Informant Patient Reported? Taking? ATORVASTATIN CALCIUM PO  Self Yes No   Sig: Take 20 mg by mouth daily  aspirin 81 MG tablet  Self Yes No   Sig: Take 81 mg by mouth daily  cholestyramine (QUESTRAN) 4 g packet  Self No No   Sig: Take 1 packet (4 g total) by mouth 3 (three) times a day with meals   ciprofloxacin (CIPRO) 500 mg tablet   No No   Sig: Take 1 tablet (500 mg total) by mouth every 12 (twelve) hours for 5 days   dicyclomine (BENTYL) 10 mg capsule   No No   Sig: Take 1 capsule (10 mg total) by mouth 3 (three) times a day as needed (spasms) for up to 30 days   ferrous sulfate 325 (65 Fe) mg tablet   No No   Sig: Take 1 tablet (325 mg total) by mouth 2 (two) times a day   mesalamine (DELZICOL) 400 mg   No No   Sig: Take 3 capsules (1,200 mg total) by mouth 4 (four) times a day   metoprolol tartrate (LOPRESSOR) 25 mg tablet  Self Yes No   Sig: Take 25 mg by mouth 2 (two) times a day     metroNIDAZOLE (FLAGYL) 500 mg tablet   No No   Sig: Take 1 tablet (500 mg total) by mouth every 8 (eight) hours for 5 days   ondansetron (ZOFRAN) 8 mg tablet   No No   Sig: Take 1 tablet (8 mg total) by mouth every 8 (eight) hours as needed for nausea or vomiting for up to 3 days   pantoprazole (PROTONIX) 40 mg tablet   No No   Sig: Take 1 tablet (40 mg total) by mouth 2 (two) times a day before meals   predniSONE 5 mg tablet   No No   Sig: Take 10 tabs for a week, then taper by 5 mg every week      Facility-Administered Medications: None       Past Medical History:   Diagnosis Date    Allergic rhinitis     Breast CA ([address] )     R breast CA-chemo/radiaition    Diarrhea     for 6 weeks-colonosocpy today 9/26/2016 and EGD    Hypertension     Thalassemia     Vitamin D deficiency        Past Surgical History:   Procedure Laterality Date    BREAST LUMPECTOMY Right     BREAST LUMPECTOMY Right     BREAST LUMPECTOMY Right     with lymph nodes  2007    COLONOSCOPY      COLONOSCOPY N/A 4/23/2018    Procedure: COLONOSCOPY;  Surgeon: Dylan Vasquez MD;  Location: [facility]; Service: Gastroenterology    CORONARY ARTERY BYPASS GRAFT      CORONARY ARTERY BYPASS GRAFT      x3, 11/2015    HYSTERECTOMY      b/l ovaries removed    UT COLONOSCOPY FLX DX W/COLLJ SPEC WHEN PFRMD N/A 9/26/2016    Procedure: COLONOSCOPY;  Surgeon: Jeanette Jaime DO;  Location: [facility]; Service: Gastroenterology       No family history on file  I have reviewed and agree with the history as documented  Social History   Substance Use Topics    Smoking status: Never Smoker    Smokeless tobacco: Never Used    Alcohol use 2 4 oz/week     4 Glasses of wine per week      Comment: per year        Review of Systems   Constitutional: Negative  HENT: Negative  Eyes: Negative  Respiratory: Negative  Cardiovascular: Negative  Gastrointestinal: Positive for abdominal pain  Negative for abdominal distention, anal bleeding, blood in stool, constipation, diarrhea, nausea, rectal pain and vomiting  Endocrine: Negative  Genitourinary: Negative  Musculoskeletal: Positive for back pain  Negative for arthralgias, gait problem, joint swelling, myalgias, neck pain and neck stiffness  Skin: Negative  Allergic/Immunologic: Negative  Neurological: Negative  Hematological: Negative  Psychiatric/Behavioral: Negative          Physical Exam  ED Triage Vitals [04/28/18 0047] Temperature Pulse Resp BP SpO2   98 9 °F (37 2 °C) (!) 106 -- -- 94 %      Temp Source Heart Rate Source Patient Position - Orthostatic VS BP Location FiO2 (%)   Oral Monitor Lying Left arm --      Pain Score       Worst Possible Pain           Orthostatic Vital Signs  Vitals:    04/28/18 0047   Pulse: (!) 106   Patient Position - Orthostatic VS: Lying       Physical Exam   Constitutional: She is oriented to person, place, and time  She appears well-developed and well-nourished  She appears distressed  Pulse ox 95 % on ra- interpretation is normal- no intervention   HENT:   Head: Normocephalic and atraumatic  Eyes: Conjunctivae and EOM are normal  Pupils are equal, round, and reactive to light  Right eye exhibits no discharge  Left eye exhibits no discharge  No scleral icterus  Mm pink   Neck: Normal range of motion  Neck supple  No JVD present  No tracheal deviation present  No thyromegaly present  Cardiovascular: Normal rate, regular rhythm, normal heart sounds and intact distal pulses  Exam reveals no gallop and no friction rub  No murmur heard  Pulmonary/Chest: Effort normal and breath sounds normal  No stridor  No respiratory distress  She has no wheezes  She has no rales  She exhibits no tenderness  Abdominal: Soft  Bowel sounds are normal  She exhibits no distension and no mass  There is tenderness  There is no rebound and no guarding  No hernia  Very soft abd-- mild lower abd tendnress is ongoing-- no peritoenal signs   Musculoskeletal: Normal range of motion  She exhibits tenderness  She exhibits no edema or deformity  Pos reproducible low back/left buttock tendenress-- normal ble strenght/snsation / reflxes-- equal bilateral radial/dp pulses- no ble edema/claf tendenress/assym/ erythema   Lymphadenopathy:     She has no cervical adenopathy  Neurological: She is alert and oriented to person, place, and time  She displays normal reflexes  No cranial nerve deficit or sensory deficit  She exhibits normal muscle tone  Coordination normal    Skin: Skin is warm  Capillary refill takes less than 2 seconds  No rash noted  No erythema  No pallor  Nursing note and vitals reviewed  ED Medications  Medications   ketorolac (TORADOL) injection 30 mg (not administered)       Diagnostic Studies  Results Reviewed     None                 No orders to display              Procedures  Procedures       Phone Contacts  ED Phone Contact    ED Course  ED Course as of Apr 28 0456   Sat Apr 28, 2018   0100 ER MD NOTE- RECENT HOSP D/C- [place] BS/ CT REPORT/ MRI/ DOPPLER U/S- GI REPORT REVIEWED BY ER MD    0301 - ER MD NOTE- AT PRESENT PT REFUSING ALL LABS- STATES NEEDS SOMETHING TO RELAX HER-- DOES NOT FEEL SHE CAN WALK- WILL TRY TO CONTROL SYMPTOMS IN HOPE OF D/C    0401 -- ER MD NOTE- PT - RE-EVALUATED BY ER MD- SOUND ASLEEP - WILL TRY TO AWAKE IN NEAR FUTURE FOR D/C    9241 -- ER MD NOTE- PT FEEL MUCH IMPROVED WITH PAIN - ABLE TO AMBULATE  PRIOR TO ER D/C    - ER MD MEDICAL DECISION MAKING NOTE- - BASED ON PRESENTATION H AND P-- ER MD FEELS THAT A SERIOUS CAUSE OF BACK/LEG PAIN  ARE Leena Mass    0171 ER MD NOTE- PT - EXAMINED PRIOR TO ER D/C-- ABD VERY SOFT- C/O MILD LOWER ABD TENDERNESS WHICH IS NOT NEW - HAS BEEN ONGOING WITH COLITIS-- NO PERITONEAL SIGNS                                MDM  CritCare Time    Disposition  Final diagnoses:   None     ED Disposition     None      Follow-up Information    None       Patient's Medications   Discharge Prescriptions    No medications on file     No discharge procedures on file      ED Provider  Electronically Signed by           Madelaine Stephens MD  04/28/18 0144

## 2018-04-28 NOTE — DISCHARGE INSTRUCTIONS
DIAGNOSIS; LOW BACK PAIN     - ACTIVITY AS TOLERATED     - THE LIDOCAINE PATCH NEEDS TO BE REMOVED IN 12 HRS- CAN USE OVER THE COUNTER LIDOCAINE PATCHES TO AREA AS NEEDED    - FOR  BACK PAIN- FOR NEXT WEEK- WOULD TRY OVER THE COUNTER NAPROXEN - 2-220 MG TABLETS - TAKEN  TOGETHER WITH OVER THE COUNTER TYLENOL 2-500 MG TABLETS - GET BOTH GENERICS-  TAKEN 2 TIMES PER DAY WITH A MEAL    - PLEASE RETURN TO  THE ER FOR ANY WORSENING/ INTRACTABLE BACK PAIN // ANY FOOT/LEG WEAKNESS/ ANY INABILITY TO MOVE OR CONTROL YOUR URINE/STOOLS/ ANY NUMBNESS OF YOUR SADDLE AREA OR ANY NEW/ WORSENING/CONCERNING SYMPTOMS TO YOU     - MOST MUSCULAR LOW BACK PAIN WILL RESSOLVE OVER 2 -3 WEEKS     - IF NOT GETTING BETTER AFTER 1 WEEK - PLEASE CALL   THE PAIN/SPINE CENTER TO SCHEDULE AN APPOINTMENT

## 2018-04-28 NOTE — ED NOTES
Pt resting quietly at present  Lights dimmed   Family at [address] [place] [address]  04/28/18 6315

## 2018-04-28 NOTE — ED NOTES
Pt sleeping with family at [facility], [address] [place] [address]  04/28/18 6445

## 2018-04-30 ENCOUNTER — TELEPHONE (OUTPATIENT)
Dept: GASTROENTEROLOGY | Facility: CLINIC | Age: 58
End: 2018-04-30

## 2018-04-30 LAB — VIT B6 SERPL-MCNC: 2.7 UG/L (ref 2–32.8)

## 2018-04-30 NOTE — TELEPHONE ENCOUNTER
Scheduled patient for a follow up on 5/14/18 in [place] with dr Linda Najera as per [facility] message

## 2018-05-01 ENCOUNTER — ANESTHESIA (OUTPATIENT)
Dept: GASTROENTEROLOGY | Facility: MEDICAL CENTER | Age: 58
End: 2018-05-01

## 2018-05-02 NOTE — TELEPHONE ENCOUNTER
Patient with hx of UC called with complaints of left sided abdominal discomfort since eating oatmeal this afternoon  I reviewed with her about trying a low fiber diet and made dietary suggestions based on that  I also reviewed her medications with her, as she had some questions about frequency  I advised her to take Bentyl as often as 3 times a day with cramping and continue her mesalamine & Prednisone until ov on 5/14/18   She also was questioning the Questran  She thought that was increasing her diarrhea so she stopped taking it  I told her that I didn't think that was the cause since it is prescribed to help with her diarrhea but may be more related to dietary choices  We also discussed trigger foods and she verbalized understanding  She is asking if there is anything else she can take?

## 2018-05-02 NOTE — ASSESSMENT & PLAN NOTE
----- Message from Paola Ayala DO sent at 5/2/2018  2:28 PM CDT -----  Genital culture demonstrates bacterial vaginosis.  Please inform patient, and needs to be placed on Metrogel vaginal one applicatorful at bedtime for five nights 70 gm tube.  Thanks.   · Patient with history of triple bypass  No symptoms   EKG and troponin within normal limits  · Continue home regimen   · Metoprolol   · Lipitor   · ASA

## 2018-05-02 NOTE — TELEPHONE ENCOUNTER
Pt called requesting to speak to a nurse because she ate oatmeal around 11am and her stomach is hurting on the left side  Pt need to know if she should continue her medication

## 2018-05-07 ENCOUNTER — TELEPHONE (OUTPATIENT)
Dept: GASTROENTEROLOGY | Facility: CLINIC | Age: 58
End: 2018-05-07

## 2018-05-07 NOTE — TELEPHONE ENCOUNTER
I called pt back  Having some diarrhea in the am and so I advised her to take imodium in the am   She can also take her prednisone twice daily instead of once daily  Will see her at her upcoming appt to discuss long term treatment options

## 2018-05-07 NOTE — TELEPHONE ENCOUNTER
Patient with hx of UC called with complaints of 4-5 episodes of  Diarrhea every morning with a lot of cramping  She has no other symptoms  She is currently taking Bentyl TID, mesalamine QID  She took her last dose of Flagyl yesterday, which was prescribed upon d/c when she was inpatient  She has ov 5/14/18 but is wondering what she can do on the mean time because she says she cant work

## 2018-05-15 ENCOUNTER — TELEPHONE (OUTPATIENT)
Dept: GASTROENTEROLOGY | Facility: CLINIC | Age: 58
End: 2018-05-15

## 2018-05-15 NOTE — TELEPHONE ENCOUNTER
Dr Dread Holley pt    Anthony Yoo NP called from the patients pcp office to advise the results for blood work they ordered shows the patients hemoglobin is still low at 8 7, she would like to know if Viv Daniel wants to set up an iron infusion, or should they?  Please advise [phone]

## 2018-05-15 NOTE — TELEPHONE ENCOUNTER
I received your message  If you can assist in setting that up that would be great    Thanks  Edith Argueta

## 2018-05-16 ENCOUNTER — OFFICE VISIT (OUTPATIENT)
Dept: HEMATOLOGY ONCOLOGY | Facility: CLINIC | Age: 58
End: 2018-05-16
Payer: COMMERCIAL

## 2018-05-16 VITALS
WEIGHT: 123 LBS | BODY MASS INDEX: 22.63 KG/M2 | SYSTOLIC BLOOD PRESSURE: 100 MMHG | HEART RATE: 65 BPM | RESPIRATION RATE: 16 BRPM | HEIGHT: 62 IN | TEMPERATURE: 98.1 F | OXYGEN SATURATION: 97 % | DIASTOLIC BLOOD PRESSURE: 62 MMHG

## 2018-05-16 DIAGNOSIS — D50.0 IRON DEFICIENCY ANEMIA DUE TO CHRONIC BLOOD LOSS: Primary | ICD-10-CM

## 2018-05-16 PROCEDURE — 99205 OFFICE O/P NEW HI 60 MIN: CPT | Performed by: PHYSICIAN ASSISTANT

## 2018-05-16 NOTE — LETTER
May 16, 2018     Micah Harrell MD  [address] [place] [address]    Patient: Dimas Martinez   YOB: 1960   Date of Visit: 5/16/2018       Dear Dr Corinne Guard: Thank you for referring Dimas Martinez to me for evaluation  Below are my notes for this consultation  If you have questions, please do not hesitate to call me  I look forward to following your patient along with you  Sincerely,        Hung Smith PA-C        CC: No Recipients  Hung Smith PA-C  5/16/2018  4:36 PM  Sign at close encounter  [address] [place] [id number]  Hematology Ambulatory Consult  Dimas Martinez, 1960, [id number]  5/16/2018    Assessment/Plan:    1  Iron deficency anemia in a patient with beta thalassemia minor and recent blood loss anemia       iron deficiency and beta thalassemia to gather occurs albeit rarely  Most reliable test to determine true iron deficiency is ferritin  Interestingly, during the patient's admission she  Had a significant amount of inflammation secondary to diagnosis of inflammatory bowel disease  This is reflected in an elevated D-dimer  In the hospital, the patient's ferritin was over 300 however I believe this is all due to inflammation and not an accurate reflection of iron stores status  During hospitalization, the patient did not receive IV iron but was noted to have a decreasing hemoglobin during her stay  Patient was discharged home with oral iron for which he has been taking twice a day  Repeat iron studies demonstrate a minimal improvement and more over the patient is symptomatic of anemia,  Restless sleep,  Need for rest and naps throughout the day, craving cold foods  I requested the ferritin be added to the patient's lab work for further assessment of progression or improvement of iron deficiency  I discussed the above with the patient    We discussed a trial of 1 dose of IV iron, Feraheme  I discussed side effects  which include but are not limited to hypertension, injection site reactions and anaphylaxis  The  Patient voiced desire to continue with treatment  Regimen:    Feraheme 510 mg IV x1 dose    Patient was asked to follow-up in 1 month with blood work prior  Continued observation of blood counts is necessary during this time  We are GI blood loss may be more significant as starting on new therapies  until disease control is obtained  2   Beta thalassemia minor  Patient's baseline hemoglobin is in the 10 gram/deciliter range  Most recent hemoglobin demonstrates improvement from discharge with hemoglobin of 8 7 grams/deciliter  Labs and imaging for follow up:  Orders Placed This Encounter   Procedures    CBC and differential     This is a patient instruction: This test is non-fasting  Please drink two glasses of water morning of bloodwork  Standing Status:   Future     Standing Expiration Date:   5/16/2019    Sedimentation rate, automated     Standing Status:   Future     Standing Expiration Date:   5/16/2019    C-reactive protein     Standing Status:   Future     Standing Expiration Date:   5/16/2019       The patient is scheduled for follow-up in approximately 1 month  Patient voiced agreement and understanding to the above  Patient knows to call the Hematology/Oncology office with any questions and concerns regarding the above  Carefully review your medication list in verify the list is accurate and up-to-date  Please call the hematologic/oncology office if there medications missing from the less, medications on the list that your not currently taking or if there is a dosage or instruction that is different from higher taking medication  Total time of being counter was 45 minutes  The patient's current treatment goals are iron repletion  No significant barrier to care were identified      The patient is able to self care     -------------------------------------------------------------------------------------------------------  Chief complaint:   Chief Complaint   Patient presents with   [address] [place] Patient ref by Zhanna Gallardo  Patient with iron deficiency  Labs in [place]  Referring provider:  Zhanna Gallardo MD  [address]  [facility], [facility]     History of present illness: This is a 4-year-old female with past medical history significant for  Right-sided breast cancer status post lumpectomy followed by radiation and chemotherapy approximately 10 years ago,  Hypertension, hyperlipidemia,  Beta thalassemia minor,  Who recently was admitted to [facility] for evaluation of chronic diarrhea  Patient was found to have inflammatory bowel disease and was started on a regimen  During her hospitalization however, the patient was placed on Lovenox in addition to her anti-platelet medications  Patient continued to have diarrhea  Which transition to blood at this time  Patient notes the perirectally she would have some bleeding but it was relatively small amounts and more attributable to generalized irritation as opposed to diffuse bleeding  During hospitalization however patient had more significant blood loss with the diarrhea was mostly blood  Patient's hemoglobin during hospitalization dropped approximately 3 g     Prior records indicate that the patient refused additional treatment such as blood transfusion or iron while hospitalized  Since discharge, the patient has been feeling more fatigued, going to bed early but a waking frequently and not getting solid   Night sleep  Patient notes that she prefer see cold things  Patient denies shortness of breath chest pain lower extremity cramping  Patient is presently out of work secondary to fatigue      Repeat labs completed on 5/12/18 demonstrates a white blood cell count of 11 4, hemoglobin = 8 7, hematocrit = 30 6, MCV = 69 1, RDW 23 3, platelet count 256, differential includes elevated absolute neutrophils and elevated nucleated red blood cells  CBC morphology demonstrates microcytosis 2+ anisocytosis 2+, polychromasia 2+, hypochromasia 2+ ovalocytes 1+ target cells  Review of Systems   Constitutional: Positive for fatigue  Negative for activity change, appetite change ( patientHas main dietary adjustments secondary to inflammatory bowel issues ), fever and unexpected weight change (Due to dietary changes patient has experienced approximately 12 lb of weight loss)  HENT: Negative for nosebleeds  Respiratory: Negative for cough, choking and shortness of breath  Negative hemoptysis  Cardiovascular: Negative for chest pain, palpitations and leg swelling  Gastrointestinal: Positive for blood in stool ( see HPI) and diarrhea  Negative for abdominal distention, abdominal pain, anal bleeding, constipation, nausea and vomiting  Endocrine: Negative  Negative for cold intolerance  Genitourinary: Negative  Negative for hematuria, menstrual problem ( patient had hysterectomy), vaginal bleeding, vaginal discharge and vaginal pain  Musculoskeletal: Negative  Negative for arthralgias, myalgias, neck pain and neck stiffness  Skin: Negative  Negative for color change, pallor and rash  Allergic/Immunologic: Negative  Negative for immunocompromised state  Neurological: Negative  Negative for weakness and headaches  Hematological: Negative for adenopathy  Does not bruise/bleed easily  Psychiatric/Behavioral: Positive for sleep disturbance ( see HPI)  All other systems reviewed and are negative      Patient Active Problem List   Diagnosis    Colitis    Periumbilic abdominal tenderness    Rectal discomfort    CRP elevated    Bilateral radiating leg pain    Coronary artery disease    Essential hypertension    Rheumatoid arthritis (HCC)    Elevated d-dimer    Abdominal pain    Abnormal TSH    Anemia       Past Medical History:   Diagnosis Date    Allergic rhinitis     Breast CA ([address] )     R breast CA-chemo/radiaition    Diarrhea     for 6 weeks-colonosocpy today 9/26/2016 and EGD    Hypertension     Thalassemia     Vitamin D deficiency        Past Surgical History:   Procedure Laterality Date    BREAST LUMPECTOMY Right     BREAST LUMPECTOMY Right     BREAST LUMPECTOMY Right     with lymph nodes  2007    COLONOSCOPY      COLONOSCOPY N/A 4/23/2018    Procedure: COLONOSCOPY;  Surgeon: Gloria Campbell MD;  [facility]; Service: Gastroenterology    CORONARY ARTERY BYPASS GRAFT      CORONARY ARTERY BYPASS GRAFT      x3, 11/2015    HYSTERECTOMY      b/l ovaries removed    KS COLONOSCOPY FLX DX W/COLLJ SPEC WHEN PFRMD N/A 9/26/2016    Procedure: COLONOSCOPY;  Surgeon: Verona Nino DO;  Location: [facility]; Service: Gastroenterology     History reviewed  No pertinent family history      Social History     Social History    Marital status: /Civil Union     Spouse name: N/A    Number of children: N/A    Years of education: N/A     Social History Main Topics    Smoking status: Never Smoker    Smokeless tobacco: Never Used    Alcohol use 2 4 oz/week     4 Glasses of wine per week      Comment: per year    Drug use: No    Sexual activity: Not Asked     Other Topics Concern    None     Social History Narrative    None       Current Outpatient Prescriptions:     dicyclomine (BENTYL) 10 mg capsule, Take 1 capsule (10 mg total) by mouth 3 (three) times a day as needed (spasms) for up to 30 days, Disp: 90 capsule, Rfl: 0    ferrous sulfate 325 (65 Fe) mg tablet, Take 1 tablet (325 mg total) by mouth 2 (two) times a day, Disp: 60 tablet, Rfl: 0    mesalamine (DELZICOL) 400 mg, Take 3 capsules (1,200 mg total) by mouth 4 (four) times a day, Disp: 270 capsule, Rfl: 0    pantoprazole (PROTONIX) 40 mg tablet, Take 1 tablet (40 mg total) by mouth 2 (two) times a day before meals, Disp: 60 tablet, Rfl: 0    predniSONE 5 mg tablet, Take 10 tabs for a week, then taper by 5 mg every week, Disp: , Rfl:     aspirin 81 MG tablet, Take 81 mg by mouth daily  , Disp: , Rfl:     ATORVASTATIN CALCIUM PO, Take 20 mg by mouth daily  , Disp: , Rfl:     cholestyramine (QUESTRAN) 4 g packet, Take 1 packet (4 g total) by mouth 3 (three) times a day with meals, Disp: 60 each, Rfl: 0    metoprolol tartrate (LOPRESSOR) 25 mg tablet, Take 25 mg by mouth 2 (two) times a day , Disp: , Rfl:     ondansetron (ZOFRAN) 8 mg tablet, Take 1 tablet (8 mg total) by mouth every 8 (eight) hours as needed for nausea or vomiting for up to 3 days, Disp: 9 tablet, Rfl: 0    Allergies   Allergen Reactions    Iodinated Diagnostic Agents Anaphylaxis     Objective:  /62 (BP Location: Left arm, Cuff Size: Standard)   Pulse 65   Temp 98 1 °F (36 7 °C) (Oral)   Resp 16   Ht 5' 2" (1 575 m)   Wt 55 8 kg (123 lb)   SpO2 97%   BMI 22 50 kg/m²    Physical Exam   Constitutional: She is oriented to person, place, and time  She appears well-developed and well-nourished  No distress  HENT:   Head: Normocephalic and atraumatic  Eyes: Pupils are equal, round, and reactive to light  No scleral icterus  Pale conjunctiva noted bilaterally   Cardiovascular: Normal rate and regular rhythm  No murmur heard  Pulmonary/Chest: Effort normal  No respiratory distress  Musculoskeletal: She exhibits no edema  Neurological: She is alert and oriented to person, place, and time  Skin: Skin is warm  No rash noted  No pallor  Psychiatric: She has a normal mood and affect   Thought content normal        Result Review  Labs:  Component      Latest Ref Rng & Units 4/24/2018 4/25/2018              WBC      4 31 - 10 16 Thousand/uL 4 77 8 98   RBC      3 81 - 5 12 Million/uL 4 52 4 13   Hemoglobin      11 5 - 15 4 g/dL 8 3 (L) 7 6 (L)   Hematocrit      34 8 - 46 1 % 25 6 (L) 24 0 (L)   MCV      82 - 98 fL 57 (L) 58 (L)   MCH      26 8 - 34 3 pg 18 4 (L) 18 4 (L)   MCHC      31 4 - 37 4 g/dL 32 4 31 7   RDW      11 6 - 15 1 % 14 9 15 1   MPV      8 9 - 12 7 fL     Platelets      721 - 390 Thousands/uL 92 (L) 169   nRBC      /100 WBC     Neutrophils Relative      43 - 75 % 65    Lymphocytes Relative      14 - 44 % 22    Monocytes Relative      4 - 12 % 13 (H)    Eosinophils Relative      0 - 6 % 0    Basophils Relative      0 - 1 % 0    Neutrophils Absolute      1 85 - 7 62 Thousands/µL 3 10    Lymphocytes Absolute      0 60 - 4 47 Thousands/µL 1 06    Monocytes Absolute      0 17 - 1 22 Thousand/µL 0 60    Eosinophils Absolute      0 00 - 0 61 Thousand/µL 0 00    Basophils Absolute      0 00 - 0 10 Thousands/µL 0 01      Component      Latest Ref Rng & Units 4/21/2018   HEMOGLOBIN A2 QUANTITATION      1 8 - 3 2 % 5 2 (H)   HEMOGLOBIN C QUANTITATION      0 0 % 0 0   HEMOGLOBIN F QUANTITATION      0 0 - 2 0 % 0 0   HEMOGLOBIN S QUANTITATION      0 0 % 0 0   HEMOGLOBIN SOLUBILITY      Negative Negative   HGB INTERPRETATION       Comment   HGB A      96 4 - 98 8 % 94 8 (L)     Comment       Comments: Hemoglobin pattern and concentrations are consistent with beta-   Thalassemia minor  Suggest hematologic and clinical correlation  Component      Latest Ref Rng & Units 4/21/2018   Iron Saturation      % 5   TIBC      250 - 450 ug/dL 269   Iron      50 - 170 ug/dL 13 (L)     Component      Latest Ref Rng & Units 12/22/2017 4/21/2018   Ferritin      8 - 388 ng/mL 27 326     Component      Latest Ref Rng & Units 12/22/2017 4/21/2018   Vitamin B-12      100 - 900 pg/mL 474 1,346 (H)     Component      Latest Ref Rng & Units 4/20/2018   D-DIMER QUANTITATIVE      0 - 424 ng/ml (FEU) >10,000 (H)     Please note: This report has been generated by a voice recognition software system  Therefore there may be syntax, spelling, and/or grammatical errors  Please call if you have any questions

## 2018-05-16 NOTE — PROGRESS NOTES
[address] [facility]  [address] [address]  Hematology Ambulatory Consult  Molly, 1960, [id number]  5/16/2018    Assessment/Plan:    1  Iron deficency anemia in a patient with beta thalassemia minor and recent blood loss anemia       iron deficiency and beta thalassemia to gather occurs albeit rarely  Most reliable test to determine true iron deficiency is ferritin  Interestingly, during the patient's admission she  Had a significant amount of inflammation secondary to diagnosis of inflammatory bowel disease  This is reflected in an elevated D-dimer  In the hospital, the patient's ferritin was over 300 however I believe this is all due to inflammation and not an accurate reflection of iron stores status  During hospitalization, the patient did not receive IV iron but was noted to have a decreasing hemoglobin during her stay  Patient was discharged home with oral iron for which he has been taking twice a day  Repeat iron studies demonstrate a minimal improvement and more over the patient is symptomatic of anemia,  Restless sleep,  Need for rest and naps throughout the day, craving cold foods  I requested the ferritin be added to the patient's lab work for further assessment of progression or improvement of iron deficiency  I discussed the above with the patient  We discussed a trial of 1 dose of IV iron, Feraheme  I discussed side effects  which include but are not limited to hypertension, injection site reactions and anaphylaxis  The  Patient voiced desire to continue with treatment  Regimen:    Feraheme 510 mg IV x1 dose    Patient was asked to follow-up in 1 month with blood work prior  Continued observation of blood counts is necessary during this time  We are GI blood loss may be more significant as starting on new therapies  until disease control is obtained  2   Beta thalassemia minor    Patient's baseline hemoglobin is in the 10 gram/deciliter range  Most recent hemoglobin demonstrates improvement from discharge with hemoglobin of 8 7 grams/deciliter  Labs and imaging for follow up:  Orders Placed This Encounter   Procedures    CBC and differential     This is a patient instruction: This test is non-fasting  Please drink two glasses of water morning of bloodwork  Standing Status:   Future     Standing Expiration Date:   5/16/2019    Sedimentation rate, automated     Standing Status:   Future     Standing Expiration Date:   5/16/2019    C-reactive protein     Standing Status:   Future     Standing Expiration Date:   5/16/2019       The patient is scheduled for follow-up in approximately 1 month  Patient voiced agreement and understanding to the above  Patient knows to call the Hematology/Oncology office with any questions and concerns regarding the above  Carefully review your medication list in verify the list is accurate and up-to-date  Please call the hematologic/oncology office if there medications missing from the less, medications on the list that your not currently taking or if there is a dosage or instruction that is different from higher taking medication  Total time of being counter was 45 minutes  The patient's current treatment goals are iron repletion  No significant barrier to care were identified  The patient is able to self care     -------------------------------------------------------------------------------------------------------  Chief complaint:   Chief Complaint   Patient presents with   [address] [place] [address] Patient ref by Kailyn Jaeger  Patient with iron deficiency  Labs in [employer]  Referring provider:  Kailyn Jaeger MD  [address] [facility], [address]     History of present illness:     This is a 58-year-old female with past medical history significant for  Right-sided breast cancer status post lumpectomy followed by radiation and chemotherapy approximately 10 years ago,  Hypertension, hyperlipidemia,  Beta thalassemia minor,  Who recently was admitted to [facility] for evaluation of chronic diarrhea  Patient was found to have inflammatory bowel disease and was started on a regimen  During her hospitalization however, the patient was placed on Lovenox in addition to her anti-platelet medications  Patient continued to have diarrhea  Which transition to blood at this time  Patient notes the perirectally she would have some bleeding but it was relatively small amounts and more attributable to generalized irritation as opposed to diffuse bleeding  During hospitalization however patient had more significant blood loss with the diarrhea was mostly blood  Patient's hemoglobin during hospitalization dropped approximately 3 g     Prior records indicate that the patient refused additional treatment such as blood transfusion or iron while hospitalized  Since discharge, the patient has been feeling more fatigued, going to bed early but a waking frequently and not getting solid   Night sleep  Patient notes that she prefer see cold things  Patient denies shortness of breath chest pain lower extremity cramping  Patient is presently out of work secondary to fatigue  Repeat labs completed on 5/12/18 demonstrates a white blood cell count of 11 4, hemoglobin = 8 7, hematocrit = 30 6, MCV = 69 1, RDW 23 3, platelet count 283, differential includes elevated absolute neutrophils and elevated nucleated red blood cells  CBC morphology demonstrates microcytosis 2+ anisocytosis 2+, polychromasia 2+, hypochromasia 2+ ovalocytes 1+ target cells  Review of Systems   Constitutional: Positive for fatigue   Negative for activity change, appetite change ( patientHas main dietary adjustments secondary to inflammatory bowel issues ), fever and unexpected weight change (Due to dietary changes patient has experienced approximately 12 lb of weight loss)  HENT: Negative for nosebleeds  Respiratory: Negative for cough, choking and shortness of breath  Negative hemoptysis  Cardiovascular: Negative for chest pain, palpitations and leg swelling  Gastrointestinal: Positive for blood in stool ( see HPI) and diarrhea  Negative for abdominal distention, abdominal pain, anal bleeding, constipation, nausea and vomiting  Endocrine: Negative  Negative for cold intolerance  Genitourinary: Negative  Negative for hematuria, menstrual problem ( patient had hysterectomy), vaginal bleeding, vaginal discharge and vaginal pain  Musculoskeletal: Negative  Negative for arthralgias, myalgias, neck pain and neck stiffness  Skin: Negative  Negative for color change, pallor and rash  Allergic/Immunologic: Negative  Negative for immunocompromised state  Neurological: Negative  Negative for weakness and headaches  Hematological: Negative for adenopathy  Does not bruise/bleed easily  Psychiatric/Behavioral: Positive for sleep disturbance ( see HPI)  All other systems reviewed and are negative      Patient Active Problem List   Diagnosis    Colitis    Periumbilic abdominal tenderness    Rectal discomfort    CRP elevated    Bilateral radiating leg pain    Coronary artery disease    Essential hypertension    Rheumatoid arthritis (HCC)    Elevated d-dimer    Abdominal pain    Abnormal TSH    Anemia       Past Medical History:   Diagnosis Date    Allergic rhinitis     Breast CA ([address] )     R breast CA-chemo/radiaition    Diarrhea     for 6 weeks-colonosocpy today 9/26/2016 and EGD    Hypertension     Thalassemia     Vitamin D deficiency        Past Surgical History:   Procedure Laterality Date    BREAST LUMPECTOMY Right     BREAST LUMPECTOMY Right     BREAST LUMPECTOMY Right     with lymph nodes  2007    COLONOSCOPY      COLONOSCOPY N/A 4/23/2018    Procedure: COLONOSCOPY;  Surgeon: Arin Sales MD;  [facility]; Service: Gastroenterology    CORONARY ARTERY BYPASS GRAFT      CORONARY ARTERY BYPASS GRAFT      x3, 11/2015    HYSTERECTOMY      b/l ovaries removed    [place] COLONOSCOPY FLX DX W/COLLJ SPEC WHEN PFRMD N/A 9/26/2016    Procedure: COLONOSCOPY;  Surgeon: Hollie Bright DO;  [facility]; Service: Gastroenterology     History reviewed  No pertinent family history  Social History     Social History    Marital status: /Civil Union     Spouse name: N/A    Number of children: N/A    Years of education: N/A     Social History Main Topics    Smoking status: Never Smoker    Smokeless tobacco: Never Used    Alcohol use 2 4 oz/week     4 Glasses of wine per week      Comment: per year    Drug use: No    Sexual activity: Not Asked     Other Topics Concern    None     Social History Narrative    None       Current Outpatient Prescriptions:     dicyclomine (BENTYL) 10 mg capsule, Take 1 capsule (10 mg total) by mouth 3 (three) times a day as needed (spasms) for up to 30 days, Disp: 90 capsule, Rfl: 0    ferrous sulfate 325 (65 Fe) mg tablet, Take 1 tablet (325 mg total) by mouth 2 (two) times a day, Disp: 60 tablet, Rfl: 0    mesalamine (DELZICOL) 400 mg, Take 3 capsules (1,200 mg total) by mouth 4 (four) times a day, Disp: 270 capsule, Rfl: 0    pantoprazole (PROTONIX) 40 mg tablet, Take 1 tablet (40 mg total) by mouth 2 (two) times a day before meals, Disp: 60 tablet, Rfl: 0    predniSONE 5 mg tablet, Take 10 tabs for a week, then taper by 5 mg every week, Disp: , Rfl:     aspirin 81 MG tablet, Take 81 mg by mouth daily  , Disp: , Rfl:     ATORVASTATIN CALCIUM PO, Take 20 mg by mouth daily  , Disp: , Rfl:     cholestyramine (QUESTRAN) 4 g packet, Take 1 packet (4 g total) by mouth 3 (three) times a day with meals, Disp: 60 each, Rfl: 0    metoprolol tartrate (LOPRESSOR) 25 mg tablet, Take 25 mg by mouth 2 (two) times a day , Disp: , Rfl:     ondansetron (ZOFRAN) 8 mg tablet, Take 1 tablet (8 mg total) by mouth every 8 (eight) hours as needed for nausea or vomiting for up to 3 days, Disp: 9 tablet, Rfl: 0    Allergies   Allergen Reactions    Iodinated Diagnostic Agents Anaphylaxis     Objective:  /62 (BP Location: Left arm, Cuff Size: Standard)   Pulse 65   Temp 98 1 °F (36 7 °C) (Oral)   Resp 16   Ht 5' 2" (1 575 m)   Wt 55 8 kg (123 lb)   SpO2 97%   BMI 22 50 kg/m²   Physical Exam   Constitutional: She is oriented to person, place, and time  She appears well-developed and well-nourished  No distress  HENT:   Head: Normocephalic and atraumatic  Eyes: Pupils are equal, round, and reactive to light  No scleral icterus  Pale conjunctiva noted bilaterally   Cardiovascular: Normal rate and regular rhythm  No murmur heard  Pulmonary/Chest: Effort normal  No respiratory distress  Musculoskeletal: She exhibits no edema  Neurological: She is alert and oriented to person, place, and time  Skin: Skin is warm  No rash noted  No pallor  Psychiatric: She has a normal mood and affect   Thought content normal        Result Review  Labs:  Component      Latest Ref Rng & Units 4/24/2018 4/25/2018              WBC      4 31 - 10 16 Thousand/uL 4 77 8 98   RBC      3 81 - 5 12 Million/uL 4 52 4 13   Hemoglobin      11 5 - 15 4 g/dL 8 3 (L) 7 6 (L)   Hematocrit      34 8 - 46 1 % 25 6 (L) 24 0 (L)   MCV      82 - 98 fL 57 (L) 58 (L)   MCH      26 8 - 34 3 pg 18 4 (L) 18 4 (L)   MCHC      31 4 - 37 4 g/dL 32 4 31 7   RDW      11 6 - 15 1 % 14 9 15 1   MPV      8 9 - 12 7 fL     Platelets      296 - 390 Thousands/uL 92 (L) 169   nRBC      /100 WBC     Neutrophils Relative      43 - 75 % 65    Lymphocytes Relative      14 - 44 % 22    Monocytes Relative      4 - 12 % 13 (H)    Eosinophils Relative      0 - 6 % 0    Basophils Relative      0 - 1 % 0    Neutrophils Absolute      1 85 - 7 62 Thousands/µL 3 10    Lymphocytes Absolute      0 60 - 4 47 Thousands/µL 1 06    Monocytes Absolute 0 17 - 1 22 Thousand/µL 0 60    Eosinophils Absolute      0 00 - 0 61 Thousand/µL 0 00    Basophils Absolute      0 00 - 0 10 Thousands/µL 0 01      Component      Latest Ref Rng & Units 4/21/2018   HEMOGLOBIN A2 QUANTITATION      1 8 - 3 2 % 5 2 (H)   HEMOGLOBIN C QUANTITATION      0 0 % 0 0   HEMOGLOBIN F QUANTITATION      0 0 - 2 0 % 0 0   HEMOGLOBIN S QUANTITATION      0 0 % 0 0   HEMOGLOBIN SOLUBILITY      Negative Negative   HGB INTERPRETATION       Comment   HGB A      96 4 - 98 8 % 94 8 (L)     Comment       Comments: Hemoglobin pattern and concentrations are consistent with beta-   Thalassemia minor  Suggest hematologic and clinical correlation  Component      Latest Ref Rng & Units 4/21/2018   Iron Saturation      % 5   TIBC      250 - 450 ug/dL 269   Iron      50 - 170 ug/dL 13 (L)     Component      Latest Ref Rng & Units 12/22/2017 4/21/2018   Ferritin      8 - 388 ng/mL 27 326     Component      Latest Ref Rng & Units 12/22/2017 4/21/2018   Vitamin B-12      100 - 900 pg/mL 474 1,346 (H)     Component      Latest Ref Rng & Units 4/20/2018   D-DIMER QUANTITATIVE      0 - 424 ng/ml (FEU) >10,000 (H)     Please note: This report has been generated by a voice recognition software system  Therefore there may be syntax, spelling, and/or grammatical errors  Please call if you have any questions

## 2018-05-16 NOTE — TELEPHONE ENCOUNTER
She has an appointment with Dr Inna Lee office this morning as she was already an est  patient  Should be taken care of from there  Thanks!

## 2018-05-17 ENCOUNTER — OFFICE VISIT (OUTPATIENT)
Dept: GASTROENTEROLOGY | Facility: MEDICAL CENTER | Age: 58
End: 2018-05-17
Payer: COMMERCIAL

## 2018-05-17 VITALS
WEIGHT: 128 LBS | BODY MASS INDEX: 23.55 KG/M2 | DIASTOLIC BLOOD PRESSURE: 74 MMHG | SYSTOLIC BLOOD PRESSURE: 120 MMHG | TEMPERATURE: 98.6 F | HEART RATE: 73 BPM | HEIGHT: 62 IN

## 2018-05-17 DIAGNOSIS — K52.9 COLITIS: ICD-10-CM

## 2018-05-17 DIAGNOSIS — D50.0 IRON DEFICIENCY ANEMIA DUE TO CHRONIC BLOOD LOSS: ICD-10-CM

## 2018-05-17 DIAGNOSIS — K52.3 INDETERMINATE COLITIS: Primary | ICD-10-CM

## 2018-05-17 DIAGNOSIS — E55.9 VITAMIN D DEFICIENCY: ICD-10-CM

## 2018-05-17 DIAGNOSIS — R79.82 ELEVATED C-REACTIVE PROTEIN (CRP): ICD-10-CM

## 2018-05-17 PROCEDURE — 99215 OFFICE O/P EST HI 40 MIN: CPT | Performed by: INTERNAL MEDICINE

## 2018-05-17 RX ORDER — FERROUS SULFATE TAB EC 324 MG (65 MG FE EQUIVALENT) 324 (65 FE) MG
324 TABLET DELAYED RESPONSE ORAL
Qty: 60 TABLET | Refills: 5 | Status: SHIPPED | OUTPATIENT
Start: 2018-05-17 | End: 2018-11-14

## 2018-05-17 RX ORDER — MESALAMINE 4 G/60ML
4 ENEMA RECTAL
Qty: 90 ML | Refills: 3 | Status: SHIPPED | OUTPATIENT
Start: 2018-05-17 | End: 2018-11-14

## 2018-05-17 RX ORDER — MESALAMINE 400 MG/1
1200 CAPSULE, DELAYED RELEASE ORAL 4 TIMES DAILY
Qty: 360 CAPSULE | Refills: 3 | Status: SHIPPED | OUTPATIENT
Start: 2018-05-17 | End: 2018-11-16 | Stop reason: SDUPTHER

## 2018-05-17 RX ORDER — PREDNISONE 1 MG/1
5 TABLET ORAL DAILY
Qty: 300 TABLET | Refills: 3 | Status: SHIPPED | OUTPATIENT
Start: 2018-05-17 | End: 2018-05-22 | Stop reason: SDUPTHER

## 2018-05-17 RX ORDER — DICYCLOMINE HCL 20 MG
20 TABLET ORAL EVERY 6 HOURS
Qty: 120 TABLET | Refills: 4 | Status: SHIPPED | OUTPATIENT
Start: 2018-05-17 | End: 2018-11-14

## 2018-05-17 RX ORDER — MESALAMINE 400 MG/1
1200 CAPSULE, DELAYED RELEASE ORAL 4 TIMES DAILY
Qty: 270 CAPSULE | Refills: 3 | Status: SHIPPED | OUTPATIENT
Start: 2018-05-17 | End: 2018-07-13 | Stop reason: SDUPTHER

## 2018-05-17 NOTE — LETTER
May 22, 2018     Arpita Ziegler MD  [address] [place] [address]    Patient: Gordon Trinidad   YOB: 1960   Date of Visit: 5/17/2018       Dear Dr Kg Soler: Thank you for referring Gordon Trinidad to me for evaluation  Below are my notes for this consultation  If you have questions, please do not hesitate to call me  I look forward to following your patient along with you  Sincerely,        Bunny Mcwilliams DO        CC: No Recipients  Bunny Mcwilliams DO  5/22/2018  5:18 PM  Sign at close encounter  [facility] - Outpatient Follow-up Note  Gordon Trinidad 61 y o  female MRN: [id number]  Encounter: [id number]    ASSESSMENT AND PLAN:    The patient is a 61year old female here for a 1 month follow-up regarding her ulcerative colitis  1  Ulcerative colitis   - continue taking delzicol 1200 mg 4 times daily as it seems to be controlling her colitis  I discussed with the patient that she may need to be on mesalamine agents for a long time in order to keep her colitis under control    - she had concerns about the long-term effects of the medication, and I explained that it is very safe  - I will refill the dicyclomine for abdominal cramping  - continue 40 mg of prednisone for 1 more week  - after 1 week, lower dose to 25 mg for a week  If her symptoms worsen, go back to taking 40 mg, otherwise, lower dose to 20 for 1 week, 15x1 week, 10x1 week, 5x1 week, then discontinue  - I will prescribe rowasa enema at bedtime to alleviate symptoms associated with the diarrhea  - I gave her educational information about ulcerative colitis and low-residue diet  - take 50,000 units of vitamin D a week for 1 month  2  Iron deficiency anemia due to chronic blood loss  - she is scheduled for IV iron infusions   - continue taking iron tablets twice daily     3  Elevated C-reactive protein   - I will continue to monitor her C-reactive protein levels  She will follow up in 1 month to continue monitoring her symptoms and her labs  ______________________________________________________________________    SUBJECTIVE:      Malvin Emery is a 61 y o  female here for 1 month follow-up regarding her recent diagnosis of chronic ulcerative colitis  She had a colonoscopy done in April which showed significant inflammation of the colon  Labs showed that her c-reactive protein is elevated  She was started on prednisone and delzicol, and she seems to be doing well on them  She had a few episodes of morning diarrhea, which resolved with Imodium  She is feeling well today, she has been having slightly formed stools in the morning  She was put on blood thinners after having coronary artery bypass graft procedure, which she thinks were the cause for her bleeding previously  She denies bloody diarrhea, but is still experiencing LLQ pain  She denies change in stool caliber, melena, hematochezia, change in appetite, nausea, vomiting, GERD, dysphagia, odynophagia and unintentional weight loss  She is a nonsmoker, and reports that she has about 4 glasses of wine a week  She reports that she has been under stress due to planning her daughter's wedding  REVIEW OF SYSTEMS IS OTHERWISE NEGATIVE  Historical Information   Past Medical History:   Diagnosis Date    Allergic rhinitis     Breast CA ([address] )     R breast CA-chemo/radiaition    Diarrhea     for 6 weeks-colonosocpy today 9/26/2016 and EGD    Hypertension     Thalassemia     Vitamin D deficiency      Past Surgical History:   Procedure Laterality Date    BREAST LUMPECTOMY Right     BREAST LUMPECTOMY Right     BREAST LUMPECTOMY Right     with lymph nodes  2007    COLONOSCOPY      COLONOSCOPY N/A 4/23/2018    Procedure: COLONOSCOPY;  Surgeon: Alexa Roth MD;  [facility];   Service: Gastroenterology    CORONARY ARTERY BYPASS GRAFT      CORONARY ARTERY BYPASS GRAFT      x3, 11/2015    HYSTERECTOMY      b/l ovaries removed    [place] COLONOSCOPY FLX DX W/COLLJ SPEC WHEN PFRMD N/A 9/26/2016    Procedure: COLONOSCOPY;  Surgeon: Lavinia Moreno DO;  [facility]; Service: Gastroenterology     Social History   History   Alcohol Use    2 4 oz/week    4 Glasses of wine per week     Comment: per year     History   Drug Use No     History   Smoking Status    Never Smoker   Smokeless Tobacco    Never Used     History reviewed  No pertinent family history  Meds/Allergies       Current Outpatient Prescriptions:     aspirin 81 MG tablet    ATORVASTATIN CALCIUM PO    cholestyramine (QUESTRAN) 4 g packet    dicyclomine (BENTYL) 10 mg capsule    ferrous sulfate 325 (65 Fe) mg tablet    mesalamine (DELZICOL) 400 mg    metoprolol tartrate (LOPRESSOR) 25 mg tablet    pantoprazole (PROTONIX) 40 mg tablet    predniSONE 5 mg tablet    dicyclomine (BENTYL) 20 mg tablet    ferrous sulfate 324 (65 Fe) mg    mesalamine (DELZICOL) 400 mg    mesalamine (ROWASA) 4 g    ondansetron (ZOFRAN) 8 mg tablet    predniSONE 5 mg tablet    Allergies   Allergen Reactions    Iodinated Diagnostic Agents Anaphylaxis           Objective     Blood pressure 120/74, pulse 73, temperature 98 6 °F (37 °C), temperature source Oral, height 5' 2 01" (1 575 m), weight 58 1 kg (128 lb), not currently breastfeeding  Body mass index is 23 41 kg/m²  PHYSICAL EXAM:      General Appearance:   Alert, cooperative, no distress   HEENT:   Normocephalic, atraumatic, anicteric      Neck:  Supple, symmetrical, trachea midline   Lungs:   Clear to auscultation bilaterally; no rales, rhonchi or wheezing; respirations unlabored    Heart[de-identified]   Regular rate and rhythm; no murmur, rub, or gallop     Abdomen:   Soft, non-tender, non-distended; normal bowel sounds; no masses, no organomegaly    Genitalia:   Deferred    Rectal:   Deferred    Extremities:  No cyanosis, clubbing or edema    Pulses:  2+ and symmetric    Skin:  No jaundice, rashes, or lesions    Lymph nodes:  No palpable cervical lymphadenopathy            Radiology Results:   Ct Abdomen Pelvis Wo Contrast  Result Date: 4/19/2018  Impression: Questionable mild wall thickening of the descending colon with minimal pericolonic inflammatory stranding suspicious for an infectious or inflammatory colitis  No free air or free fluid  Attestation:   By signing my name below, Jocelyn Dos Santos, attest that this documentation has been prepared under the direction and in the presence of [employer], DO  Electronically Signed: Jacqueline Martel Res  05/17/2018  Dalia Portillo, personally performed the services described in this documentation  All medical record entries made by the scribe were at my direction and in my presence  I have reviewed the chart and discharge instructions and agree that the record reflects my personal performance and is accurate and complete  [employer], DO  05/17/2018

## 2018-05-18 RX ORDER — SODIUM CHLORIDE 9 MG/ML
20 INJECTION, SOLUTION INTRAVENOUS CONTINUOUS
Status: DISCONTINUED | OUTPATIENT
Start: 2018-05-21 | End: 2018-05-24 | Stop reason: HOSPADM

## 2018-05-21 ENCOUNTER — TELEPHONE (OUTPATIENT)
Dept: GASTROENTEROLOGY | Facility: CLINIC | Age: 58
End: 2018-05-21

## 2018-05-21 ENCOUNTER — HOSPITAL ENCOUNTER (OUTPATIENT)
Dept: INFUSION CENTER | Facility: CLINIC | Age: 58
Discharge: HOME | End: 2018-05-21
Payer: COMMERCIAL

## 2018-05-21 VITALS
HEIGHT: 62 IN | DIASTOLIC BLOOD PRESSURE: 58 MMHG | BODY MASS INDEX: 22.73 KG/M2 | OXYGEN SATURATION: 98 % | RESPIRATION RATE: 18 BRPM | SYSTOLIC BLOOD PRESSURE: 90 MMHG | HEART RATE: 65 BPM | TEMPERATURE: 98.2 F | WEIGHT: 123.5 LBS

## 2018-05-21 DIAGNOSIS — K52.3 INDETERMINATE COLITIS: ICD-10-CM

## 2018-05-21 DIAGNOSIS — K52.9 COLITIS: ICD-10-CM

## 2018-05-21 DIAGNOSIS — R10.9 ABDOMINAL PAIN, UNSPECIFIED ABDOMINAL LOCATION: Primary | ICD-10-CM

## 2018-05-21 PROCEDURE — 96365 THER/PROPH/DIAG IV INF INIT: CPT

## 2018-05-21 RX ORDER — PANTOPRAZOLE SODIUM 40 MG/1
40 TABLET, DELAYED RELEASE ORAL
Qty: 60 TABLET | Refills: 0 | Status: CANCELLED | OUTPATIENT
Start: 2018-05-21

## 2018-05-21 RX ORDER — PREDNISONE 1 MG/1
5 TABLET ORAL DAILY
Qty: 300 TABLET | Refills: 0 | Status: CANCELLED | OUTPATIENT
Start: 2018-05-21

## 2018-05-21 RX ORDER — PREDNISONE 1 MG/1
TABLET ORAL
Refills: 0 | Status: CANCELLED
Start: 2018-05-21

## 2018-05-21 RX ADMIN — SODIUM CHLORIDE 20 ML/HR: 0.9 INJECTION, SOLUTION INTRAVENOUS at 14:29

## 2018-05-21 RX ADMIN — FERUMOXYTOL 510 MG: 510 INJECTION INTRAVENOUS at 14:36

## 2018-05-21 NOTE — PROGRESS NOTES
Pt to clinic for feraheme infusion, pt offers no complaints at this time, tolerated infusion without complications

## 2018-05-21 NOTE — PROGRESS NOTES
Ferritin WNL however this may not accurately reflect true deficiency  Patient should continue with iron infusion and follow up as previously outlined

## 2018-05-21 NOTE — PLAN OF CARE
Knowledge Deficit     Patient/family/caregiver demonstrates understanding of disease process, treatment plan, medications, and discharge instructions Progressing        Nutrition/Hydration-ADULT     Nutrient/Hydration intake appropriate for improving, restoring or maintaining nutritional needs Progressing        Potential for Falls     Patient will remain free of falls Progressing

## 2018-05-22 DIAGNOSIS — K52.3 INDETERMINATE COLITIS: ICD-10-CM

## 2018-05-22 DIAGNOSIS — K52.9 COLITIS: Primary | ICD-10-CM

## 2018-05-22 RX ORDER — PREDNISONE 1 MG/1
TABLET ORAL
Qty: 220 TABLET | Refills: 0 | Status: SHIPPED | OUTPATIENT
Start: 2018-05-22 | End: 2018-05-25 | Stop reason: SDUPTHER

## 2018-05-22 RX ORDER — PANTOPRAZOLE SODIUM 40 MG/1
40 TABLET, DELAYED RELEASE ORAL DAILY
Qty: 30 TABLET | Refills: 2 | Status: SHIPPED | OUTPATIENT
Start: 2018-05-22 | End: 2018-05-25 | Stop reason: SDUPTHER

## 2018-05-22 RX ORDER — PREDNISONE 1 MG/1
5 TABLET ORAL DAILY
Qty: 300 TABLET | Refills: 0 | Status: SHIPPED | OUTPATIENT
Start: 2018-05-22 | End: 2018-05-22 | Stop reason: SDUPTHER

## 2018-05-25 DIAGNOSIS — R10.9 ABDOMINAL PAIN, UNSPECIFIED ABDOMINAL LOCATION: ICD-10-CM

## 2018-05-25 DIAGNOSIS — K52.3 INDETERMINATE COLITIS: ICD-10-CM

## 2018-05-25 DIAGNOSIS — K52.9 COLITIS: ICD-10-CM

## 2018-05-25 RX ORDER — PANTOPRAZOLE SODIUM 40 MG/1
40 TABLET, DELAYED RELEASE ORAL DAILY
Qty: 30 TABLET | Refills: 0 | Status: SHIPPED | OUTPATIENT
Start: 2018-05-25 | End: 2018-08-22 | Stop reason: SDUPTHER

## 2018-05-25 RX ORDER — PREDNISONE 1 MG/1
TABLET ORAL
Qty: 220 TABLET | Refills: 0 | Status: SHIPPED | OUTPATIENT
Start: 2018-05-25 | End: 2018-07-13 | Stop reason: HOSPADM

## 2018-05-25 NOTE — TELEPHONE ENCOUNTER
Pt called stating the mediation was sent to wrong pharmacy  Can you please resend meds for Pantoprazole and the prednisone to the pharmacy that is now on file   Pt can be reached at [phone]

## 2018-05-30 ENCOUNTER — TELEPHONE (OUTPATIENT)
Dept: GASTROENTEROLOGY | Facility: CLINIC | Age: 58
End: 2018-05-30

## 2018-05-30 NOTE — TELEPHONE ENCOUNTER
ADRIÁN PETERS spoke to the patient today & she is stating that she noticed swelling & pain in her left leg yesterday  She says that it is difficult to walk on  She is also complaining of some rectal pain  She is unsure if she has hemmorrhoids  She is not having n/v/d/c, fever or bleeding of any type  She is currently using Delzicol & Rowasa at [facility] & her UC symptoms seem to be controlled as she is no longer having diarrhea  Stool is more formed & regular  I suggested that the patient goes to the ED since these symptoms that she has in her leg are a concern for a possible clot  Patient stated she does not want to go to the ED & that she is on her way to her PCP for a scheduled OV  I advised her to address these symptoms when she gets there  She verbalized understanding    F/U scheduled for 6/19/18

## 2018-05-30 NOTE — TELEPHONE ENCOUNTER
DR Baxter Senior PT    Pt called in to advise that she has been having pain and swelling in her left leg that's causing her difficulty to walk and she also has pain in her "bottom, colon area"   Please call her back to discuss [phone]

## 2018-06-01 NOTE — TELEPHONE ENCOUNTER
I called pt back  Her leg pain is improved  She saw her PCP and it is improving  Her GI symptoms are much improved  Advised her to continue her prednisone taper  Call us with any further questions or concerns

## 2018-06-08 LAB
ALBUMIN SERPL-MCNC: 3.4 G/DL (ref 3.6–5.1)
ALBUMIN/GLOB SERPL: 1.1 (CALC) (ref 1–2.5)
ALP SERPL-CCNC: 88 U/L (ref 33–130)
ALT SERPL-CCNC: 20 U/L (ref 6–29)
AST SERPL-CCNC: 16 U/L (ref 10–35)
BILIRUB SERPL-MCNC: 0.4 MG/DL (ref 0.2–1.2)
BUN SERPL-MCNC: 23 MG/DL (ref 7–25)
BUN/CREAT SERPL: ABNORMAL (CALC) (ref 6–22)
CALCIUM SERPL-MCNC: 8.9 MG/DL (ref 8.6–10.4)
CHLORIDE SERPL-SCNC: 105 MMOL/L (ref 98–110)
CO2 SERPL-SCNC: 26 MMOL/L (ref 20–31)
CREAT SERPL-MCNC: 0.74 MG/DL (ref 0.5–1.05)
GLOBULIN SER CALC-MCNC: 3 G/DL (CALC) (ref 1.9–3.7)
GLUCOSE SERPL-MCNC: 79 MG/DL (ref 65–99)
POTASSIUM SERPL-SCNC: 3.6 MMOL/L (ref 3.5–5.3)
PROT SERPL-MCNC: 6.4 G/DL (ref 6.1–8.1)
SL AMB EGFR AFRICAN AMERICAN: 103 ML/MIN/1.73M2
SL AMB EGFR NON AFRICAN AMERICAN: 89 ML/MIN/1.73M2
SODIUM SERPL-SCNC: 141 MMOL/L (ref 135–146)

## 2018-06-13 ENCOUNTER — OFFICE VISIT (OUTPATIENT)
Dept: HEMATOLOGY ONCOLOGY | Facility: CLINIC | Age: 58
End: 2018-06-13
Payer: COMMERCIAL

## 2018-06-13 VITALS
HEIGHT: 62 IN | OXYGEN SATURATION: 96 % | HEART RATE: 85 BPM | RESPIRATION RATE: 18 BRPM | DIASTOLIC BLOOD PRESSURE: 78 MMHG | TEMPERATURE: 99.1 F | SYSTOLIC BLOOD PRESSURE: 116 MMHG

## 2018-06-13 DIAGNOSIS — D50.0 BLOOD LOSS ANEMIA: Primary | ICD-10-CM

## 2018-06-13 DIAGNOSIS — D56.3 BETA THALASSEMIA MINOR: ICD-10-CM

## 2018-06-13 PROBLEM — D64.9 ANEMIA: Status: RESOLVED | Noted: 2018-04-24 | Resolved: 2018-06-13

## 2018-06-13 PROCEDURE — 99213 OFFICE O/P EST LOW 20 MIN: CPT | Performed by: PHYSICIAN ASSISTANT

## 2018-06-13 NOTE — LETTER
June 13, 2018     Joaquin Parker MD  [address] [place] [address]    Patient: Jimena Lopez   YOB: 1960   Date of Visit: 6/13/2018       Dear Dr Ashley Waite: Thank you for referring Jimena Lopez to me for evaluation  Below are my notes for this consultation  If you have questions, please do not hesitate to call me  I look forward to following your patient along with you  Sincerely,        Tomasz Gama PA-C        CC: No Recipients  Tomasz Gama PA-C  6/13/2018  3:08 PM  Sign at close encounter  [address] [place] [address]  Hematology Ambulatory Follow-Up  Jimena Lopez, 1960, [id number]  6/13/2018    Assessment/Plan:    1  Iron deficiency anemia in the setting of beta thalassemia minor and recent blood loss anemia  Patient underwent infusion with 1 dose of IV iron, Feraheme  Patient tolerated treatment well without side effect  Patient notes a tremendous improvement in symptoms  Patient notes feeling well and follow-up studies demonstrate a hemoglobin of 10 0 grams/deciliter back to the patient's baseline  Iron saturation also showed improvement with a value of 22 percent, ferritin is pending  At this time no formal follow-up has been made with the patient  She will continue with oral supplementation at this time  Patient will follow up with an appointment if hemoglobin returns low or if inflammatory bowel condition causes blood-loss anemia  Patient voiced agreement to the above understands she can call our office at any time to make a follow-up appointment  Likewise if patient needs additional evaluation I will request blood work prior to her appointment  2   Beta thalassemia minor  Patient's baseline hemoglobin is in the 10 gram/deciliter range and present hemoglobin is 10 0g/dL      Patient knows to call the Hematology/Oncology office with any questions and concerns regarding the above  Carefully review your medication list in verify the list is accurate and up-to-date  Please call the hematologic/oncology office if there medications missing from the less, medications on the list that your not currently taking or if there is a dosage or instruction that is different from higher taking medication  Total time of being counter was 15 minutes  The patient's current treatment goals are met: iron repletion completed  No significant barrier to care were identified  The patient is able to self care     -------------------------------------------------------------------------------------------------------    Chief complaint:   Chief Complaint   Patient presents with    Follow-up     History of present illness: This is a 79-year-old female with past medical history significant for  Right-sided breast cancer status post lumpectomy followed by radiation and chemotherapy approximately 10 years ago,  Hypertension, hyperlipidemia,  Beta thalassemia minor,  Who recently was admitted to [facility] for evaluation of chronic diarrhea  Patient was found to have inflammatory bowel disease and was started on a regimen  During her hospitalization however, the patient was placed on Lovenox in addition to her anti-platelet medications  Patient continued to have diarrhea  Which transition to blood at this time  Patient notes the perirectally she would have some bleeding but it was relatively small amounts and more attributable to generalized irritation as opposed to diffuse bleeding  During hospitalization however patient had more significant blood loss with the diarrhea was mostly blood      Patient's hemoglobin during hospitalization dropped approximately 3 g     Prior records indicate that the patient refused additional treatment such as blood transfusion or iron while hospitalized        Since discharge, the patient has been feeling more fatigued, going to bed early but a waking frequently and not getting solid   Night sleep  Patient notes that she prefer see cold things  Patient denies shortness of breath chest pain lower extremity cramping  Patient is presently out of work secondary to fatigue      Repeat labs completed on 5/12/18 demonstrates a white blood cell count of 11 4, hemoglobin = 8 7, hematocrit = 30 6, MCV = 69 1, RDW 23 3, platelet count 388, differential includes elevated absolute neutrophils and elevated nucleated red blood cells  CBC morphology demonstrates microcytosis 2+ anisocytosis 2+, polychromasia 2+, hypochromasia 2+ ovalocytes 1+ target cells  Interval history:  Patient underwent iron supplementation and now presents for follow-up  Patient states that she is getting a solid diet sleep and fatigue is rare  Patient notes she is still on steroids for inflammatory bowel condition however she will be weaned off completely in approximately 2 weeks  Review of Systems   Constitutional: Positive for fatigue (improved)  Negative for activity change, appetite change, chills, fever and unexpected weight change (Due to dietary changes patient has experienced approximately 12 lb of weight loss)  HENT: Negative for hearing loss, mouth sores, nosebleeds, sore throat, trouble swallowing and voice change  Eyes: Negative for visual disturbance  Respiratory: Negative for cough, choking and shortness of breath  Negative hemoptysis  Cardiovascular: Negative for chest pain, palpitations and leg swelling  Gastrointestinal: Negative for abdominal distention, abdominal pain, anal bleeding, blood in stool (none since dc from hospital), constipation, diarrhea, nausea and vomiting  Endocrine: Negative  Negative for cold intolerance  Genitourinary: Negative    Negative for decreased urine volume, dysuria, hematuria, menstrual problem ( patient had hysterectomy), vaginal bleeding, vaginal discharge and vaginal pain    Musculoskeletal: Negative  Negative for arthralgias, myalgias, neck pain and neck stiffness  Skin: Negative  Negative for color change, pallor and rash  Allergic/Immunologic: Negative  Negative for immunocompromised state  Neurological: Negative  Negative for dizziness, weakness, numbness and headaches  Hematological: Negative for adenopathy  Does not bruise/bleed easily  Psychiatric/Behavioral: Negative for dysphoric mood and sleep disturbance (resolved)  All other systems reviewed and are negative  Patient Active Problem List   Diagnosis    Colitis    Periumbilic abdominal tenderness    Rectal discomfort    CRP elevated    Bilateral radiating leg pain    Coronary artery disease    Essential hypertension    Rheumatoid arthritis (HCC)    Elevated d-dimer    Abdominal pain    Abnormal TSH    Anemia     Past Medical History:   Diagnosis Date    Allergic rhinitis     Breast CA ([address] )     R breast CA-chemo/radiaition    Diarrhea     for 6 weeks-colonosocpy today 2016 and EGD    Hypertension     Thalassemia     Vitamin D deficiency      Past Surgical History:   Procedure Laterality Date    BREAST LUMPECTOMY Right     BREAST LUMPECTOMY Right     BREAST LUMPECTOMY Right     with lymph nodes      COLONOSCOPY      COLONOSCOPY N/A 2018    Procedure: COLONOSCOPY;  Surgeon: Sourav Henry MD;  [facility]; Service: Gastroenterology    CORONARY ARTERY BYPASS GRAFT      CORONARY ARTERY BYPASS GRAFT      x3, 2015    HYSTERECTOMY      b/l ovaries removed    [place] COLONOSCOPY FLX DX W/COLLJ SPEC WHEN PFRMD N/A 2016    Procedure: COLONOSCOPY;  Surgeon: Louretta Apgar, DO;  [facility]; Service: Gastroenterology     No family history on file      Social History     Social History    Marital status: /Civil Union     Spouse name: N/A    Number of children: N/A    Years of education: N/A     Social History Main Topics    Smoking status: Never Smoker    Smokeless tobacco: Never Used    Alcohol use 2 4 oz/week     4 Glasses of wine per week      Comment: per year    Drug use: No    Sexual activity: Not on file     Other Topics Concern    Not on file     Social History Narrative    No narrative on file       Current Outpatient Prescriptions:     aspirin 81 MG tablet, Take 81 mg by mouth daily  , Disp: , Rfl:     ATORVASTATIN CALCIUM PO, Take 20 mg by mouth daily  , Disp: , Rfl:     Cholecalciferol 82265 units TABS, Take 1 tablet (50,000 Units total) by mouth once a week, Disp: 12 tablet, Rfl: 0    cholestyramine (QUESTRAN) 4 g packet, Take 1 packet (4 g total) by mouth 3 (three) times a day with meals, Disp: 60 each, Rfl: 0    dicyclomine (BENTYL) 20 mg tablet, Take 1 tablet (20 mg total) by mouth every 6 (six) hours, Disp: 120 tablet, Rfl: 4    ferrous sulfate 324 (65 Fe) mg, Take 1 tablet (324 mg total) by mouth 2 (two) times a day before meals, Disp: 60 tablet, Rfl: 5    ferrous sulfate 325 (65 Fe) mg tablet, Take 1 tablet (325 mg total) by mouth 2 (two) times a day, Disp: 60 tablet, Rfl: 0    mesalamine (DELZICOL) 400 mg, Take 3 capsules (1,200 mg total) by mouth 4 (four) times a day, Disp: 360 capsule, Rfl: 3    mesalamine (DELZICOL) 400 mg, Take 3 capsules (1,200 mg total) by mouth 4 (four) times a day, Disp: 270 capsule, Rfl: 3    mesalamine (ROWASA) 4 g, Insert 60 mL (4 g total) into the rectum daily at bedtime, Disp: 90 mL, Rfl: 3    metoprolol tartrate (LOPRESSOR) 25 mg tablet, Take 25 mg by mouth 2 (two) times a day , Disp: , Rfl:     pantoprazole (PROTONIX) 40 mg tablet, Take 1 tablet (40 mg total) by mouth daily, Disp: 30 tablet, Rfl: 0    predniSONE 5 mg tablet, Take 10 tabs for a week, then taper by 5 mg every week, Disp: , Rfl:     predniSONE 5 mg tablet, 40mg x1wk,[id number], [id number], con't taper to 5dtv6lt, Disp: 220 tablet, Rfl: 0    dicyclomine (BENTYL) 10 mg capsule, Take 1 capsule (10 mg total) by mouth 3 (three) times a day as needed (spasms) for up to 30 days, Disp: 90 capsule, Rfl: 0    ondansetron (ZOFRAN) 8 mg tablet, Take 1 tablet (8 mg total) by mouth every 8 (eight) hours as needed for nausea or vomiting for up to 3 days, Disp: 9 tablet, Rfl: 0    Allergies   Allergen Reactions    Iodinated Diagnostic Agents Anaphylaxis       Objective:  /78 (BP Location: Left arm, Patient Position: Sitting, Cuff Size: Adult)   Pulse 85   Temp 99 1 °F (37 3 °C)   Resp 18   Ht 5' 1 5" (1 562 m)   LMP  (LMP Unknown)   SpO2 96%      Wt Readings from Last 3 Encounters:   05/21/18 56 kg (123 lb 8 oz)   05/17/18 58 1 kg (128 lb)   05/16/18 55 8 kg (123 lb)     Physical Exam   Constitutional: She is oriented to person, place, and time  She appears well-developed and well-nourished  No distress  HENT:   Head: Normocephalic and atraumatic  Eyes: Pupils are equal, round, and reactive to light  No scleral icterus  Cardiovascular: Normal rate and regular rhythm  No murmur heard  Pulmonary/Chest: Effort normal  No respiratory distress  Musculoskeletal: She exhibits no edema  Neurological: She is alert and oriented to person, place, and time  Skin: Skin is warm  No rash noted  No pallor  Psychiatric: She has a normal mood and affect  Thought content normal      Result Review  Labs:  Lab Results   Component Value Date    WBC 8 98 04/25/2018    HGB 10 0 (L) 06/08/2018    HCT 35 3 06/08/2018    MCV 69 1 (L) 06/08/2018     06/08/2018     Please note: This report has been generated by a voice recognition software system  Therefore there may be syntax, spelling, and/or grammatical errors  Please call if you have any questions

## 2018-06-13 NOTE — PROGRESS NOTES
[address] [address]  Hematology Ambulatory Follow-Up  Molly, 1960, [id number]  6/13/2018    Assessment/Plan:    1  Iron deficiency anemia in the setting of beta thalassemia minor and recent blood loss anemia  Patient underwent infusion with 1 dose of IV iron, Feraheme  Patient tolerated treatment well without side effect  Patient notes a tremendous improvement in symptoms  Patient notes feeling well and follow-up studies demonstrate a hemoglobin of 10 0 grams/deciliter back to the patient's baseline  Iron saturation also showed improvement with a value of 22 percent, ferritin is pending  At this time no formal follow-up has been made with the patient  She will continue with oral supplementation at this time  Patient will follow up with an appointment if hemoglobin returns low or if inflammatory bowel condition causes blood-loss anemia  Patient voiced agreement to the above understands she can call our office at any time to make a follow-up appointment  Likewise if patient needs additional evaluation I will request blood work prior to her appointment  2   Beta thalassemia minor  Patient's baseline hemoglobin is in the 10 gram/deciliter range and present hemoglobin is 10 0g/dL  Patient knows to call the Hematology/Oncology office with any questions and concerns regarding the above  Carefully review your medication list in verify the list is accurate and up-to-date  Please call the hematologic/oncology office if there medications missing from the less, medications on the list that your not currently taking or if there is a dosage or instruction that is different from higher taking medication  Total time of being counter was 15 minutes  The patient's current treatment goals are met: iron repletion completed  No significant barrier to care were identified      The patient is able to self care     -------------------------------------------------------------------------------------------------------    Chief complaint:   Chief Complaint   Patient presents with    Follow-up     History of present illness: This is a 50-year-old female with past medical history significant for  Right-sided breast cancer status post lumpectomy followed by radiation and chemotherapy approximately 10 years ago,  Hypertension, hyperlipidemia,  Beta thalassemia minor,  Who recently was admitted to [facility] for evaluation of chronic diarrhea  Patient was found to have inflammatory bowel disease and was started on a regimen  During her hospitalization however, the patient was placed on Lovenox in addition to her anti-platelet medications  Patient continued to have diarrhea  Which transition to blood at this time  Patient notes the perirectally she would have some bleeding but it was relatively small amounts and more attributable to generalized irritation as opposed to diffuse bleeding  During hospitalization however patient had more significant blood loss with the diarrhea was mostly blood  Patient's hemoglobin during hospitalization dropped approximately 3 g     Prior records indicate that the patient refused additional treatment such as blood transfusion or iron while hospitalized        Since discharge, the patient has been feeling more fatigued, going to bed early but a waking frequently and not getting solid   Night sleep  Patient notes that she prefer see cold things  Patient denies shortness of breath chest pain lower extremity cramping  Patient is presently out of work secondary to fatigue      Repeat labs completed on 5/12/18 demonstrates a white blood cell count of 11 4, hemoglobin = 8 7, hematocrit = 30 6, MCV = 69 1, RDW 23 3, platelet count 179, differential includes elevated absolute neutrophils and elevated nucleated red blood cells    CBC morphology demonstrates microcytosis 2+ anisocytosis 2+, polychromasia 2+, hypochromasia 2+ ovalocytes 1+ target cells  Interval history:  Patient underwent iron supplementation and now presents for follow-up  Patient states that she is getting a solid diet sleep and fatigue is rare  Patient notes she is still on steroids for inflammatory bowel condition however she will be weaned off completely in approximately 2 weeks  Review of Systems   Constitutional: Positive for fatigue (improved)  Negative for activity change, appetite change, chills, fever and unexpected weight change (Due to dietary changes patient has experienced approximately 12 lb of weight loss)  HENT: Negative for hearing loss, mouth sores, nosebleeds, sore throat, trouble swallowing and voice change  Eyes: Negative for visual disturbance  Respiratory: Negative for cough, choking and shortness of breath  Negative hemoptysis  Cardiovascular: Negative for chest pain, palpitations and leg swelling  Gastrointestinal: Negative for abdominal distention, abdominal pain, anal bleeding, blood in stool (none since dc from hospital), constipation, diarrhea, nausea and vomiting  Endocrine: Negative  Negative for cold intolerance  Genitourinary: Negative  Negative for decreased urine volume, dysuria, hematuria, menstrual problem ( patient had hysterectomy), vaginal bleeding, vaginal discharge and vaginal pain  Musculoskeletal: Negative  Negative for arthralgias, myalgias, neck pain and neck stiffness  Skin: Negative  Negative for color change, pallor and rash  Allergic/Immunologic: Negative  Negative for immunocompromised state  Neurological: Negative  Negative for dizziness, weakness, numbness and headaches  Hematological: Negative for adenopathy  Does not bruise/bleed easily  Psychiatric/Behavioral: Negative for dysphoric mood and sleep disturbance (resolved)  All other systems reviewed and are negative      Patient Active Problem List Diagnosis    Colitis    Periumbilic abdominal tenderness    Rectal discomfort    CRP elevated    Bilateral radiating leg pain    Coronary artery disease    Essential hypertension    Rheumatoid arthritis (HCC)    Elevated d-dimer    Abdominal pain    Abnormal TSH    Anemia     Past Medical History:   Diagnosis Date    Allergic rhinitis     Breast CA ([address] )     R breast CA-chemo/radiaition    Diarrhea     for 6 weeks-colonosocpy today 9/26/2016 and EGD    Hypertension     Thalassemia     Vitamin D deficiency      Past Surgical History:   Procedure Laterality Date    BREAST LUMPECTOMY Right     BREAST LUMPECTOMY Right     BREAST LUMPECTOMY Right     with lymph nodes  2007    COLONOSCOPY      COLONOSCOPY N/A 4/23/2018    Procedure: COLONOSCOPY;  Surgeon: Eliot Reece MD;  [facility]; Service: Gastroenterology    CORONARY ARTERY BYPASS GRAFT      CORONARY ARTERY BYPASS GRAFT      x3, 11/2015    HYSTERECTOMY      b/l ovaries removed    [place] COLONOSCOPY FLX DX W/COLLJ SPEC WHEN PFRMD N/A 9/26/2016    Procedure: COLONOSCOPY;  Surgeon: Lona Kaye DO;  [facility]; Service: Gastroenterology     No family history on file  Social History     Social History    Marital status: /Civil Union     Spouse name: N/A    Number of children: N/A    Years of education: N/A     Social History Main Topics    Smoking status: Never Smoker    Smokeless tobacco: Never Used    Alcohol use 2 4 oz/week     4 Glasses of wine per week      Comment: per year    Drug use: No    Sexual activity: Not on file     Other Topics Concern    Not on file     Social History Narrative    No narrative on file       Current Outpatient Prescriptions:     aspirin 81 MG tablet, Take 81 mg by mouth daily  , Disp: , Rfl:     ATORVASTATIN CALCIUM PO, Take 20 mg by mouth daily  , Disp: , Rfl:     Cholecalciferol 48639 units TABS, Take 1 tablet (50,000 Units total) by mouth once a week, Disp: 12 tablet, Rfl: 0    cholestyramine (QUESTRAN) 4 g packet, Take 1 packet (4 g total) by mouth 3 (three) times a day with meals, Disp: 60 each, Rfl: 0    dicyclomine (BENTYL) 20 mg tablet, Take 1 tablet (20 mg total) by mouth every 6 (six) hours, Disp: 120 tablet, Rfl: 4    ferrous sulfate 324 (65 Fe) mg, Take 1 tablet (324 mg total) by mouth 2 (two) times a day before meals, Disp: 60 tablet, Rfl: 5    ferrous sulfate 325 (65 Fe) mg tablet, Take 1 tablet (325 mg total) by mouth 2 (two) times a day, Disp: 60 tablet, Rfl: 0    mesalamine (DELZICOL) 400 mg, Take 3 capsules (1,200 mg total) by mouth 4 (four) times a day, Disp: 360 capsule, Rfl: 3    mesalamine (DELZICOL) 400 mg, Take 3 capsules (1,200 mg total) by mouth 4 (four) times a day, Disp: 270 capsule, Rfl: 3    mesalamine (ROWASA) 4 g, Insert 60 mL (4 g total) into the rectum daily at bedtime, Disp: 90 mL, Rfl: 3    metoprolol tartrate (LOPRESSOR) 25 mg tablet, Take 25 mg by mouth 2 (two) times a day , Disp: , Rfl:     pantoprazole (PROTONIX) 40 mg tablet, Take 1 tablet (40 mg total) by mouth daily, Disp: 30 tablet, Rfl: 0    predniSONE 5 mg tablet, Take 10 tabs for a week, then taper by 5 mg every week, Disp: , Rfl:     predniSONE 5 mg tablet, 40mg x1wk,[id number], [id number], con't taper to [id number], Disp: 220 tablet, Rfl: 0    dicyclomine (BENTYL) 10 mg capsule, Take 1 capsule (10 mg total) by mouth 3 (three) times a day as needed (spasms) for up to 30 days, Disp: 90 capsule, Rfl: 0    ondansetron (ZOFRAN) 8 mg tablet, Take 1 tablet (8 mg total) by mouth every 8 (eight) hours as needed for nausea or vomiting for up to 3 days, Disp: 9 tablet, Rfl: 0    Allergies   Allergen Reactions    Iodinated Diagnostic Agents Anaphylaxis       Objective:  /78 (BP Location: Left arm, Patient Position: Sitting, Cuff Size: Adult)   Pulse 85   Temp 99 1 °F (37 3 °C)   Resp 18   Ht 5' 1 5" (1 562 m)   LMP  (LMP Unknown)   SpO2 96%      Wt Readings from Last 3 Encounters:   05/21/18 56 kg (123 lb 8 oz)   05/17/18 58 1 kg (128 lb)   05/16/18 55 8 kg (123 lb)     Physical Exam   Constitutional: She is oriented to person, place, and time  She appears well-developed and well-nourished  No distress  HENT:   Head: Normocephalic and atraumatic  Eyes: Pupils are equal, round, and reactive to light  No scleral icterus  Cardiovascular: Normal rate and regular rhythm  No murmur heard  Pulmonary/Chest: Effort normal  No respiratory distress  Musculoskeletal: She exhibits no edema  Neurological: She is alert and oriented to person, place, and time  Skin: Skin is warm  No rash noted  No pallor  Psychiatric: She has a normal mood and affect  Thought content normal      Result Review  Labs:  Lab Results   Component Value Date    WBC 8 98 04/25/2018    HGB 10 0 (L) 06/08/2018    HCT 35 3 06/08/2018    MCV 69 1 (L) 06/08/2018     06/08/2018     Please note: This report has been generated by a voice recognition software system  Therefore there may be syntax, spelling, and/or grammatical errors  Please call if you have any questions

## 2018-06-15 LAB
BASOPHILS # BLD AUTO: 58 CELLS/UL (ref 0–200)
BASOPHILS NFR BLD AUTO: 0.5 %
CRP SERPL-MCNC: 15.9 MG/L
EOSINOPHIL # BLD AUTO: 207 CELLS/UL (ref 15–500)
EOSINOPHIL NFR BLD AUTO: 1.8 %
ERYTHROCYTE [DISTWIDTH] IN BLOOD BY AUTOMATED COUNT: 20.7 % (ref 11–15)
ERYTHROCYTE [SEDIMENTATION RATE] IN BLOOD BY WESTERGREN METHOD: 19 MM/H
FERRITIN SERPL-MCNC: 420 NG/ML (ref 10–232)
HCT VFR BLD AUTO: 35.3 % (ref 35–45)
HGB BLD-MCNC: 10 G/DL (ref 11.7–15.5)
IRON SATN MFR SERPL: 22 % (CALC) (ref 11–50)
IRON SERPL-MCNC: 58 MCG/DL (ref 45–160)
LYMPHOCYTES # BLD AUTO: 3991 CELLS/UL (ref 850–3900)
LYMPHOCYTES NFR BLD AUTO: 34.7 %
MCH RBC QN AUTO: 19.6 PG (ref 27–33)
MCHC RBC AUTO-ENTMCNC: 28.3 G/DL (ref 32–36)
MCV RBC AUTO: 69.1 FL (ref 80–100)
MONOCYTES # BLD AUTO: 610 CELLS/UL (ref 200–950)
MONOCYTES NFR BLD AUTO: 5.3 %
NEUTROPHILS # BLD AUTO: 6636 CELLS/UL (ref 1500–7800)
NEUTROPHILS NFR BLD AUTO: 57.7 %
PLATELET # BLD AUTO: 310 THOUSAND/UL (ref 140–400)
PMV BLD REES-ECKER: 10.7 FL (ref 7.5–12.5)
RBC # BLD AUTO: 5.11 MILLION/UL (ref 3.8–5.1)
REF LAB TEST NAME: NORMAL
REF LAB TEST: NORMAL
SL AMB CLIENT CONTACT: NORMAL
TIBC SERPL-MCNC: 260 MCG/DL (CALC) (ref 250–450)
WBC # BLD AUTO: 11.5 THOUSAND/UL (ref 3.8–10.8)

## 2018-06-18 ENCOUNTER — TELEPHONE (OUTPATIENT)
Dept: GASTROENTEROLOGY | Facility: CLINIC | Age: 58
End: 2018-06-18

## 2018-06-18 NOTE — TELEPHONE ENCOUNTER
Yes please call pt and tell her that is fine to stop her pred taper as previously scheduled but to continue the delzicol  She can also liberalize her diet  Please call her to tell her this and let me know if she needs anything else  I will see her at her follow up visit

## 2018-06-18 NOTE — TELEPHONE ENCOUNTER
Patient with hx of UC is currently on 1200 Delzicol QID & Prednisone taper  Her symptoms have been well managed on this current course of treatment and she currently is not having diarrhea  She is still maintaining a bland diet  She has been rescheduled for 7/13 and is finished her Prednisone taper around July 1st   She is wondering if it will be ok to stop Prednisone at end of taper, since there are about 2 weeks before her appt?  (she was under the impression that she should be on Prednisone until she sees Dr Dread Holley for f/u)  She is also wondering if she can start gradually introducing more of a regular diet, as she has been eating bland for quite a while now & has her daughters wedding coming up

## 2018-06-18 NOTE — TELEPHONE ENCOUNTER
Spoke with patient & reviewed medication and diet with her    She will finish her Prednisone and continue all other medications until her appt 7/13/18

## 2018-07-13 ENCOUNTER — OFFICE VISIT (OUTPATIENT)
Dept: GASTROENTEROLOGY | Facility: MEDICAL CENTER | Age: 58
End: 2018-07-13
Payer: COMMERCIAL

## 2018-07-13 VITALS
WEIGHT: 130 LBS | DIASTOLIC BLOOD PRESSURE: 60 MMHG | HEART RATE: 77 BPM | HEIGHT: 62 IN | BODY MASS INDEX: 23.92 KG/M2 | TEMPERATURE: 98.6 F | SYSTOLIC BLOOD PRESSURE: 118 MMHG

## 2018-07-13 DIAGNOSIS — K52.9 COLITIS: ICD-10-CM

## 2018-07-13 DIAGNOSIS — K51.011 ULCERATIVE PANCOLITIS WITH RECTAL BLEEDING (HCC): Primary | ICD-10-CM

## 2018-07-13 PROCEDURE — 99214 OFFICE O/P EST MOD 30 MIN: CPT | Performed by: INTERNAL MEDICINE

## 2018-07-13 RX ORDER — MESALAMINE 400 MG/1
1200 CAPSULE, DELAYED RELEASE ORAL 4 TIMES DAILY
Qty: 270 CAPSULE | Refills: 5 | Status: SHIPPED | OUTPATIENT
Start: 2018-07-13 | End: 2018-11-14

## 2018-07-13 NOTE — LETTER
July 13, 2018     Michelle Sorto MD  [address] [place] [address]    Patient: Pili Cheng   YOB: 1960   Date of Visit: 7/13/2018       Dear Dr Greg Banerjee: Thank you for referring Pili Cheng to me for evaluation  Below are my notes for this consultation  If you have questions, please do not hesitate to call me  I look forward to following your patient along with you           Sincerely,        Lisa Ansari,         CC: No Recipients

## 2018-07-13 NOTE — PROGRESS NOTES
[facility] - Outpatient Follow-up Note  Amita Bragg 61 y o  female MRN: [id number]  Encounter: [id number]          ASSESSMENT AND PLAN:    The patient is a 61year old female here for a 1 month follow-up regarding her ulcerative colitis  1  Ulcerative pancolitis with rectal bleeding ([address] )  - continue taking delzicol 1200 mg 4 times daily as it seems to be controlling her colitis  - continue using Rowasa enemas as needed   - I started her on low-residue diet 1 month ago  We discussed that she can begin reintroducing fiber and other foods as tolerable      2  Iron deficiency anemia due to chronic blood loss  - she notes tremendous improvement in symptoms following infusion with 1 dose of IV iron  Most recent CBC shows hemoglobin of 10 0, back to the patient's baseline  Iron saturation and ferritin are within normal limits     - continue with oral supplementation at this time        3  Elevated C-reactive protein   - labs from 6/8/2018 showed that her c-reactive protein remians elevated  - I will continue to monitor her C-reactive protein levels       She will follow up in 3 month to continue monitoring her symptoms and her labs  ______________________________________________________________________    SUBJECTIVE:      Amita Bragg is a 61 y o  female here for 1 month follow-up regarding her recent diagnosis of chronic ulcerative colitis  She has been on 1200 Delzicol QID and Rowasa enemas, and she seems to be doing well on them  She had a colonoscopy done in April which showed significant inflammation of the colon  Labs from 6/8/2018 showed that her c-reactive protein remians elevated  She had a few episodes of morning diarrhea  REVIEW OF SYSTEMS IS OTHERWISE NEGATIVE        Historical Information   Past Medical History:   Diagnosis Date    Allergic rhinitis     Breast CA ([address] )     R breast CA-chemo/radiaition    Diarrhea     for 6 weeks-colonosocpy today 9/26/2016 and EGD    Hypertension     Thalassemia     Vitamin D deficiency      Past Surgical History:   Procedure Laterality Date    BREAST LUMPECTOMY Right     BREAST LUMPECTOMY Right     BREAST LUMPECTOMY Right     with lymph nodes  2007    COLONOSCOPY      COLONOSCOPY N/A 4/23/2018    Procedure: COLONOSCOPY;  Surgeon: Dana Cooper MD;  [facility]; Service: Gastroenterology    CORONARY ARTERY BYPASS GRAFT      CORONARY ARTERY BYPASS GRAFT      x3, 11/2015    HYSTERECTOMY      b/l ovaries removed    [place] COLONOSCOPY FLX DX W/COLLJ SPEC WHEN PFRMD N/A 9/26/2016    Procedure: COLONOSCOPY;  Surgeon: John Belle DO;  [facility]; Service: Gastroenterology     Social History   History   Alcohol Use    2 4 oz/week    4 Glasses of wine per week     Comment: per year     History   Drug Use No     History   Smoking Status    Never Smoker   Smokeless Tobacco    Never Used     No family history on file  Meds/Allergies       Current Outpatient Prescriptions:     aspirin 81 MG tablet    ATORVASTATIN CALCIUM PO    Cholecalciferol 85886 units TABS    cholestyramine (QUESTRAN) 4 g packet    dicyclomine (BENTYL) 20 mg tablet    ferrous sulfate 324 (65 Fe) mg    ferrous sulfate 325 (65 Fe) mg tablet    mesalamine (DELZICOL) 400 mg    mesalamine (DELZICOL) 400 mg    mesalamine (ROWASA) 4 g    metoprolol tartrate (LOPRESSOR) 25 mg tablet    ondansetron (ZOFRAN) 8 mg tablet    pantoprazole (PROTONIX) 40 mg tablet    Allergies   Allergen Reactions    Iodinated Diagnostic Agents Anaphylaxis           Objective     Blood pressure 118/60, pulse 77, temperature 98 6 °F (37 °C), temperature source Tympanic, height 5' 1 5" (1 562 m), weight 59 kg (130 lb), not currently breastfeeding  Body mass index is 24 17 kg/m²        PHYSICAL EXAM:      General Appearance:   Alert, cooperative, no distress   HEENT:   Normocephalic, atraumatic, anicteric      Neck:  Supple, symmetrical, trachea midline   Lungs:   Clear to auscultation bilaterally; no rales, rhonchi or wheezing; respirations unlabored    Heart[de-identified]   Regular rate and rhythm; no murmur, rub, or gallop  Abdomen:   Soft, non-tender, non-distended; normal bowel sounds; no masses, no organomegaly    Genitalia:   Deferred    Rectal:   Deferred    Extremities:  No cyanosis, clubbing or edema    Pulses:  2+ and symmetric    Skin:  No jaundice, rashes, or lesions    Lymph nodes:  No palpable cervical lymphadenopathy        Lab Results:   No visits with results within 1 Day(s) from this visit     Latest known visit with results is:   Orders Only on 06/08/2018   Component Date Value    Iron, Serum 06/08/2018 58     Total Iron Binding [place]* 06/08/2018 260     Iron Saturation 06/08/2018 22     SL AMB SED RATE BY MODIF* 06/08/2018 19     SL AMB LAB WHITE BLOOD C* 06/08/2018 11 5*    SL AMB LAB RED BLOOD [place]* 06/08/2018 5 11*    Hemoglobin 06/08/2018 10 0*    Hematocrit 06/08/2018 35 3     MCV 06/08/2018 69 1*    MCH 06/08/2018 19 6*    MCHC 06/08/2018 28 3*    RDW 06/08/2018 20 7*    Platelet Count 52/27/9417 310     SL AMB MPV 06/08/2018 10 7     Neutrophils (Absolute) 06/08/2018 6636     Lymphocytes (Absolute) 06/08/2018 3991*    Monocytes (Absolute) 06/08/2018 610     Eosinophils (Absolute) 06/08/2018 207     Basophils (Absolute) 06/08/2018 58     Neutrophils 06/08/2018 57 7     Lymphocytes 06/08/2018 34 7     Monocytes 06/08/2018 5 3     Eosinophils 06/08/2018 1 8     Basophils 06/08/2018 0 5     RBC Morphology 06/08/2018      C-Reactive Protein, Quant 06/08/2018 15 9*    Test Name 06/08/2018  Miko Ashley Test Code 06/08/2018  [id number]     Client Contact 06/08/2018 ZIA CHAMPAGNE     Report Always Message Si* 06/08/2018      Always Message 06/08/2018      Ferritin 06/08/2018 420*         Attestation:   By signing my name below, Carlos Eduardo Asencio, attest that this documentation has been prepared under the direction and in the presence of [employer], DO  Electronically Signed: Kory Haji  07/13/2018  Stanley Bautista, personally performed the services described in this documentation  All medical record entries made by the kory were at my direction and in my presence  I have reviewed the chart and discharge instructions and agree that the record reflects my personal performance and is accurate and complete  [employer], DO  07/13/2018

## 2018-07-16 ENCOUNTER — TELEPHONE (OUTPATIENT)
Dept: GASTROENTEROLOGY | Facility: CLINIC | Age: 58
End: 2018-07-16

## 2018-07-16 NOTE — TELEPHONE ENCOUNTER
[facility] patient      Dr Oswaldo Sears called to have office notes from visit on 7-13-18 faxed to them at [phone]

## 2018-07-18 ENCOUNTER — TELEPHONE (OUTPATIENT)
Dept: CARDIOLOGY CLINIC | Facility: CLINIC | Age: 58
End: 2018-07-18

## 2018-07-18 NOTE — TELEPHONE ENCOUNTER
Pt has appt with you on 9/26 and would like to know if you want any blood work done prior to appt  Please advise

## 2018-07-18 NOTE — TELEPHONE ENCOUNTER
She is not due to have her cholesterol rechecked until 12/18, so she does not need any bloodwork at this time

## 2018-07-27 ENCOUNTER — TELEPHONE (OUTPATIENT)
Dept: GASTROENTEROLOGY | Facility: CLINIC | Age: 58
End: 2018-07-27

## 2018-08-14 ENCOUNTER — TELEPHONE (OUTPATIENT)
Dept: GASTROENTEROLOGY | Facility: AMBULARY SURGERY CENTER | Age: 58
End: 2018-08-14

## 2018-08-14 NOTE — TELEPHONE ENCOUNTER
Dr Steinberg's pt called stating that she will be traveling and she would like to be provided a letter listing the medications that were prescribed to her and stating that it is ok for her to travel with the medications  Pt is requesting to  the letter next week if possible   Please call pt to advise pt can be reached at [phone]

## 2018-08-22 ENCOUNTER — TELEPHONE (OUTPATIENT)
Dept: GASTROENTEROLOGY | Facility: CLINIC | Age: 58
End: 2018-08-22

## 2018-08-22 DIAGNOSIS — R10.9 ABDOMINAL PAIN, UNSPECIFIED ABDOMINAL LOCATION: ICD-10-CM

## 2018-08-22 DIAGNOSIS — K52.9 COLITIS: ICD-10-CM

## 2018-08-22 RX ORDER — PANTOPRAZOLE SODIUM 40 MG/1
40 TABLET, DELAYED RELEASE ORAL DAILY
Qty: 30 TABLET | Refills: 4 | Status: SHIPPED | OUTPATIENT
Start: 2018-08-22 | End: 2018-11-14

## 2018-08-22 NOTE — TELEPHONE ENCOUNTER
[facility] patient    She just took her last pantoprazole and wants to know if she is to continue taking them

## 2018-08-24 DIAGNOSIS — R10.9 ABDOMINAL PAIN, UNSPECIFIED ABDOMINAL LOCATION: ICD-10-CM

## 2018-08-24 DIAGNOSIS — K52.9 COLITIS: ICD-10-CM

## 2018-08-24 RX ORDER — PANTOPRAZOLE SODIUM 40 MG/1
TABLET, DELAYED RELEASE ORAL
Qty: 30 TABLET | Refills: 2 | Status: SHIPPED | OUTPATIENT
Start: 2018-08-24 | End: 2018-11-14 | Stop reason: ALTCHOICE

## 2018-09-25 PROBLEM — E78.5 DYSLIPIDEMIA: Status: ACTIVE | Noted: 2018-09-25

## 2018-10-18 ENCOUNTER — TRANSCRIBE ORDERS (OUTPATIENT)
Dept: ADMINISTRATIVE | Facility: HOSPITAL | Age: 58
End: 2018-10-18

## 2018-10-18 DIAGNOSIS — M85.80 OSTEOPENIA, UNSPECIFIED LOCATION: ICD-10-CM

## 2018-10-18 DIAGNOSIS — Z12.39 SCREENING BREAST EXAMINATION: Primary | ICD-10-CM

## 2018-11-05 LAB
25(OH)D3 SERPL-MCNC: 24 NG/ML (ref 30–100)
ALBUMIN SERPL-MCNC: 4.1 G/DL (ref 3.6–5.1)
ALBUMIN/GLOB SERPL: 1.4 (CALC) (ref 1–2.5)
ALP SERPL-CCNC: 123 U/L (ref 33–130)
ALT SERPL-CCNC: 29 U/L (ref 6–29)
AST SERPL-CCNC: 23 U/L (ref 10–35)
BASOPHILS # BLD AUTO: 70 CELLS/UL (ref 0–200)
BASOPHILS NFR BLD AUTO: 1 %
BILIRUB SERPL-MCNC: 0.5 MG/DL (ref 0.2–1.2)
BUN SERPL-MCNC: 23 MG/DL (ref 7–25)
BUN/CREAT SERPL: NORMAL (CALC) (ref 6–22)
CALCIUM SERPL-MCNC: 9.2 MG/DL (ref 8.6–10.4)
CHLORIDE SERPL-SCNC: 107 MMOL/L (ref 98–110)
CHOLEST SERPL-MCNC: 228 MG/DL
CHOLEST/HDLC SERPL: 3.7 (CALC)
CO2 SERPL-SCNC: 25 MMOL/L (ref 20–32)
CREAT SERPL-MCNC: 0.79 MG/DL (ref 0.5–1.05)
EOSINOPHIL # BLD AUTO: 147 CELLS/UL (ref 15–500)
EOSINOPHIL NFR BLD AUTO: 2.1 %
ERYTHROCYTE [DISTWIDTH] IN BLOOD BY AUTOMATED COUNT: 20.2 % (ref 11–15)
FERRITIN SERPL-MCNC: 111 NG/ML (ref 10–232)
GLOBULIN SER CALC-MCNC: 3 G/DL (CALC) (ref 1.9–3.7)
GLUCOSE SERPL-MCNC: 87 MG/DL (ref 65–99)
HCT VFR BLD AUTO: 38.6 % (ref 35–45)
HDLC SERPL-MCNC: 61 MG/DL
HGB BLD-MCNC: 11.5 G/DL (ref 11.7–15.5)
LDLC SERPL CALC-MCNC: 140 MG/DL (CALC)
LYMPHOCYTES # BLD AUTO: 3276 CELLS/UL (ref 850–3900)
LYMPHOCYTES NFR BLD AUTO: 46.8 %
MCH RBC QN AUTO: 18.9 PG (ref 27–33)
MCHC RBC AUTO-ENTMCNC: 29.8 G/DL (ref 32–36)
MCV RBC AUTO: 63.6 FL (ref 80–100)
MONOCYTES # BLD AUTO: 574 CELLS/UL (ref 200–950)
MONOCYTES NFR BLD AUTO: 8.2 %
NEUTROPHILS # BLD AUTO: 2933 CELLS/UL (ref 1500–7800)
NEUTROPHILS NFR BLD AUTO: 41.9 %
NONHDLC SERPL-MCNC: 167 MG/DL (CALC)
PLATELET # BLD AUTO: 338 THOUSAND/UL (ref 140–400)
PMV BLD REES-ECKER: 11.2 FL (ref 7.5–12.5)
POTASSIUM SERPL-SCNC: 4.2 MMOL/L (ref 3.5–5.3)
PROT SERPL-MCNC: 7.1 G/DL (ref 6.1–8.1)
RBC # BLD AUTO: 6.07 MILLION/UL (ref 3.8–5.1)
SL AMB EGFR AFRICAN AMERICAN: 95 ML/MIN/1.73M2
SL AMB EGFR NON AFRICAN AMERICAN: 82 ML/MIN/1.73M2
SODIUM SERPL-SCNC: 139 MMOL/L (ref 135–146)
TRIGL SERPL-MCNC: 148 MG/DL
TSH SERPL-ACNC: 2.4 MIU/L (ref 0.4–4.5)
VIT B12 SERPL-MCNC: 459 PG/ML (ref 200–1100)
WBC # BLD AUTO: 7 THOUSAND/UL (ref 3.8–10.8)

## 2018-11-14 ENCOUNTER — TELEPHONE (OUTPATIENT)
Dept: HEMATOLOGY ONCOLOGY | Facility: CLINIC | Age: 58
End: 2018-11-14

## 2018-11-14 ENCOUNTER — OFFICE VISIT (OUTPATIENT)
Dept: CARDIOLOGY CLINIC | Facility: CLINIC | Age: 58
End: 2018-11-14
Payer: COMMERCIAL

## 2018-11-14 VITALS
WEIGHT: 134.9 LBS | DIASTOLIC BLOOD PRESSURE: 78 MMHG | BODY MASS INDEX: 24.82 KG/M2 | HEIGHT: 62 IN | OXYGEN SATURATION: 97 % | HEART RATE: 78 BPM | SYSTOLIC BLOOD PRESSURE: 122 MMHG

## 2018-11-14 DIAGNOSIS — I10 ESSENTIAL HYPERTENSION: Primary | Chronic | ICD-10-CM

## 2018-11-14 DIAGNOSIS — E78.5 DYSLIPIDEMIA: ICD-10-CM

## 2018-11-14 DIAGNOSIS — I25.10 CORONARY ARTERY DISEASE INVOLVING NATIVE CORONARY ARTERY OF NATIVE HEART WITHOUT ANGINA PECTORIS: ICD-10-CM

## 2018-11-14 PROCEDURE — 99214 OFFICE O/P EST MOD 30 MIN: CPT | Performed by: INTERNAL MEDICINE

## 2018-11-14 RX ORDER — CARVEDILOL PHOSPHATE 10 MG/1
10 CAPSULE, EXTENDED RELEASE ORAL DAILY
Qty: 90 CAPSULE | Refills: 3 | Status: SHIPPED | OUTPATIENT
Start: 2018-11-14 | End: 2019-09-25 | Stop reason: ALTCHOICE

## 2018-11-14 NOTE — TELEPHONE ENCOUNTER
Following [facility]  instructions, I called the patient and told her that she doesn't need addition iron infusion  She agreed verbally has understood and she asked me if she needs keep taking oral iron supplement  I told her that I will let her know tomorrow after have asked to [address] [place] [address]

## 2018-11-14 NOTE — PROGRESS NOTES
Cardiology Outpatient Follow up Note    Juana Ferraro 60 y o  female MRN: [id number]    11/14/18          Assessment/Plan:    1  CAD s/p 3 vessel CABG 11/15  LV function was normal by echo 9/15  She was having issues with hair loss, on Metoprolol  Switched to [employer] 11/16  She reports she was tolerating it, but stopped taking Coreg in 4/18 when she was diagnosed with ulcerative colitis  She is willing to resume Coreg CR 10 mg daily now       2  HTN  BP at goal  Monitor with resumption of low dose Coreg      3  HL  Lipid panel 11/15 showed total cholesterol 142, HDL 45, , LDL 57, she was not taking a statin at that time  She was started on Atorvastatin 10 11/15  Lipid panel in 8/16 showed total cholesterol 123, HDL 45, LDL 52,   She had a lot of issues with pain in her ankles, so attempted to switch to Livalo 2 mg in 11/16  She reports she was tolerating Livalo until 4/18, when she stopped it due to ulcerative colitis  Lipid panel in 12/17 while on Livalo 2 mg daily was total 173, , HDL 58, LDL 92  Lipid panel 11/18 off of Livalo showed total cholesterol 228, , HDL 61,   She reports it causes significant fatigue/aching  She is willing to try Livalo 2 mg every other day or twice a week  She has a history of rheumatoid arthritis for which she is not currently taking anything  We did talk about Repatha/Praluent, she is interested in trying if insurance will cover, will send script for Repatha 420 once monthly and see if approved  1  Essential hypertension     2  Coronary artery disease involving native coronary artery of native heart without angina pectoris     3  Dyslipidemia         HPI: 60 y o  woman with HTN, HL, family history of premature CAD, who is here for follow up of CAD  She initially presented for evaluation of exertional dyspnea and throat discomfort in 9/15   She underwent cardiac catheterization 10/15, which showed 80% mid LAD stenosis, 70% ostial D2 branch which was small, 100% stenosis of origin of OM2 with collateral flow from RCA to OM3 branch, and 70% stenosis of the origin of the right sided PDA  LVEDP was normal  She underwent 3 vessel CABG 11/15 with LIMA to LAD, SVG to PDA, and SVG to OM3  She required postop left sided thoracentesis with 300 mL of fluid removed in 12/15  Echo 9/15 showed normal LV size and hyperdynamic LV function with EF 70%, no RMWA  Grade I diastolic dysfunction with elevated filling pressure  RV size and function were normal Trace MR  She was diagnosed with ulcerative colitis in 4/18, now is taking Delzicol  She stopped taking Coreg and Livalo at that time  She reports she lost weight after ulcerative colitis  She is not eating fiber, she is eating white bread, has cut out butter  She reports an episode of palpitations last week, BP was elevated at that time  She is exercising in the form of treadmill/elliptical, no chest pain or shortness of breath with exeriton  Occasionally gets random chest discomfort in middle of chest, lasts around 5-10 minutes, resolves on its own  No LE edema  No orthopnea, using 1 pillow to sleep, no PND         Patient Active Problem List   Diagnosis    Colitis    Periumbilic abdominal tenderness    Rectal discomfort    CRP elevated    Bilateral radiating leg pain    Coronary artery disease involving native coronary artery of native heart without angina pectoris    Essential hypertension    Rheumatoid arthritis (HCC)    Elevated d-dimer    Abdominal pain    Abnormal TSH    Beta thalassemia minor    Dyslipidemia       Allergies   Allergen Reactions    Iodinated Diagnostic Agents Anaphylaxis         Current Outpatient Prescriptions:     Cholecalciferol 39078 units TABS, Take 1 tablet (50,000 Units total) by mouth once a week, Disp: 12 tablet, Rfl: 0    ferrous sulfate 325 (65 Fe) mg tablet, Take 1 tablet (325 mg total) by mouth 2 (two) times a day, Disp: 60 tablet, Rfl: 0    mesalamine (DELZICOL) 400 mg, Take 3 capsules (1,200 mg total) by mouth 4 (four) times a day, Disp: 360 capsule, Rfl: 3    Past Medical History:   Diagnosis Date    Allergic rhinitis     Breast CA ([address] )     R breast CA-chemo/radiaition    Diarrhea     for 6 weeks-colonosocpy today 9/26/2016 and EGD    Hypertension     Thalassemia     Vitamin D deficiency        Family History   Problem Relation Age of Onset    Stroke Father     Heart attack Brother     Sudden death Brother         cardiac    Heart attack Maternal Uncle     Hypertension Family     Hyperlipidemia Family     Clotting disorder Neg Hx     Anuerysm Neg Hx        Past Surgical History:   Procedure Laterality Date    BREAST LUMPECTOMY Right     BREAST LUMPECTOMY Right     BREAST LUMPECTOMY Right     with lymph nodes  2007    COLONOSCOPY      COLONOSCOPY N/A 4/23/2018    Procedure: COLONOSCOPY;  Surgeon: Sarah Hobbs MD;  [facility]; Service: Gastroenterology    CORONARY ARTERY BYPASS GRAFT      CORONARY ARTERY BYPASS GRAFT      x3, 11/2015    HYSTERECTOMY      b/l ovaries removed    [place] COLONOSCOPY FLX DX W/COLLJ SPEC WHEN PFRMD N/A 9/26/2016    Procedure: COLONOSCOPY;  Surgeon: Marissa Kaye DO;  [facility]; Service: Gastroenterology       Social History     Social History    Marital status: /Civil Union     Spouse name: N/A    Number of children: N/A    Years of education: N/A     Occupational History    Not on file  Social History Main Topics    Smoking status: Never Smoker    Smokeless tobacco: Never Used    Alcohol use 2 4 oz/week     4 Glasses of wine per week      Comment: per year    Drug use: No    Sexual activity: Not on file     Other Topics Concern    Not on file     Social History Narrative    No narrative on file       Review of Systems   Constitution: Positive for weight loss  Negative for chills, decreased appetite, diaphoresis, fever, weakness, malaise/fatigue, night sweats and weight gain  HENT: Negative for ear pain, hearing loss, hoarse voice, nosebleeds, sore throat and tinnitus  Eyes: Negative for blurred vision and pain  Cardiovascular: Negative  Negative for chest pain, claudication, cyanosis, dyspnea on exertion, irregular heartbeat, leg swelling, near-syncope, orthopnea, palpitations, paroxysmal nocturnal dyspnea and syncope  Respiratory: Negative for cough, hemoptysis, shortness of breath, sleep disturbances due to breathing, snoring, sputum production and wheezing  Hematologic/Lymphatic: Negative for adenopathy and bleeding problem  Does not bruise/bleed easily  Skin: Negative for color change, dry skin, flushing, itching, poor wound healing and rash  Musculoskeletal: Positive for arthritis  Negative for back pain, falls, joint pain, muscle cramps, muscle weakness, myalgias and neck pain  Gastrointestinal: Negative for abdominal pain, constipation, diarrhea, dysphagia, heartburn, hematemesis, hematochezia, melena, nausea and vomiting  Genitourinary: Negative for dysuria, frequency, hematuria, hesitancy, non-menstrual bleeding and urgency  Neurological: Negative for excessive daytime sleepiness, dizziness, focal weakness, headaches, light-headedness, loss of balance, numbness, paresthesias, tremors and vertigo  Psychiatric/Behavioral: Negative for altered mental status, depression and memory loss  The patient does not have insomnia and is not nervous/anxious  Allergic/Immunologic: Negative for environmental allergies and persistent infections  Vitals: /78 (BP Location: Left arm, Patient Position: Sitting, Cuff Size: Adult)   Pulse 78   Ht 5' 1 5" (1 562 m)   Wt 61 2 kg (134 lb 14 4 oz)   LMP  (LMP Unknown)   SpO2 97%   BMI 25 08 kg/m²       Physical Exam:     GEN: Alert and oriented x 3, in no acute distress  Well appearing and well nourished  HEENT: Sclera anicteric, conjunctivae pink, mucous membranes moist  Oropharynx clear     NECK: Supple, no carotid bruits, no significant JVD  Trachea midline, no thyromegaly  HEART: Regular rhythm, normal S1 and S2, no murmurs, clicks, gallops or rubs  PMI nondisplaced, no thrills  LUNGS: Clear to auscultation bilaterally; no wheezes, rales, or rhonchi  No increased work of breathing or signs of respiratory distress  ABDOMEN: Soft, nontender, nondistended, normoactive bowel sounds  EXTREMITIES: Skin warm and well perfused, no clubbing, cyanosis, or edema  NEURO: No focal findings  Normal gait  Normal speech  Mood and affect normal    SKIN: Normal without suspicious lesions on exposed skin        Lab Results:       Lab Results   Component Value Date    HGBA1C 6 0 (H) 11/10/2015     Lab Results   Component Value Date    CHOL 173 12/22/2017    CHOL 173 12/22/2017    CHOL 123 (L) 08/12/2016     Lab Results   Component Value Date    HDL 61 11/02/2018    HDL 58 12/22/2017    HDL 58 12/22/2017     No results found for: [facility]  Lab Results   Component Value Date    TRIG 148 11/02/2018    TRIG 133 12/22/2017    TRIG 133 12/22/2017     No results found for: CHOLHDL

## 2018-11-14 NOTE — LETTER
November 14, 2018     Susan Quintana MD  [address] [place] [address]    Patient: Robyn Stevens   YOB: 1960   Date of Visit: 11/14/2018       Dear Dr Mario Low: Thank you for referring Robyn Stevens to me for evaluation  Below are my notes for this consultation  If you have questions, please do not hesitate to call me  I look forward to following your patient along with you  Sincerely,        Ivana Mccord MD        CC: No Recipients  Ivana Mccord MD  11/14/2018  3:09 PM  Sign at close encounter  Cardiology Outpatient Follow up Note    Robyn Stevens 60 y o  female MRN: [id number]    11/14/18          Assessment/Plan:    1  CAD s/p 3 vessel CABG 11/15  LV function was normal by echo 9/15  She was having issues with hair loss, on Metoprolol  Switched to [employer] 11/16  She reports she was tolerating it, but stopped taking Coreg in 4/18 when she was diagnosed with ulcerative colitis  She is willing to resume Coreg CR 10 mg daily now       2  HTN  BP at goal  Monitor with resumption of low dose Coreg      3  HL  Lipid panel 11/15 showed total cholesterol 142, HDL 45, , LDL 57, she was not taking a statin at that time  She was started on Atorvastatin 10 11/15  Lipid panel in 8/16 showed total cholesterol 123, HDL 45, LDL 52,   She had a lot of issues with pain in her ankles, so attempted to switch to Livalo 2 mg in 11/16  She reports she was tolerating Livalo until 4/18, when she stopped it due to ulcerative colitis  Lipid panel in 12/17 while on Livalo 2 mg daily was total 173, , HDL 58, LDL 92  Lipid panel 11/18 off of Livalo showed total cholesterol 228, , HDL 61,   She reports it causes significant fatigue/aching  She is willing to try Livalo 2 mg every other day or twice a week  She has a history of rheumatoid arthritis for which she is not currently taking anything   We did talk about Repatha/Praluent, she is interested in trying if insurance will cover, will send script for Repatha 420 once monthly and see if approved  1  Essential hypertension     2  Coronary artery disease involving native coronary artery of native heart without angina pectoris     3  Dyslipidemia         HPI: 60 y o  woman with HTN, HL, family history of premature CAD, who is here for follow up of CAD  She initially presented for evaluation of exertional dyspnea and throat discomfort in 9/15  She underwent cardiac catheterization 10/15, which showed 80% mid LAD stenosis, 70% ostial D2 branch which was small, 100% stenosis of origin of OM2 with collateral flow from RCA to OM3 branch, and 70% stenosis of the origin of the right sided PDA  LVEDP was normal  She underwent 3 vessel CABG 11/15 with LIMA to LAD, SVG to PDA, and SVG to OM3  She required postop left sided thoracentesis with 300 mL of fluid removed in 12/15  Echo 9/15 showed normal LV size and hyperdynamic LV function with EF 70%, no RMWA  Grade I diastolic dysfunction with elevated filling pressure  RV size and function were normal Trace MR  She was diagnosed with ulcerative colitis in 4/18, now is taking Delzicol  She stopped taking Coreg and Livalo at that time  She reports she lost weight after ulcerative colitis  She is not eating fiber, she is eating white bread, has cut out butter  She reports an episode of palpitations last week, BP was elevated at that time  She is exercising in the form of treadmill/elliptical, no chest pain or shortness of breath with exeriton  Occasionally gets random chest discomfort in middle of chest, lasts around 5-10 minutes, resolves on its own  No LE edema  No orthopnea, using 1 pillow to sleep, no PND         Patient Active Problem List   Diagnosis    Colitis    Periumbilic abdominal tenderness    Rectal discomfort    CRP elevated    Bilateral radiating leg pain    Coronary artery disease involving native coronary artery of native heart without angina pectoris    Essential hypertension    Rheumatoid arthritis (HCC)    Elevated d-dimer    Abdominal pain    Abnormal TSH    Beta thalassemia minor    Dyslipidemia       Allergies   Allergen Reactions    Iodinated Diagnostic Agents Anaphylaxis         Current Outpatient Prescriptions:     Cholecalciferol 20736 units TABS, Take 1 tablet (50,000 Units total) by mouth once a week, Disp: 12 tablet, Rfl: 0    ferrous sulfate 325 (65 Fe) mg tablet, Take 1 tablet (325 mg total) by mouth 2 (two) times a day, Disp: 60 tablet, Rfl: 0    mesalamine (DELZICOL) 400 mg, Take 3 capsules (1,200 mg total) by mouth 4 (four) times a day, Disp: 360 capsule, Rfl: 3    Past Medical History:   Diagnosis Date    Allergic rhinitis     Breast CA (HCC)     R breast CA-chemo/radiaition    Diarrhea     for 6 weeks-colonosocpy today 9/26/2016 and EGD    Hypertension     Thalassemia     Vitamin D deficiency        Family History   Problem Relation Age of Onset    Stroke Father     Heart attack Brother     Sudden death Brother         cardiac    Heart attack Maternal Uncle     Hypertension Family     Hyperlipidemia Family     Clotting disorder Neg Hx     Anuerysm Neg Hx        Past Surgical History:   Procedure Laterality Date    BREAST LUMPECTOMY Right     BREAST LUMPECTOMY Right     BREAST LUMPECTOMY Right     with lymph nodes  2007    COLONOSCOPY      COLONOSCOPY N/A 4/23/2018    Procedure: COLONOSCOPY;  Surgeon: Cuco Castillo MD;  [facility]; Service: Gastroenterology    CORONARY ARTERY BYPASS GRAFT      CORONARY ARTERY BYPASS GRAFT      x3, 11/2015    HYSTERECTOMY      b/l ovaries removed    SC COLONOSCOPY FLX DX W/COLLJ SPEC WHEN PFRMD N/A 9/26/2016    Procedure: COLONOSCOPY;  Surgeon: Tessy Smallwood DO;  [facility];   Service: Gastroenterology       Social History     Social History    Marital status: /Civil Union     Spouse name: N/A    Number of children: N/A    Years of education: N/A     Occupational History    Not on file  Social History Main Topics    Smoking status: Never Smoker    Smokeless tobacco: Never Used    Alcohol use 2 4 oz/week     4 Glasses of wine per week      Comment: per year    Drug use: No    Sexual activity: Not on file     Other Topics Concern    Not on file     Social History Narrative    No narrative on file       Review of Systems   Constitution: Positive for weight loss  Negative for chills, decreased appetite, diaphoresis, fever, weakness, malaise/fatigue, night sweats and weight gain  HENT: Negative for ear pain, hearing loss, hoarse voice, nosebleeds, sore throat and tinnitus  Eyes: Negative for blurred vision and pain  Cardiovascular: Negative  Negative for chest pain, claudication, cyanosis, dyspnea on exertion, irregular heartbeat, leg swelling, near-syncope, orthopnea, palpitations, paroxysmal nocturnal dyspnea and syncope  Respiratory: Negative for cough, hemoptysis, shortness of breath, sleep disturbances due to breathing, snoring, sputum production and wheezing  Hematologic/Lymphatic: Negative for adenopathy and bleeding problem  Does not bruise/bleed easily  Skin: Negative for color change, dry skin, flushing, itching, poor wound healing and rash  Musculoskeletal: Positive for arthritis  Negative for back pain, falls, joint pain, muscle cramps, muscle weakness, myalgias and neck pain  Gastrointestinal: Negative for abdominal pain, constipation, diarrhea, dysphagia, heartburn, hematemesis, hematochezia, melena, nausea and vomiting  Genitourinary: Negative for dysuria, frequency, hematuria, hesitancy, non-menstrual bleeding and urgency  Neurological: Negative for excessive daytime sleepiness, dizziness, focal weakness, headaches, light-headedness, loss of balance, numbness, paresthesias, tremors and vertigo  Psychiatric/Behavioral: Negative for altered mental status, depression and memory loss   The patient does not have insomnia and is not nervous/anxious  Allergic/Immunologic: Negative for environmental allergies and persistent infections  Vitals: /78 (BP Location: Left arm, Patient Position: Sitting, Cuff Size: Adult)   Pulse 78   Ht 5' 1 5" (1 562 m)   Wt 61 2 kg (134 lb 14 4 oz)   LMP  (LMP Unknown)   SpO2 97%   BMI 25 08 kg/m²        Physical Exam:     GEN: Alert and oriented x 3, in no acute distress  Well appearing and well nourished  HEENT: Sclera anicteric, conjunctivae pink, mucous membranes moist  Oropharynx clear  NECK: Supple, no carotid bruits, no significant JVD  Trachea midline, no thyromegaly  HEART: Regular rhythm, normal S1 and S2, no murmurs, clicks, gallops or rubs  PMI nondisplaced, no thrills  LUNGS: Clear to auscultation bilaterally; no wheezes, rales, or rhonchi  No increased work of breathing or signs of respiratory distress  ABDOMEN: Soft, nontender, nondistended, normoactive bowel sounds  EXTREMITIES: Skin warm and well perfused, no clubbing, cyanosis, or edema  NEURO: No focal findings  Normal gait  Normal speech  Mood and affect normal    SKIN: Normal without suspicious lesions on exposed skin        Lab Results:       Lab Results   Component Value Date    HGBA1C 6 0 (H) 11/10/2015     Lab Results   Component Value Date    CHOL 173 12/22/2017    CHOL 173 12/22/2017    CHOL 123 (L) 08/12/2016     Lab Results   Component Value Date    HDL 61 11/02/2018    HDL 58 12/22/2017    HDL 58 12/22/2017     No results found for: [address]  Lab Results   Component Value Date    TRIG 148 11/02/2018    TRIG 133 12/22/2017    TRIG 133 12/22/2017     No results found for: CHOLHDL

## 2018-11-15 ENCOUNTER — TELEPHONE (OUTPATIENT)
Dept: CARDIOLOGY CLINIC | Facility: CLINIC | Age: 58
End: 2018-11-15

## 2018-11-16 ENCOUNTER — OFFICE VISIT (OUTPATIENT)
Dept: GASTROENTEROLOGY | Facility: MEDICAL CENTER | Age: 58
End: 2018-11-16
Payer: COMMERCIAL

## 2018-11-16 VITALS
HEART RATE: 74 BPM | TEMPERATURE: 98.9 F | DIASTOLIC BLOOD PRESSURE: 70 MMHG | SYSTOLIC BLOOD PRESSURE: 102 MMHG | BODY MASS INDEX: 25.09 KG/M2 | WEIGHT: 135 LBS

## 2018-11-16 DIAGNOSIS — K51.011 ULCERATIVE PANCOLITIS WITH RECTAL BLEEDING (HCC): Primary | ICD-10-CM

## 2018-11-16 DIAGNOSIS — K51.00 ULCERATIVE PANCOLITIS WITHOUT COMPLICATION (HCC): ICD-10-CM

## 2018-11-16 DIAGNOSIS — R79.82 CRP ELEVATED: ICD-10-CM

## 2018-11-16 PROCEDURE — 99214 OFFICE O/P EST MOD 30 MIN: CPT | Performed by: INTERNAL MEDICINE

## 2018-11-16 RX ORDER — MESALAMINE 4 G/60ML
4 ENEMA RECTAL
Qty: 90 BOTTLE | Refills: 3 | Status: SHIPPED | OUTPATIENT
Start: 2018-11-16 | End: 2019-02-19 | Stop reason: SDUPTHER

## 2018-11-16 RX ORDER — MESALAMINE 400 MG/1
1200 CAPSULE, DELAYED RELEASE ORAL 4 TIMES DAILY
Qty: 360 CAPSULE | Refills: 3 | Status: SHIPPED | OUTPATIENT
Start: 2018-11-16 | End: 2019-02-19 | Stop reason: SDUPTHER

## 2018-11-16 NOTE — LETTER
November 19, 2018     Olesya Ahmadi MD  [address] [place] [address]    Patient: Jenaro Fitzgerald   YOB: 1960   Date of Visit: 11/16/2018       Dear Dr Elise Senior: Thank you for referring Jenaro Fitzgerald to me for evaluation  Below are my notes for this consultation  If you have questions, please do not hesitate to call me  I look forward to following your patient along with you  Sincerely,        Tessy Smallwood DO        CC: No Recipients  Tessy Smallwood DO  11/19/2018  2:21 PM  Sign at close encounter  [facility] - Outpatient Follow-up Note  Jenaro Fitzgerald 60 y o  female MRN: [id number]  Encounter: [id number]          ASSESSMENT AND PLAN:      60year old female here for follow up  1  Ulcerative pancolitis without complication    - mesalamine (ROWASA) 4 g; Insert 60 mL (4 g total) into the rectum daily at bedtime  Dispense: 90 Bottle; Refill: 3    2  Ulcerative pancolitis with rectal bleeding    - mesalamine (DELZICOL) 400 mg; Take 3 capsules (1,200 mg total) by mouth 4 (four) times a day  Dispense: 360 capsule; Refill: 3    3  CRP elevated  -Due to known UC     Follow up in a few months time    ______________________________________________________________________    SUBJECTIVE:    Pt here for follow up  She is feeling quite well  Much better than previously  Only having 2 BM's daily  REVIEW OF SYSTEMS IS OTHERWISE NEGATIVE      Historical Information   Past Medical History:   Diagnosis Date    Allergic rhinitis     Breast CA ([address] )     R breast CA-chemo/radiaition    Diarrhea     for 6 weeks-colonosocpy today 9/26/2016 and EGD    Hypertension     Thalassemia     Vitamin D deficiency      Past Surgical History:   Procedure Laterality Date    BREAST LUMPECTOMY Right     BREAST LUMPECTOMY Right     BREAST LUMPECTOMY Right     with lymph nodes  2007    COLONOSCOPY      COLONOSCOPY N/A 4/23/2018    Procedure: COLONOSCOPY;  Surgeon: Kaleb Chatman MD;  [facility]; Service: Gastroenterology    CORONARY ARTERY BYPASS GRAFT      CORONARY ARTERY BYPASS GRAFT      x3, 11/2015    HYSTERECTOMY      b/l ovaries removed    SC COLONOSCOPY FLX DX W/COLLJ SPEC WHEN PFRMD N/A 9/26/2016    Procedure: COLONOSCOPY;  Surgeon: Jyoti Mccullough DO;  [facility]; Service: Gastroenterology     Social History   History   Alcohol Use    2 4 oz/week    4 Glasses of wine per week     Comment: per year     History   Drug Use No     History   Smoking Status    Never Smoker   Smokeless Tobacco    Never Used     Family History   Problem Relation Age of Onset    Stroke Father     Heart attack Brother     Sudden death Brother         cardiac    Heart attack Maternal Uncle     Hypertension Family     Hyperlipidemia Family     Clotting disorder Neg Hx     Anuerysm Neg Hx        Meds/Allergies       Current Outpatient Prescriptions:     carvedilol (COREG CR) 10 MG 24 hr capsule    Cholecalciferol 30341 units TABS    Evolocumab with Infusor 420 MG/3 5ML SOCT    mesalamine (DELZICOL) 400 mg    pitavastatin (LIVALO) 2 mg    ferrous sulfate 325 (65 Fe) mg tablet    mesalamine (ROWASA) 4 g    Allergies   Allergen Reactions    Iodinated Diagnostic Agents Anaphylaxis       Objective     Blood pressure 102/70, pulse 74, temperature 98 9 °F (37 2 °C), temperature source Tympanic, weight 61 2 kg (135 lb), not currently breastfeeding  Body mass index is 25 09 kg/m²  PHYSICAL EXAM:      General Appearance:   Alert, cooperative, no distress   HEENT:   Normocephalic, atraumatic, anicteric      Neck:  Supple, symmetrical, trachea midline   Lungs:   Clear to auscultation bilaterally; no rales, rhonchi or wheezing; respirations unlabored    Heart[de-identified]   Regular rate and rhythm; no murmur, rub, or gallop     Abdomen:   Soft, non-tender, non-distended; normal bowel sounds; no masses, no organomegaly    Genitalia:   Deferred  Rectal:   Deferred    Extremities:  No cyanosis, clubbing or edema    Pulses:  2+ and symmetric    Skin:  No jaundice, rashes, or lesions    Lymph nodes:  No palpable cervical lymphadenopathy        Lab Results:   No visits with results within 1 Day(s) from this visit     Latest known visit with results is:   Orders Only on 11/02/2018   Component Date Value    Total Cholesterol 11/02/2018 228*    HDL 11/02/2018 61     Triglycerides 11/02/2018 148     LDL Direct 11/02/2018 140*    Chol HDLC Ratio 11/02/2018 3 7     Non-HDL Cholesterol 11/02/2018 167*    Glucose, Random 11/02/2018 87     BUN 11/02/2018 23     Creatinine 11/02/2018 0 79     eGFR Non African American 11/02/2018 82     SL AMB EGFR  AMER* 11/02/2018 95     SL AMB BUN/CREATININE RA* 83/26/7144 NOT APPLICABLE     Sodium 99/82/0252 139     Potassium 11/02/2018 4 2     Chloride 11/02/2018 107     CO2 11/02/2018 25     SL AMB CALCIUM 11/02/2018 9 2     SL AMB PROTEIN, TOTAL 11/02/2018 7 1     Albumin 11/02/2018 4 1     Globulin 11/02/2018 3 0     Albumin/Globulin Ratio 11/02/2018 1 4     TOTAL BILIRUBIN 11/02/2018 0 5     Alkaline Phosphatase 11/02/2018 123     SL AMB AST 11/02/2018 23     SL AMB ALT 11/02/2018 29     White Blood Cell Count 11/02/2018 7 0     Red Blood Cell Count 11/02/2018 6 07*    Hemoglobin 11/02/2018 11 5*    HCT 11/02/2018 38 6     MCV 11/02/2018 63 6*    MCH 11/02/2018 18 9*    MCHC 11/02/2018 29 8*    RDW 11/02/2018 20 2*    Platelet Count 90/86/3668 338     SL AMB MPV 11/02/2018 11 2     Neutrophils (Absolute) 11/02/2018 2933     Lymphocytes (Absolute) 11/02/2018 3276     Monocytes (Absolute) 11/02/2018 574     Eosinophils (Absolute) 11/02/2018 147     Basophils ABS 11/02/2018 70     Neutrophils 11/02/2018 41 9     Lymphocytes 11/02/2018 46 8     Monocytes 11/02/2018 8 2     Eosinophils 11/02/2018 2 1     Basophils PCT 11/02/2018 1 0     RBC Morphology 11/02/2018      Note: 11/02/2018      Ferritin 11/02/2018 111     TSH 11/02/2018 2 40     Vitamin B-12 11/02/2018 459     Vitamin D, 25-Hydroxy, S* 11/02/2018 24*     Radiology Results:   No results found

## 2018-11-19 ENCOUNTER — TELEPHONE (OUTPATIENT)
Dept: HEMATOLOGY ONCOLOGY | Facility: CLINIC | Age: 58
End: 2018-11-19

## 2018-11-19 DIAGNOSIS — D50.9 IRON DEFICIENCY ANEMIA, UNSPECIFIED IRON DEFICIENCY ANEMIA TYPE: ICD-10-CM

## 2018-11-19 PROBLEM — K51.00 ULCERATIVE PANCOLITIS WITHOUT COMPLICATION (HCC): Status: ACTIVE | Noted: 2018-11-19

## 2018-11-19 PROBLEM — K51.011 ULCERATIVE PANCOLITIS WITH RECTAL BLEEDING (HCC): Status: ACTIVE | Noted: 2018-11-19

## 2018-11-19 PROBLEM — K52.9 COLITIS: Status: RESOLVED | Noted: 2018-04-17 | Resolved: 2018-11-19

## 2018-11-19 RX ORDER — FERROUS SULFATE 325(65) MG
325 TABLET ORAL 2 TIMES DAILY
Qty: 60 TABLET | Refills: 1 | Status: SHIPPED | OUTPATIENT
Start: 2018-11-19 | End: 2019-04-16 | Stop reason: SDUPTHER

## 2018-11-19 NOTE — TELEPHONE ENCOUNTER
I called the patient to let her know that she is to continue taking oral iron according CHEYENNE Joshua instructions  Patient said has understood and asked to refill

## 2018-11-19 NOTE — PROGRESS NOTES
[facility] - Outpatient Follow-up Note  Laura Long 60 y o  female MRN: [id number]  Encounter: [id number]          ASSESSMENT AND PLAN:      60year old female here for follow up  1  Ulcerative pancolitis without complication    - mesalamine (ROWASA) 4 g; Insert 60 mL (4 g total) into the rectum daily at bedtime  Dispense: 90 Bottle; Refill: 3    2  Ulcerative pancolitis with rectal bleeding    - mesalamine (DELZICOL) 400 mg; Take 3 capsules (1,200 mg total) by mouth 4 (four) times a day  Dispense: 360 capsule; Refill: 3    3  CRP elevated  -Due to known UC     Follow up in a few months time    ______________________________________________________________________    SUBJECTIVE:    Pt here for follow up  She is feeling quite well  Much better than previously  Only having 2 BM's daily  REVIEW OF SYSTEMS IS OTHERWISE NEGATIVE  Historical Information   Past Medical History:   Diagnosis Date    Allergic rhinitis     Breast CA ([address] )     R breast CA-chemo/radiaition    Diarrhea     for 6 weeks-colonosocpy today 9/26/2016 and EGD    Hypertension     Thalassemia     Vitamin D deficiency      Past Surgical History:   Procedure Laterality Date    BREAST LUMPECTOMY Right     BREAST LUMPECTOMY Right     BREAST LUMPECTOMY Right     with lymph nodes  2007    COLONOSCOPY      COLONOSCOPY N/A 4/23/2018    Procedure: COLONOSCOPY;  Surgeon: Beverly Gotti MD;  [facility]; Service: Gastroenterology    CORONARY ARTERY BYPASS GRAFT      CORONARY ARTERY BYPASS GRAFT      x3, 11/2015    HYSTERECTOMY      b/l ovaries removed    [place] COLONOSCOPY FLX DX W/COLLJ SPEC WHEN PFRMD N/A 9/26/2016    Procedure: COLONOSCOPY;  Surgeon: Paige Mullen DO;  [facility];   Service: Gastroenterology     Social History   History   Alcohol Use    2 4 oz/week    4 Glasses of wine per week     Comment: per year     History   Drug Use No     History   Smoking Status    Never Smoker   Smokeless Tobacco    Never Used     Family History   Problem Relation Age of Onset    Stroke Father     Heart attack Brother     Sudden death Brother         cardiac    Heart attack Maternal Uncle     Hypertension Family     Hyperlipidemia Family     Clotting disorder Neg Hx     Anuerysm Neg Hx        Meds/Allergies       Current Outpatient Prescriptions:     carvedilol (COREG CR) 10 MG 24 hr capsule    Cholecalciferol 22691 units TABS    Evolocumab with Infusor 420 MG/3 5ML SOCT    mesalamine (DELZICOL) 400 mg    pitavastatin (LIVALO) 2 mg    ferrous sulfate 325 (65 Fe) mg tablet    mesalamine (ROWASA) 4 g    Allergies   Allergen Reactions    Iodinated Diagnostic Agents Anaphylaxis       Objective     Blood pressure 102/70, pulse 74, temperature 98 9 °F (37 2 °C), temperature source Tympanic, weight 61 2 kg (135 lb), not currently breastfeeding  Body mass index is 25 09 kg/m²  PHYSICAL EXAM:      General Appearance:   Alert, cooperative, no distress   HEENT:   Normocephalic, atraumatic, anicteric      Neck:  Supple, symmetrical, trachea midline   Lungs:   Clear to auscultation bilaterally; no rales, rhonchi or wheezing; respirations unlabored    Heart[de-identified]   Regular rate and rhythm; no murmur, rub, or gallop  Abdomen:   Soft, non-tender, non-distended; normal bowel sounds; no masses, no organomegaly    Genitalia:   Deferred    Rectal:   Deferred    Extremities:  No cyanosis, clubbing or edema    Pulses:  2+ and symmetric    Skin:  No jaundice, rashes, or lesions    Lymph nodes:  No palpable cervical lymphadenopathy        Lab Results:   No visits with results within 1 Day(s) from this visit     Latest known visit with results is:   Orders Only on 11/02/2018   Component Date Value    Total Cholesterol 11/02/2018 228*    HDL 11/02/2018 61     Triglycerides 11/02/2018 148     LDL Direct 11/02/2018 140*    Chol HDLC Ratio 11/02/2018 3 7     Non-HDL Cholesterol 11/02/2018 167*    Glucose, Random 11/02/2018 87     BUN 11/02/2018 23     Creatinine 11/02/2018 0 79     eGFR Non African American 11/02/2018 82     SL AMB EGFR  AMER* 11/02/2018 95     SL AMB BUN/CREATININE RA* 30/37/0909 NOT APPLICABLE     Sodium 84/97/9183 139     Potassium 11/02/2018 4 2     Chloride 11/02/2018 107     CO2 11/02/2018 25     SL AMB CALCIUM 11/02/2018 9 2     SL AMB PROTEIN, TOTAL 11/02/2018 7 1     Albumin 11/02/2018 4 1     Globulin 11/02/2018 3 0     Albumin/Globulin Ratio 11/02/2018 1 4     TOTAL BILIRUBIN 11/02/2018 0 5     Alkaline Phosphatase 11/02/2018 123     SL AMB AST 11/02/2018 23     SL AMB ALT 11/02/2018 29     White Blood Cell Count 11/02/2018 7 0     Red Blood Cell Count 11/02/2018 6 07*    Hemoglobin 11/02/2018 11 5*    HCT 11/02/2018 38 6     MCV 11/02/2018 63 6*    MCH 11/02/2018 18 9*    MCHC 11/02/2018 29 8*    RDW 11/02/2018 20 2*    Platelet Count 23/86/4958 338     SL AMB MPV 11/02/2018 11 2     Neutrophils (Absolute) 11/02/2018 2933     Lymphocytes (Absolute) 11/02/2018 3276     Monocytes (Absolute) 11/02/2018 574     Eosinophils (Absolute) 11/02/2018 147     Basophils ABS 11/02/2018 70     Neutrophils 11/02/2018 41 9     Lymphocytes 11/02/2018 46 8     Monocytes 11/02/2018 8 2     Eosinophils 11/02/2018 2 1     Basophils PCT 11/02/2018 1 0     RBC Morphology 11/02/2018      Note: 11/02/2018      Ferritin 11/02/2018 111     TSH 11/02/2018 2 40     Vitamin B-12 11/02/2018 459     Vitamin D, 25-Hydroxy, S* 11/02/2018 24*     Radiology Results:   No results found

## 2018-11-21 ENCOUNTER — TELEPHONE (OUTPATIENT)
Dept: CARDIOLOGY CLINIC | Facility: CLINIC | Age: 58
End: 2018-11-21

## 2018-11-21 NOTE — TELEPHONE ENCOUNTER
Per [facility] both Repatha and Praluent require a Prior Auth  No prior auths on either medication recently submitted     Prior auth on repatha  submitted through [address] [facility]

## 2018-11-26 NOTE — TELEPHONE ENCOUNTER
Mariza Millan denied for lack of documentation of Coronary Calcium score of greater then 300 [place] units      [facility] case # [id number]

## 2018-11-26 NOTE — TELEPHONE ENCOUNTER
Call placed to [facility] to check status of Repatha Prior Auth  - per [facility] it is still in review

## 2018-11-30 ENCOUNTER — HOSPITAL ENCOUNTER (OUTPATIENT)
Dept: RADIOLOGY | Age: 58
Discharge: HOME | End: 2018-11-30
Payer: COMMERCIAL

## 2018-11-30 VITALS — BODY MASS INDEX: 25.49 KG/M2 | WEIGHT: 135 LBS | HEIGHT: 61 IN

## 2018-11-30 DIAGNOSIS — Z12.39 SCREENING BREAST EXAMINATION: ICD-10-CM

## 2018-11-30 PROCEDURE — 77067 SCR MAMMO BI INCL CAD: CPT

## 2018-12-04 ENCOUNTER — TELEPHONE (OUTPATIENT)
Dept: CARDIOLOGY CLINIC | Facility: CLINIC | Age: 58
End: 2018-12-04

## 2018-12-04 ENCOUNTER — TELEPHONE (OUTPATIENT)
Dept: GASTROENTEROLOGY | Facility: CLINIC | Age: 58
End: 2018-12-04

## 2018-12-04 NOTE — TELEPHONE ENCOUNTER
Dr King Negron patient- Hx ulcerative pancolitis    Symptom call from patient for abdominal pain and urgency feeling- BM lasting one hour after taking initial dose of Calcium 600mg that was prescribed by her PCP  She was very concerned because of her history of rectal bleeding and wants to know if the Calcium could cause this and if she should continue to take the Calcium  She states all her symptoms are otherwise stable

## 2018-12-04 NOTE — TELEPHONE ENCOUNTER
Dr Coral Zhang pt    Pt called in requesting to speak to a nurse in regards to her calcium pills, she states she took one and she started having stomach pain  She would like to know if it is ok for her to continue using this med   [phone]

## 2018-12-04 NOTE — TELEPHONE ENCOUNTER
I can put order in for calcium score if she is willing to have it done, but I am not sure she will end up taking Repatha on a frequent basis anyway  She has stopped most of her cardiac medications at some point on her own due to side effects

## 2018-12-04 NOTE — TELEPHONE ENCOUNTER
RX plan Denied Repatha  Calcium score need's  to be done & it needs to be above 300  Patient would like you to call her  Thanks

## 2018-12-04 NOTE — TELEPHONE ENCOUNTER
Patient advised to continue calcium and to monitor her symptoms  She reports she is stable and bowel habits regular/no bleeding  If symptoms reoccur or worsen  she will discontinue the calcium and call back to follow up

## 2018-12-05 ENCOUNTER — TELEPHONE (OUTPATIENT)
Dept: CARDIOLOGY CLINIC | Facility: CLINIC | Age: 58
End: 2018-12-05

## 2018-12-05 DIAGNOSIS — I25.10 CORONARY ARTERY DISEASE INVOLVING NATIVE HEART WITHOUT ANGINA PECTORIS, UNSPECIFIED VESSEL OR LESION TYPE: Primary | ICD-10-CM

## 2018-12-06 ENCOUNTER — TELEPHONE (OUTPATIENT)
Dept: CARDIOLOGY CLINIC | Facility: MEDICAL CENTER | Age: 58
End: 2018-12-06

## 2018-12-06 ENCOUNTER — TELEPHONE (OUTPATIENT)
Dept: CARDIOLOGY CLINIC | Facility: CLINIC | Age: 58
End: 2018-12-06

## 2018-12-06 NOTE — TELEPHONE ENCOUNTER
To Whom It May Concern,     Rico Julian has documented allergy to IV dye, which causes anaphylaxis, so cannot undergo cardiac CTA as recommended by her insurance prescription plan in order to approve Demian Mcmahan without significant risk       Sincerely,     Arcenio Reddy MD

## 2018-12-06 NOTE — TELEPHONE ENCOUNTER
I have explained you are not in the office today and like we discussed she would need to have GI send there information with any GI issues  Arina Ansari

## 2018-12-11 ENCOUNTER — TELEPHONE (OUTPATIENT)
Dept: CARDIOLOGY CLINIC | Facility: CLINIC | Age: 58
End: 2018-12-11

## 2018-12-11 NOTE — TELEPHONE ENCOUNTER
I cannot fax letter of medical necessity, I need to know from prescription plan itself, not from patient, what they are requiring from me to say, or what issues need to be documented, and where letter needs to be sent

## 2018-12-11 NOTE — TELEPHONE ENCOUNTER
Patient called in today she has requested a letter of medical necessity to be sent to [facility] & a call to her PCP Dr Lima in [place] @  [phone] about her GI issues  I did explain you are out of the office today she will fax forms to the office for you to fill out  FYI

## 2018-12-11 NOTE — TELEPHONE ENCOUNTER
I have explained this to the patient  & forms are on my desk when your in the office I also explained a letter was sent about the CT dye  Not sure what she wants  I explained everything is on hold till you are back in office  That when she explained she wanted you to call her PCP    anthony

## 2018-12-13 ENCOUNTER — TELEPHONE (OUTPATIENT)
Dept: CARDIOLOGY CLINIC | Facility: CLINIC | Age: 58
End: 2018-12-13

## 2018-12-13 NOTE — TELEPHONE ENCOUNTER
I don't think it make much difference if she tries it, since she can't get it from her prescription plan long term, unless she takes it and realizes she doesn't want to take it long term anyway

## 2019-01-09 ENCOUNTER — TELEPHONE (OUTPATIENT)
Dept: GASTROENTEROLOGY | Facility: MEDICAL CENTER | Age: 59
End: 2019-01-09

## 2019-01-09 NOTE — TELEPHONE ENCOUNTER
Dr Cherise Montez pt called to sched a FU appt with Dr Marta Mota only  Pt states she was supposed to sched 2-3MO FU but there is nothing avail through April 1st  Pt is upset that she is unable to sched and would like to speak to someone regarding this issue   Pt can be reached at [phone]

## 2019-01-18 NOTE — TELEPHONE ENCOUNTER
Patient called explained we are not faxing papers to her insurance would like a call back  ibuprofen explained we have done everything you could do  Jerzy Andrade

## 2019-02-11 RX ORDER — ZOLPIDEM TARTRATE 5 MG/1
TABLET ORAL
COMMUNITY
Start: 2018-11-27 | End: 2022-04-20 | Stop reason: SDUPTHER

## 2019-02-11 RX ORDER — MOMETASONE FUROATE 50 UG/1
2 SPRAY, METERED NASAL DAILY
Refills: 2 | COMMUNITY
Start: 2018-12-15

## 2019-02-11 RX ORDER — EPINASTINE HCL 0.05 %
DROPS OPHTHALMIC (EYE)
COMMUNITY
Start: 2018-11-19 | End: 2022-04-20

## 2019-02-14 NOTE — PROGRESS NOTES
Cardiology Outpatient Follow up Note    David Melo 60 y o  female MRN: [id number]    02/15/19          Assessment/Plan:    1  CAD s/p 3 vessel CABG 11/15  LV function was normal by echo 9/15  She was having issues with hair loss, on Metoprolol  Switched to [employer] 11/16  She reports she was tolerating it, but stopped taking Coreg in 4/18 when she was diagnosed with ulcerative colitis  She is now using Coreg CR 10 mg daily now, but reports not taking daily, I advised her to take daily in order to prevent symptoms        2  HTN  BP at goal  Currently controlled in office, sometimes elevated at home, but she is not taking Coreg consistently at home       3  HL  Lipid panel 11/15 showed total cholesterol 142, HDL 45, , LDL 57, she was not taking a statin at that time  She was started on Atorvastatin 10 11/15  Lipid panel in 8/16 showed total cholesterol 123, HDL 45, LDL 52,   She had a lot of issues with pain in her ankles, so attempted to switch to Livalo 2 mg in 11/16  She reports she was tolerating Livalo until 4/18, when she stopped it due to ulcerative colitis and fatigue/aching  Lipid panel in 12/17 while on Livalo 2 mg daily was total 173, , HDL 58, LDL 92  Lipid panel 11/18 off of Livalo showed total cholesterol 228, , HDL 61,   She reports it causes significant fatigue/aching  She is willing to try Livalo 2 mg every other day or twice a week  She has a history of rheumatoid arthritis for which she is not currently taking anything  We attempted Repatha, but her insurance denied it  We will attempt Pravastatin 10 mg daily and see if she can tolerate it, then if this does not work can try Simvastatin to see if we can eventually get Repatha approved  1  Essential hypertension     2  Coronary artery disease involving native coronary artery of native heart without angina pectoris     3   Dyslipidemia  pravastatin (PRAVACHOL) 10 mg tablet       HPI: 60 y o  woman with HTN, HL, family history of premature CAD, who is here for follow up of CAD  She initially presented for evaluation of exertional dyspnea and throat discomfort in 9/15  She underwent cardiac catheterization 10/15, which showed 80% mid LAD stenosis, 70% ostial D2 branch which was small, 100% stenosis of origin of OM2 with collateral flow from RCA to OM3 branch, and 70% stenosis of the origin of the right sided PDA  LVEDP was normal  She underwent 3 vessel CABG 11/15 with LIMA to LAD, SVG to PDA, and SVG to OM3  She required postop left sided thoracentesis with 300 mL of fluid removed in 12/15  Echo 9/15 showed normal LV size and hyperdynamic LV function with EF 70%, no RMWA  Grade I diastolic dysfunction with elevated filling pressure  RV size and function were normal Trace MR  She was diagnosed with ulcerative colitis in 4/18, now is taking Delzicol  She stopped taking Coreg and Livalo at that time  She reports she lost weight after ulcerative colitis  She is not eating fiber, she is eating white bread, has cut out butter  She reports an episode of high BP one day, 150-160s  She also had some palpitations  Occurs a few times a month  She reports she is not taking Coreg daily  She is exercising in the form of treadmill/elliptical, no chest pain or shortness of breath with exeriton  Occasionally gets random chest discomfort in middle of chest, lasts around 5-10 minutes, resolves on its own  No LE edema  No orthopnea, using 1 pillow to sleep, no PND         Patient Active Problem List   Diagnosis    Periumbilic abdominal tenderness    Rectal discomfort    CRP elevated    Bilateral radiating leg pain    Coronary artery disease involving native coronary artery of native heart without angina pectoris    Essential hypertension    Rheumatoid arthritis ([address] )    Elevated d-dimer    Abdominal pain    Abnormal TSH    Beta thalassemia minor    Dyslipidemia    Ulcerative pancolitis with rectal bleeding ([address] ) Allergies   Allergen Reactions    Iodinated Diagnostic Agents Anaphylaxis         Current Outpatient Medications:     carvedilol (COREG CR) 10 MG 24 hr capsule, Take 1 capsule (10 mg total) by mouth daily, Disp: 90 capsule, Rfl: 3    Cholecalciferol 71644 units TABS, Take 1 tablet (50,000 Units total) by mouth once a week, Disp: 12 tablet, Rfl: 0    Epinastine HCl 0 05 % ophthalmic solution, , Disp: , Rfl:     ferrous sulfate 325 (65 Fe) mg tablet, Take 1 tablet (325 mg total) by mouth 2 (two) times a day, Disp: 60 tablet, Rfl: 1    mesalamine (DELZICOL) 400 mg, Take 3 capsules (1,200 mg total) by mouth 4 (four) times a day, Disp: 360 capsule, Rfl: 3    mometasone (NASONEX) 50 mcg/act nasal spray, 2 sprays daily, Disp: , Rfl: 2    Evolocumab with Infusor 420 MG/3 5ML SOCT, Inject 3 5 mL (420 mg total) under the skin every 30 (thirty) days (Patient not taking: Reported on 2/15/2019), Disp: 1 Cartridge, Rfl: 11    mesalamine (ROWASA) 4 g, Insert 60 mL (4 g total) into the rectum daily at bedtime (Patient not taking: Reported on 2/15/2019), Disp: 90 Bottle, Rfl: 3    pravastatin (PRAVACHOL) 10 mg tablet, Take 1 tablet (10 mg total) by mouth daily at bedtime, Disp: 30 tablet, Rfl: 11    zolpidem (AMBIEN) 5 mg tablet, , Disp: , Rfl:     Past Medical History:   Diagnosis Date    Allergic rhinitis     Breast CA ([facility] [address] )     R breast CA-chemo/radiaition    Diarrhea     for 6 weeks-colonosocpy today 9/26/2016 and EGD    History of radiation therapy     Hypertension     Thalassemia     Vitamin D deficiency        Family History   Problem Relation Age of Onset    Stroke Father     Heart attack Brother     Sudden death Brother         cardiac    Heart attack Maternal Uncle     Hypertension Family     Hyperlipidemia Family     Clotting disorder Neg Hx     Anuerysm Neg Hx        Past Surgical History:   Procedure Laterality Date    BREAST LUMPECTOMY Right     BREAST LUMPECTOMY Right     BREAST LUMPECTOMY Right     with lymph nodes  2007    COLONOSCOPY      COLONOSCOPY N/A 4/23/2018    Procedure: COLONOSCOPY;  Surgeon: Aneta Huynh MD;  [facility]; Service: Gastroenterology    CORONARY ARTERY BYPASS GRAFT      CORONARY ARTERY BYPASS GRAFT      x3, 11/2015    HYSTERECTOMY      b/l ovaries removed    OOPHORECTOMY      [place] COLONOSCOPY FLX DX W/COLLJ SPEC WHEN PFRMD N/A 9/26/2016    Procedure: COLONOSCOPY;  Surgeon: Kathleen sOorio DO;  [facility]: [facility]; Service: Gastroenterology       Social History     Socioeconomic History    Marital status: /Civil Union     Spouse name: Not on file    Number of children: Not on file    Years of education: Not on file    Highest education level: Not on file   Occupational History    Not on file   Social Needs    Financial resource strain: Not on file    Food insecurity:     Worry: Not on file     Inability: Not on file    Transportation needs:     Medical: Not on file     Non-medical: Not on file   Tobacco Use    Smoking status: Never Smoker    Smokeless tobacco: Never Used   Substance and Sexual Activity    Alcohol use:  Yes     Alcohol/week: 2 4 oz     Types: 4 Glasses of wine per week     Comment: per year    Drug use: No    Sexual activity: Not on file   Lifestyle    Physical activity:     Days per week: Not on file     Minutes per session: Not on file    Stress: Not on file   Relationships    Social connections:     Talks on phone: Not on file     Gets together: Not on file     Attends [employer] service: Not on file     Active member of club or organization: Not on file     Attends meetings of clubs or organizations: Not on file     Relationship status: Not on file    Intimate partner violence:     Fear of current or ex partner: Not on file     Emotionally abused: Not on file     Physically abused: Not on file     Forced sexual activity: Not on file   Other Topics Concern    Not on file   Social History Narrative    Not on file       Review of Systems   Constitution: Positive for weight loss  Negative for chills, decreased appetite, diaphoresis, fever, malaise/fatigue, night sweats and weight gain  HENT: Negative for ear pain, hearing loss, hoarse voice, nosebleeds, sore throat and tinnitus  Eyes: Negative for blurred vision and pain  Cardiovascular: Negative  Negative for chest pain, claudication, cyanosis, dyspnea on exertion, irregular heartbeat, leg swelling, near-syncope, orthopnea, palpitations, paroxysmal nocturnal dyspnea and syncope  Respiratory: Negative for cough, hemoptysis, shortness of breath, sleep disturbances due to breathing, snoring, sputum production and wheezing  Hematologic/Lymphatic: Negative for adenopathy and bleeding problem  Does not bruise/bleed easily  Skin: Negative for color change, dry skin, flushing, itching, poor wound healing and rash  Musculoskeletal: Positive for arthritis  Negative for back pain, falls, joint pain, muscle cramps, muscle weakness, myalgias and neck pain  Gastrointestinal: Negative for abdominal pain, constipation, diarrhea, dysphagia, heartburn, hematemesis, hematochezia, melena, nausea and vomiting  Genitourinary: Negative for dysuria, frequency, hematuria, hesitancy, non-menstrual bleeding and urgency  Neurological: Negative for excessive daytime sleepiness, dizziness, focal weakness, headaches, light-headedness, loss of balance, numbness, paresthesias, tremors, vertigo and weakness  Psychiatric/Behavioral: Negative for altered mental status, depression and memory loss  The patient does not have insomnia and is not nervous/anxious  Allergic/Immunologic: Negative for environmental allergies and persistent infections         Vitals: /88 (BP Location: Left arm, Patient Position: Sitting, Cuff Size: Standard)   Pulse 72   Ht 5' 1" (1 549 m)   Wt 62 kg (136 lb 11 2 oz)   LMP  (LMP Unknown)   BMI 25 83 kg/m²       Physical Exam:     GEN: Alert and oriented x 3, in no acute distress  Well appearing and well nourished  HEENT: Sclera anicteric, conjunctivae pink, mucous membranes moist  Oropharynx clear  NECK: Supple, no carotid bruits, no significant JVD  Trachea midline, no thyromegaly  HEART: Regular rhythm, normal S1 and S2, no murmurs, clicks, gallops or rubs  PMI nondisplaced, no thrills  LUNGS: Clear to auscultation bilaterally; no wheezes, rales, or rhonchi  No increased work of breathing or signs of respiratory distress  ABDOMEN: Soft, nontender, nondistended, normoactive bowel sounds  EXTREMITIES: Skin warm and well perfused, no clubbing, cyanosis, or edema  NEURO: No focal findings  Normal gait  Normal speech  Mood and affect normal    SKIN: Normal without suspicious lesions on exposed skin        Lab Results:       Lab Results   Component Value Date    HGBA1C 6 0 (H) 11/10/2015     Lab Results   Component Value Date    CHOL 173 12/22/2017    CHOL 173 12/22/2017    CHOL 123 (L) 08/12/2016     Lab Results   Component Value Date    HDL 61 11/02/2018    HDL 58 12/22/2017    HDL 58 12/22/2017     No results found for: [facility]  Lab Results   Component Value Date    TRIG 148 11/02/2018    TRIG 133 12/22/2017    TRIG 133 12/22/2017     No results found for: CHOLHDL

## 2019-02-15 ENCOUNTER — OFFICE VISIT (OUTPATIENT)
Dept: CARDIOLOGY CLINIC | Facility: CLINIC | Age: 59
End: 2019-02-15
Payer: COMMERCIAL

## 2019-02-15 VITALS
WEIGHT: 136.7 LBS | BODY MASS INDEX: 25.81 KG/M2 | HEART RATE: 72 BPM | DIASTOLIC BLOOD PRESSURE: 88 MMHG | HEIGHT: 61 IN | SYSTOLIC BLOOD PRESSURE: 134 MMHG

## 2019-02-15 DIAGNOSIS — I25.10 CORONARY ARTERY DISEASE INVOLVING NATIVE CORONARY ARTERY OF NATIVE HEART WITHOUT ANGINA PECTORIS: ICD-10-CM

## 2019-02-15 DIAGNOSIS — I10 ESSENTIAL HYPERTENSION: Primary | Chronic | ICD-10-CM

## 2019-02-15 DIAGNOSIS — E78.5 DYSLIPIDEMIA: ICD-10-CM

## 2019-02-15 PROCEDURE — 99214 OFFICE O/P EST MOD 30 MIN: CPT | Performed by: INTERNAL MEDICINE

## 2019-02-15 RX ORDER — PRAVASTATIN SODIUM 10 MG
10 TABLET ORAL
Qty: 30 TABLET | Refills: 11 | Status: SHIPPED | OUTPATIENT
Start: 2019-02-15 | End: 2019-09-25 | Stop reason: ALTCHOICE

## 2019-02-19 ENCOUNTER — TELEPHONE (OUTPATIENT)
Dept: CARDIOLOGY CLINIC | Facility: CLINIC | Age: 59
End: 2019-02-19

## 2019-02-19 ENCOUNTER — OFFICE VISIT (OUTPATIENT)
Dept: GASTROENTEROLOGY | Facility: CLINIC | Age: 59
End: 2019-02-19
Payer: COMMERCIAL

## 2019-02-19 VITALS
HEIGHT: 61 IN | WEIGHT: 136 LBS | BODY MASS INDEX: 25.68 KG/M2 | DIASTOLIC BLOOD PRESSURE: 89 MMHG | TEMPERATURE: 97.2 F | SYSTOLIC BLOOD PRESSURE: 138 MMHG | HEART RATE: 74 BPM

## 2019-02-19 DIAGNOSIS — K51.00 ULCERATIVE PANCOLITIS WITHOUT COMPLICATION (HCC): ICD-10-CM

## 2019-02-19 DIAGNOSIS — K51.011 ULCERATIVE PANCOLITIS WITH RECTAL BLEEDING (HCC): Primary | ICD-10-CM

## 2019-02-19 DIAGNOSIS — R79.82 CRP ELEVATED: ICD-10-CM

## 2019-02-19 PROCEDURE — 99214 OFFICE O/P EST MOD 30 MIN: CPT | Performed by: INTERNAL MEDICINE

## 2019-02-19 RX ORDER — MESALAMINE 4 G/60ML
4 ENEMA RECTAL
Qty: 90 BOTTLE | Refills: 3 | Status: SHIPPED | OUTPATIENT
Start: 2019-02-19 | End: 2019-07-05 | Stop reason: SDUPTHER

## 2019-02-19 RX ORDER — MESALAMINE 400 MG/1
1200 CAPSULE, DELAYED RELEASE ORAL 4 TIMES DAILY
Qty: 360 CAPSULE | Refills: 3 | Status: SHIPPED | OUTPATIENT
Start: 2019-02-19 | End: 2019-10-24 | Stop reason: SDUPTHER

## 2019-02-19 NOTE — TELEPHONE ENCOUNTER
Morning  [employer] plan has sent more forms, ill give them to you tomorrow when you are in [facility] in [facility]  am   FYI

## 2019-02-19 NOTE — PROGRESS NOTES
[facility] - Outpatient Follow-up Note  Quyen Juarez 60 y o  female MRN: [id number]  Encounter: [id number]          ASSESSMENT AND PLAN:    Quyen Juarez is a 60 y o  female here for a follow up regarding ulcerative pancolitis  1  Ulcerative pancolitis without complication  - doing well clinically, will continue rowasa and delzicol  -update labs     - mesalamine (ROWASA) 4 g; Insert 60 mL (4 g total) into the rectum daily at bedtime  Dispense: 90 Bottle; Refill: 3  - mesalamine (DELZICOL) 400 mg; Take 3 capsules (1,200 mg total) by mouth 4 (four) times a day  Dispense: 360 capsule; Refill: 3    We discusses reintroducing fiber and other foods as tolerable      3  CRP elevated  -Due to known UC, will repeat to ensure it is downtrending  I will repeat a CBC and CMP as well    4  Colon Cancer Screening  We will discuss a repeat colonoscopy at her next visit     Follow up in 6 months    ______________________________________________________________________    SUBJECTIVE:    Quyen Juarez is a 60 y o  female here for a follow up regarding ulcerative pancolitis  She states that she is feeling well  She is still taking Rowasa 4g and Delzicol 400mg and states that they help make her feel better but that Delzicol causes her to urinate less often  She is still taking Bentyl and states that she feels less bloated due to it  She is having 2 BMs a day and is currently not feeling bloated or passing gas  Bloating and gassiness is only exacerbated by trigger foods  She denies any vaginal bleeding  Her past surgical history includes a colonoscopy on 4/23/18 which showed colitis  She does not smoke and is currently not drinking  REVIEW OF SYSTEMS IS OTHERWISE NEGATIVE        Historical Information   Past Medical History:   Diagnosis Date    Allergic rhinitis     Breast CA ([address] )     R breast CA-chemo/radiaition    Diarrhea     for 6 weeks-colonosocpy today 9/26/2016 and EGD    History of radiation therapy     Hypertension     Thalassemia     Vitamin D deficiency      Past Surgical History:   Procedure Laterality Date    BREAST LUMPECTOMY Right     BREAST LUMPECTOMY Right     BREAST LUMPECTOMY Right     with lymph nodes  2007    COLONOSCOPY      COLONOSCOPY N/A 4/23/2018    Procedure: COLONOSCOPY;  Surgeon: Beverly Gotti MD;  [facility]; Service: Gastroenterology    CORONARY ARTERY BYPASS GRAFT      CORONARY ARTERY BYPASS GRAFT      x3, 11/2015    HYSTERECTOMY      b/l ovaries removed    OOPHORECTOMY      [place] COLONOSCOPY FLX DX W/COLLJ SPEC WHEN PFRMD N/A 9/26/2016    Procedure: COLONOSCOPY;  Surgeon: Paige Mullen DO;  [facility];   Service: Gastroenterology     Social History   Social History     Substance and Sexual Activity   Alcohol Use Yes    Alcohol/week: 2 4 oz    Types: 4 Glasses of wine per week    Comment: per year     Social History     Substance and Sexual Activity   Drug Use No     Social History     Tobacco Use   Smoking Status Never Smoker   Smokeless Tobacco Never Used     Family History   Problem Relation Age of Onset    Stroke Father     Heart attack Brother     Sudden death Brother         cardiac    Heart attack Maternal Uncle     Hypertension Family     Hyperlipidemia Family     Clotting disorder Neg Hx     Anuerysm Neg Hx        Meds/Allergies       Current Outpatient Medications:     carvedilol (COREG CR) 10 MG 24 hr capsule    Cholecalciferol 29144 units TABS    Epinastine HCl 0 05 % ophthalmic solution    Evolocumab with Infusor 420 MG/3 5ML SOCT    ferrous sulfate 325 (65 Fe) mg tablet    mesalamine (DELZICOL) 400 mg    mesalamine (ROWASA) 4 g    mometasone (NASONEX) 50 mcg/act nasal spray    pravastatin (PRAVACHOL) 10 mg tablet    zolpidem (AMBIEN) 5 mg tablet    Allergies   Allergen Reactions    Iodinated Diagnostic Agents Anaphylaxis           Objective     Blood pressure 138/89, pulse 74, temperature (!) 97 2 °F (36 2 °C), temperature source Tympanic, height 5' 1" (1 549 m), weight 61 7 kg (136 lb), not currently breastfeeding  Body mass index is 25 7 kg/m²  PHYSICAL EXAM:      General Appearance:   Alert, cooperative, no distress   HEENT:   Normocephalic, atraumatic, anicteric      Neck:  Supple, symmetrical, trachea midline   Lungs:   Clear to auscultation bilaterally; no rales, rhonchi or wheezing; respirations unlabored    Heart[de-identified]   Regular rate and rhythm; no murmur, rub, or gallop  Abdomen:   Soft, non-tender, non-distended; normal bowel sounds; no masses, no organomegaly    Genitalia:   Deferred    Rectal:   Deferred    Extremities:  No cyanosis, clubbing or edema    Pulses:  2+ and symmetric    Skin:  No jaundice, rashes, or lesions    Lymph nodes:  No palpable cervical lymphadenopathy        Lab Results:   No visits with results within 1 Day(s) from this visit     Latest known visit with results is:   Orders Only on 11/02/2018   Component Date Value    Total Cholesterol 11/02/2018 228*    HDL 11/02/2018 61     Triglycerides 11/02/2018 148     LDL Direct 11/02/2018 140*    Chol HDLC Ratio 11/02/2018 3 7     Non-HDL Cholesterol 11/02/2018 167*    Glucose, Random 11/02/2018 87     BUN 11/02/2018 23     Creatinine 11/02/2018 0 79     eGFR Non African American 11/02/2018 82     eGFR  11/02/2018 95     SL AMB BUN/CREATININE RA* 97/52/4058 NOT APPLICABLE     Sodium 95/15/7985 139     Potassium 11/02/2018 4 2     Chloride 11/02/2018 107     CO2 11/02/2018 25     SL AMB CALCIUM 11/02/2018 9 2     Protein, Total 11/02/2018 7 1     Albumin 11/02/2018 4 1     Globulin 11/02/2018 3 0     Albumin/Globulin Ratio 11/02/2018 1 4     TOTAL BILIRUBIN 11/02/2018 0 5     Alkaline Phosphatase 11/02/2018 123     AST 11/02/2018 23     ALT 11/02/2018 29     White Blood Cell Count 11/02/2018 7 0     Red Blood Cell Count 11/02/2018 6 07*    Hemoglobin 11/02/2018 11 5*    HCT 11/02/2018 38 6     MCV 11/02/2018 63 6*    MCH 11/02/2018 18 9*    MCHC 11/02/2018 29 8*    RDW 11/02/2018 20 2*    Platelet Count 82/75/9351 338     SL AMB MPV 11/02/2018 11 2     Neutrophils (Absolute) 11/02/2018 2,933     Lymphocytes (Absolute) 11/02/2018 3,276     Monocytes (Absolute) 11/02/2018 574     Eosinophils (Absolute) 11/02/2018 147     Basophils ABS 11/02/2018 70     Neutrophils 11/02/2018 41 9     Lymphocytes 11/02/2018 46 8     Monocytes 11/02/2018 8 2     Eosinophils 11/02/2018 2 1     Basophils PCT 11/02/2018 1 0     RBC Morphology 11/02/2018      Note: 11/02/2018      Ferritin 11/02/2018 111     TSH 11/02/2018 2 40     Vitamin B-12 11/02/2018 459     Vitamin D, 25-Hydroxy, S* 11/02/2018 24*     Radiology Results:   No results found  Attestation:   By signing my name below, I, Abimbola Juarez, attest that this documentation has been prepared under the direction and in the presence of SHILPA Reed  Electronically Signed: Jacqueline Love  2/19/19        I, Nicholas Peterson, personally performed the services described in this documentation  All medical record entries made by the shiraibe were at my direction and in my presence  I have reviewed the chart and discharge instructions and agree that the record reflects my personal performance and is accurate and complete   SHILPA Reed  2/19/19

## 2019-02-21 DIAGNOSIS — E78.5 DYSLIPIDEMIA: ICD-10-CM

## 2019-02-21 DIAGNOSIS — I25.10 CORONARY ARTERY DISEASE INVOLVING NATIVE CORONARY ARTERY OF NATIVE HEART WITHOUT ANGINA PECTORIS: ICD-10-CM

## 2019-02-25 ENCOUNTER — TELEPHONE (OUTPATIENT)
Dept: GASTROENTEROLOGY | Facility: CLINIC | Age: 59
End: 2019-02-25

## 2019-02-25 NOTE — TELEPHONE ENCOUNTER
Dr Marichuy Yeh patient      She wanted to speak with a nurse,, offered to take message, she wanted to leave vm

## 2019-02-25 NOTE — TELEPHONE ENCOUNTER
Patient called to request coupon for Delzicol 400mg  [facility] office will mail savings card to patient

## 2019-03-18 ENCOUNTER — TELEPHONE (OUTPATIENT)
Dept: GASTROENTEROLOGY | Facility: CLINIC | Age: 59
End: 2019-03-18

## 2019-03-18 NOTE — TELEPHONE ENCOUNTER
I called her, she reports that she had crampy abdominal pain over the weekend but no diarrhea or bleeding  She had some fecal urgency but her stools have remained formed  She began prednisone 10 mg daily as well as pantoprazole 40 mg daily and reports improvement in her pain  She would like to keep taking the prednisone so I let her know that she can continue the 10 mg dose for the next 2 weeks and then decrease to 5 mg while monitoring for symptoms  If she has recurrence of her pain she should call the office  If she does well, I recommended that she stop the prednisone after a week on the 5 mg dose  I saw from the previous office note that you are recommending a colonoscopy soon, do think that she should schedule this to re-stage her disease? She states that she has been taking the mesalamine as prescribed

## 2019-03-18 NOTE — TELEPHONE ENCOUNTER
chaput pt    Pt called to advise her symptoms have returned so she started the use of prednisone and pantoprazole again on Saturday   She is requesting a call back from either dr Trinidad Dancer or a PA to discuss [phone]

## 2019-03-25 NOTE — TELEPHONE ENCOUNTER
I called pt  She denies any diarrhea  She added pantoprazole as well  Currently taking 5 mg of predisone daily  Denies any rectal bleeding  Advised pt to continue prednisone for just a few days more  Can also dc pantoprazole if not needed

## 2019-04-16 ENCOUNTER — TELEPHONE (OUTPATIENT)
Dept: GASTROENTEROLOGY | Facility: AMBULARY SURGERY CENTER | Age: 59
End: 2019-04-16

## 2019-04-16 DIAGNOSIS — D50.9 IRON DEFICIENCY ANEMIA, UNSPECIFIED IRON DEFICIENCY ANEMIA TYPE: ICD-10-CM

## 2019-04-16 DIAGNOSIS — K51.011 ULCERATIVE PANCOLITIS WITH RECTAL BLEEDING (HCC): Primary | ICD-10-CM

## 2019-04-16 RX ORDER — FERROUS SULFATE 325(65) MG
325 TABLET ORAL 2 TIMES DAILY
Qty: 60 TABLET | Refills: 1 | Status: SHIPPED | OUTPATIENT
Start: 2019-04-16 | End: 2019-10-24 | Stop reason: SDUPTHER

## 2019-06-04 ENCOUNTER — TELEPHONE (OUTPATIENT)
Dept: GASTROENTEROLOGY | Facility: CLINIC | Age: 59
End: 2019-06-04

## 2019-06-04 DIAGNOSIS — E55.9 VITAMIN D DEFICIENCY: Primary | ICD-10-CM

## 2019-06-19 LAB
ALBUMIN SERPL-MCNC: 3.8 G/DL (ref 3.6–5.1)
ALBUMIN/GLOB SERPL: 1.2 (CALC) (ref 1–2.5)
ALP SERPL-CCNC: 122 U/L (ref 33–130)
ALT SERPL-CCNC: 20 U/L (ref 6–29)
AST SERPL-CCNC: 16 U/L (ref 10–35)
BASOPHILS # BLD AUTO: 71 CELLS/UL (ref 0–200)
BASOPHILS NFR BLD AUTO: 0.9 %
BILIRUB SERPL-MCNC: 0.3 MG/DL (ref 0.2–1.2)
BUN SERPL-MCNC: 22 MG/DL (ref 7–25)
BUN/CREAT SERPL: NORMAL (CALC) (ref 6–22)
CALCIUM SERPL-MCNC: 8.9 MG/DL (ref 8.6–10.4)
CHLORIDE SERPL-SCNC: 107 MMOL/L (ref 98–110)
CO2 SERPL-SCNC: 25 MMOL/L (ref 20–32)
CREAT SERPL-MCNC: 0.94 MG/DL (ref 0.5–1.05)
CRP SERPL-MCNC: 3.4 MG/L
EOSINOPHIL # BLD AUTO: 340 CELLS/UL (ref 15–500)
EOSINOPHIL NFR BLD AUTO: 4.3 %
ERYTHROCYTE [DISTWIDTH] IN BLOOD BY AUTOMATED COUNT: 18.9 % (ref 11–15)
GLOBULIN SER CALC-MCNC: 3.2 G/DL (CALC) (ref 1.9–3.7)
GLUCOSE SERPL-MCNC: 90 MG/DL (ref 65–99)
HCT VFR BLD AUTO: 37.2 % (ref 35–45)
HGB BLD-MCNC: 11.1 G/DL (ref 11.7–15.5)
LYMPHOCYTES # BLD AUTO: 2812 CELLS/UL (ref 850–3900)
LYMPHOCYTES NFR BLD AUTO: 35.6 %
MCH RBC QN AUTO: 19.2 PG (ref 27–33)
MCHC RBC AUTO-ENTMCNC: 29.8 G/DL (ref 32–36)
MCV RBC AUTO: 64.5 FL (ref 80–100)
MONOCYTES # BLD AUTO: 569 CELLS/UL (ref 200–950)
MONOCYTES NFR BLD AUTO: 7.2 %
NEUTROPHILS # BLD AUTO: 4108 CELLS/UL (ref 1500–7800)
NEUTROPHILS NFR BLD AUTO: 52 %
PLATELET # BLD AUTO: 314 THOUSAND/UL (ref 140–400)
PMV BLD REES-ECKER: 11.4 FL (ref 7.5–12.5)
POTASSIUM SERPL-SCNC: 4.2 MMOL/L (ref 3.5–5.3)
PROT SERPL-MCNC: 7 G/DL (ref 6.1–8.1)
RBC # BLD AUTO: 5.77 MILLION/UL (ref 3.8–5.1)
SL AMB EGFR AFRICAN AMERICAN: 77 ML/MIN/1.73M2
SL AMB EGFR NON AFRICAN AMERICAN: 66 ML/MIN/1.73M2
SODIUM SERPL-SCNC: 138 MMOL/L (ref 135–146)
TSH SERPL-ACNC: 1.8 MIU/L (ref 0.4–4.5)
WBC # BLD AUTO: 7.9 THOUSAND/UL (ref 3.8–10.8)

## 2019-06-20 ENCOUNTER — TELEPHONE (OUTPATIENT)
Dept: GASTROENTEROLOGY | Facility: AMBULARY SURGERY CENTER | Age: 59
End: 2019-06-20

## 2019-06-25 ENCOUNTER — TELEPHONE (OUTPATIENT)
Dept: GASTROENTEROLOGY | Facility: AMBULARY SURGERY CENTER | Age: 59
End: 2019-06-25

## 2019-07-05 DIAGNOSIS — K51.00 ULCERATIVE PANCOLITIS WITHOUT COMPLICATION (HCC): ICD-10-CM

## 2019-07-05 RX ORDER — MESALAMINE 4 G/60ML
4 ENEMA RECTAL
Qty: 90 BOTTLE | Refills: 3 | Status: SHIPPED | OUTPATIENT
Start: 2019-07-05 | End: 2019-07-16 | Stop reason: SDUPTHER

## 2019-07-16 ENCOUNTER — TELEPHONE (OUTPATIENT)
Dept: GASTROENTEROLOGY | Facility: AMBULARY SURGERY CENTER | Age: 59
End: 2019-07-16

## 2019-07-16 DIAGNOSIS — K51.00 ULCERATIVE PANCOLITIS WITHOUT COMPLICATION (HCC): ICD-10-CM

## 2019-07-16 RX ORDER — MESALAMINE 4 G/60ML
4 ENEMA RECTAL
Qty: 90 BOTTLE | Refills: 3 | Status: SHIPPED | OUTPATIENT
Start: 2019-07-16 | End: 2019-10-24 | Stop reason: SDUPTHER

## 2019-07-30 DIAGNOSIS — K51.00 ULCERATIVE CHRONIC PANCOLITIS WITHOUT COMPLICATIONS (HCC): Primary | ICD-10-CM

## 2019-09-17 ENCOUNTER — ANESTHESIA EVENT (OUTPATIENT)
Dept: GASTROENTEROLOGY | Facility: AMBULARY SURGERY CENTER | Age: 59
End: 2019-09-17

## 2019-09-24 NOTE — PROGRESS NOTES
Cardiology Outpatient Follow up Note    Yaritza Padilla 60 y o  female MRN: [id number]    09/25/19          Assessment/Plan:    1  CAD s/p 3 vessel CABG 11/15  LV function was normal by echo 9/15  She was having issues with hair loss, on Metoprolol  Switched to [employer] 11/16  She reports she was tolerating it, but stopped taking Coreg in 4/18 when she was diagnosed with ulcerative colitis  She is now using Coreg CR 10 mg daily now, but reports not taking daily, I advised her to take daily in order to prevent symptoms        2  HTN  BP at goal  Currently controlled in office, sometimes elevated at home, but she is not taking Coreg consistently at home       3  HL  Lipid panel 11/15 showed total cholesterol 142, HDL 45, , LDL 57, she was not taking a statin at that time  She was started on Atorvastatin 10 11/15  Lipid panel in 8/16 showed total cholesterol 123, HDL 45, LDL 52,   She had a lot of issues with pain in her ankles, so attempted to switch to Livalo 2 mg in 11/16  She reports she was tolerating Livalo until 4/18, when she stopped it due to ulcerative colitis and fatigue/aching  Lipid panel in 12/17 while on Livalo 2 mg daily was total 173, , HDL 58, LDL 92  Lipid panel 11/18 off of Livalo showed total cholesterol 228, , HDL 61,   She reports it caused significant fatigue/aching  She has a history of rheumatoid arthritis for which she is not currently taking anything  We attempted Repatha, finally got it approved 2/19  She reports she is taking it and tolerating without difficulty  Lipid panel 6/19 showed total cholesterol 130, , LDL 59, HDL 48      1  Coronary artery disease involving native coronary artery of native heart without angina pectoris     2  Essential hypertension         HPI: 60 y o  woman with HTN, HL, family history of premature CAD, who is here for follow up of CAD       She initially presented for evaluation of exertional dyspnea and throat discomfort in 9/15  She underwent cardiac catheterization 10/15, which showed 80% mid LAD stenosis, 70% ostial D2 branch which was small, 100% stenosis of origin of OM2 with collateral flow from RCA to OM3 branch, and 70% stenosis of the origin of the right sided PDA  LVEDP was normal  She underwent 3 vessel CABG 11/15 with LIMA to LAD, SVG to PDA, and SVG to OM3  She required postop left sided thoracentesis with 300 mL of fluid removed in 12/15  Echo 9/15 showed normal LV size and hyperdynamic LV function with EF 70%, no RMWA  Grade I diastolic dysfunction with elevated filling pressure  RV size and function were normal Trace MR  She was diagnosed with ulcerative colitis in 4/18, now is taking Delzicol  She stopped taking Coreg and Livalo at that time  She reports she lost weight after ulcerative colitis  She is not eating fiber, she is eating white bread, has cut out butter  She is not taking Coreg currently  She is exercising in the form of spin classes 7 days a week, no chest pain with exertion  No LE edema  No orthopnea, using 1 pillow to sleep, no PND         Patient Active Problem List   Diagnosis    Periumbilic abdominal tenderness    Rectal discomfort    CRP elevated    Bilateral radiating leg pain    Coronary artery disease involving native coronary artery of native heart without angina pectoris    Essential hypertension    Rheumatoid arthritis (HCC)    Elevated d-dimer    Abdominal pain    Abnormal TSH    Beta thalassemia minor    Dyslipidemia    Ulcerative pancolitis with rectal bleeding (HCC)       Allergies   Allergen Reactions    Iodinated Diagnostic Agents Anaphylaxis, Hives and Swelling         Current Outpatient Medications:     Epinastine HCl 0 05 % ophthalmic solution, , Disp: , Rfl:     Evolocumab with Infusor 420 MG/3 5ML SOCT, Inject 3 5 mL (420 mg total) under the skin every 30 (thirty) days, Disp: 1 Cartridge, Rfl: 11    ferrous sulfate 325 (65 Fe) mg tablet, Take 1 tablet (325 mg total) by mouth 2 (two) times a day, Disp: 60 tablet, Rfl: 1    mesalamine (DELZICOL) 400 mg, Take 3 capsules (1,200 mg total) by mouth 4 (four) times a day, Disp: 360 capsule, Rfl: 3    mesalamine (ROWASA) 4 g, Insert 60 mL (4 g total) into the rectum daily at bedtime, Disp: 90 Bottle, Rfl: 3    mometasone (NASONEX) 50 mcg/act nasal spray, 2 sprays daily, Disp: , Rfl: 2    Cholecalciferol 46407 units TABS, Take 1 tablet (50,000 Units total) by mouth once a week (Patient not taking: Reported on 9/25/2019), Disp: 12 tablet, Rfl: 0    Na Sulfate-K Sulfate-Mg Sulf 17 5-3 13-1 6 GM/177ML SOLN, Take as directed by office  (Patient not taking: Reported on 9/25/2019), Disp: 2 Bottle, Rfl: 0    zolpidem (AMBIEN) 5 mg tablet, , Disp: , Rfl:     Past Medical History:   Diagnosis Date    Allergic rhinitis     Breast CA ([facility] [address] )     R breast CA-chemo/radiaition    Diarrhea     for 6 weeks-colonosocpy today 9/26/2016 and EGD    History of radiation therapy     Hypertension     Thalassemia     Vitamin D deficiency        Family History   Problem Relation Age of Onset    Stroke Father     Heart attack Brother    [place] Shay Sudden death Brother         cardiac    Heart attack Maternal Uncle     Hypertension Family     Hyperlipidemia Family     Clotting disorder Neg Hx     Anuerysm Neg Hx        Past Surgical History:   Procedure Laterality Date    BREAST LUMPECTOMY Right     BREAST LUMPECTOMY Right     BREAST LUMPECTOMY Right     with lymph nodes  2007    COLONOSCOPY      COLONOSCOPY N/A 4/23/2018    Procedure: COLONOSCOPY;  Surgeon: Shu Villatoro MD;  [facility]; Service: Gastroenterology    CORONARY ARTERY BYPASS GRAFT      CORONARY ARTERY BYPASS GRAFT      x3, 11/2015    HYSTERECTOMY      b/l ovaries removed    OOPHORECTOMY      SC COLONOSCOPY FLX DX W/COLLJ SPEC WHEN PFRMD N/A 9/26/2016    Procedure: COLONOSCOPY;  Surgeon: Kathe Reeder DO;  [facility];   Service: Gastroenterology Social History     Socioeconomic History    Marital status: /Civil Union     Spouse name: Not on file    Number of children: Not on file    Years of education: Not on file    Highest education level: Not on file   Occupational History    Not on file   Social Needs    Financial resource strain: Not on file    Food insecurity:     Worry: Not on file     Inability: Not on file    Transportation needs:     Medical: Not on file     Non-medical: Not on file   Tobacco Use    Smoking status: Never Smoker    Smokeless tobacco: Never Used   Substance and Sexual Activity    Alcohol use: Yes     Alcohol/week: 4 0 standard drinks     Types: 4 Glasses of wine per week     Comment: per year    Drug use: No    Sexual activity: Not on file   Lifestyle    Physical activity:     Days per week: Not on file     Minutes per session: Not on file    Stress: Not on file   Relationships    Social connections:     Talks on phone: Not on file     Gets together: Not on file     Attends Alevism service: Not on file     Active member of club or organization: Not on file     Attends meetings of clubs or organizations: Not on file     Relationship status: Not on file    Intimate partner violence:     Fear of current or ex partner: Not on file     Emotionally abused: Not on file     Physically abused: Not on file     Forced sexual activity: Not on file   Other Topics Concern    Not on file   Social History Narrative    Not on file       Review of Systems   Constitution: Negative for chills, decreased appetite, diaphoresis, fever, malaise/fatigue, night sweats, weight gain and weight loss  HENT: Negative for ear pain, hearing loss, hoarse voice, nosebleeds, sore throat and tinnitus  Eyes: Negative for blurred vision and pain  Cardiovascular: Negative    Negative for chest pain, claudication, cyanosis, dyspnea on exertion, irregular heartbeat, leg swelling, near-syncope, orthopnea, palpitations, paroxysmal nocturnal dyspnea and syncope  Respiratory: Negative for cough, hemoptysis, shortness of breath, sleep disturbances due to breathing, snoring, sputum production and wheezing  Hematologic/Lymphatic: Negative for adenopathy and bleeding problem  Does not bruise/bleed easily  Skin: Negative for color change, dry skin, flushing, itching, poor wound healing and rash  Musculoskeletal: Positive for arthritis  Negative for back pain, falls, joint pain, muscle cramps, muscle weakness, myalgias and neck pain  Gastrointestinal: Negative for abdominal pain, constipation, diarrhea, dysphagia, heartburn, hematemesis, hematochezia, melena, nausea and vomiting  Genitourinary: Negative for dysuria, frequency, hematuria, hesitancy, non-menstrual bleeding and urgency  Neurological: Negative for excessive daytime sleepiness, dizziness, focal weakness, headaches, light-headedness, loss of balance, numbness, paresthesias, tremors, vertigo and weakness  Psychiatric/Behavioral: Negative for altered mental status, depression and memory loss  The patient does not have insomnia and is not nervous/anxious  Allergic/Immunologic: Negative for environmental allergies and persistent infections  Vitals: /72 (BP Location: Left arm, Patient Position: Sitting, Cuff Size: Standard)   Pulse 88   Ht 5' 2" (1 575 m)   Wt 61 6 kg (135 lb 12 8 oz)   LMP  (LMP Unknown)   SpO2 95%   BMI 24 84 kg/m²     Physical Exam:     GEN: Alert and oriented x 3, in no acute distress  Well appearing and well nourished  HEENT: Sclera anicteric, conjunctivae pink, mucous membranes moist  Oropharynx clear  NECK: Supple, no carotid bruits, no significant JVD  Trachea midline, no thyromegaly  HEART: Regular rhythm, normal S1 and S2, no murmurs, clicks, gallops or rubs  PMI nondisplaced, no thrills  LUNGS: Clear to auscultation bilaterally; no wheezes, rales, or rhonchi   No increased work of breathing or signs of respiratory distress  ABDOMEN: Soft, nontender, nondistended, normoactive bowel sounds  EXTREMITIES: Skin warm and well perfused, no clubbing, cyanosis, or edema  NEURO: No focal findings  Normal gait  Normal speech  Mood and affect normal    SKIN: Normal without suspicious lesions on exposed skin        Lab Results:       Lab Results   Component Value Date    HGBA1C 6 0 (H) 11/10/2015     Lab Results   Component Value Date    CHOL 173 12/22/2017    CHOL 173 12/22/2017    CHOL 123 (L) 08/12/2016     Lab Results   Component Value Date    HDL 61 11/02/2018    HDL 58 12/22/2017    HDL 58 12/22/2017     No results found for: [address]  Lab Results   Component Value Date    TRIG 148 11/02/2018    TRIG 133 12/22/2017    TRIG 133 12/22/2017     No results found for: CHOLHDL

## 2019-09-25 ENCOUNTER — OFFICE VISIT (OUTPATIENT)
Dept: CARDIOLOGY CLINIC | Facility: CLINIC | Age: 59
End: 2019-09-25
Payer: COMMERCIAL

## 2019-09-25 VITALS
DIASTOLIC BLOOD PRESSURE: 72 MMHG | BODY MASS INDEX: 24.99 KG/M2 | WEIGHT: 135.8 LBS | HEIGHT: 62 IN | HEART RATE: 88 BPM | OXYGEN SATURATION: 95 % | SYSTOLIC BLOOD PRESSURE: 100 MMHG

## 2019-09-25 DIAGNOSIS — I25.10 CORONARY ARTERY DISEASE INVOLVING NATIVE CORONARY ARTERY OF NATIVE HEART WITHOUT ANGINA PECTORIS: Primary | ICD-10-CM

## 2019-09-25 DIAGNOSIS — E78.5 DYSLIPIDEMIA: ICD-10-CM

## 2019-09-25 DIAGNOSIS — I10 ESSENTIAL HYPERTENSION: Chronic | ICD-10-CM

## 2019-09-25 PROCEDURE — 99214 OFFICE O/P EST MOD 30 MIN: CPT | Performed by: INTERNAL MEDICINE

## 2019-09-25 PROCEDURE — 3078F DIAST BP <80 MM HG: CPT | Performed by: INTERNAL MEDICINE

## 2019-09-25 PROCEDURE — 3074F SYST BP LT 130 MM HG: CPT | Performed by: INTERNAL MEDICINE

## 2019-09-26 ENCOUNTER — TELEPHONE (OUTPATIENT)
Dept: GASTROENTEROLOGY | Facility: CLINIC | Age: 59
End: 2019-09-26

## 2019-10-01 ENCOUNTER — HOSPITAL ENCOUNTER (OUTPATIENT)
Dept: GASTROENTEROLOGY | Facility: AMBULARY SURGERY CENTER | Age: 59
Setting detail: OUTPATIENT SURGERY
Discharge: HOME | End: 2019-10-01
Attending: INTERNAL MEDICINE | Admitting: INTERNAL MEDICINE
Payer: COMMERCIAL

## 2019-10-01 ENCOUNTER — ANESTHESIA (OUTPATIENT)
Dept: GASTROENTEROLOGY | Facility: AMBULARY SURGERY CENTER | Age: 59
End: 2019-10-01

## 2019-10-01 VITALS
HEART RATE: 62 BPM | DIASTOLIC BLOOD PRESSURE: 75 MMHG | RESPIRATION RATE: 18 BRPM | HEIGHT: 62 IN | WEIGHT: 132 LBS | TEMPERATURE: 98.4 F | OXYGEN SATURATION: 95 % | SYSTOLIC BLOOD PRESSURE: 120 MMHG | BODY MASS INDEX: 24.29 KG/M2

## 2019-10-01 DIAGNOSIS — K51.00 ULCERATIVE PANCOLITIS WITHOUT COMPLICATION (HCC): ICD-10-CM

## 2019-10-01 PROCEDURE — 88305 TISSUE EXAM BY PATHOLOGIST: CPT | Performed by: PATHOLOGY

## 2019-10-01 PROCEDURE — 45380 COLONOSCOPY AND BIOPSY: CPT | Performed by: INTERNAL MEDICINE

## 2019-10-01 RX ORDER — LIDOCAINE HYDROCHLORIDE 10 MG/ML
INJECTION, SOLUTION INFILTRATION; PERINEURAL AS NEEDED
Status: DISCONTINUED | OUTPATIENT
Start: 2019-10-01 | End: 2019-10-01 | Stop reason: SURG

## 2019-10-01 RX ORDER — PROPOFOL 10 MG/ML
INJECTION, EMULSION INTRAVENOUS AS NEEDED
Status: DISCONTINUED | OUTPATIENT
Start: 2019-10-01 | End: 2019-10-01 | Stop reason: SURG

## 2019-10-01 RX ORDER — ONDANSETRON 2 MG/ML
INJECTION INTRAMUSCULAR; INTRAVENOUS AS NEEDED
Status: DISCONTINUED | OUTPATIENT
Start: 2019-10-01 | End: 2019-10-01 | Stop reason: SURG

## 2019-10-01 RX ORDER — SODIUM CHLORIDE, SODIUM LACTATE, POTASSIUM CHLORIDE, CALCIUM CHLORIDE 600; 310; 30; 20 MG/100ML; MG/100ML; MG/100ML; MG/100ML
125 INJECTION, SOLUTION INTRAVENOUS CONTINUOUS
Status: DISCONTINUED | OUTPATIENT
Start: 2019-10-01 | End: 2019-10-05 | Stop reason: HOSPADM

## 2019-10-01 RX ADMIN — SODIUM CHLORIDE, SODIUM LACTATE, POTASSIUM CHLORIDE, AND CALCIUM CHLORIDE: .6; .31; .03; .02 INJECTION, SOLUTION INTRAVENOUS at 13:28

## 2019-10-01 RX ADMIN — LIDOCAINE HYDROCHLORIDE 20 MG: 10 INJECTION, SOLUTION INFILTRATION; PERINEURAL at 14:27

## 2019-10-01 RX ADMIN — PROPOFOL 20 MG: 10 INJECTION, EMULSION INTRAVENOUS at 14:38

## 2019-10-01 RX ADMIN — PROPOFOL 75 MG: 10 INJECTION, EMULSION INTRAVENOUS at 14:27

## 2019-10-01 RX ADMIN — PROPOFOL 20 MG: 10 INJECTION, EMULSION INTRAVENOUS at 14:41

## 2019-10-01 RX ADMIN — PROPOFOL 20 MG: 10 INJECTION, EMULSION INTRAVENOUS at 14:35

## 2019-10-01 RX ADMIN — PROPOFOL 20 MG: 10 INJECTION, EMULSION INTRAVENOUS at 14:31

## 2019-10-01 RX ADMIN — PROPOFOL 20 MG: 10 INJECTION, EMULSION INTRAVENOUS at 14:32

## 2019-10-01 RX ADMIN — PROPOFOL 20 MG: 10 INJECTION, EMULSION INTRAVENOUS at 14:34

## 2019-10-01 RX ADMIN — PROPOFOL 20 MG: 10 INJECTION, EMULSION INTRAVENOUS at 14:37

## 2019-10-01 RX ADMIN — PROPOFOL 25 MG: 10 INJECTION, EMULSION INTRAVENOUS at 14:29

## 2019-10-01 RX ADMIN — PROPOFOL 20 MG: 10 INJECTION, EMULSION INTRAVENOUS at 14:33

## 2019-10-01 RX ADMIN — PROPOFOL 20 MG: 10 INJECTION, EMULSION INTRAVENOUS at 14:36

## 2019-10-01 RX ADMIN — PROPOFOL 20 MG: 10 INJECTION, EMULSION INTRAVENOUS at 14:39

## 2019-10-01 RX ADMIN — PROPOFOL 20 MG: 10 INJECTION, EMULSION INTRAVENOUS at 14:40

## 2019-10-01 RX ADMIN — ONDANSETRON 4 MG: 2 INJECTION INTRAMUSCULAR; INTRAVENOUS at 14:30

## 2019-10-01 NOTE — H&P
History and Physical -  [facility]  Conchis Scott 60 y o  female MRN: [id number]                  HPI: Conchis Scott is a 60y o  year old female who presents for colonoscopy due to history of ulcerative colitis  REVIEW OF SYSTEMS: Per the HPI, and otherwise unremarkable  Historical Information   Past Medical History:   Diagnosis Date    Allergic rhinitis     Breast CA ([address] )     R breast CA-chemo/radiaition    Diarrhea     for 6 weeks-colonosocpy today 9/26/2016 and EGD    History of radiation therapy     Hyperlipidemia     Hypertension     Thalassemia     Vitamin D deficiency      Past Surgical History:   Procedure Laterality Date    BREAST LUMPECTOMY Right     BREAST LUMPECTOMY Right     BREAST LUMPECTOMY Right     with lymph nodes  2007    COLONOSCOPY      COLONOSCOPY N/A 4/23/2018    Procedure: COLONOSCOPY;  Surgeon: Mani Crowell MD;  [facility]; Service: Gastroenterology    CORONARY ARTERY BYPASS GRAFT      CORONARY ARTERY BYPASS GRAFT      x3, 11/2015    HYSTERECTOMY      b/l ovaries removed    OOPHORECTOMY      TX COLONOSCOPY FLX DX W/COLLJ SPEC WHEN PFRMD N/A 9/26/2016    Procedure: COLONOSCOPY;  Surgeon: Joi Lopez DO;  [facility];   Service: Gastroenterology     Social History   Social History     Substance and Sexual Activity   Alcohol Use Yes    Alcohol/week: 4 0 standard drinks    Types: 4 Glasses of wine per week    Comment: per year     Social History     Substance and Sexual Activity   Drug Use No     Social History     Tobacco Use   Smoking Status Never Smoker   Smokeless Tobacco Never Used     Family History   Problem Relation Age of Onset    Stroke Father     Heart attack Brother    Katja Azul Sudden death Brother         cardiac    Heart attack Maternal Uncle     Hypertension Family     Hyperlipidemia Family     Clotting disorder Neg Hx     Anuerysm Neg Hx        Meds/Allergies       (Not in a hospital admission)    Allergies   Allergen Reactions    Iodinated Diagnostic Agents Anaphylaxis, Hives and Swelling       Objective     /79   Pulse 61   Temp (!) 97 3 °F (36 3 °C) (Temporal)   Resp 18   Ht 5' 2" (1 575 m)   Wt 59 9 kg (132 lb)   LMP  (LMP Unknown)   SpO2 97%   BMI 24 14 kg/m²       PHYSICAL EXAM    Gen: NAD  CV: RRR  CHEST: Clear  ABD: soft, NT/ND  EXT: no edema      ASSESSMENT/PLAN:  This is a 60y o  year old female here for colonoscopy, and she is stable and optimized for her procedure

## 2019-10-01 NOTE — ANESTHESIA POSTPROCEDURE EVALUATION
Post-Op Assessment Note    CV Status:  Stable  Pain Score: 0    Pain management: adequate     Mental Status:  Sleepy   Hydration Status:  Stable   PONV Controlled:  None   Airway Patency:  Patent and adequate   Post Op Vitals Reviewed: Yes      Staff: CRNA   Comments: 2 L NC          BP      Temp     Pulse     Resp      SpO2

## 2019-10-01 NOTE — ANESTHESIA PREPROCEDURE EVALUATION
Review of Systems/Medical History  Patient summary reviewed  Chart reviewed  History of anesthetic complications PONV    Cardiovascular  Exercise tolerance (METS): >4,  Hyperlipidemia, Hypertension controlled, Valvular heart disease , mitral regurgitation, No past MI , CAD , History of CABG (2015),    Pulmonary  Negative pulmonary ROS        GI/Hepatic    Bowel prep            Endo/Other     GYN    Hysterectomy, Breast cancer (right)        Hematology  Anemia (thalassemia) ,     Musculoskeletal  Rheumatoid arthritis ,   Arthritis     Neurology   Psychology         Physical Exam    Airway    Mallampati score: III  TM Distance: >3 FB  Neck ROM: full     Dental   No notable dental hx     Cardiovascular      Pulmonary      Other Findings       Lab Results   Component Value Date    WBC 7 9 06/18/2019    HGB 11 1 (L) 06/18/2019     06/18/2019     Lab Results   Component Value Date     12/22/2017     12/22/2017    K 4 2 06/18/2019    BUN 22 06/18/2019    CREATININE 0 94 06/18/2019    GLUCOSE 129 12/09/2015     Anesthesia Plan  ASA Score- 3     Anesthesia Type- IV sedation with anesthesia with ASA Monitors  Additional Monitors:   Airway Plan:         Plan Factors-    Induction- intravenous  Postoperative Plan-     Informed Consent- Anesthetic plan and risks discussed with patient  I personally reviewed this patient with the CRNA  Discussed and agreed on the Anesthesia Plan with the CRNA  Kalyan Oliveira

## 2019-10-07 ENCOUNTER — TELEPHONE (OUTPATIENT)
Dept: GASTROENTEROLOGY | Facility: AMBULARY SURGERY CENTER | Age: 59
End: 2019-10-07

## 2019-10-07 NOTE — TELEPHONE ENCOUNTER
Patients GI provider:  Dr Carlos Robbins    Number to return call: ( [phone]    Reason for call: Pt calling back to get colon results    Scheduled procedure/appointment date if applicable: na

## 2019-10-24 ENCOUNTER — TELEPHONE (OUTPATIENT)
Dept: GASTROENTEROLOGY | Facility: AMBULARY SURGERY CENTER | Age: 59
End: 2019-10-24

## 2019-10-24 ENCOUNTER — TELEPHONE (OUTPATIENT)
Dept: GASTROENTEROLOGY | Facility: CLINIC | Age: 59
End: 2019-10-24

## 2019-10-24 DIAGNOSIS — K51.00 ULCERATIVE PANCOLITIS WITHOUT COMPLICATION (HCC): ICD-10-CM

## 2019-10-24 DIAGNOSIS — K51.011 ULCERATIVE PANCOLITIS WITH RECTAL BLEEDING (HCC): ICD-10-CM

## 2019-10-24 DIAGNOSIS — R10.9 ABDOMINAL PAIN, UNSPECIFIED ABDOMINAL LOCATION: Primary | ICD-10-CM

## 2019-10-24 DIAGNOSIS — D50.9 IRON DEFICIENCY ANEMIA, UNSPECIFIED IRON DEFICIENCY ANEMIA TYPE: ICD-10-CM

## 2019-10-24 RX ORDER — DICYCLOMINE HCL 20 MG
20 TABLET ORAL EVERY 8 HOURS PRN
Qty: 30 TABLET | Refills: 0 | Status: SHIPPED | OUTPATIENT
Start: 2019-10-24 | End: 2019-11-18 | Stop reason: SDUPTHER

## 2019-10-24 RX ORDER — MESALAMINE 4 G/60ML
4 ENEMA RECTAL
Qty: 90 BOTTLE | Refills: 1 | Status: SHIPPED | OUTPATIENT
Start: 2019-10-24 | End: 2020-09-25 | Stop reason: SDUPTHER

## 2019-10-24 RX ORDER — MESALAMINE 4 G/60ML
4 ENEMA RECTAL
Qty: 90 BOTTLE | Refills: 1 | Status: SHIPPED | OUTPATIENT
Start: 2019-10-24 | End: 2019-10-24 | Stop reason: SDUPTHER

## 2019-10-24 RX ORDER — FERROUS SULFATE 325(65) MG
325 TABLET ORAL 2 TIMES DAILY
Qty: 60 TABLET | Refills: 1 | Status: SHIPPED | OUTPATIENT
Start: 2019-10-24 | End: 2019-11-19 | Stop reason: SDUPTHER

## 2019-10-24 RX ORDER — MESALAMINE 400 MG/1
1200 CAPSULE, DELAYED RELEASE ORAL 4 TIMES DAILY
Qty: 360 CAPSULE | Refills: 3 | Status: SHIPPED | OUTPATIENT
Start: 2019-10-24 | End: 2019-11-19 | Stop reason: SDUPTHER

## 2019-10-24 NOTE — TELEPHONE ENCOUNTER
GI Physician: Dr Mauri Schafer for Medication: Mesalamine (rowasa) enema      Dose: 4g    Quantity: 90 day    Pharmacy: [facility]

## 2019-10-24 NOTE — TELEPHONE ENCOUNTER
GI Physician: Dr Tay Vargas for Medication: dicyclomine and ferosul    Dose:  20mg, 325mg    Quantity:     Pharmacy: [facility]

## 2019-10-29 ENCOUNTER — TELEPHONE (OUTPATIENT)
Dept: GASTROENTEROLOGY | Facility: AMBULARY SURGERY CENTER | Age: 59
End: 2019-10-29

## 2019-10-29 NOTE — TELEPHONE ENCOUNTER
Patients GI provider:  Dr Geovanni Dawkins    Number to return call: ( [phone]    Reason for call: Pt calling to inform dr Geovanni Dawkins that she recently had urine test done and there was blood found in her urine    Scheduled procedure/appointment date if applicable:  ma

## 2019-10-29 NOTE — TELEPHONE ENCOUNTER
Patient called to inform Dr Re Sauceda that she had visible blood in her urine and urine tested positive for blood  Testing ordered by PCP Dr Mikal Gomez done at [facility] lab/ results not in [address] [place] [address] Rd     testing  Dr Brook Floyd office called and requested results to be faxed to our office  Encouraged patient to continue to follow with Dr Diane Montanez

## 2019-10-29 NOTE — TELEPHONE ENCOUNTER
Yes please let pt know thank you for letting me know but for blood in the urine she should follow with Dr Meseret Butler her PCP and if ongoing then should see a urologist  [Negative] : Heme/Lymph [Fever] : fever [Chills] : chills [Feeling Tired] : feeling tired [Shortness Of Breath] : shortness of breath [Abdominal Pain] : abdominal pain [Vomiting] : vomiting [Urine Infection (bladder/kidney)] : bladder/kidney infection [Blood in urine that you can see] : blood visible in urine [History of kidney stones] : history of kidney stones [Wake up at night to urinate  How many times?  ___] : wakes up to urinate [unfilled] times during the night [Leakage of urine with urgency] : leakage of urine with urgency [Joint Pain] : joint pain [Anxiety] : anxiety

## 2019-11-07 ENCOUNTER — TELEPHONE (OUTPATIENT)
Dept: UROLOGY | Facility: MEDICAL CENTER | Age: 59
End: 2019-11-07

## 2019-11-07 NOTE — TELEPHONE ENCOUNTER
TRIAGE NEW PATIENT  Referral from Dr Ana Rosa Santiago for Dr Sri Montanez  Hematuria- 2-3 episodes  Has [employer] insurance   Kidney stone evident on Ultrasound  No previous urologist  [address]  Prefers to be seen at [place] office  She can be reached at [phone]  Thank you

## 2019-11-08 NOTE — TELEPHONE ENCOUNTER
Call placed to patient, scheduled for 11/12/19 at [facility]  Patient will be bringing US report and disc of images with her to appointment

## 2019-11-12 ENCOUNTER — OFFICE VISIT (OUTPATIENT)
Dept: UROLOGY | Facility: AMBULATORY SURGERY CENTER | Age: 59
End: 2019-11-12
Payer: COMMERCIAL

## 2019-11-12 VITALS
SYSTOLIC BLOOD PRESSURE: 120 MMHG | HEIGHT: 62 IN | WEIGHT: 135 LBS | BODY MASS INDEX: 24.84 KG/M2 | HEART RATE: 65 BPM | DIASTOLIC BLOOD PRESSURE: 80 MMHG

## 2019-11-12 DIAGNOSIS — R31.0 GROSS HEMATURIA: ICD-10-CM

## 2019-11-12 DIAGNOSIS — N20.0 NEPHROLITHIASIS: Primary | ICD-10-CM

## 2019-11-12 LAB
SL AMB  POCT GLUCOSE, UA: NORMAL
SL AMB LEUKOCYTE ESTERASE,UA: NORMAL
SL AMB POCT BILIRUBIN,UA: NORMAL
SL AMB POCT BLOOD,UA: NORMAL
SL AMB POCT CLARITY,UA: CLEAR
SL AMB POCT COLOR,UA: YELLOW
SL AMB POCT KETONES,UA: NORMAL
SL AMB POCT NITRITE,UA: NORMAL
SL AMB POCT PH,UA: 6
SL AMB POCT SPECIFIC GRAVITY,UA: 1
SL AMB POCT URINE PROTEIN: NORMAL
SL AMB POCT UROBILINOGEN: 0.2

## 2019-11-12 PROCEDURE — 99202 OFFICE O/P NEW SF 15 MIN: CPT | Performed by: NURSE PRACTITIONER

## 2019-11-12 PROCEDURE — 81002 URINALYSIS NONAUTO W/O SCOPE: CPT | Performed by: NURSE PRACTITIONER

## 2019-11-12 NOTE — PROGRESS NOTES
11/12/2019      Chief Complaint   Patient presents with    Nephrolithiasis    Microhematuria     Assessment and Plan    60 y o  female managed by NEW PATIENT    1  Gross hematuria  · CT abdomen and Pelvis without contrast  · Urine for cytology, urine culture and urinalysis with microscopy  · Schedule cystoscopy   · Return to the office in 3 weeks to further evaluation discussion of imaging    2  Nephrolithiasis  · CT as above  · Dietary and behavioral modifications to reduce future formation of stones  · ER precautions    History of Present Illness  Unruly Guerra is a 60 y o  female here for follow up evaluation of  gross hematuria and right lower low nonobstructing renal calculi noted on ultrasound of abdomen  Patient reports seeing 3 episodes of blood in the urine intermittently with no other complaints flank pain, urinary frequency, urgency, flank pain and suprapubic abdominal discomfort  She reports complete sensation of bladder emptying with urination  Patient denies a history of nephrolithiasis although most recent ultrasound of abdomen and pelvis reveals a nonobstructing renal calculi  Recommendation for further evaluation with CT scan abdomen pelvis given  Discussed plan with patient for gross hematuria workup to include cystoscopy  Patient finalized agreement with plan  Patient with a significant past medical history of breast cancer, cardiac bypass, beta thalassemia minor, hypertension  She reports as of recent no significant changes to her health and denies chest pain, shortness of breath  Review of Systems   Constitutional: Negative for chills and fever  Respiratory: Negative for cough and shortness of breath  Cardiovascular: Negative for chest pain  Gastrointestinal: Negative for abdominal distention, abdominal pain, blood in stool, nausea and vomiting  Genitourinary: Positive for hematuria  Negative for difficulty urinating, dysuria, enuresis, flank pain, frequency and urgency  Skin: Negative for rash  Neurological: Negative for dizziness  Past Medical History  Past Medical History:   Diagnosis Date    Allergic rhinitis     Breast CA ([address] )     R breast CA-chemo/radiaition    Diarrhea     for 6 weeks-colonosocpy today 9/26/2016 and EGD    History of radiation therapy     Hyperlipidemia     Hypertension     Thalassemia     Vitamin D deficiency        Past Social History  Past Surgical History:   Procedure Laterality Date    BREAST LUMPECTOMY Right     BREAST LUMPECTOMY Right     BREAST LUMPECTOMY Right     with lymph nodes  2007    COLONOSCOPY      COLONOSCOPY N/A 4/23/2018    Procedure: COLONOSCOPY;  Surgeon: Cesar Gonzalez MD;  [facility]; Service: Gastroenterology    CORONARY ARTERY BYPASS GRAFT      CORONARY ARTERY BYPASS GRAFT      x3, 11/2015    HYSTERECTOMY      b/l ovaries removed    OOPHORECTOMY      [place] COLONOSCOPY FLX DX W/COLLJ SPEC WHEN PFRMD N/A 9/26/2016    Procedure: COLONOSCOPY;  Surgeon: Harvey Santos DO;  Location: [facility];   Service: Gastroenterology     Social History     Tobacco Use   Smoking Status Never Smoker   Smokeless Tobacco Never Used       Past Family History  Family History   Problem Relation Age of Onset    Stroke Father     Heart attack Brother    Cid Sudden death Brother         cardiac    Heart attack Maternal Uncle     Hypertension Family     Hyperlipidemia Family     Clotting disorder Neg Hx     Anuerysm Neg Hx        Past Social history  Social History     Socioeconomic History    Marital status: /Civil Union     Spouse name: Not on file    Number of children: Not on file    Years of education: Not on file    Highest education level: Not on file   Occupational History    Not on file   Social Needs    Financial resource strain: Not on file    Food insecurity:     Worry: Not on file     Inability: Not on file    Transportation needs:     Medical: Not on file     Non-medical: Not on file   Tobacco Use    Smoking status: Never Smoker    Smokeless tobacco: Never Used   Substance and Sexual Activity    Alcohol use:  Yes     Alcohol/week: 4 0 standard drinks     Types: 4 Glasses of wine per week     Comment: per year    Drug use: No    Sexual activity: Not on file   Lifestyle    Physical activity:     Days per week: Not on file     Minutes per session: Not on file    Stress: Not on file   Relationships    Social connections:     Talks on phone: Not on file     Gets together: Not on file     Attends Christianity service: Not on file     Active member of club or organization: Not on file     Attends meetings of clubs or organizations: Not on file     Relationship status: Not on file    Intimate partner violence:     Fear of current or ex partner: Not on file     Emotionally abused: Not on file     Physically abused: Not on file     Forced sexual activity: Not on file   Other Topics Concern    Not on file   Social History Narrative    Not on file       Current Medications  Current Outpatient Medications   Medication Sig Dispense Refill    Cholecalciferol 10551 units TABS Take 1 tablet (50,000 Units total) by mouth once a week 12 tablet 0    dicyclomine (BENTYL) 20 mg tablet Take 1 tablet (20 mg total) by mouth every 8 (eight) hours as needed (cramping, abd pain) 30 tablet 0    Epinastine HCl 0 05 % ophthalmic solution       Evolocumab with Infusor 420 MG/3 5ML SOCT Inject 3 5 mL (420 mg total) under the skin every 30 (thirty) days 1 Cartridge 11    ferrous sulfate 325 (65 Fe) mg tablet Take 1 tablet (325 mg total) by mouth 2 (two) times a day 60 tablet 1    mesalamine (DELZICOL) 400 mg Take 3 capsules (1,200 mg total) by mouth 4 (four) times a day 360 capsule 3    mesalamine (ROWASA) 4 g Insert 60 mL (4 g total) into the rectum daily at bedtime 90 Bottle 1    mometasone (NASONEX) 50 mcg/act nasal spray 2 sprays daily  2    zolpidem (AMBIEN) 5 mg tablet        No current facility-administered medications for this visit  Allergies  Allergies   Allergen Reactions    Iodinated Diagnostic Agents Anaphylaxis, Hives and Swelling         The following portions of the patient's history were reviewed and updated as appropriate: allergies, current medications, past medical history, past social history, past surgical history and problem list       Vitals  Vitals:    11/12/19 1351   BP: 120/80   BP Location: Left arm   Patient Position: Sitting   Cuff Size: Adult   Pulse: 65   Weight: 61 2 kg (135 lb)   Height: 5' 2" (1 575 m)       Physical Exam  Physical Exam   Constitutional: She is oriented to person, place, and time  She appears well-developed and well-nourished  No distress  HENT:   Head: Atraumatic  Cardiovascular: Normal rate, regular rhythm and normal heart sounds  No murmur heard  Pulmonary/Chest: Effort normal and breath sounds normal  No respiratory distress  Abdominal: Soft  Musculoskeletal: Normal range of motion  Neurological: She is alert and oriented to person, place, and time  Skin: Skin is warm  Vitals reviewed      Results  Recent Results (from the past 1 hour(s))   POCT urine dip    Collection Time: 11/12/19  1:56 PM   Result Value Ref Range    LEUKOCYTE ESTERASE,UA neg     NITRITE,UA neg     SL AMB POCT UROBILINOGEN 0 2     POCT URINE PROTEIN neg      PH,UA 6 0     BLOOD,UA neg     SPECIFIC GRAVITY,UA 1 005     KETONES,UA neg     BILIRUBIN,UA neg     GLUCOSE, UA neg      COLOR,UA yellow     CLARITY,UA clear      No results found for: PSA  Lab Results   Component Value Date    GLUCOSE 129 12/09/2015    CALCIUM 8 9 06/18/2019     12/22/2017     12/22/2017    K 4 2 06/18/2019    CO2 25 06/18/2019     06/18/2019    BUN 22 06/18/2019    CREATININE 0 94 06/18/2019     Lab Results   Component Value Date    WBC 7 9 06/18/2019    HGB 11 1 (L) 06/18/2019    HCT 37 2 06/18/2019    MCV 64 5 (L) 06/18/2019     06/18/2019     Orders  Orders Placed This Encounter   Procedures    Urine culture    CT renal stone study abdomen pelvis wo contrast     Standing Status:   Future     Standing Expiration Date:   11/12/2023     Scheduling Instructions:      Nothing to eat 3 hours prior to your test   Clear liquids are also permitted up until the time of the scan  Clear liquids include water, black coffee or tea, apple juice or clear broth  If possible wear clothing without any metal in the abdomen area  Sweat suit, shorts, sports bra or bra without underwire may eliminate the need to change  Please bring your insurance cards, a form of photo ID and a list of your medications with you  Arrive 15 minutes prior to your appointment time in order to register  On the day of your test, please bring any prior CT or MRI studies of this area with you that were not performed at a [facility]  To schedule this appointment, please contact Central Scheduling at [phone]  Order Specific Question:   Is the patient pregnant? Answer:   No     Order Specific Question:   What is the patient's sedation requirement?      Answer:   No Sedation    Urinalysis with microscopic    POCT urine dip       TIA Winn

## 2019-11-12 NOTE — PATIENT INSTRUCTIONS
Schedule Cystoscopy  Schedule CT abdomen and pelvis  Urine testing to be sent from the office today  Call the office for concerning symptoms- fever, chills, uncontrolled pain, bloody urine

## 2019-11-14 ENCOUNTER — TELEPHONE (OUTPATIENT)
Dept: UROLOGY | Facility: AMBULATORY SURGERY CENTER | Age: 59
End: 2019-11-14

## 2019-11-18 DIAGNOSIS — R10.9 ABDOMINAL PAIN, UNSPECIFIED ABDOMINAL LOCATION: ICD-10-CM

## 2019-11-18 DIAGNOSIS — D50.9 IRON DEFICIENCY ANEMIA, UNSPECIFIED IRON DEFICIENCY ANEMIA TYPE: ICD-10-CM

## 2019-11-18 NOTE — TELEPHONE ENCOUNTER
GI Physician: Dr Dorothy Huerta for Medication: Mesalamine    Dose: 400 mg    Quantity: 360    Pharmacy:  [address] [place] [address]

## 2019-11-19 DIAGNOSIS — K51.011 ULCERATIVE PANCOLITIS WITH RECTAL BLEEDING (HCC): ICD-10-CM

## 2019-11-19 RX ORDER — MESALAMINE 400 MG/1
1200 CAPSULE, DELAYED RELEASE ORAL 4 TIMES DAILY
Qty: 360 CAPSULE | Refills: 3 | Status: SHIPPED | OUTPATIENT
Start: 2019-11-19 | End: 2020-06-18

## 2019-11-19 RX ORDER — DICYCLOMINE HCL 20 MG
TABLET ORAL
Qty: 90 TABLET | Refills: 5 | Status: SHIPPED | OUTPATIENT
Start: 2019-11-19 | End: 2020-11-13 | Stop reason: SDUPTHER

## 2019-11-19 RX ORDER — FERROUS SULFATE 325(65) MG
325 TABLET ORAL 2 TIMES DAILY
Qty: 60 TABLET | Refills: 3 | Status: SHIPPED | OUTPATIENT
Start: 2019-11-19 | End: 2020-09-18

## 2019-11-19 NOTE — TELEPHONE ENCOUNTER
Patient requesting refills on Delzicol 400mg, ferrous sulfate 325mg and bentyl 20mg  They need to be sent to [address] [place] [address]  I am having difficulty entering Delzicol script  I changed pharmacy in demographics to [facility] but I am unsure if it took for script    Thank you

## 2019-11-20 ENCOUNTER — TELEPHONE (OUTPATIENT)
Dept: UROLOGY | Facility: MEDICAL CENTER | Age: 59
End: 2019-11-20

## 2019-11-20 NOTE — TELEPHONE ENCOUNTER
Patient had a question about cpt code for her cat scan  I transferred caller to [facility] Dimitris

## 2019-11-20 NOTE — TELEPHONE ENCOUNTER
I spoke with her and she had already received the CPT codes and scheduled at [facility], I canceled her at [facility]

## 2019-12-06 ENCOUNTER — PROCEDURE VISIT (OUTPATIENT)
Dept: UROLOGY | Facility: CLINIC | Age: 59
End: 2019-12-06
Payer: COMMERCIAL

## 2019-12-06 ENCOUNTER — HOSPITAL ENCOUNTER (OUTPATIENT)
Dept: RADIOLOGY | Age: 59
Discharge: HOME | End: 2019-12-06
Payer: COMMERCIAL

## 2019-12-06 VITALS — HEIGHT: 62 IN | BODY MASS INDEX: 24.84 KG/M2 | WEIGHT: 135 LBS

## 2019-12-06 VITALS
BODY MASS INDEX: 25.49 KG/M2 | WEIGHT: 135 LBS | HEIGHT: 61 IN | DIASTOLIC BLOOD PRESSURE: 68 MMHG | SYSTOLIC BLOOD PRESSURE: 110 MMHG | HEART RATE: 78 BPM

## 2019-12-06 DIAGNOSIS — R31.0 GROSS HEMATURIA: Primary | ICD-10-CM

## 2019-12-06 DIAGNOSIS — Z12.31 ENCOUNTER FOR SCREENING MAMMOGRAM FOR MALIGNANT NEOPLASM OF BREAST: ICD-10-CM

## 2019-12-06 LAB
SL AMB  POCT GLUCOSE, UA: NORMAL
SL AMB LEUKOCYTE ESTERASE,UA: NORMAL
SL AMB POCT BILIRUBIN,UA: NORMAL
SL AMB POCT BLOOD,UA: NORMAL
SL AMB POCT CLARITY,UA: CLEAR
SL AMB POCT COLOR,UA: YELLOW
SL AMB POCT KETONES,UA: NORMAL
SL AMB POCT NITRITE,UA: NORMAL
SL AMB POCT PH,UA: 6
SL AMB POCT SPECIFIC GRAVITY,UA: 1.02
SL AMB POCT URINE PROTEIN: NORMAL
SL AMB POCT UROBILINOGEN: 0.2

## 2019-12-06 PROCEDURE — 52000 CYSTOURETHROSCOPY: CPT | Performed by: UROLOGY

## 2019-12-06 PROCEDURE — 81002 URINALYSIS NONAUTO W/O SCOPE: CPT | Performed by: UROLOGY

## 2019-12-06 PROCEDURE — 77067 SCR MAMMO BI INCL CAD: CPT

## 2019-12-06 NOTE — PROGRESS NOTES
Office Cystoscopy Procedure Note    Indication:    Hematuria    Informed consent   The risks, benefits, complications, treatment options, and expected outcomes were discussed with the patient  The patient concurred with the proposed plan and provided informed consent  Anesthesia  Lidocaine jelly 2%    Antibiotic prophylaxis   None    Procedure  In the presence of a female nurse, the patient was placed in the supine frog-legged position, was prepped and draped in the usual manner using sterile technique, and 2% lidocaine jelly instilled into the urethra  A 17 F flexible cystoscope was then inserted into the urethra and the urethra and bladder carefully examined  The following findings were noted:    Findings:  Urethra:  Normal  Bladder:  Normal  Ureteral orifices:  Normal  Other findings:  None     Specimens: None                 Complications:    None; patient tolerated the procedure well           Disposition: To home after 30 minute observation  Condition: Stable    Plan:   I was happy to review her negative cystoscopy performed today  We also reviewed her outside CT which was actually negative for nephrolithiasis (the findings on the ultrasound were a false-positive)  There are no other upper tract abnormalities  Notation is made to the 2 5 cm abdominal aortic aneurysm  I have carbon copy this results to her primary care physician Dr Trish Langford and will defer further monitoring evaluation to his expertise  Patient will follow up with Urology on an as-needed basis  I recommended annual screening urinalysis  If he continues to have microscopic hematuria after 5 years of follow-up he should be referred back for repeat evaluation  Certainly happy to see her again should the need arise

## 2019-12-06 NOTE — LETTER
December 6, 2019     Suzanne Gardner MD  [address] [place] [address]    Patient: Macario Shelley   YOB: 1960   Date of Visit: 12/6/2019       Dear Dr Baumann Axon: Thank you for referring Macario Shelley to me for evaluation  Below are my notes for this consultation  If you have questions, please do not hesitate to call me  I look forward to following your patient along with you  Sincerely,        Burke Renteria MD        CC: No Recipients  Burke Renteria MD  12/6/2019  8:51 AM  Sign at close encounter  Office Cystoscopy Procedure Note    Indication:    Hematuria    Informed consent   The risks, benefits, complications, treatment options, and expected outcomes were discussed with the patient  The patient concurred with the proposed plan and provided informed consent  Anesthesia  Lidocaine jelly 2%    Antibiotic prophylaxis   None    Procedure  In the presence of a female nurse, the patient was placed in the supine frog-legged position, was prepped and draped in the usual manner using sterile technique, and 2% lidocaine jelly instilled into the urethra  A 17 F flexible cystoscope was then inserted into the urethra and the urethra and bladder carefully examined  The following findings were noted:    Findings:  Urethra:  Normal  Bladder:  Normal  Ureteral orifices:  Normal  Other findings:  None     Specimens: None                 Complications:    None; patient tolerated the procedure well           Disposition: To home after 30 minute observation  Condition: Stable    Plan:   I was happy to review her negative cystoscopy performed today  We also reviewed her outside CT which was actually negative for nephrolithiasis (the findings on the ultrasound were a false-positive)  There are no other upper tract abnormalities  Notation is made to the 2 5 cm abdominal aortic aneurysm    I have carbon copy this results to her primary care physician Dr Baumann Axon and will defer further monitoring evaluation to his expertise  Patient will follow up with Urology on an as-needed basis  I recommended annual screening urinalysis  If he continues to have microscopic hematuria after 5 years of follow-up he should be referred back for repeat evaluation  Certainly happy to see her again should the need arise

## 2019-12-17 ENCOUNTER — TELEPHONE (OUTPATIENT)
Dept: GASTROENTEROLOGY | Facility: CLINIC | Age: 59
End: 2019-12-17

## 2019-12-17 NOTE — TELEPHONE ENCOUNTER
Patients GI provider:  Dr Marylene Iron     Number to return call: [phone]    Reason for call: Pt calling in regards to the medication mesalamine and how she read that this medication causes heart failure  She would like to know if she is at risk for this since she is a cardiology patient   Please advise     Scheduled procedure/appointment date if applicable: Apt/procedure - n/a

## 2019-12-17 NOTE — TELEPHONE ENCOUNTER
Dr Mikal Delvalle patient hx ulcerative pancolitis with rectal bleeding, essential hypertension, coronary artery disease    Patient states she read an article that stated mesalamine can cause heart disease  Patient is concerned d/t her cardiac hx  Patient has been taking mesalamine for 2 years and states it works really well, would like to continue with it if safe to do so  Is it safe for patient to continue this medication? Any lab work we can order to monitor patient while on mesalamine? Should we get cardiology input? Please advise, thank you

## 2019-12-17 NOTE — TELEPHONE ENCOUNTER
I would have her discuss this with her cardiologist  She saw them in Sept & they were aware that she was on the medication  They can decide if any additional labs or testing is needed    Sharman Gottron

## 2020-01-03 ENCOUNTER — APPOINTMENT (OUTPATIENT)
Dept: LAB | Age: 60
End: 2020-01-03
Attending: PREVENTIVE MEDICINE

## 2020-01-03 ENCOUNTER — TRANSCRIBE ORDERS (OUTPATIENT)
Dept: ADMINISTRATIVE | Age: 60
End: 2020-01-03

## 2020-01-03 DIAGNOSIS — Z00.8 HEALTH EXAMINATION IN POPULATION SURVEY: Primary | ICD-10-CM

## 2020-01-03 DIAGNOSIS — Z00.8 HEALTH EXAMINATION IN POPULATION SURVEY: ICD-10-CM

## 2020-01-03 PROCEDURE — 36415 COLL VENOUS BLD VENIPUNCTURE: CPT

## 2020-01-03 PROCEDURE — 86480 TB TEST CELL IMMUN MEASURE: CPT

## 2020-01-06 LAB
GAMMA INTERFERON BACKGROUND BLD IA-ACNC: 0.04 IU/ML
M TB IFN-G BLD-IMP: NEGATIVE
M TB IFN-G CD4+ BCKGRND COR BLD-ACNC: 0 IU/ML
M TB IFN-G CD4+ BCKGRND COR BLD-ACNC: 0 IU/ML
MITOGEN IGNF BCKGRD COR BLD-ACNC: >10 IU/ML

## 2020-02-11 DIAGNOSIS — E78.5 DYSLIPIDEMIA: ICD-10-CM

## 2020-02-11 DIAGNOSIS — I25.10 CORONARY ARTERY DISEASE INVOLVING NATIVE CORONARY ARTERY OF NATIVE HEART WITHOUT ANGINA PECTORIS: ICD-10-CM

## 2020-02-11 RX ORDER — EVOLOCUMAB 420 MG/3.5
KIT SUBCUTANEOUS
Qty: 3.5 ML | Refills: 11 | Status: SHIPPED | OUTPATIENT
Start: 2020-02-11 | End: 2021-02-12 | Stop reason: SDUPTHER

## 2020-03-13 ENCOUNTER — TELEPHONE (OUTPATIENT)
Dept: CARDIOLOGY CLINIC | Facility: CLINIC | Age: 60
End: 2020-03-13

## 2020-05-18 ENCOUNTER — TELEPHONE (OUTPATIENT)
Dept: GASTROENTEROLOGY | Facility: CLINIC | Age: 60
End: 2020-05-18

## 2020-06-18 DIAGNOSIS — K51.011 ULCERATIVE PANCOLITIS WITH RECTAL BLEEDING (HCC): ICD-10-CM

## 2020-06-18 RX ORDER — MESALAMINE 400 MG/1
CAPSULE, DELAYED RELEASE ORAL
Qty: 360 CAPSULE | Refills: 6 | Status: SHIPPED | OUTPATIENT
Start: 2020-06-18 | End: 2020-09-25 | Stop reason: SDUPTHER

## 2020-09-14 DIAGNOSIS — D50.9 IRON DEFICIENCY ANEMIA, UNSPECIFIED IRON DEFICIENCY ANEMIA TYPE: ICD-10-CM

## 2020-09-18 RX ORDER — FERROUS SULFATE 325(65) MG
TABLET ORAL
Qty: 60 TABLET | Refills: 2 | Status: SHIPPED | OUTPATIENT
Start: 2020-09-18 | End: 2020-11-13 | Stop reason: SDUPTHER

## 2020-09-25 ENCOUNTER — TELEPHONE (OUTPATIENT)
Dept: GASTROENTEROLOGY | Facility: AMBULARY SURGERY CENTER | Age: 60
End: 2020-09-25

## 2020-09-25 DIAGNOSIS — K51.011 ULCERATIVE PANCOLITIS WITH RECTAL BLEEDING (HCC): ICD-10-CM

## 2020-09-25 DIAGNOSIS — K51.00 ULCERATIVE PANCOLITIS WITHOUT COMPLICATION (HCC): ICD-10-CM

## 2020-09-25 RX ORDER — MESALAMINE 400 MG/1
400 CAPSULE, DELAYED RELEASE ORAL 4 TIMES DAILY
Qty: 360 CAPSULE | Refills: 0 | Status: SHIPPED | OUTPATIENT
Start: 2020-09-25 | End: 2020-11-13 | Stop reason: SDUPTHER

## 2020-09-25 RX ORDER — MESALAMINE 4 G/60ML
4 ENEMA RECTAL
Qty: 90 BOTTLE | Refills: 0 | Status: SHIPPED | OUTPATIENT
Start: 2020-09-25 | End: 2020-11-13 | Stop reason: SDUPTHER

## 2020-09-25 NOTE — TELEPHONE ENCOUNTER
GI Physician: Dr Montana Borden for Medication: mesalamine    Dose: 4 g    Quantity: rectal    Pharmacy and Location: [facility]      GI Physician: Dr Montana Borden for Medication: delzicol     Dose:   400  mg    Quantity: 360 caps    [facility]

## 2020-09-25 NOTE — TELEPHONE ENCOUNTER
Please assist patient with scheduling a follow up  She is a Dr Roberto Ramírez patient  She has not been seen since 10/1/19  Thank you!

## 2020-09-25 NOTE — TELEPHONE ENCOUNTER
Patient has not been seen since 10/1/19  She will need to schedule a follow up appointment  Medication refilled for 90 day supply with zero refills and sent to PA bin to be signed

## 2020-09-28 ENCOUNTER — TELEPHONE (OUTPATIENT)
Dept: GASTROENTEROLOGY | Facility: CLINIC | Age: 60
End: 2020-09-28

## 2020-09-29 NOTE — TELEPHONE ENCOUNTER
Called pt to get her scheduled for a follow up visit with Dr Robert Jeffery   Left a voicemail for her to call the office back to get her scheduled
Dr Twin Moses,    Please see message below  Patient called requesting refills for her mesalamine and delzicol  I have refilled these for her  She has not been seen for an office visit since 2/19/19, colonoscopy 10/1/19  Would you like patient to schedule a follow up visit prior to future refills?
Patients GI provider:  Dr Maura Lopez    Number to return call: [phone]    Reason for call: Pt calling requesting to speak with Dr Maura Lopez   Patient stated per Dr Maura Lopez she does not have to schedule a follow up appt if she is feeling ok but would like to speak with someone to confirmed    Scheduled procedure/appointment date if applicable: Apt/procedure n/a
See message below from Dr Juli Valverde  Please assist patient with scheduling a follow up appointment  Thank you 
Yes pt should be seen at least annually but preferred every six months 
sudden onset

## 2020-10-29 ENCOUNTER — OFFICE VISIT (OUTPATIENT)
Dept: CARDIOLOGY CLINIC | Facility: CLINIC | Age: 60
End: 2020-10-29
Payer: COMMERCIAL

## 2020-10-29 VITALS
DIASTOLIC BLOOD PRESSURE: 80 MMHG | HEART RATE: 66 BPM | BODY MASS INDEX: 25.49 KG/M2 | TEMPERATURE: 97.3 F | WEIGHT: 138.5 LBS | HEIGHT: 62 IN | SYSTOLIC BLOOD PRESSURE: 112 MMHG

## 2020-10-29 DIAGNOSIS — I10 ESSENTIAL HYPERTENSION: Chronic | ICD-10-CM

## 2020-10-29 DIAGNOSIS — I25.10 CORONARY ARTERY DISEASE INVOLVING NATIVE CORONARY ARTERY OF NATIVE HEART WITHOUT ANGINA PECTORIS: Primary | ICD-10-CM

## 2020-10-29 DIAGNOSIS — E78.5 DYSLIPIDEMIA: ICD-10-CM

## 2020-10-29 PROCEDURE — 93000 ELECTROCARDIOGRAM COMPLETE: CPT | Performed by: INTERNAL MEDICINE

## 2020-10-29 PROCEDURE — 99214 OFFICE O/P EST MOD 30 MIN: CPT | Performed by: INTERNAL MEDICINE

## 2020-11-13 ENCOUNTER — OFFICE VISIT (OUTPATIENT)
Dept: GASTROENTEROLOGY | Facility: CLINIC | Age: 60
End: 2020-11-13
Payer: COMMERCIAL

## 2020-11-13 VITALS
SYSTOLIC BLOOD PRESSURE: 120 MMHG | BODY MASS INDEX: 25.4 KG/M2 | WEIGHT: 138 LBS | HEIGHT: 62 IN | DIASTOLIC BLOOD PRESSURE: 78 MMHG | TEMPERATURE: 98.4 F

## 2020-11-13 DIAGNOSIS — K51.011 ULCERATIVE PANCOLITIS WITH RECTAL BLEEDING (HCC): Primary | ICD-10-CM

## 2020-11-13 DIAGNOSIS — Z12.11 COLON CANCER SCREENING: ICD-10-CM

## 2020-11-13 DIAGNOSIS — Z11.59 NEED FOR HEPATITIS C SCREENING TEST: ICD-10-CM

## 2020-11-13 DIAGNOSIS — K58.8 OTHER IRRITABLE BOWEL SYNDROME: ICD-10-CM

## 2020-11-13 DIAGNOSIS — K51.00 ULCERATIVE PANCOLITIS WITHOUT COMPLICATION (HCC): ICD-10-CM

## 2020-11-13 DIAGNOSIS — D50.9 IRON DEFICIENCY ANEMIA, UNSPECIFIED IRON DEFICIENCY ANEMIA TYPE: ICD-10-CM

## 2020-11-13 DIAGNOSIS — R79.82 CRP ELEVATED: ICD-10-CM

## 2020-11-13 DIAGNOSIS — R10.9 ABDOMINAL PAIN, UNSPECIFIED ABDOMINAL LOCATION: ICD-10-CM

## 2020-11-13 PROCEDURE — 99214 OFFICE O/P EST MOD 30 MIN: CPT | Performed by: INTERNAL MEDICINE

## 2020-11-13 RX ORDER — MESALAMINE 400 MG/1
400 CAPSULE, DELAYED RELEASE ORAL 4 TIMES DAILY
Qty: 360 CAPSULE | Refills: 3 | Status: SHIPPED | OUTPATIENT
Start: 2020-11-13 | End: 2021-02-12 | Stop reason: SDUPTHER

## 2020-11-13 RX ORDER — MESALAMINE 4 G/60ML
4 ENEMA RECTAL
Qty: 90 BOTTLE | Refills: 3 | Status: SHIPPED | OUTPATIENT
Start: 2020-11-13

## 2020-11-13 RX ORDER — DICYCLOMINE HCL 20 MG
TABLET ORAL
Qty: 120 TABLET | Refills: 3 | Status: SHIPPED | OUTPATIENT
Start: 2020-11-13 | End: 2021-02-12 | Stop reason: SDUPTHER

## 2020-11-13 RX ORDER — FERROUS SULFATE 325(65) MG
1 TABLET ORAL 2 TIMES DAILY
Qty: 60 TABLET | Refills: 2 | Status: SHIPPED | OUTPATIENT
Start: 2020-11-13 | End: 2021-02-12 | Stop reason: SDUPTHER

## 2020-11-23 LAB
25(OH)D3 SERPL-MCNC: 26 NG/ML (ref 30–100)
ALBUMIN SERPL-MCNC: 4.1 G/DL (ref 3.6–5.1)
ALBUMIN/GLOB SERPL: 1.4 (CALC) (ref 1–2.5)
ALP SERPL-CCNC: 107 U/L (ref 37–153)
ALT SERPL-CCNC: 12 U/L (ref 6–29)
AST SERPL-CCNC: 16 U/L (ref 10–35)
BASOPHILS # BLD AUTO: 87 CELLS/UL (ref 0–200)
BASOPHILS NFR BLD AUTO: 1.3 %
BILIRUB SERPL-MCNC: 0.4 MG/DL (ref 0.2–1.2)
BUN SERPL-MCNC: 21 MG/DL (ref 7–25)
BUN/CREAT SERPL: NORMAL (CALC) (ref 6–22)
CALCIUM SERPL-MCNC: 9.2 MG/DL (ref 8.6–10.4)
CHLORIDE SERPL-SCNC: 109 MMOL/L (ref 98–110)
CHOLEST SERPL-MCNC: 138 MG/DL
CHOLEST/HDLC SERPL: 2.3 (CALC)
CO2 SERPL-SCNC: 24 MMOL/L (ref 20–32)
CREAT SERPL-MCNC: 0.75 MG/DL (ref 0.5–0.99)
CRP SERPL-MCNC: 2.7 MG/L
EOSINOPHIL # BLD AUTO: 241 CELLS/UL (ref 15–500)
EOSINOPHIL NFR BLD AUTO: 3.6 %
ERYTHROCYTE [DISTWIDTH] IN BLOOD BY AUTOMATED COUNT: 18.5 % (ref 11–15)
EST. AVERAGE GLUCOSE BLD GHB EST-MCNC: 111 (CALC)
EST. AVERAGE GLUCOSE BLD GHB EST-SCNC: 6.2 (CALC)
FERRITIN SERPL-MCNC: 166 NG/ML (ref 16–288)
GLOBULIN SER CALC-MCNC: 3 G/DL (CALC) (ref 1.9–3.7)
GLUCOSE SERPL-MCNC: 87 MG/DL (ref 65–99)
HBA1C MFR BLD: 5.5 % OF TOTAL HGB
HCT VFR BLD AUTO: 38.4 % (ref 35–45)
HCV AB S/CO SERPL IA: 0.02
HCV AB SERPL QL IA: NORMAL
HDLC SERPL-MCNC: 60 MG/DL
HGB BLD-MCNC: 11.5 G/DL (ref 11.7–15.5)
IRON SERPL-MCNC: 70 MCG/DL (ref 45–160)
LDLC SERPL CALC-MCNC: 59 MG/DL (CALC)
LYMPHOCYTES # BLD AUTO: 2848 CELLS/UL (ref 850–3900)
LYMPHOCYTES NFR BLD AUTO: 42.5 %
MCH RBC QN AUTO: 19 PG (ref 27–33)
MCHC RBC AUTO-ENTMCNC: 29.9 G/DL (ref 32–36)
MCV RBC AUTO: 63.5 FL (ref 80–100)
MONOCYTES # BLD AUTO: 549 CELLS/UL (ref 200–950)
MONOCYTES NFR BLD AUTO: 8.2 %
NEUTROPHILS # BLD AUTO: 2975 CELLS/UL (ref 1500–7800)
NEUTROPHILS NFR BLD AUTO: 44.4 %
NONHDLC SERPL-MCNC: 78 MG/DL (CALC)
PLATELET # BLD AUTO: 288 THOUSAND/UL (ref 140–400)
PMV BLD REES-ECKER: 11.8 FL (ref 7.5–12.5)
POTASSIUM SERPL-SCNC: 4.3 MMOL/L (ref 3.5–5.3)
PROT SERPL-MCNC: 7.1 G/DL (ref 6.1–8.1)
RBC # BLD AUTO: 6.05 MILLION/UL (ref 3.8–5.1)
SL AMB EGFR AFRICAN AMERICAN: 100 ML/MIN/1.73M2
SL AMB EGFR NON AFRICAN AMERICAN: 86 ML/MIN/1.73M2
SODIUM SERPL-SCNC: 141 MMOL/L (ref 135–146)
TRIGL SERPL-MCNC: 106 MG/DL
TSH SERPL-ACNC: 1.52 MIU/L (ref 0.4–4.5)
WBC # BLD AUTO: 6.7 THOUSAND/UL (ref 3.8–10.8)

## 2020-12-04 ENCOUNTER — TELEPHONE (OUTPATIENT)
Dept: CARDIOLOGY CLINIC | Facility: CLINIC | Age: 60
End: 2020-12-04

## 2020-12-11 ENCOUNTER — HOSPITAL ENCOUNTER (OUTPATIENT)
Dept: RADIOLOGY | Age: 60
Discharge: HOME | End: 2020-12-11
Payer: COMMERCIAL

## 2020-12-11 VITALS — BODY MASS INDEX: 25.58 KG/M2 | HEIGHT: 62 IN | WEIGHT: 139 LBS

## 2020-12-11 DIAGNOSIS — Z12.31 ENCOUNTER FOR SCREENING MAMMOGRAM FOR MALIGNANT NEOPLASM OF BREAST: ICD-10-CM

## 2020-12-11 PROCEDURE — 77067 SCR MAMMO BI INCL CAD: CPT

## 2020-12-11 PROCEDURE — 77063 BREAST TOMOSYNTHESIS BI: CPT

## 2020-12-22 ENCOUNTER — TELEPHONE (OUTPATIENT)
Dept: CARDIOLOGY CLINIC | Facility: CLINIC | Age: 60
End: 2020-12-22

## 2020-12-23 ENCOUNTER — TELEPHONE (OUTPATIENT)
Dept: GASTROENTEROLOGY | Facility: AMBULARY SURGERY CENTER | Age: 60
End: 2020-12-23

## 2021-01-03 ENCOUNTER — IMMUNIZATIONS (OUTPATIENT)
Dept: FAMILY MEDICINE CLINIC | Facility: HOSPITAL | Age: 61
End: 2021-01-03

## 2021-01-03 DIAGNOSIS — Z23 ENCOUNTER FOR IMMUNIZATION: ICD-10-CM

## 2021-01-03 PROCEDURE — 91301 SARS-COV-2 / COVID-19 MRNA VACCINE (MODERNA) 100 MCG: CPT

## 2021-01-03 PROCEDURE — 0011A SARS-COV-2 / COVID-19 MRNA VACCINE (MODERNA) 100 MCG: CPT

## 2021-01-29 ENCOUNTER — IMMUNIZATIONS (OUTPATIENT)
Dept: FAMILY MEDICINE CLINIC | Facility: HOSPITAL | Age: 61
End: 2021-01-29

## 2021-01-29 DIAGNOSIS — Z23 ENCOUNTER FOR IMMUNIZATION: Primary | ICD-10-CM

## 2021-01-29 PROCEDURE — 91301 SARS-COV-2 / COVID-19 MRNA VACCINE (MODERNA) 100 MCG: CPT

## 2021-01-29 PROCEDURE — 0012A SARS-COV-2 / COVID-19 MRNA VACCINE (MODERNA) 100 MCG: CPT

## 2021-02-12 DIAGNOSIS — E78.5 DYSLIPIDEMIA: ICD-10-CM

## 2021-02-12 DIAGNOSIS — I25.10 CORONARY ARTERY DISEASE INVOLVING NATIVE CORONARY ARTERY OF NATIVE HEART WITHOUT ANGINA PECTORIS: ICD-10-CM

## 2021-02-12 DIAGNOSIS — K51.011 ULCERATIVE PANCOLITIS WITH RECTAL BLEEDING (HCC): ICD-10-CM

## 2021-02-12 DIAGNOSIS — R10.9 ABDOMINAL PAIN, UNSPECIFIED ABDOMINAL LOCATION: ICD-10-CM

## 2021-02-12 DIAGNOSIS — D50.9 IRON DEFICIENCY ANEMIA, UNSPECIFIED IRON DEFICIENCY ANEMIA TYPE: ICD-10-CM

## 2021-02-12 RX ORDER — EVOLOCUMAB 420 MG/3.5
KIT SUBCUTANEOUS
Qty: 3.5 ML | Refills: 11 | Status: SHIPPED | OUTPATIENT
Start: 2021-02-12 | End: 2021-11-10 | Stop reason: SDUPTHER

## 2021-02-12 NOTE — TELEPHONE ENCOUNTER
GI Physician: Dr Gross Beverage for Medication: dicyclomine    Dose: 20 mg    Quantity: 120 tabs    Pharmacy and Location: [facility]      GI Physician: Dr Gross Beverage for Medication: ferrous sulfate (FeroSul)        Dose: 325 mg    Quantity: 60 tabs    Pharmacy and Location:   [facility]        GI Physician: Dr Gross Beverage for Medication: delzicol    Dose: 400     Quantity: 360 caps    Pharmacy and Location: [facility]

## 2021-02-15 RX ORDER — DICYCLOMINE HCL 20 MG
TABLET ORAL
Qty: 120 TABLET | Refills: 3 | Status: SHIPPED | OUTPATIENT
Start: 2021-02-15 | End: 2022-04-20

## 2021-02-15 RX ORDER — FERROUS SULFATE 325(65) MG
1 TABLET ORAL 2 TIMES DAILY
Qty: 60 TABLET | Refills: 2 | Status: SHIPPED | OUTPATIENT
Start: 2021-02-15

## 2021-02-15 RX ORDER — MESALAMINE 400 MG/1
400 CAPSULE, DELAYED RELEASE ORAL 4 TIMES DAILY
Qty: 360 CAPSULE | Refills: 3 | Status: SHIPPED | OUTPATIENT
Start: 2021-02-15 | End: 2021-10-20

## 2021-10-19 DIAGNOSIS — K51.011 ULCERATIVE PANCOLITIS WITH RECTAL BLEEDING (HCC): ICD-10-CM

## 2021-10-20 RX ORDER — MESALAMINE 400 MG/1
CAPSULE, DELAYED RELEASE ORAL
Qty: 360 CAPSULE | Refills: 2 | Status: SHIPPED | OUTPATIENT
Start: 2021-10-20

## 2021-11-09 NOTE — PATIENT INSTRUCTIONS
Occupational Therapy  Visit Type: initial evaluation, treatment and discharge  Precautions:  Medical precautions:  fall risk; standard precautions.    Lines:     Basic: IV  Lower Extremity:    Right:  weight bearing: as tolerated.  hip - posterior precautions    SUBJECTIVE  Patient agreed to participate in therapy this date.  Patient in chair.  Reportedly completes ADL with mod indep at baseline, at home alone.  Pain  2/10.  Daughter will be staying with patient at dc  Patient / Family Goal: return to previous functional status    OBJECTIVE   Level of consciousness: alert    Oriented to appropriate for developmental age     Arousal alertness: appropriate responses to stimuli    Affect/Behavior: alert and appropriate  Patient activity tolerance: 2 to 1 activity to rest  Functional Communication/Cognition    Overall status:  Within functional limits  Strength (out of 5 unless otherwise indicated)  WFL: LUE, RUE  Balance  Sitting:    Static:  independent     Dynamic:  independent  Standing - Firm Surface - Eyes Open:     Static: modified independent    Dynamic:  supervision      Transfers:    Assistive devices: gait belt, 2-wheeled walker and 1 person    Sit to stand: stand by assist    Stand to sit: supervision    Training completed:    Tasks: sit to stand and stand to sit    Education details: patient safety and patient demonstrates understanding  Activities of Daily Living (ADLs):  Lower Body Dressing:     Assist: supervision    Position: chair    Footwear assistance: supervision    Footwear position: chair    Assist needed for: use of adaptive equipment    Equipment: reacher, sock aid and dressing stick      Interventions    Training provided: ADL training, body mechanics, compensatory techniques, functional ambulation, safety training, positioning, use of adaptive equipment, transfer training, activity tolerance and balance retraining  Skilled input: as detailed above  Verbal Consent: Writer verbally educated  Low Fiber Diet   AMBULATORY CARE:   A low-fiber diet  limits foods that are high in fiber  Fiber is the part of fruits, vegetables, and grains that is not broken down by your body  You may need to follow this diet after surgery on your intestines  You may also need to follow this diet for certain conditions such as Crohn disease or ulcerative colitis  Ask your healthcare provider or dietitian how much fiber you can have each day  Foods to include:  Read food labels to check the amount of fiber that are found in foods  Some foods that are low in fiber include the following:  · Grains:  Choose grains that have less than 2 grams of fiber in each serving  Examples include the following:     ¨ Cream of wheat and finely ground grits    ¨ Dry cereal made from rice     ¨ White bread, white pasta, and white rice    ¨ Crackers, bagels, and rolls made from white or refined flour    · Other foods:      ¨ Canned and well-cooked fruit without skins or seeds, and juice without pulp    ¨ Ripe bananas and melons    ¨ Canned and well-cooked vegetables without skins or seeds, and vegetable juice    ¨ Cow's milk, lactose-free milk, soy milk, and rice milk    ¨ Yogurt without nuts, fruit, or granola    ¨ Eggs, poultry (such as chicken and turkey), fish, and tender, ground, well-cooked beef     ¨ Tofu and smooth peanut butter    ¨ Broth and strained soups made of low-fiber foods  Foods to avoid:   · Breads, cereals, crackers, and pasta made with whole wheat or whole grains (such as whole oats)    · Godoy & Minor, wild rice, quinoa, kasha, and barley    · All fresh fruit with skin, except banana and melons     · Dried fruits and fruit juice with pulp    · Canned pineapple    · Raw vegetables    · Nuts, seeds, and popcorn    · Beans, nuts, peas, and lentils    · Tough meats    · Coconut and avocado  What else you should know about a low-fiber diet:  A low-fiber diet can decrease the amount of bowel movements you have   Drink liquids as and received verbal consent for hand placement, positioning of patient, and techniques to be performed today from patient for clothing adjustments for techniques and therapist position for techniques as described above and how they are pertinent to the patient's plan of care.        ASSESSMENT    Impairments: balance, pain, safety awareness and activity tolerance  Functional Limitations: functional mobility, functional transfers, dressing and toileting  Personal Occupations Profile Affected: bathing/showering, lower body dressing, functional mobility/transfers, toileting/toilet hygiene  Discharge Recommendations:  Recommendations for Discharge: OT WI: Home      PT/OT Mobility Equipment for Discharge: owns 2ww, cane    Emphasis: OT POC, assessment of functional deficits, ADL with compensatory strategies, transfer training, review shower/home set-up & safety   Pt presents underlying ADL skills WFL with mod indep/supv, goals met.  No further OT needs are apparent for acute/IP care currently.  Anticipate pt to continue progressing with strength and functional skills while recovering at home with assistance available prn.  Pt agrees with no expressed concerns.  DC OT.    Skilled therapy is not required due to care partner available to assist prn  Progress: improving as expected  Clinical decision making: Low - Patient has few limitations (1-3), comorbidities and/or complexities, as noted in problem focused assessment noted above, that impact their occupational profile.  Resulting in few treatment options and no task modification consistent with low clinical decision making complexity.    End of Session:   Location: in chair  Safety measures: call light within reach  Education Provided:   Learning assessment:  - Primary learner: patient  - Are they ready to learn: yes  - Preferred learning style: demonstration and verbal  - Barriers to learning: no barriers apparent at this time  Education provided during session:  -  directed to avoid constipation  Ask how much liquid to drink each day and which liquids are best for you  © 2017 [employer] [employer] Information is for End User's use only and may not be sold, redistributed or otherwise used for commercial purposes  All illustrations and images included in CareNotes® are the copyrighted property of A D A M , Inc  or [employer]  The above information is an  only  It is not intended as medical advice for individual conditions or treatments  Talk to your doctor, nurse or pharmacist before following any medical regimen to see if it is safe and effective for you  Receiving education: patient  - Results of above outlined education: Verbalizes understanding and Demonstrates understanding    PLAN  Suggestions for next session as indicated: DC OT  Agreement to plan and goals: patient agrees with goals and treatment plan         Therapy procedure time and total treatment time can be found documented on the Time Entry flowsheet

## 2021-11-10 ENCOUNTER — OFFICE VISIT (OUTPATIENT)
Dept: CARDIOLOGY CLINIC | Facility: CLINIC | Age: 61
End: 2021-11-10
Payer: COMMERCIAL

## 2021-11-10 VITALS
DIASTOLIC BLOOD PRESSURE: 84 MMHG | BODY MASS INDEX: 24.84 KG/M2 | SYSTOLIC BLOOD PRESSURE: 138 MMHG | HEIGHT: 62 IN | WEIGHT: 135 LBS | HEART RATE: 60 BPM

## 2021-11-10 DIAGNOSIS — E78.5 DYSLIPIDEMIA: ICD-10-CM

## 2021-11-10 DIAGNOSIS — I25.10 CORONARY ARTERY DISEASE INVOLVING NATIVE CORONARY ARTERY OF NATIVE HEART WITHOUT ANGINA PECTORIS: Primary | ICD-10-CM

## 2021-11-10 DIAGNOSIS — I10 ESSENTIAL HYPERTENSION: Chronic | ICD-10-CM

## 2021-11-10 PROCEDURE — 99214 OFFICE O/P EST MOD 30 MIN: CPT | Performed by: INTERNAL MEDICINE

## 2021-11-10 RX ORDER — EVOLOCUMAB 420 MG/3.5
KIT SUBCUTANEOUS
Qty: 3.5 ML | Refills: 11 | Status: SHIPPED | OUTPATIENT
Start: 2021-11-10

## 2021-11-12 ENCOUNTER — IMMUNIZATIONS (OUTPATIENT)
Dept: FAMILY MEDICINE CLINIC | Facility: HOSPITAL | Age: 61
End: 2021-11-12

## 2021-11-12 DIAGNOSIS — Z23 ENCOUNTER FOR IMMUNIZATION: Primary | ICD-10-CM

## 2021-11-12 PROCEDURE — 0013A COVID-19 MODERNA VACC 0.25 ML BOOSTER: CPT

## 2021-11-12 PROCEDURE — 91306 COVID-19 MODERNA VACC 0.25 ML BOOSTER: CPT

## 2021-12-12 LAB
25(OH)D3 SERPL-MCNC: 36 NG/ML (ref 30–100)
ALBUMIN SERPL-MCNC: 4 G/DL (ref 3.6–5.1)
ALBUMIN/GLOB SERPL: 1.3 (CALC) (ref 1–2.5)
ALP SERPL-CCNC: 114 U/L (ref 37–153)
ALT SERPL-CCNC: 13 U/L (ref 6–29)
AST SERPL-CCNC: 16 U/L (ref 10–35)
BASOPHILS # BLD AUTO: 94 CELLS/UL (ref 0–200)
BASOPHILS NFR BLD AUTO: 1.2 %
BILIRUB SERPL-MCNC: 0.3 MG/DL (ref 0.2–1.2)
BUN SERPL-MCNC: 19 MG/DL (ref 7–25)
BUN/CREAT SERPL: NORMAL (CALC) (ref 6–22)
CALCIUM SERPL-MCNC: 9.4 MG/DL (ref 8.6–10.4)
CHLORIDE SERPL-SCNC: 106 MMOL/L (ref 98–110)
CHOLEST SERPL-MCNC: 139 MG/DL
CHOLEST/HDLC SERPL: 2.2 (CALC)
CO2 SERPL-SCNC: 28 MMOL/L (ref 20–32)
CREAT SERPL-MCNC: 0.7 MG/DL (ref 0.5–0.99)
EOSINOPHIL # BLD AUTO: 320 CELLS/UL (ref 15–500)
EOSINOPHIL NFR BLD AUTO: 4.1 %
ERYTHROCYTE [DISTWIDTH] IN BLOOD BY AUTOMATED COUNT: 18.7 % (ref 11–15)
EST. AVERAGE GLUCOSE BLD GHB EST-MCNC: 111 MG/DL
EST. AVERAGE GLUCOSE BLD GHB EST-SCNC: 6.2 MMOL/L
GLOBULIN SER CALC-MCNC: 3.1 G/DL (CALC) (ref 1.9–3.7)
GLUCOSE SERPL-MCNC: 89 MG/DL (ref 65–99)
HBA1C MFR BLD: 5.5 % OF TOTAL HGB
HCT VFR BLD AUTO: 39.1 % (ref 35–45)
HDLC SERPL-MCNC: 63 MG/DL
HGB BLD-MCNC: 11.7 G/DL (ref 11.7–15.5)
LDLC SERPL CALC-MCNC: 57 MG/DL (CALC)
LYMPHOCYTES # BLD AUTO: 3081 CELLS/UL (ref 850–3900)
LYMPHOCYTES NFR BLD AUTO: 39.5 %
MCH RBC QN AUTO: 18.9 PG (ref 27–33)
MCHC RBC AUTO-ENTMCNC: 29.9 G/DL (ref 32–36)
MCV RBC AUTO: 63.3 FL (ref 80–100)
MONOCYTES # BLD AUTO: 601 CELLS/UL (ref 200–950)
MONOCYTES NFR BLD AUTO: 7.7 %
NEUTROPHILS # BLD AUTO: 3705 CELLS/UL (ref 1500–7800)
NEUTROPHILS NFR BLD AUTO: 47.5 %
NONHDLC SERPL-MCNC: 76 MG/DL (CALC)
PLATELET # BLD AUTO: 280 THOUSAND/UL (ref 140–400)
PMV BLD REES-ECKER: 11.4 FL (ref 7.5–12.5)
POTASSIUM SERPL-SCNC: 4.1 MMOL/L (ref 3.5–5.3)
PROT SERPL-MCNC: 7.1 G/DL (ref 6.1–8.1)
RBC # BLD AUTO: 6.18 MILLION/UL (ref 3.8–5.1)
SL AMB EGFR AFRICAN AMERICAN: 108 ML/MIN/1.73M2
SL AMB EGFR NON AFRICAN AMERICAN: 93 ML/MIN/1.73M2
SODIUM SERPL-SCNC: 143 MMOL/L (ref 135–146)
TRIGL SERPL-MCNC: 102 MG/DL
TSH SERPL-ACNC: 1.42 MIU/L (ref 0.4–4.5)
WBC # BLD AUTO: 7.8 THOUSAND/UL (ref 3.8–10.8)

## 2021-12-17 ENCOUNTER — HOSPITAL ENCOUNTER (OUTPATIENT)
Dept: RADIOLOGY | Age: 61
Discharge: HOME | End: 2021-12-17
Payer: COMMERCIAL

## 2021-12-17 VITALS — WEIGHT: 135 LBS | HEIGHT: 62 IN | BODY MASS INDEX: 24.84 KG/M2

## 2021-12-17 DIAGNOSIS — Z12.31 ENCOUNTER FOR SCREENING MAMMOGRAM FOR MALIGNANT NEOPLASM OF BREAST: ICD-10-CM

## 2021-12-17 PROCEDURE — 77063 BREAST TOMOSYNTHESIS BI: CPT

## 2021-12-17 PROCEDURE — 77067 SCR MAMMO BI INCL CAD: CPT

## 2021-12-28 ENCOUNTER — COSMETIC (OUTPATIENT)
Dept: PLASTIC SURGERY | Facility: CLINIC | Age: 61
End: 2021-12-28

## 2021-12-28 DIAGNOSIS — Z41.1 ENCOUNTER FOR COSMETIC PROCEDURE: ICD-10-CM

## 2021-12-28 PROCEDURE — BOTOX1U PR BOTOX BY THE UNIT: Performed by: STUDENT IN AN ORGANIZED HEALTH CARE EDUCATION/TRAINING PROGRAM

## 2021-12-28 PROCEDURE — RECHECK: Performed by: STUDENT IN AN ORGANIZED HEALTH CARE EDUCATION/TRAINING PROGRAM

## 2022-03-02 ENCOUNTER — OFFICE VISIT (OUTPATIENT)
Dept: GASTROENTEROLOGY | Facility: CLINIC | Age: 62
End: 2022-03-02
Payer: COMMERCIAL

## 2022-03-02 VITALS
BODY MASS INDEX: 26.24 KG/M2 | SYSTOLIC BLOOD PRESSURE: 138 MMHG | WEIGHT: 139 LBS | OXYGEN SATURATION: 97 % | RESPIRATION RATE: 18 BRPM | HEART RATE: 66 BPM | TEMPERATURE: 98.8 F | DIASTOLIC BLOOD PRESSURE: 70 MMHG | HEIGHT: 61 IN

## 2022-03-02 DIAGNOSIS — E66.3 OVERWEIGHT: ICD-10-CM

## 2022-03-02 DIAGNOSIS — K51.011 ULCERATIVE PANCOLITIS WITH RECTAL BLEEDING (HCC): Primary | ICD-10-CM

## 2022-03-02 PROCEDURE — 1036F TOBACCO NON-USER: CPT | Performed by: INTERNAL MEDICINE

## 2022-03-02 PROCEDURE — 3008F BODY MASS INDEX DOCD: CPT | Performed by: INTERNAL MEDICINE

## 2022-03-02 PROCEDURE — 99214 OFFICE O/P EST MOD 30 MIN: CPT | Performed by: INTERNAL MEDICINE

## 2022-03-02 RX ORDER — DICYCLOMINE HCL 20 MG
20 TABLET ORAL EVERY 6 HOURS PRN
Qty: 120 TABLET | Refills: 5 | Status: SHIPPED | OUTPATIENT
Start: 2022-03-02

## 2022-03-02 RX ORDER — FERROUS SULFATE TAB EC 324 MG (65 MG FE EQUIVALENT) 324 (65 FE) MG
324 TABLET DELAYED RESPONSE ORAL
Qty: 180 TABLET | Refills: 3 | Status: SHIPPED | OUTPATIENT
Start: 2022-03-02

## 2022-03-02 RX ORDER — PREDNISONE 20 MG/1
20 TABLET ORAL DAILY
Qty: 30 TABLET | Refills: 0 | Status: SHIPPED | OUTPATIENT
Start: 2022-03-02

## 2022-03-02 RX ORDER — MESALAMINE 4 G/60ML
4 ENEMA RECTAL
Qty: 90 ENEMA | Refills: 3 | Status: SHIPPED | OUTPATIENT
Start: 2022-03-02

## 2022-03-02 RX ORDER — MESALAMINE 400 MG/1
1200 CAPSULE, DELAYED RELEASE ORAL 4 TIMES DAILY
Qty: 1080 CAPSULE | Refills: 3 | Status: SHIPPED | OUTPATIENT
Start: 2022-03-02 | End: 2022-05-31

## 2022-03-02 NOTE — PROGRESS NOTES
[facility] - Outpatient Follow-up Note  Marlon Huynh 57 y o  female MRN: [id number]  Encounter: [id number]    ASSESSMENT AND PLAN:  57year old female here for follow up  1  Ulcerative pancolitis with rectal bleeding ([address] )  -doing well; will refill all meds today; giving a one month supply of prednisone only if she is on vacation and needs it- pt knows to call me if she feels the need for it    - mesalamine (ROWASA) 4 g; Insert 60 mL (4 g total) into the rectum daily at bedtime  Dispense: 90 enema; Refill: 3  - mesalamine (Delzicol) 400 mg; Take 3 capsules (1,200 mg total) by mouth 4 (four) times a day  Dispense: 1080 capsule; Refill: 3  - dicyclomine (BENTYL) 20 mg tablet; Take 1 tablet (20 mg total) by mouth every 6 (six) hours as needed (abd pain)  Dispense: 120 tablet; Refill: 5  - predniSONE 20 mg tablet; Take 1 tablet (20 mg total) by mouth daily  Dispense: 30 tablet; Refill: 0    2  Overweight  -pt agrees tor referral for colorectal surgery    Follow up in a few months time      ___________________________________    SUBJECTIVE:  She is avoiding her food triggers  This  Feeling well with her UC  Taking delzicol, rowasa enenams and bentyl as needed  Avoids oatmeal and if she does this feels well  REVIEW OF SYSTEMS IS OTHERWISE NEGATIVE  Historical Information   Past Medical History:   Diagnosis Date    Allergic rhinitis     Breast CA ([address] )     R breast CA-chemo/radiaition    Diarrhea     for 6 weeks-colonosocpy today 9/26/2016 and EGD    History of radiation therapy     Hyperlipidemia     Hypertension     Thalassemia     Vitamin D deficiency      Past Surgical History:   Procedure Laterality Date    BREAST LUMPECTOMY Right     BREAST LUMPECTOMY Right     BREAST LUMPECTOMY Right     with lymph nodes  2007    COLONOSCOPY      COLONOSCOPY N/A 4/23/2018    Procedure: COLONOSCOPY;  Surgeon: Aayush Murrell MD;  [facility];   Service: Gastroenterology    CORONARY ARTERY BYPASS GRAFT      CORONARY ARTERY BYPASS GRAFT      x3, 11/2015    HYSTERECTOMY      b/l ovaries removed    OOPHORECTOMY      VT COLONOSCOPY FLX DX W/COLLJ SPEC WHEN PFRMD N/A 9/26/2016    Procedure: COLONOSCOPY;  Surgeon: Yamileth Cali DO;  [facility];   Service: Gastroenterology     Social History   Social History     Substance and Sexual Activity   Alcohol Use Yes    Alcohol/week: 4 0 standard drinks    Types: 4 Glasses of wine per week    Comment: per year     Social History     Substance and Sexual Activity   Drug Use No     Social History     Tobacco Use   Smoking Status Never Smoker   Smokeless Tobacco Never Used     Family History   Problem Relation Age of Onset    Stroke Father     Heart attack Brother     Sudden death Brother         cardiac    Heart attack Maternal Uncle     Hypertension Family     Hyperlipidemia Family     No Known Problems Mother     No Known Problems Sister     No Known Problems Daughter     No Known Problems Maternal Grandmother     No Known Problems Maternal Grandfather     No Known Problems Paternal Grandmother     No Known Problems Paternal Grandfather     No Known Problems Sister     No Known Problems Maternal Aunt     No Known Problems Maternal Aunt     No Known Problems Maternal Aunt     No Known Problems Paternal Aunt     No Known Problems Paternal Aunt     No Known Problems Paternal Aunt     No Known Problems Paternal Aunt     No Known Problems Son     Clotting disorder Neg Hx     Anuerysm Neg Hx        Meds/Allergies       Current Outpatient Medications:     Cholecalciferol 53995 units TABS    Delzicol 400 MG    dicyclomine (BENTYL) 20 mg tablet    Epinastine HCl 0 05 % ophthalmic solution    Evolocumab with Infusor (Repatha Pushtronex System) 420 MG/3 5ML SOCT    ferrous sulfate (FeroSul) 325 (65 Fe) mg tablet    mesalamine (ROWASA) 4 g    mometasone (NASONEX) 50 mcg/act nasal spray   zolpidem (AMBIEN) 5 mg tablet    dicyclomine (BENTYL) 20 mg tablet    mesalamine (Delzicol) 400 mg    mesalamine (ROWASA) 4 g    predniSONE 20 mg tablet    Allergies   Allergen Reactions    Iodinated Diagnostic Agents Anaphylaxis, Hives and Swelling     Objective     Blood pressure 138/70, pulse 66, temperature 98 8 °F (37 1 °C), resp  rate 18, height 5' 1" (1 549 m), weight 63 kg (139 lb), SpO2 97 %, not currently breastfeeding  Body mass index is 26 26 kg/m²  PHYSICAL EXAM:      General Appearance:   Alert, cooperative, no distress   HEENT:   Normocephalic, atraumatic, anicteric      Neck:  Supple, symmetrical, trachea midline   Lungs:   Clear to auscultation bilaterally; no rales, rhonchi or wheezing; respirations unlabored    Heart[de-identified]   Regular rate and rhythm; no murmur, rub, or gallop  Abdomen:   Soft, non-tender, non-distended; normal bowel sounds; no masses, no organomegaly    Genitalia:   Deferred    Rectal:   Deferred    Extremities:  No cyanosis, clubbing or edema    Pulses:  2+ and symmetric    Skin:  No jaundice, rashes, or lesions    Lymph nodes:  No palpable cervical lymphadenopathy        Lab Results:   No visits with results within 1 Day(s) from this visit     Latest known visit with results is:   Orders Only on 12/11/2021   Component Date Value    Total Cholesterol 12/11/2021 139     HDL 12/11/2021 63     Triglycerides 12/11/2021 102     LDL Calculated 12/11/2021 57     Chol HDLC Ratio 12/11/2021 2 2     Non-HDL Cholesterol 12/11/2021 76     Glucose, Random 12/11/2021 89     BUN 12/11/2021 19     Creatinine 12/11/2021 0 70     eGFR Non  12/11/2021 93     eGFR  12/11/2021 108     SL AMB BUN/CREATININE RA* 85/50/2663 NOT APPLICABLE     Sodium 72/08/4559 143     Potassium 12/11/2021 4 1     Chloride 12/11/2021 106     CO2 12/11/2021 28     Calcium 12/11/2021 9 4     Protein, Total 12/11/2021 7 1     Albumin 12/11/2021 4 0     Globulin 12/11/2021 3 1     Albumin/Globulin Ratio 12/11/2021 1 3     TOTAL BILIRUBIN 12/11/2021 0 3     Alkaline Phosphatase 12/11/2021 114     AST 12/11/2021 16     ALT 12/11/2021 13     White Blood Cell Count 12/11/2021 7 8     Red Blood Cell Count 12/11/2021 6 18*    Hemoglobin 12/11/2021 11 7     HCT 12/11/2021 39 1     MCV 12/11/2021 63 3*    MCH 12/11/2021 18 9*    MCHC 12/11/2021 29 9*    RDW 12/11/2021 18 7*    Platelet Count 95/79/9350 280     SL AMB MPV 12/11/2021 11 4     Neutrophils (Absolute) 12/11/2021 3,705     Lymphocytes (Absolute) 12/11/2021 3,081     Monocytes (Absolute) 12/11/2021 601     Eosinophils (Absolute) 12/11/2021 320     Basophils ABS 12/11/2021 94     Neutrophils 12/11/2021 47 5     Lymphocytes 12/11/2021 39 5     Monocytes 12/11/2021 7 7     Eosinophils 12/11/2021 4 1     Basophils PCT 12/11/2021 1 2     TSH W/RFX TO FREE T4 12/11/2021 1 42     Vitamin D, 25-Hydroxy, S* 12/11/2021 36     Hemoglobin A1C 12/11/2021 5 5     Estimated Average Glucose 12/11/2021 111     Estimated Average Glucos* 12/11/2021 6 2     RBC Morphology 12/11/2021       Radiology Results:   No results found

## 2022-03-03 NOTE — TELEPHONE ENCOUNTER
Pt  Called in would like suppository to be ordered for her in addition to her other meds  She will be traveling in May and would like to try it prior to her travels stated enema bottle are to large to travel with

## 2022-03-04 DIAGNOSIS — K51.011 ULCERATIVE PANCOLITIS WITH RECTAL BLEEDING (HCC): Primary | ICD-10-CM

## 2022-03-04 RX ORDER — MESALAMINE 1000 MG/1
1000 SUPPOSITORY RECTAL
Qty: 30 SUPPOSITORY | Refills: 3 | Status: SHIPPED | OUTPATIENT
Start: 2022-03-04

## 2022-03-04 NOTE — TELEPHONE ENCOUNTER
Please call pt and ask her what suppositories would she like me to write for? I think she is asking for canasa suppositories which are the mesalamine suppositories?     Thanks,  Eller Krabbe

## 2022-04-06 ENCOUNTER — TELEMEDICINE (OUTPATIENT)
Dept: FAMILY MEDICINE CLINIC | Facility: CLINIC | Age: 62
End: 2022-04-06
Payer: COMMERCIAL

## 2022-04-06 VITALS — TEMPERATURE: 99 F | HEIGHT: 61 IN | BODY MASS INDEX: 25.68 KG/M2 | WEIGHT: 136 LBS

## 2022-04-06 DIAGNOSIS — R05.9 COUGH: ICD-10-CM

## 2022-04-06 DIAGNOSIS — J06.9 ACUTE URI: Primary | ICD-10-CM

## 2022-04-06 PROCEDURE — 99203 OFFICE O/P NEW LOW 30 MIN: CPT | Performed by: FAMILY MEDICINE

## 2022-04-06 PROCEDURE — 87636 SARSCOV2 & INF A&B AMP PRB: CPT | Performed by: FAMILY MEDICINE

## 2022-04-06 PROCEDURE — 3725F SCREEN DEPRESSION PERFORMED: CPT | Performed by: FAMILY MEDICINE

## 2022-04-06 RX ORDER — BROMPHENIRAMINE MALEATE, PSEUDOEPHEDRINE HYDROCHLORIDE, AND DEXTROMETHORPHAN HYDROBROMIDE 2; 30; 10 MG/5ML; MG/5ML; MG/5ML
5 SYRUP ORAL 4 TIMES DAILY PRN
Qty: 120 ML | Refills: 0 | Status: SHIPPED | OUTPATIENT
Start: 2022-04-06 | End: 2022-04-20

## 2022-04-06 NOTE — PROGRESS NOTES
COVID-19 Outpatient Progress Note    Assessment/Plan:    Problem List Items Addressed This Visit     None      Visit Diagnoses     Acute URI    -  Primary    Relevant Medications    brompheniramine-pseudoephedrine-DM 30-2-10 MG/5ML syrup    Cough        Relevant Orders    Covid/Flu- Office Collect         Disposition:     Recommended patient to come to the office to test for COVID-19/Influenza  I have spent 15 minutes directly with the patient  Encounter provider Linsey Rios MD    Provider located at [address] [address]    Recent Visits  No visits were found meeting these conditions  Showing recent visits within past 7 days and meeting all other requirements  Today's Visits  Date Type Provider Dept   04/06/22 Telemedicine Linsey Rios MD [facility]   Showing today's visits and meeting all other requirements  Future Appointments  No visits were found meeting these conditions  Showing future appointments within next 150 days and meeting all other requirements     This virtual check-in was done via [facility] and patient was informed that this is a secure, HIPAA-compliant platform  She agrees to proceed  Patient agrees to participate in a virtual check in via telephone or video visit instead of presenting to the office to address urgent/immediate medical needs  Patient is aware this is a billable service  After connecting through [facility], the patient was identified by name and date of birth  Tristan Ortega was informed that this was a telemedicine visit and that the exam was being conducted confidentially over secure lines  My office door was closed  No one else was in the room  Tristan Ortega acknowledged consent and understanding of privacy and security of the telemedicine visit   I informed the patient that I have reviewed her record in Epic and presented the opportunity for her to ask any questions regarding the visit today  The patient agreed to participate  Verification of patient location:  Patient is located in the following state in which I hold an active license: [place]    Subjective:   Manjeet Grullon is a 57 y o  female who is concerned about COVID-19  Patient's symptoms include fever, chills, fatigue, malaise, nasal congestion, rhinorrhea, sore throat and cough  Patient denies anosmia, loss of taste, shortness of breath, chest tightness, abdominal pain, nausea, vomiting, diarrhea, myalgias and headaches       - Date of symptom onset: 4/4/2022      COVID-19 vaccination status: Fully vaccinated with booster    Exposure:   Contact with a person who is under investigation (PUI) for or who is positive for COVID-19 within the last 14 days?: No    Hospitalized recently for fever and/or lower respiratory symptoms?: No      Currently a healthcare worker that is involved in direct patient care?: No      Works in a special setting where the risk of COVID-19 transmission may be high? (this may include long-term care, correctional and assisted facilities; homeless shelters; assisted-living facilities and group homes ): No      Resident in a special setting where the risk of COVID-19 transmission may be high? (this may include long-term care, correctional and assisted facilities; homeless shelters; assisted-living facilities and group homes ): No      No results found for: [address] [facility] [address], [address] [facility], [address] [facility], [facility] [address], [address] [facility], [address] [facility]  Past Medical History:   Diagnosis Date    Allergic rhinitis     Breast CA ([address] )     R breast CA-chemo/radiaition    Diarrhea     for 6 weeks-colonosocpy today 9/26/2016 and EGD    History of radiation therapy     Hyperlipidemia     Hypertension     Thalassemia     Vitamin D deficiency      Past Surgical History:   Procedure Laterality Date    BREAST LUMPECTOMY Right     BREAST LUMPECTOMY Right     BREAST LUMPECTOMY Right     with lymph nodes  2007    COLONOSCOPY      COLONOSCOPY N/A 4/23/2018    Procedure: COLONOSCOPY;  Surgeon: Sabas Anand MD;  [facility]; Service: Gastroenterology    CORONARY ARTERY BYPASS GRAFT      CORONARY ARTERY BYPASS GRAFT      x3, 11/2015    HYSTERECTOMY      b/l ovaries removed    OOPHORECTOMY      VT COLONOSCOPY FLX DX W/COLLJ SPEC WHEN PFRMD N/A 9/26/2016    Procedure: COLONOSCOPY;  Surgeon: Saravanan Nesbitt DO;  [facility];   Service: Gastroenterology     Current Outpatient Medications   Medication Sig Dispense Refill    Cholecalciferol 24098 units TABS Take 1 tablet (50,000 Units total) by mouth once a week 12 tablet 0    Delzicol 400 MG TAKE THREE CAPSULES FOUR TIMES A  capsule 2    dicyclomine (BENTYL) 20 mg tablet Take 1 tablet (20 mg total) by mouth every 6 (six) hours as needed (abd pain) 120 tablet 5    ferrous sulfate 324 (65 Fe) mg Take 1 tablet (324 mg total) by mouth 2 (two) times a day before meals 180 tablet 3    mesalamine (CANASA) 1,000 mg suppository Insert 1 suppository (1,000 mg total) into the rectum daily at bedtime as needed (ulcerative colitis) 30 suppository 2    mesalamine (Delzicol) 400 mg Take 3 capsules (1,200 mg total) by mouth 4 (four) times a day 1080 capsule 3    mesalamine (ROWASA) 4 g Insert 60 mL (4 g total) into the rectum daily at bedtime 90 enema 3    mometasone (NASONEX) 50 mcg/act nasal spray 2 sprays daily  2    predniSONE 20 mg tablet Take 1 tablet (20 mg total) by mouth daily 30 tablet 0    zolpidem (AMBIEN) 5 mg tablet daily at bedtime as needed        brompheniramine-pseudoephedrine-DM 30-2-10 MG/5ML syrup Take 5 mL by mouth 4 (four) times a day as needed for congestion or cough 120 mL 0    dicyclomine (BENTYL) 20 mg tablet Abdominal pain (Patient not taking: Reported on 4/6/2022 ) 120 tablet 3    Epinastine HCl 0 05 % ophthalmic solution       Evolocumab with Infusor (Repatha Pushtronex System) 420 MG/3 5ML SOCT Inject 3 5 ml under the skin every 30 days 3 5 mL 11    ferrous sulfate (FeroSul) 325 (65 Fe) mg tablet Take 1 tablet (325 mg total) by mouth 2 (two) times a day (Patient taking differently: Take 1 tablet by mouth daily with breakfast  ) 60 tablet 2    mesalamine (CANASA) 1,000 mg suppository Insert 1 suppository (1,000 mg total) into the rectum daily at bedtime (Patient not taking: Reported on 4/6/2022 ) 30 suppository 3    mesalamine (ROWASA) 4 g Insert 60 mL (4 g total) into the rectum daily at bedtime (Patient not taking: Reported on 4/6/2022 ) 90 Bottle 3     No current facility-administered medications for this visit  Allergies   Allergen Reactions    Iodinated Diagnostic Agents Anaphylaxis, Hives and Swelling       Review of Systems   Constitutional: Positive for chills, fatigue and fever  HENT: Positive for congestion, rhinorrhea and sore throat  Respiratory: Positive for cough  Negative for chest tightness and shortness of breath  Gastrointestinal: Negative for abdominal pain, diarrhea, nausea and vomiting  Musculoskeletal: Negative for myalgias  Neurological: Negative for headaches  Objective:    Vitals:    04/06/22 1018   Temp: 99 °F (37 2 °C)   Weight: 61 7 kg (136 lb)   Height: 5' 1" (1 549 m)       Physical Exam  Constitutional:       Appearance: Normal appearance  Pulmonary:      Effort: Pulmonary effort is normal    Neurological:      General: No focal deficit present  Mental Status: She is alert and oriented to person, place, and time  VIRTUAL VISIT DISCLAIMER    Álvaro Brumfield verbally agrees to participate in [facility]  Pt is aware that [facility] could be limited without vital signs or the ability to perform a full hands-on physical Stacia Lopez understands she or the provider may request at any time to terminate the video visit and request the patient to seek care or treatment in person

## 2022-04-07 LAB
FLUAV RNA RESP QL NAA+PROBE: NEGATIVE
FLUBV RNA RESP QL NAA+PROBE: NEGATIVE
SARS-COV-2 RNA RESP QL NAA+PROBE: NEGATIVE

## 2022-04-13 ENCOUNTER — COSMETIC (OUTPATIENT)
Dept: PLASTIC SURGERY | Facility: CLINIC | Age: 62
End: 2022-04-13

## 2022-04-13 DIAGNOSIS — Z41.1 ENCOUNTER FOR COSMETIC PROCEDURE: ICD-10-CM

## 2022-04-13 PROCEDURE — BOTOX1U PR BOTOX BY THE UNIT: Performed by: STUDENT IN AN ORGANIZED HEALTH CARE EDUCATION/TRAINING PROGRAM

## 2022-04-13 NOTE — PROGRESS NOTES
Botox Consult     First time?: no, had performed by me in Dec with good results  Allergies: contrast dye  Blood thinners: none  Pregnant:no  Neuromuscular illnesses: no     Patient had botox with me before in Dec with good results, wants same      Will proceed with 20 units of botox     Risks and benefits discussed, patient agreed to proceed      6 units to forehead  14 units to glabellum     Total 20 units, 10% off, botox day     Patient tolerated well, f/u in 3 months        Lele Harrison MD   [facility]   [address], [address], [address]   Office: [phone]

## 2022-04-20 ENCOUNTER — OFFICE VISIT (OUTPATIENT)
Dept: FAMILY MEDICINE CLINIC | Facility: CLINIC | Age: 62
End: 2022-04-20
Payer: COMMERCIAL

## 2022-04-20 VITALS
SYSTOLIC BLOOD PRESSURE: 130 MMHG | HEIGHT: 62 IN | BODY MASS INDEX: 25.58 KG/M2 | TEMPERATURE: 97.7 F | HEART RATE: 77 BPM | WEIGHT: 139 LBS | DIASTOLIC BLOOD PRESSURE: 80 MMHG | OXYGEN SATURATION: 95 % | RESPIRATION RATE: 16 BRPM

## 2022-04-20 DIAGNOSIS — Z00.00 ANNUAL PHYSICAL EXAM: Primary | ICD-10-CM

## 2022-04-20 DIAGNOSIS — M06.9 RHEUMATOID ARTHRITIS INVOLVING MULTIPLE SITES, UNSPECIFIED WHETHER RHEUMATOID FACTOR PRESENT (HCC): ICD-10-CM

## 2022-04-20 DIAGNOSIS — K51.011 ULCERATIVE PANCOLITIS WITH RECTAL BLEEDING (HCC): ICD-10-CM

## 2022-04-20 DIAGNOSIS — F51.01 PRIMARY INSOMNIA: ICD-10-CM

## 2022-04-20 PROBLEM — R10.9 ABDOMINAL PAIN: Status: RESOLVED | Noted: 2018-04-20 | Resolved: 2022-04-20

## 2022-04-20 PROCEDURE — 99396 PREV VISIT EST AGE 40-64: CPT | Performed by: FAMILY MEDICINE

## 2022-04-20 PROCEDURE — 1036F TOBACCO NON-USER: CPT | Performed by: FAMILY MEDICINE

## 2022-04-20 PROCEDURE — 3008F BODY MASS INDEX DOCD: CPT | Performed by: FAMILY MEDICINE

## 2022-04-20 RX ORDER — ZOLPIDEM TARTRATE 5 MG/1
TABLET ORAL
Qty: 30 TABLET | Status: CANCELLED | OUTPATIENT
Start: 2022-04-20

## 2022-04-20 RX ORDER — ZOLPIDEM TARTRATE 5 MG/1
5 TABLET ORAL
Qty: 30 TABLET | Refills: 0 | Status: SHIPPED | OUTPATIENT
Start: 2022-04-20

## 2022-04-20 NOTE — PROGRESS NOTES
[address] [facility] PRACTICE    NAME: Darwin Panchal  AGE: 57 y o  SEX: female  : 1960     DATE: 2022     Assessment and Plan:     Problem List Items Addressed This Visit        Digestive    Ulcerative pancolitis with rectal bleeding ([facility] [address] )     Stable on current medication  Care per GI           Other Visit Diagnoses     Annual physical exam    -  Primary    Primary insomnia        Relevant Medications    zolpidem (AMBIEN) 5 mg tablet    Rheumatoid arthritis involving multiple sites, unspecified whether rheumatoid factor present ([facility] )              Immunizations and preventive care screenings were discussed with patient today  Appropriate education was printed on patient's after visit summary  Counseling:  Alcohol/drug use: discussed moderation in alcohol intake, the recommendations for healthy alcohol use, and avoidance of illicit drug use  Dental Health: discussed importance of regular tooth brushing, flossing, and dental visits  Injury prevention: discussed safety/seat belts, safety helmets, smoke detectors, carbon dioxide detectors, and smoking near bedding or upholstery  Sexual health: discussed sexually transmitted diseases, partner selection, use of condoms, avoidance of unintended pregnancy, and contraceptive alternatives  · Exercise: the importance of regular exercise/physical activity was discussed  Recommend exercise 3-5 times per week for at least 30 minutes  No follow-ups on file  Chief Complaint:     Chief Complaint   Patient presents with    Physical Exam     Patient is here today for an annual exam       History of Present Illness:     Adult Annual Physical   Patient here for a comprehensive physical exam  The patient reports no problems  Diet and Physical Activity  · Diet/Nutrition: well balanced diet and consuming 3-5 servings of fruits/vegetables daily  · Exercise: moderate cardiovascular exercise  [place]      Depression Screening  PHQ-2/9 Depression Screening         General Health  · Sleep: sleeps well and gets 7-8 hours of sleep on average  · Hearing: normal - bilateral   · Vision: goes for regular eye exams and wears glasses  · Dental: regular dental visits and brushes teeth twice daily  /GYN Health  · Patient is: postmenopausal  · Last menstrual period: none since hysterectomy   · Contraceptive method: none  Review of Systems:     Review of Systems   Constitutional: Negative  HENT: Negative  Eyes: Negative  Respiratory: Negative  Cardiovascular: Negative  Gastrointestinal: Negative  Endocrine: Negative  Genitourinary: Negative  Musculoskeletal: Negative  Skin: Negative  Allergic/Immunologic: Negative  Neurological: Negative  Hematological: Negative  Psychiatric/Behavioral: Negative  Past Medical History:     Past Medical History:   Diagnosis Date    Allergic rhinitis     Breast CA ([facility] [address] )     R breast CA-chemo/radiaition    Diarrhea     for 6 weeks-colonosocpy today 9/26/2016 and EGD    History of radiation therapy     Hyperlipidemia     Hypertension     Thalassemia     Vitamin D deficiency       Past Surgical History:     Past Surgical History:   Procedure Laterality Date    BREAST LUMPECTOMY Right     BREAST LUMPECTOMY Right     BREAST LUMPECTOMY Right     with lymph nodes  2007    COLONOSCOPY      COLONOSCOPY N/A 4/23/2018    Procedure: COLONOSCOPY;  Surgeon: Pallavi Brandt MD;  [facility]; Service: Gastroenterology    CORONARY ARTERY BYPASS GRAFT      CORONARY ARTERY BYPASS GRAFT      x3, 11/2015    HYSTERECTOMY      b/l ovaries removed    OOPHORECTOMY      [place] COLONOSCOPY FLX DX W/COLLJ SPEC WHEN PFRMD N/A 9/26/2016    Procedure: COLONOSCOPY;  Surgeon: Dejon Dee DO;  Location: [facility];   Service: Gastroenterology      Social History:     Social History     Socioeconomic History    Marital status: /[employer]     Spouse name: None    Number of children: None    Years of education: None    Highest education level: None   Occupational History    None   Tobacco Use    Smoking status: Never Smoker    Smokeless tobacco: Never Used   Substance and Sexual Activity    Alcohol use:  Yes     Alcohol/week: 4 0 standard drinks     Types: 4 Glasses of wine per week     Comment: per year    Drug use: No    Sexual activity: None   Other Topics Concern    None   Social History Narrative    None     Social Determinants of Health     Financial Resource Strain: Not on file   Food Insecurity: Not on file   Transportation Needs: Not on file   Physical Activity: Not on file   Stress: Not on file   Social Connections: Not on file   Intimate Partner Violence: Not on file   Housing Stability: Not on file      Family History:     Family History   Problem Relation Age of Onset    Stroke Father     Heart attack Brother     Sudden death Brother         cardiac    Heart attack Maternal Uncle     Hypertension Family     Hyperlipidemia Family     No Known Problems Mother     No Known Problems Sister     No Known Problems Daughter     No Known Problems Maternal Grandmother     No Known Problems Maternal Grandfather     No Known Problems Paternal Grandmother     No Known Problems Paternal Grandfather     No Known Problems Sister     No Known Problems Maternal Aunt     No Known Problems Maternal Aunt     No Known Problems Maternal Aunt     No Known Problems Paternal Aunt     No Known Problems Paternal Aunt     No Known Problems Paternal Aunt     No Known Problems Paternal Aunt     No Known Problems Son     Clotting disorder Neg Hx     Anuerysm Neg Hx       Current Medications:     Current Outpatient Medications   Medication Sig Dispense Refill    Cholecalciferol 75037 units TABS Take 1 tablet (50,000 Units total) by mouth once a week 12 tablet 0    Delzicol 400 MG TAKE THREE CAPSULES FOUR TIMES A DAY 360 capsule 2    dicyclomine (BENTYL) 20 mg tablet Take 1 tablet (20 mg total) by mouth every 6 (six) hours as needed (abd pain) 120 tablet 5    Evolocumab with Infusor (Repatha Pushtronex System) 420 MG/3 5ML SOCT Inject 3 5 ml under the skin every 30 days 3 5 mL 11    ferrous sulfate 324 (65 Fe) mg Take 1 tablet (324 mg total) by mouth 2 (two) times a day before meals 180 tablet 3    mesalamine (CANASA) 1,000 mg suppository Insert 1 suppository (1,000 mg total) into the rectum daily at bedtime 30 suppository 3    mesalamine (CANASA) 1,000 mg suppository Insert 1 suppository (1,000 mg total) into the rectum daily at bedtime as needed (ulcerative colitis) 30 suppository 2    mesalamine (Delzicol) 400 mg Take 3 capsules (1,200 mg total) by mouth 4 (four) times a day 1080 capsule 3    mesalamine (ROWASA) 4 g Insert 60 mL (4 g total) into the rectum daily at bedtime 90 Bottle 3    mesalamine (ROWASA) 4 g Insert 60 mL (4 g total) into the rectum daily at bedtime 90 enema 3    mometasone (NASONEX) 50 mcg/act nasal spray 2 sprays daily  2    predniSONE 20 mg tablet Take 1 tablet (20 mg total) by mouth daily 30 tablet 0    zolpidem (AMBIEN) 5 mg tablet Take 1 tablet (5 mg total) by mouth daily at bedtime as needed for sleep 30 tablet 0    ferrous sulfate (FeroSul) 325 (65 Fe) mg tablet Take 1 tablet (325 mg total) by mouth 2 (two) times a day (Patient taking differently: Take 1 tablet by mouth daily with breakfast  ) 60 tablet 2     No current facility-administered medications for this visit  Allergies:      Allergies   Allergen Reactions    Iodinated Diagnostic Agents Anaphylaxis, Hives and Swelling      Physical Exam:     /80 (BP Location: Left arm, Patient Position: Sitting, Cuff Size: Adult)   Pulse 77   Temp 97 7 °F (36 5 °C) (Skin)   Resp 16   Ht 5' 2 13" (1 578 m)   Wt 63 kg (139 lb)   LMP  (LMP Unknown)   SpO2 95%   BMI 25 32 kg/m²     Physical Exam  Constitutional:       Appearance: She is well-developed  HENT:      Head: Normocephalic and atraumatic  Right Ear: Tympanic membrane normal       Left Ear: Tympanic membrane normal       Nose: Nose normal    Eyes:      Pupils: Pupils are equal, round, and reactive to light  Cardiovascular:      Rate and Rhythm: Normal rate and regular rhythm  Pulses: Normal pulses  Heart sounds: Normal heart sounds  Pulmonary:      Effort: Pulmonary effort is normal  No respiratory distress  Breath sounds: Normal breath sounds  No wheezing  Abdominal:      General: Bowel sounds are normal       Palpations: Abdomen is soft  Musculoskeletal:         General: No deformity  Normal range of motion  Cervical back: Normal range of motion and neck supple  Skin:     General: Skin is warm  Capillary Refill: Capillary refill takes less than 2 seconds  Neurological:      General: No focal deficit present  Mental Status: She is alert and oriented to person, place, and time     Psychiatric:         Mood and Affect: Mood normal          Behavior: Behavior normal           Edgard Claire MD  [facility]

## 2022-04-20 NOTE — PATIENT INSTRUCTIONS

## 2022-06-22 ENCOUNTER — OFFICE VISIT (OUTPATIENT)
Dept: FAMILY MEDICINE CLINIC | Facility: CLINIC | Age: 62
End: 2022-06-22
Payer: COMMERCIAL

## 2022-06-22 ENCOUNTER — TELEPHONE (OUTPATIENT)
Dept: FAMILY MEDICINE CLINIC | Facility: CLINIC | Age: 62
End: 2022-06-22

## 2022-06-22 VITALS
HEIGHT: 62 IN | OXYGEN SATURATION: 95 % | SYSTOLIC BLOOD PRESSURE: 140 MMHG | WEIGHT: 140.6 LBS | TEMPERATURE: 98.3 F | HEART RATE: 72 BPM | BODY MASS INDEX: 25.88 KG/M2 | DIASTOLIC BLOOD PRESSURE: 90 MMHG | RESPIRATION RATE: 13 BRPM

## 2022-06-22 DIAGNOSIS — M54.50 ACUTE BILATERAL LOW BACK PAIN, UNSPECIFIED WHETHER SCIATICA PRESENT: Primary | ICD-10-CM

## 2022-06-22 PROCEDURE — 3008F BODY MASS INDEX DOCD: CPT | Performed by: NURSE PRACTITIONER

## 2022-06-22 PROCEDURE — 99213 OFFICE O/P EST LOW 20 MIN: CPT | Performed by: NURSE PRACTITIONER

## 2022-06-22 PROCEDURE — 1036F TOBACCO NON-USER: CPT | Performed by: NURSE PRACTITIONER

## 2022-06-22 RX ORDER — MELOXICAM 15 MG/1
15 TABLET ORAL DAILY
Qty: 10 TABLET | Refills: 0 | Status: SHIPPED | OUTPATIENT
Start: 2022-06-22

## 2022-06-22 RX ORDER — CYCLOBENZAPRINE HCL 5 MG
5 TABLET ORAL 3 TIMES DAILY PRN
Qty: 10 TABLET | Refills: 0 | Status: SHIPPED | OUTPATIENT
Start: 2022-06-22

## 2022-06-22 NOTE — TELEPHONE ENCOUNTER
Patient called and stated that she has had lower back pain since Monday  The patient was asking if there is anything you can suggest for her to do  The patient is scheduled with you tomorrow night  Please advise

## 2022-06-22 NOTE — PROGRESS NOTES
Assessment/Plan:    Acute bilateral low back pain  Bilateral low back pain, responded well to 1 tab ibuprofen today  Will start meloxicam 15 mg daily, cyclobenzaprine prn at bedtime  She is advised of SE of medications, knows to take meloxicam with food and not to combine with otc nsaids  Heat at intervals  At this time she is in too much pain to do PT, she works at PT though and she will call if she would like a referral for treatment once pain calms down  Pt instructed to call for reevaluation if sx worsen or persist        Diagnoses and all orders for this visit:    Acute bilateral low back pain, unspecified whether sciatica present  -     meloxicam (Mobic) 15 mg tablet; Take 1 tablet (15 mg total) by mouth daily  -     cyclobenzaprine (FLEXERIL) 5 mg tablet; Take 1 tablet (5 mg total) by mouth 3 (three) times a day as needed for muscle spasms        Subjective:      Patient ID: Unruly Guerra is a 57 y o  female  Pt is a 58 yo female here today for evaluation of back pain  Sx started on Monday, worsened since  She describes pain as an ache across low back, she rates pain 10/10 this morning  She took motrin this morning, which did help; pain now down to 5/10 but increasing  Pain increases with movement  Notes that if she stands too long it makes her legs feel weak  Denies numbness or tingling, no pain in the extremities  No change in bowel or bladder  The following portions of the patient's history were reviewed and updated as appropriate: allergies, current medications, past family history, past medical history, past social history, past surgical history and problem list     Review of Systems   Constitutional: Negative for activity change and fatigue  Respiratory: Negative for shortness of breath  Cardiovascular: Negative for chest pain, palpitations and leg swelling  Musculoskeletal: Positive for back pain  Negative for arthralgias, joint swelling and myalgias     Skin: Negative for color change, rash and wound  Neurological: Negative for weakness, numbness and headaches  Hematological: Negative for adenopathy  Objective:      /90 (BP Location: Left arm, Patient Position: Sitting, Cuff Size: Standard)   Pulse 72   Temp 98 3 °F (36 8 °C) (Tympanic)   Resp 13   Ht 5' 2 13" (1 578 m)   Wt 63 8 kg (140 lb 9 6 oz)   LMP  (LMP Unknown)   SpO2 95%   BMI 25 61 kg/m²          Physical Exam  Vitals reviewed  Constitutional:       General: She is awake  She is not in acute distress  Appearance: Normal appearance  She is well-developed and well-groomed  She is not ill-appearing  Cardiovascular:      Rate and Rhythm: Normal rate and regular rhythm  Heart sounds: Normal heart sounds  No murmur heard  Pulmonary:      Effort: Pulmonary effort is normal       Breath sounds: Normal breath sounds  Musculoskeletal:         General: No tenderness  Cervical back: Full passive range of motion without pain and normal range of motion  No muscular tenderness  Lumbar back: No tenderness or bony tenderness  Decreased range of motion  Right lower leg: No edema  Left lower leg: No edema  Comments: Exam limited by pain, unable to lay pt down to do SLR  Positioning on exam table causes pain  Skin:     General: Skin is warm and dry  Findings: No erythema  Neurological:      Mental Status: She is alert and oriented to person, place, and time  Motor: Motor function is intact  Psychiatric:         Attention and Perception: Attention normal          Mood and Affect: Mood normal          Speech: Speech normal          Behavior: Behavior normal  Behavior is cooperative  Thought Content:  Thought content normal          Judgment: Judgment normal

## 2022-06-22 NOTE — TELEPHONE ENCOUNTER
Yes she can try aleve, ibuprofen or tylenol for pain along with heating pad as needed   If her pain is bad, she can come into the office to see [facility] today     Dr Pita Bergman

## 2022-06-22 NOTE — ASSESSMENT & PLAN NOTE
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

## 2022-06-23 ENCOUNTER — TELEPHONE (OUTPATIENT)
Dept: FAMILY MEDICINE CLINIC | Facility: CLINIC | Age: 62
End: 2022-06-23

## 2022-06-23 NOTE — TELEPHONE ENCOUNTER
Patient called and was asking for a note for work  The patient stated that she is in a lot of pain and was unable to go to work  Is it okay to write the note? Please advise

## 2022-06-29 ENCOUNTER — TELEPHONE (OUTPATIENT)
Dept: FAMILY MEDICINE CLINIC | Facility: CLINIC | Age: 62
End: 2022-06-29

## 2022-06-29 DIAGNOSIS — M54.50 ACUTE BILATERAL LOW BACK PAIN, UNSPECIFIED WHETHER SCIATICA PRESENT: Primary | ICD-10-CM

## 2022-06-29 RX ORDER — METHYLPREDNISOLONE 4 MG/1
TABLET ORAL
Qty: 21 EACH | Refills: 0 | Status: SHIPPED | OUTPATIENT
Start: 2022-06-29 | End: 2022-11-23 | Stop reason: ALTCHOICE

## 2022-06-29 NOTE — TELEPHONE ENCOUNTER
Patient called and stated that last week she saw you for back and gave her medication  The patient stated that the medication is not helping her  The patient stated that you spoke with her about a steroid, the patient stated that she believes that might help and she would like for you to send it the pharmacy for her  Please advise

## 2022-08-16 DIAGNOSIS — K51.011 ULCERATIVE PANCOLITIS WITH RECTAL BLEEDING (HCC): ICD-10-CM

## 2022-08-16 RX ORDER — MESALAMINE 400 MG/1
CAPSULE, DELAYED RELEASE ORAL
Qty: 360 CAPSULE | Refills: 2 | OUTPATIENT
Start: 2022-08-16

## 2022-08-16 RX ORDER — MESALAMINE 400 MG/1
1200 CAPSULE, DELAYED RELEASE ORAL 4 TIMES DAILY
Qty: 1080 CAPSULE | Refills: 3 | Status: SHIPPED | OUTPATIENT
Start: 2022-08-16 | End: 2022-11-14

## 2022-08-16 NOTE — TELEPHONE ENCOUNTER
Patient is requesting refill on Delzicol  She would like this sent to [facility] pharmacy

## 2022-08-25 ENCOUNTER — OFFICE VISIT (OUTPATIENT)
Dept: FAMILY MEDICINE CLINIC | Facility: CLINIC | Age: 62
End: 2022-08-25
Payer: COMMERCIAL

## 2022-08-25 VITALS
BODY MASS INDEX: 25.54 KG/M2 | DIASTOLIC BLOOD PRESSURE: 90 MMHG | TEMPERATURE: 97.9 F | OXYGEN SATURATION: 95 % | SYSTOLIC BLOOD PRESSURE: 122 MMHG | WEIGHT: 138.8 LBS | HEART RATE: 89 BPM | RESPIRATION RATE: 16 BRPM | HEIGHT: 62 IN

## 2022-08-25 DIAGNOSIS — H10.32 ACUTE BACTERIAL CONJUNCTIVITIS OF LEFT EYE: Primary | ICD-10-CM

## 2022-08-25 DIAGNOSIS — F51.01 PRIMARY INSOMNIA: ICD-10-CM

## 2022-08-25 DIAGNOSIS — J30.89 ALLERGIC RHINITIS DUE TO OTHER ALLERGIC TRIGGER, UNSPECIFIED SEASONALITY: ICD-10-CM

## 2022-08-25 DIAGNOSIS — K51.011 ULCERATIVE PANCOLITIS WITH RECTAL BLEEDING (HCC): ICD-10-CM

## 2022-08-25 PROCEDURE — 99214 OFFICE O/P EST MOD 30 MIN: CPT | Performed by: FAMILY MEDICINE

## 2022-08-25 RX ORDER — MOMETASONE FUROATE 50 UG/1
2 SPRAY, METERED NASAL DAILY
Qty: 17 G | Refills: 2 | Status: SHIPPED | OUTPATIENT
Start: 2022-08-25

## 2022-08-25 RX ORDER — TOBRAMYCIN 3 MG/ML
1 SOLUTION/ DROPS OPHTHALMIC
Qty: 5 ML | Refills: 0 | Status: SHIPPED | OUTPATIENT
Start: 2022-08-25 | End: 2022-09-01

## 2022-08-25 RX ORDER — ZOLPIDEM TARTRATE 5 MG/1
5 TABLET ORAL
Qty: 30 TABLET | Refills: 0 | Status: SHIPPED | OUTPATIENT
Start: 2022-08-25

## 2022-08-25 RX ORDER — PREDNISONE 20 MG/1
20 TABLET ORAL AS NEEDED
Qty: 30 TABLET | Refills: 0 | Status: SHIPPED | OUTPATIENT
Start: 2022-08-25

## 2022-08-25 NOTE — PROGRESS NOTES
Assessment/Plan:    No problem-specific Assessment & Plan notes found for this encounter  Diagnoses and all orders for this visit:    Acute bacterial conjunctivitis of left eye  -     tobramycin (TOBREX) 0 3 % SOLN; Administer 1 drop into the left eye every 4 (four) hours while awake for 7 days    Primary insomnia  -     zolpidem (AMBIEN) 5 mg tablet; Take 1 tablet (5 mg total) by mouth daily at bedtime as needed for sleep    Ulcerative pancolitis with rectal bleeding (HCC)  -     predniSONE 20 mg tablet; Take 1 tablet (20 mg total) by mouth as needed    Allergic rhinitis due to other allergic trigger, unspecified seasonality  -     mometasone (NASONEX) 50 mcg/act nasal spray; 2 sprays into each nostril daily      BMI Counseling: Body mass index is 25 28 kg/m²  The BMI is above normal  Nutrition recommendations include decreasing portion sizes and encouraging healthy choices of fruits and vegetables  Exercise recommendations include moderate physical activity 150 minutes/week  No pharmacotherapy was ordered  Rationale for BMI follow-up plan is due to patient being overweight or obese  Subjective:      Patient ID: Juana Ferraro is a 57 y o  female  HPI  Here for left eye redness started today   No discharge but crusty this am   No pain /itchiness  No recent illnesses       The following portions of the patient's history were reviewed and updated as appropriate: allergies, current medications, past family history, past medical history, past social history, past surgical history and problem list     Review of Systems   Constitutional: Negative  HENT: Positive for ear discharge  Eyes: Negative  Respiratory: Negative  Cardiovascular: Negative  Endocrine: Negative  Hematological: Negative  Psychiatric/Behavioral: Negative            Objective:      /90 (BP Location: Left arm, Patient Position: Sitting, Cuff Size: Adult)   Pulse 89   Temp 97 9 °F (36 6 °C) (Skin)   Resp 16   Ht 5' 2 13" (1 578 m)   Wt 63 kg (138 lb 12 8 oz)   LMP  (LMP Unknown)   SpO2 95%   BMI 25 28 kg/m²          Physical Exam  Constitutional:       Appearance: Normal appearance  Eyes:      General:         Left eye: Discharge present  Conjunctiva/sclera:      Left eye: Left conjunctiva is injected  Cardiovascular:      Rate and Rhythm: Normal rate and regular rhythm  Pulses: Normal pulses  Heart sounds: Normal heart sounds  Pulmonary:      Effort: Pulmonary effort is normal       Breath sounds: Normal breath sounds  Neurological:      General: No focal deficit present  Mental Status: She is alert and oriented to person, place, and time     Psychiatric:         Mood and Affect: Mood normal          Behavior: Behavior normal

## 2022-10-04 ENCOUNTER — COSMETIC (OUTPATIENT)
Dept: PLASTIC SURGERY | Facility: CLINIC | Age: 62
End: 2022-10-04

## 2022-10-04 DIAGNOSIS — Z41.1 ENCOUNTER FOR COSMETIC PROCEDURE: Primary | ICD-10-CM

## 2022-10-04 PROCEDURE — BOTOX1U PR BOTOX BY THE UNIT: Performed by: STUDENT IN AN ORGANIZED HEALTH CARE EDUCATION/TRAINING PROGRAM

## 2022-10-04 PROCEDURE — RECHECK: Performed by: STUDENT IN AN ORGANIZED HEALTH CARE EDUCATION/TRAINING PROGRAM

## 2022-10-04 NOTE — PROGRESS NOTES
Botox Consult     First time?: no, had performed by me in April with good results  Allergies: contrast dye  Blood thinners: none  Pregnant:no  Neuromuscular illnesses: no     Patient had botox with me before in April with good results, wants same and additional lateral brow lift      Will proceed with 24 units of botox     Risks and benefits discussed, patient agreed to proceed      6 units to forehead  14 units to glabellum  2 units to each lateral brow lift     Total 24 units, 20% off     Patient tolerated well, f/u in 3 months        Zachery Thompson MD   [facility]astic [facility]   [address] [facility] [address], [address] [facility]   Office: [phone]

## 2022-10-13 ENCOUNTER — TELEPHONE (OUTPATIENT)
Dept: FAMILY MEDICINE CLINIC | Facility: CLINIC | Age: 62
End: 2022-10-13

## 2022-10-13 NOTE — TELEPHONE ENCOUNTER
Tdap is good for 10 years unless she has a new born baby in the household and then we do recommend getting a booster    Dr Flaca Burnett

## 2022-10-19 NOTE — PROGRESS NOTES
Caller: Huey Black    Relationship: Self    Best call back number: [phone]    What is the best time to reach you: ANYTIME    Who are you requesting to speak with (clinical staff, provider,  specific staff member): CLINICAL STAFF    What was the call regarding: RETURNING A MISSED CALL FROM DR MEDINA'S NURSE    Do you require a callback: YES           [facility] - Outpatient Follow-up Note  Madeline Santa 61 y o  female MRN: [id number]  Encounter: [id number]    ASSESSMENT AND PLAN:    The patient is a 61year old female here for a 1 month follow-up regarding her ulcerative colitis  1  Ulcerative colitis   - continue taking delzicol 1200 mg 4 times daily as it seems to be controlling her colitis  I discussed with the patient that she may need to be on mesalamine agents for a long time in order to keep her colitis under control    - she had concerns about the long-term effects of the medication, and I explained that it is very safe  - I will refill the dicyclomine for abdominal cramping  - continue 40 mg of prednisone for 1 more week  - after 1 week, lower dose to 25 mg for a week  If her symptoms worsen, go back to taking 40 mg, otherwise, lower dose to 20 for 1 week, 15x1 week, 10x1 week, 5x1 week, then discontinue  - I will prescribe rowasa enema at bedtime to alleviate symptoms associated with the diarrhea  - I gave her educational information about ulcerative colitis and low-residue diet  - take 50,000 units of vitamin D a week for 1 month  2  Iron deficiency anemia due to chronic blood loss  - she is scheduled for IV iron infusions   - continue taking iron tablets twice daily     3  Elevated C-reactive protein   - I will continue to monitor her C-reactive protein levels  She will follow up in 1 month to continue monitoring her symptoms and her labs  ______________________________________________________________________    SUBJECTIVE:      Madeline Santa is a 61 y o  female here for 1 month follow-up regarding her recent diagnosis of chronic ulcerative colitis  She had a colonoscopy done in April which showed significant inflammation of the colon  Labs showed that her c-reactive protein is elevated  She was started on prednisone and delzicol, and she seems to be doing well on them   She had a few episodes of morning diarrhea, which resolved with Imodium  She is feeling well today, she has been having slightly formed stools in the morning  She was put on blood thinners after having coronary artery bypass graft procedure, which she thinks were the cause for her bleeding previously  She denies bloody diarrhea, but is still experiencing LLQ pain  She denies change in stool caliber, melena, hematochezia, change in appetite, nausea, vomiting, GERD, dysphagia, odynophagia and unintentional weight loss  She is a nonsmoker, and reports that she has about 4 glasses of wine a week  She reports that she has been under stress due to planning her daughter's wedding  REVIEW OF SYSTEMS IS OTHERWISE NEGATIVE  Historical Information   Past Medical History:   Diagnosis Date    Allergic rhinitis     Breast CA ([address] )     R breast CA-chemo/radiaition    Diarrhea     for 6 weeks-colonosocpy today 9/26/2016 and EGD    Hypertension     Thalassemia     Vitamin D deficiency      Past Surgical History:   Procedure Laterality Date    BREAST LUMPECTOMY Right     BREAST LUMPECTOMY Right     BREAST LUMPECTOMY Right     with lymph nodes  2007    COLONOSCOPY      COLONOSCOPY N/A 4/23/2018    Procedure: COLONOSCOPY;  Surgeon: Mimi Cowan MD;  [facility]; Service: Gastroenterology    CORONARY ARTERY BYPASS GRAFT      CORONARY ARTERY BYPASS GRAFT      x3, 11/2015    HYSTERECTOMY      b/l ovaries removed    [place] COLONOSCOPY FLX DX W/COLLJ SPEC WHEN PFRMD N/A 9/26/2016    Procedure: COLONOSCOPY;  Surgeon: [employer], DO;  [facility]; Service: Gastroenterology     Social History   History   Alcohol Use    2 4 oz/week    4 Glasses of wine per week     Comment: per year     History   Drug Use No     History   Smoking Status    Never Smoker   Smokeless Tobacco    Never Used     History reviewed  No pertinent family history      Meds/Allergies       Current Outpatient Prescriptions:     aspirin 81 MG tablet    ATORVASTATIN CALCIUM PO    cholestyramine (QUESTRAN) 4 g packet    dicyclomine (BENTYL) 10 mg capsule    ferrous sulfate 325 (65 Fe) mg tablet    mesalamine (DELZICOL) 400 mg    metoprolol tartrate (LOPRESSOR) 25 mg tablet    pantoprazole (PROTONIX) 40 mg tablet    predniSONE 5 mg tablet    dicyclomine (BENTYL) 20 mg tablet    ferrous sulfate 324 (65 Fe) mg    mesalamine (DELZICOL) 400 mg    mesalamine (ROWASA) 4 g    ondansetron (ZOFRAN) 8 mg tablet    predniSONE 5 mg tablet    Allergies   Allergen Reactions    Iodinated Diagnostic Agents Anaphylaxis           Objective     Blood pressure 120/74, pulse 73, temperature 98 6 °F (37 °C), temperature source Oral, height 5' 2 01" (1 575 m), weight 58 1 kg (128 lb), not currently breastfeeding  Body mass index is 23 41 kg/m²  PHYSICAL EXAM:      General Appearance:   Alert, cooperative, no distress   HEENT:   Normocephalic, atraumatic, anicteric      Neck:  Supple, symmetrical, trachea midline   Lungs:   Clear to auscultation bilaterally; no rales, rhonchi or wheezing; respirations unlabored    Heart[de-identified]   Regular rate and rhythm; no murmur, rub, or gallop  Abdomen:   Soft, non-tender, non-distended; normal bowel sounds; no masses, no organomegaly    Genitalia:   Deferred    Rectal:   Deferred    Extremities:  No cyanosis, clubbing or edema    Pulses:  2+ and symmetric    Skin:  No jaundice, rashes, or lesions    Lymph nodes:  No palpable cervical lymphadenopathy            Radiology Results:   Ct Abdomen Pelvis Wo Contrast  Result Date: 4/19/2018  Impression: Questionable mild wall thickening of the descending colon with minimal pericolonic inflammatory stranding suspicious for an infectious or inflammatory colitis  No free air or free fluid  Attestation:   By signing my name below, Leslie Lantigua, attest that this documentation has been prepared under the direction and in the presence of Rosalva Soto, DO   Electronically Signed: Jacqueline Gee  05/17/2018  John Singleton, personally performed the services described in this documentation  All medical record entries made by the scribe were at my direction and in my presence  I have reviewed the chart and discharge instructions and agree that the record reflects my personal performance and is accurate and complete  Matty Riley DO  05/17/2018

## 2022-11-09 ENCOUNTER — CLINICAL SUPPORT (OUTPATIENT)
Dept: FAMILY MEDICINE CLINIC | Facility: CLINIC | Age: 62
End: 2022-11-09

## 2022-11-09 ENCOUNTER — TELEMEDICINE (OUTPATIENT)
Dept: FAMILY MEDICINE CLINIC | Facility: CLINIC | Age: 62
End: 2022-11-09

## 2022-11-09 DIAGNOSIS — B34.9 ACUTE VIRAL SYNDROME: Primary | ICD-10-CM

## 2022-11-09 RX ORDER — BENZONATATE 100 MG/1
100 CAPSULE ORAL 3 TIMES DAILY PRN
Qty: 20 CAPSULE | Refills: 0 | Status: SHIPPED | OUTPATIENT
Start: 2022-11-09

## 2022-11-09 NOTE — PROGRESS NOTES
Virtual Regular Visit    Verification of patient location:    Patient is located in the following state in which I hold an active license [place]      Assessment/Plan:    Problem List Items Addressed This Visit        Other    Acute viral syndrome - Primary     Day 2 of illness   Vaccinated and boosted   Home covid test negative   Stable on exam   Asked to come in for COVID/flu testing   Supportive care  Follow up results   Isolate while the test is pending   Work note provided            Relevant Medications    benzonatate (TESSALON PERLES) 100 mg capsule    Other Relevant Orders    Covid/Flu- Office Collect               Reason for visit is   Chief Complaint   Patient presents with   • Virtual Brief Visit        Encounter provider Eunice Adhikari MD    Provider located at [place] [address] [place] [address]      Recent Visits  No visits were found meeting these conditions  Showing recent visits within past 7 days and meeting all other requirements  Today's Visits  Date Type Provider Dept   11/09/22 Telemedicine Eunice Adhikari MD  [place] today's visits and meeting all other requirements  Future Appointments  No visits were found meeting these conditions  Showing future appointments within next 150 days and meeting all other requirements       The patient was identified by name and date of birth  Manjeet Grullon was informed that this is a telemedicine visit and that the visit is being conducted through the [employer]  She agrees to proceed     My office door was closed  No one else was in the room  She acknowledged consent and understanding of privacy and security of the video platform  The patient has agreed to participate and understands they can discontinue the visit at any time  Patient is aware this is a billable service  Quincy Pale is a 60 y o  female        Cough, ears pain , nasal congestion, headache, body ache, feverish, and chills  Started 2 days ago  + sick contacts  Just returned from [place]  No rash, chest tightness, wheezing, and GI  Fully vaccinated  Did a home test this morning that was negative for COVID  Had the flu shot yesterday  Past Medical History:   Diagnosis Date   • Allergic rhinitis    • Breast CA ([address] )     R breast CA-chemo/radiaition   • Diarrhea     for 6 weeks-colonosocpy today 9/26/2016 and EGD   • History of radiation therapy    • Hyperlipidemia    • Hypertension    • Thalassemia    • Vitamin D deficiency        Past Surgical History:   Procedure Laterality Date   • BREAST LUMPECTOMY Right    • BREAST LUMPECTOMY Right    • BREAST LUMPECTOMY Right     with lymph nodes  2007   • COLONOSCOPY     • COLONOSCOPY N/A 4/23/2018    Procedure: COLONOSCOPY;  Surgeon: Kamaljit Craig MD;  [facility]; Service: Gastroenterology   • CORONARY ARTERY BYPASS GRAFT     • CORONARY ARTERY BYPASS GRAFT      x3, 11/2015   • HYSTERECTOMY      b/l ovaries removed   • OOPHORECTOMY     • [place] COLONOSCOPY FLX DX W/COLLJ SPEC WHEN PFRMD N/A 9/26/2016    Procedure: COLONOSCOPY;  Surgeon: Juan Pérez DO;  Location[facility];   Service: Gastroenterology       Current Outpatient Medications   Medication Sig Dispense Refill   • benzonatate (TESSALON PERLES) 100 mg capsule Take 1 capsule (100 mg total) by mouth 3 (three) times a day as needed for cough 20 capsule 0   • cyclobenzaprine (FLEXERIL) 5 mg tablet Take 1 tablet (5 mg total) by mouth 3 (three) times a day as needed for muscle spasms 10 tablet 0   • dicyclomine (BENTYL) 20 mg tablet Take 1 tablet (20 mg total) by mouth every 6 (six) hours as needed (abd pain) 120 tablet 5   • Evolocumab with Infusor (Repatha Pushtronex System) 420 MG/3 5ML SOCT Inject 3 5 ml under the skin every 30 days 3 5 mL 11   • ferrous sulfate (FeroSul) 325 (65 Fe) mg tablet Take 1 tablet (325 mg total) by mouth 2 (two) times a day (Patient taking differently: Take 1 tablet by mouth daily with breakfast) 60 tablet 2   • mesalamine (CANASA) 1,000 mg suppository Insert 1 suppository (1,000 mg total) into the rectum daily at bedtime 30 suppository 3   • mesalamine (Delzicol) 400 mg Take 3 capsules (1,200 mg total) by mouth 4 (four) times a day 1080 capsule 3   • mometasone (NASONEX) 50 mcg/act nasal spray 2 sprays into each nostril daily 17 g 2   • zolpidem (AMBIEN) 5 mg tablet Take 1 tablet (5 mg total) by mouth daily at bedtime as needed for sleep 30 tablet 0   • Cholecalciferol 83048 units TABS Take 1 tablet (50,000 Units total) by mouth once a week (Patient not taking: No sig reported) 12 tablet 0   • ferrous sulfate 324 (65 Fe) mg Take 1 tablet (324 mg total) by mouth 2 (two) times a day before meals (Patient not taking: No sig reported) 180 tablet 3   • meloxicam (Mobic) 15 mg tablet Take 1 tablet (15 mg total) by mouth daily (Patient not taking: No sig reported) 10 tablet 0   • mesalamine (ROWASA) 4 g Insert 60 mL (4 g total) into the rectum daily at bedtime (Patient not taking: No sig reported) 90 Bottle 3   • mesalamine (ROWASA) 4 g Insert 60 mL (4 g total) into the rectum daily at bedtime (Patient not taking: No sig reported) 90 enema 3   • methylPREDNISolone 4 MG tablet therapy pack Use as directed on package (Patient not taking: Reported on 11/9/2022) 21 each 0   • predniSONE 20 mg tablet Take 1 tablet (20 mg total) by mouth as needed (Patient not taking: Reported on 11/9/2022) 30 tablet 0     No current facility-administered medications for this visit          Allergies   Allergen Reactions   • Iodinated Diagnostic Agents Anaphylaxis, Hives and Swelling       Review of Systems    Video Exam    Vitals:       Physical Exam     I spent 7 minutes directly with the patient during this visit

## 2022-11-09 NOTE — PROGRESS NOTES
Name: Maira Khanna      : 1960      MRN: [id number]  Encounter Provider: Kiel Oakley  Encounter Date: 2022   Encounter department: [place] [address]   Acute viral syndrome           Subjective      HPI  Review of Systems    Current Outpatient Medications on File Prior to Visit   Medication Sig   • benzonatate (TESSALON PERLES) 100 mg capsule Take 1 capsule (100 mg total) by mouth 3 (three) times a day as needed for cough   • Cholecalciferol 53481 units TABS Take 1 tablet (50,000 Units total) by mouth once a week (Patient not taking: No sig reported)   • cyclobenzaprine (FLEXERIL) 5 mg tablet Take 1 tablet (5 mg total) by mouth 3 (three) times a day as needed for muscle spasms   • dicyclomine (BENTYL) 20 mg tablet Take 1 tablet (20 mg total) by mouth every 6 (six) hours as needed (abd pain)   • Evolocumab with Infusor (Repatha Pushtronex System) 420 MG/3 5ML SOCT Inject 3 5 ml under the skin every 30 days   • ferrous sulfate (FeroSul) 325 (65 Fe) mg tablet Take 1 tablet (325 mg total) by mouth 2 (two) times a day (Patient taking differently: Take 1 tablet by mouth daily with breakfast)   • ferrous sulfate 324 (65 Fe) mg Take 1 tablet (324 mg total) by mouth 2 (two) times a day before meals (Patient not taking: No sig reported)   • meloxicam (Mobic) 15 mg tablet Take 1 tablet (15 mg total) by mouth daily (Patient not taking: No sig reported)   • mesalamine (CANASA) 1,000 mg suppository Insert 1 suppository (1,000 mg total) into the rectum daily at bedtime   • mesalamine (Delzicol) 400 mg Take 3 capsules (1,200 mg total) by mouth 4 (four) times a day   • mesalamine (ROWASA) 4 g Insert 60 mL (4 g total) into the rectum daily at bedtime (Patient not taking: No sig reported)   • mesalamine (ROWASA) 4 g Insert 60 mL (4 g total) into the rectum daily at bedtime (Patient not taking: No sig reported)   • methylPREDNISolone 4 MG tablet therapy pack Use as directed on package (Patient not taking: Reported on 11/9/2022)   • mometasone (NASONEX) 50 mcg/act nasal spray 2 sprays into each nostril daily   • predniSONE 20 mg tablet Take 1 tablet (20 mg total) by mouth as needed (Patient not taking: Reported on 11/9/2022)   • zolpidem (AMBIEN) 5 mg tablet Take 1 tablet (5 mg total) by mouth daily at bedtime as needed for sleep       Objective     LMP  (LMP Unknown)     Cherelle Escamilla

## 2022-11-09 NOTE — LETTER
November 9, 2022     Patient: Tristan Ortega  YOB: 1960  Date of Visit: 11/9/2022      To Whom it May Concern:    Tristan Ortega was seen virtually on 11/9/22 and is under investigation for COVID/flu  Please excuse her from work on 11/9 and 11/0  Return is contingent on the test result  If you have any questions or concerns, please don't hesitate to call           Sincerely,          Ximena Howell MD        CC: No Recipients

## 2022-11-09 NOTE — ASSESSMENT & PLAN NOTE
Day 2 of illness   Vaccinated and boosted   Home covid test negative   Stable on exam   Asked to come in for COVID/flu testing   Supportive care  Follow up results   Isolate while the test is pending   Work note provided

## 2022-11-11 DIAGNOSIS — E78.5 DYSLIPIDEMIA: ICD-10-CM

## 2022-11-11 DIAGNOSIS — I25.10 CORONARY ARTERY DISEASE INVOLVING NATIVE CORONARY ARTERY OF NATIVE HEART WITHOUT ANGINA PECTORIS: ICD-10-CM

## 2022-11-11 RX ORDER — EVOLOCUMAB 420 MG/3.5
KIT SUBCUTANEOUS
Qty: 3.5 ML | Refills: 10 | Status: SHIPPED | OUTPATIENT
Start: 2022-11-11

## 2022-11-23 ENCOUNTER — OFFICE VISIT (OUTPATIENT)
Dept: FAMILY MEDICINE CLINIC | Facility: CLINIC | Age: 62
End: 2022-11-23

## 2022-11-23 VITALS
DIASTOLIC BLOOD PRESSURE: 86 MMHG | BODY MASS INDEX: 25.73 KG/M2 | RESPIRATION RATE: 16 BRPM | HEIGHT: 62 IN | OXYGEN SATURATION: 96 % | SYSTOLIC BLOOD PRESSURE: 138 MMHG | HEART RATE: 77 BPM | TEMPERATURE: 98.2 F | WEIGHT: 139.8 LBS

## 2022-11-23 DIAGNOSIS — I25.10 CORONARY ARTERY DISEASE INVOLVING NATIVE CORONARY ARTERY OF NATIVE HEART WITHOUT ANGINA PECTORIS: ICD-10-CM

## 2022-11-23 DIAGNOSIS — K51.011 ULCERATIVE PANCOLITIS WITH RECTAL BLEEDING (HCC): ICD-10-CM

## 2022-11-23 DIAGNOSIS — I10 ESSENTIAL HYPERTENSION: Primary | Chronic | ICD-10-CM

## 2022-11-23 DIAGNOSIS — Z11.4 SCREENING FOR HIV (HUMAN IMMUNODEFICIENCY VIRUS): ICD-10-CM

## 2022-11-23 PROBLEM — M54.50 ACUTE BILATERAL LOW BACK PAIN: Status: RESOLVED | Noted: 2022-06-22 | Resolved: 2022-11-23

## 2022-11-23 PROBLEM — K62.89 RECTAL DISCOMFORT: Status: RESOLVED | Noted: 2018-04-17 | Resolved: 2022-11-23

## 2022-11-23 PROBLEM — R10.815 PERIUMBILIC ABDOMINAL TENDERNESS: Status: RESOLVED | Noted: 2018-04-17 | Resolved: 2022-11-23

## 2022-11-23 PROBLEM — M54.10 BILATERAL RADIATING LEG PAIN: Status: RESOLVED | Noted: 2018-04-20 | Resolved: 2022-11-23

## 2022-11-23 PROBLEM — B34.9 ACUTE VIRAL SYNDROME: Status: RESOLVED | Noted: 2022-11-09 | Resolved: 2022-11-23

## 2022-11-23 NOTE — PROGRESS NOTES
Name: Jeannette Sharma      : 1960      MRN: [id number]  Encounter Provider: Mervin Bey MD  Encounter Date: 2022   Encounter department: [place] [address]  Essential hypertension  Assessment & Plan:  Stable on current meds       2  Screening for HIV (human immunodeficiency virus)  -     HIV 1/2 Antigen/Antibody (4th Generation) w Reflex [facility]; Future    3  Coronary artery disease involving native coronary artery of native heart without angina pectoris  Assessment & Plan:  Stable on current meds       4  Ulcerative pancolitis with rectal bleeding [facility])  Assessment & Plan:  Stable  Care per GI         spoke with the patient about FMLA form that I cannot fill out the form as it was for her daughter having a baby and she has to babysit for 3 months is not a medical reason    Subjective      HPI   Here for follow up and to fill out FMLA form  Feeling well overall      Review of Systems   Constitutional: Negative  HENT: Negative  Eyes: Negative  Respiratory: Negative  Cardiovascular: Negative  Endocrine: Negative  Genitourinary: Negative  Musculoskeletal: Negative  Allergic/Immunologic: Negative  Neurological: Negative  Hematological: Negative  Psychiatric/Behavioral: Negative          Current Outpatient Medications on File Prior to Visit   Medication Sig   • cyclobenzaprine (FLEXERIL) 5 mg tablet Take 1 tablet (5 mg total) by mouth 3 (three) times a day as needed for muscle spasms   • dicyclomine (BENTYL) 20 mg tablet Take 1 tablet (20 mg total) by mouth every 6 (six) hours as needed (abd pain)   • ferrous sulfate (FeroSul) 325 (65 Fe) mg tablet Take 1 tablet (325 mg total) by mouth 2 (two) times a day (Patient taking differently: Take 1 tablet by mouth daily with breakfast)   • mesalamine (CANASA) 1,000 mg suppository Insert 1 suppository (1,000 mg total) into the rectum daily at bedtime   • mometasone (NASONEX) 50 mcg/act nasal spray 2 sprays into each nostril daily   • predniSONE 20 mg tablet Take 1 tablet (20 mg total) by mouth as needed   • Repatha Pushtronex System 420 MG/3 5ML SOCT INJECT 3 5 ML UNDER THE SKIN EVERY 30 DAYS   • zolpidem (AMBIEN) 5 mg tablet Take 1 tablet (5 mg total) by mouth daily at bedtime as needed for sleep   • Cholecalciferol 96998 units TABS Take 1 tablet (50,000 Units total) by mouth once a week (Patient not taking: Reported on 6/22/2022)   • ferrous sulfate 324 (65 Fe) mg Take 1 tablet (324 mg total) by mouth 2 (two) times a day before meals (Patient not taking: Reported on 6/22/2022)   • mesalamine (Delzicol) 400 mg Take 3 capsules (1,200 mg total) by mouth 4 (four) times a day   • mesalamine (ROWASA) 4 g Insert 60 mL (4 g total) into the rectum daily at bedtime (Patient not taking: Reported on 11/23/2022)   • mesalamine (ROWASA) 4 g Insert 60 mL (4 g total) into the rectum daily at bedtime (Patient not taking: Reported on 6/22/2022)   • [DISCONTINUED] benzonatate (TESSALON PERLES) 100 mg capsule Take 1 capsule (100 mg total) by mouth 3 (three) times a day as needed for cough (Patient not taking: Reported on 11/23/2022)   • [DISCONTINUED] meloxicam (Mobic) 15 mg tablet Take 1 tablet (15 mg total) by mouth daily (Patient not taking: Reported on 8/25/2022)   • [DISCONTINUED] methylPREDNISolone 4 MG tablet therapy pack Use as directed on package (Patient not taking: Reported on 11/9/2022)       Objective     /86 (BP Location: Left arm, Patient Position: Sitting, Cuff Size: Adult)   Pulse 77   Temp 98 2 °F (36 8 °C) (Tympanic)   Resp 16   Ht 5' 2 13" (1 578 m)   Wt 63 4 kg (139 lb 12 8 oz)   LMP  (LMP Unknown)   SpO2 96%   BMI 25 46 kg/m²     Physical Exam  Vitals reviewed  Constitutional:       Appearance: Normal appearance     HENT:      Right Ear: Tympanic membrane normal       Left Ear: Tympanic membrane normal       Nose: Nose normal       Mouth/Throat:      Mouth: Mucous membranes are moist  Eyes:      Extraocular Movements: Extraocular movements intact  Pupils: Pupils are equal, round, and reactive to light  Cardiovascular:      Rate and Rhythm: Normal rate and regular rhythm  Pulses: Normal pulses  Heart sounds: Normal heart sounds  Pulmonary:      Effort: Pulmonary effort is normal       Breath sounds: Normal breath sounds  Musculoskeletal:         General: Normal range of motion  Cervical back: Normal range of motion and neck supple  Neurological:      General: No focal deficit present  Mental Status: She is alert and oriented to person, place, and time     Psychiatric:         Mood and Affect: Mood normal          Behavior: Behavior normal        Nathalie Carrasco MD

## 2022-11-30 ENCOUNTER — CLINICAL SUPPORT (OUTPATIENT)
Dept: FAMILY MEDICINE CLINIC | Facility: CLINIC | Age: 62
End: 2022-11-30

## 2022-11-30 DIAGNOSIS — Z23 ENCOUNTER FOR IMMUNIZATION: Primary | ICD-10-CM

## 2022-11-30 NOTE — PROGRESS NOTES
Name: Ronnie Oh      : 1960      MRN: [id number]  Encounter Provider: Abigail Ocampo  Encounter Date: 2022   Encounter department: [place] [address]   Encounter for immunization  -     TDAP VACCINE GREATER THAN OR EQUAL TO 6YO IM           Subjective      HPI  Review of Systems    Current Outpatient Medications on File Prior to Visit   Medication Sig   • Cholecalciferol 00971 units TABS Take 1 tablet (50,000 Units total) by mouth once a week (Patient not taking: Reported on 2022)   • cyclobenzaprine (FLEXERIL) 5 mg tablet Take 1 tablet (5 mg total) by mouth 3 (three) times a day as needed for muscle spasms   • dicyclomine (BENTYL) 20 mg tablet Take 1 tablet (20 mg total) by mouth every 6 (six) hours as needed (abd pain)   • ferrous sulfate (FeroSul) 325 (65 Fe) mg tablet Take 1 tablet (325 mg total) by mouth 2 (two) times a day (Patient taking differently: Take 1 tablet by mouth daily with breakfast)   • ferrous sulfate 324 (65 Fe) mg Take 1 tablet (324 mg total) by mouth 2 (two) times a day before meals (Patient not taking: Reported on 2022)   • mesalamine (CANASA) 1,000 mg suppository Insert 1 suppository (1,000 mg total) into the rectum daily at bedtime   • mesalamine (Delzicol) 400 mg Take 3 capsules (1,200 mg total) by mouth 4 (four) times a day   • mesalamine (ROWASA) 4 g Insert 60 mL (4 g total) into the rectum daily at bedtime (Patient not taking: Reported on 2022)   • mesalamine (ROWASA) 4 g Insert 60 mL (4 g total) into the rectum daily at bedtime (Patient not taking: Reported on 2022)   • mometasone (NASONEX) 50 mcg/act nasal spray 2 sprays into each nostril daily   • predniSONE 20 mg tablet Take 1 tablet (20 mg total) by mouth as needed   • Repatha Pushtronex System 420 MG/3 5ML SOCT INJECT 3 5 ML UNDER THE SKIN EVERY 30 DAYS   • zolpidem (AMBIEN) 5 mg tablet Take 1 tablet (5 mg total) by mouth daily at bedtime as needed for sleep       Objective     LMP  (LMP Unknown)     Shruthi Gibbs

## 2022-12-08 ENCOUNTER — OFFICE VISIT (OUTPATIENT)
Dept: GASTROENTEROLOGY | Facility: CLINIC | Age: 62
End: 2022-12-08

## 2022-12-08 VITALS
TEMPERATURE: 98.9 F | DIASTOLIC BLOOD PRESSURE: 80 MMHG | WEIGHT: 141.8 LBS | HEIGHT: 62 IN | SYSTOLIC BLOOD PRESSURE: 128 MMHG | BODY MASS INDEX: 26.09 KG/M2

## 2022-12-08 DIAGNOSIS — Z13.9 ENCOUNTER FOR HEALTH-RELATED SCREENING: ICD-10-CM

## 2022-12-08 DIAGNOSIS — K51.011 ULCERATIVE PANCOLITIS WITH RECTAL BLEEDING (HCC): Primary | ICD-10-CM

## 2022-12-08 DIAGNOSIS — E55.9 VITAMIN D DEFICIENCY: ICD-10-CM

## 2022-12-08 RX ORDER — MESALAMINE 400 MG/1
1200 CAPSULE, DELAYED RELEASE ORAL 4 TIMES DAILY
Qty: 1080 CAPSULE | Refills: 3 | Status: SHIPPED | OUTPATIENT
Start: 2022-12-08 | End: 2023-03-08

## 2022-12-08 RX ORDER — DICYCLOMINE HCL 20 MG
20 TABLET ORAL EVERY 6 HOURS PRN
Qty: 120 TABLET | Refills: 5 | Status: SHIPPED | OUTPATIENT
Start: 2022-12-08

## 2022-12-08 NOTE — PROGRESS NOTES
[facility] - Outpatient Follow-up Note  Yaquelin Escobar 60 y o  female MRN: [id number]  Encounter: [id number]      ASSESSMENT AND PLAN:  60year old female here for follow up  1  Ulcerative pancolitis with rectal bleeding (HCC)  -continue mesalamine PO and mesalamine suppositories  -reschedule COlonoscopy to assess for endoscopic healing, she is in clinical remission    2  IBS  -continue bentyl as neeed    Follow up in six months to one year's time     _______________________________________    SUBJECTIVE:  60year old female here for follow up  She is feeling well  Taking delzicol PO and suppositories  REVIEW OF SYSTEMS IS OTHERWISE NEGATIVE  Historical Information   Past Medical History:   Diagnosis Date   • Allergic rhinitis    • Breast CA ([address] )     R breast CA-chemo/radiaition   • Diarrhea     for 6 weeks-colonosocpy today 9/26/2016 and EGD   • History of radiation therapy    • Hyperlipidemia    • Hypertension    • Thalassemia    • Vitamin D deficiency      Past Surgical History:   Procedure Laterality Date   • BREAST LUMPECTOMY Right    • BREAST LUMPECTOMY Right    • BREAST LUMPECTOMY Right     with lymph nodes  2007   • COLONOSCOPY     • COLONOSCOPY N/A 4/23/2018    Procedure: COLONOSCOPY;  Surgeon: Kaleb Chatman MD;  Location: [facility]; Service: Gastroenterology   • CORONARY ARTERY BYPASS GRAFT     • CORONARY ARTERY BYPASS GRAFT      x3, 11/2015   • HYSTERECTOMY      b/l ovaries removed   • OOPHORECTOMY     • NE COLONOSCOPY FLX DX W/COLLJ SPEC WHEN PFRMD N/A 9/26/2016    Procedure: COLONOSCOPY;  Surgeon: Jyoti Mccullough DO;  Location: [facility];   Service: Gastroenterology     Social History   Social History     Substance and Sexual Activity   Alcohol Use Yes   • Alcohol/week: 4 0 standard drinks   • Types: 4 Glasses of wine per week    Comment: per year     Social History     Substance and Sexual Activity   Drug Use No     Social History     Tobacco Use Smoking Status Never   Smokeless Tobacco Never     Family History   Problem Relation Age of Onset   • Stroke Father    • Heart attack Brother    • Sudden death Brother         cardiac   • Heart attack Maternal Uncle    • Hypertension Family    • Hyperlipidemia Family    • No Known Problems Mother    • No Known Problems Sister    • No Known Problems Daughter    • No Known Problems Maternal Grandmother    • No Known Problems Maternal Grandfather    • No Known Problems Paternal Grandmother    • No Known Problems Paternal Grandfather    • No Known Problems Sister    • No Known Problems Maternal Aunt    • No Known Problems Maternal Aunt    • No Known Problems Maternal Aunt    • No Known Problems Paternal Aunt    • No Known Problems Paternal Aunt    • No Known Problems Paternal Aunt    • No Known Problems Paternal Aunt    • No Known Problems Son    • Clotting disorder Neg Hx    • Anuerysm Neg Hx        Meds/Allergies       Current Outpatient Medications:   •  cyclobenzaprine (FLEXERIL) 5 mg tablet  •  dicyclomine (BENTYL) 20 mg tablet  •  ferrous sulfate 324 (65 Fe) mg  •  mesalamine (CANASA) 1,000 mg suppository  •  mometasone (NASONEX) 50 mcg/act nasal spray  •  predniSONE 20 mg tablet  •  Repatha Pushtronex System 420 MG/3 5ML SOCT  •  zolpidem (AMBIEN) 5 mg tablet  •  Cholecalciferol 08410 units TABS  •  mesalamine (Delzicol) 400 mg    Allergies   Allergen Reactions   • Iodinated Diagnostic Agents Anaphylaxis, Hives and Swelling           Objective     Blood pressure 128/80, temperature 98 9 °F (37 2 °C), temperature source Tympanic, height 5' 2 13" (1 578 m), weight 64 3 kg (141 lb 12 8 oz), not currently breastfeeding  Body mass index is 25 83 kg/m²        PHYSICAL EXAM:      General Appearance:   Alert, cooperative, no distress   HEENT:   Normocephalic, atraumatic, anicteric      Neck:  Supple, symmetrical, trachea midline   Lungs:   Clear to auscultation bilaterally; no rales, rhonchi or wheezing; respirations unlabored    Heart[de-identified]   Regular rate and rhythm; no murmur, rub, or gallop  Abdomen:   Soft, non-tender, non-distended; normal bowel sounds; no masses, no organomegaly    Genitalia:   Deferred    Rectal:   Deferred    Extremities:  No cyanosis, clubbing or edema    Pulses:  2+ and symmetric    Skin:  No jaundice, rashes, or lesions    Lymph nodes:  No palpable cervical lymphadenopathy        Lab Results:   No visits with results within 1 Day(s) from this visit  Latest known visit with results is:   Telemedicine on 11/09/2022   Component Date Value   • SARS-CoV-2 11/09/2022 Negative    • INFLUENZA A PCR 11/09/2022 Negative    • INFLUENZA B PCR 11/09/2022 Negative      Radiology Results:   No results found  done

## 2022-12-22 ENCOUNTER — OFFICE VISIT (OUTPATIENT)
Dept: CARDIOLOGY CLINIC | Facility: CLINIC | Age: 62
End: 2022-12-22

## 2022-12-22 VITALS
WEIGHT: 140 LBS | HEART RATE: 83 BPM | DIASTOLIC BLOOD PRESSURE: 88 MMHG | OXYGEN SATURATION: 95 % | BODY MASS INDEX: 25.76 KG/M2 | SYSTOLIC BLOOD PRESSURE: 152 MMHG | HEIGHT: 62 IN

## 2022-12-22 DIAGNOSIS — I10 ESSENTIAL HYPERTENSION: Primary | Chronic | ICD-10-CM

## 2022-12-22 DIAGNOSIS — I25.10 CORONARY ARTERY DISEASE INVOLVING NATIVE CORONARY ARTERY OF NATIVE HEART WITHOUT ANGINA PECTORIS: ICD-10-CM

## 2022-12-22 DIAGNOSIS — Z95.1 HX OF CABG: ICD-10-CM

## 2022-12-22 DIAGNOSIS — E78.5 DYSLIPIDEMIA: ICD-10-CM

## 2022-12-22 RX ORDER — EVOLOCUMAB 420 MG/3.5
KIT SUBCUTANEOUS
Qty: 3.5 ML | Refills: 10 | Status: SHIPPED | OUTPATIENT
Start: 2022-12-22

## 2022-12-22 NOTE — PROGRESS NOTES
Cardiology Follow Up    Sandra Christina  1960  [id number]  [address] [id number] [phone]    1  Essential hypertension        2  Coronary artery disease involving native coronary artery of native heart without angina pectoris  Evolocumab with Infusor (Repatha Pushtronex System) 420 MG/3 5ML SOCT      3  Dyslipidemia  Evolocumab with Infusor (Repatha Pushtronex System) 420 MG/3 5ML SOCT      4  Hx of CABG            Diagnoses and all orders for this visit:    Essential hypertension    Coronary artery disease involving native coronary artery of native heart without angina pectoris  -     Evolocumab with Infusor (Repatha Pushtronex System) 420 MG/3 5ML SOCT; Take one dose every month/30 days    Dyslipidemia  -     Evolocumab with Infusor (Repatha Pushtronex System) 420 MG/3 5ML SOCT; Take one dose every month/30 days    Hx of CABG    I had the pleasure of seeing Sandra Carri for a follow up visit  INTERVAL HISTORY: none - switching providers    History of the presenting illness, Discussion/Summary and My Plan are as follows:::     Angie Laird is a pleasant 50-year-old with hypertension, dyslipidemia, no diabetes, diagnosed of coronary artery disease after a positive stress test in 2015, with coronary angiography showing triple-vessel coronary disease and subsequently underwent coronary artery bypass grafting with LIMA-LAD, SVG-PDA, SVG-OM 3   I had originally seen her at the time of the stress test   And subsequently she has followed with Dr Hope Blackmon and more recently with Dr Susie Alfaro  She also has a history of intolerance to statins, had been on atorvastatin 10 mg with ankle pains and Livalo even at a dose of 2 mg with fatigue and aching  Ultimately she was approved for Repatha on which she remains with a good and appropriate reduction in her LDL    She remains physically active, exercising 7 days a week-[employer] 30 to 45 minutes a day, which includes aerobic and anaerobic workouts without any change in physical capacity  She used to work as an  for a primary care practice but now works as a coordinator for physical therapy at [employer]  Cardiac exam is unremarkable except for elevated blood pressures  Plan:    History of coronary artery disease and CABG: Clinically stable, she self discontinued aspirin as well as Coreg in the past   Felt Metoprolol had caused hair loss  Clinically stable  Agreed to an ECG at her next visit  Hypertension: Elevated today but usually well controlled in the 733I systolic at home  Will check at home and report if over 130/80    Dyslipidemia: Repatha was renewed  Has an upcoming lipid profile and A1c      Discussed dietary modifications to prevent further weight gain, especially decreasing snacks-chips and cookies advised to start looking at food labels as well,    Follow-up in 9 months       Latest Reference Range & Units 08/01/14 06:48 08/12/16 08:04 12/22/17 06:57 11/02/18 06:51 11/21/20 10:00 12/11/21 08:33   Cholesterol <200 mg/dL 211 (H) 123 (L) 173  173 228 (H) 138 139   Triglycerides <150 mg/dL 262 (H) 130 133  133 148 106 102   HDL > OR = 50 mg/dL 48 45 (L) 58  58 61 60 63   Non-HDL Cholesterol <130 mg/dL (calc) 163 (H) 78 115  115 167 (H) 78 76   LDL CHOLESTEROL mg/dL (calc)  mg/dL (calc)  52 92  92      LDL Calculated mg/dL (calc)    140 (H) 59 57   LDL CHOLESTEROL DIRECT <130 mg/dL 110        Chol HDLC Ratio <5 0 (calc) 4 4 2 7 3 0  3 0 3 7 2 3 2 2   (H): Data is abnormally high  (L): Data is abnormally low     Latest Reference Range & Units 11/21/20 10:00   Hemoglobin A1C <5 7 % of total Hgb 5 5   eAG, EST AVG Glucose (calc)  (calc) 111  6 2     Patient Active Problem List   Diagnosis   • CRP elevated   • Coronary artery disease involving native coronary artery of native heart without angina pectoris   • Essential hypertension   • Elevated d-dimer   • Abnormal TSH   • Beta thalassemia minor   • Dyslipidemia   • Ulcerative pancolitis with rectal bleeding (HCC)   • Hx of CABG   • Allergic rhinitis     Past Medical History:   Diagnosis Date   • Allergic rhinitis    • Breast CA (HCC)     R breast CA-chemo/radiaition   • Diarrhea     for 6 weeks-colonosocpy today 9/26/2016 and EGD   • History of radiation therapy    • Hyperlipidemia    • Hypertension    • Thalassemia    • Vitamin D deficiency      Social History     Socioeconomic History   • Marital status: /Civil Union     Spouse name: Not on file   • Number of children: Not on file   • Years of education: Not on file   • Highest education level: Not on file   Occupational History   • Not on file   Tobacco Use   • Smoking status: Never   • Smokeless tobacco: Never   Vaping Use   • Vaping Use: Never used   Substance and Sexual Activity   • Alcohol use:  Yes     Alcohol/week: 4 0 standard drinks     Types: 4 Glasses of wine per week     Comment: per year   • Drug use: No   • Sexual activity: Not on file   Other Topics Concern   • Not on file   Social History Narrative   • Not on file     Social Determinants of Health     Financial Resource Strain: Not on file   Food Insecurity: Not on file   Transportation Needs: Not on file   Physical Activity: Not on file   Stress: Not on file   Social Connections: Not on file   Intimate Partner Violence: Not on file   Housing Stability: Not on file      Family History   Problem Relation Age of Onset   • Stroke Father    • Heart attack Brother    • Sudden death Brother         cardiac   • Heart attack Maternal Uncle    • Hypertension Family    • Hyperlipidemia Family    • No Known Problems Mother    • No Known Problems Sister    • No Known Problems Daughter    • No Known Problems Maternal Grandmother    • No Known Problems Maternal Grandfather    • No Known Problems Paternal Grandmother    • No Known Problems Paternal Grandfather    • No Known Problems Sister    • No Known Problems Maternal Aunt    • No Known Problems Maternal Aunt    • No Known Problems Maternal Aunt    • No Known Problems Paternal Aunt    • No Known Problems Paternal Aunt    • No Known Problems Paternal Aunt    • No Known Problems Paternal Aunt    • No Known Problems Son    • Clotting disorder Neg Hx    • Anuerysm Neg Hx      Past Surgical History:   Procedure Laterality Date   • BREAST LUMPECTOMY Right    • BREAST LUMPECTOMY Right    • BREAST LUMPECTOMY Right     with lymph nodes  2007   • COLONOSCOPY     • COLONOSCOPY N/A 4/23/2018    Procedure: COLONOSCOPY;  Surgeon: Brianne Larose MD;  Location[facility]; Service: Gastroenterology   • CORONARY ARTERY BYPASS GRAFT     • CORONARY ARTERY BYPASS GRAFT      x3, 11/2015   • HYSTERECTOMY      b/l ovaries removed   • OOPHORECTOMY     • [place] COLONOSCOPY FLX DX W/COLLJ SPEC WHEN PFRMD N/A 9/26/2016    Procedure: COLONOSCOPY;  Surgeon: Leopoldo Poplar, DO;  Location: [facility];   Service: Gastroenterology       Current Outpatient Medications:   •  dicyclomine (BENTYL) 20 mg tablet, Take 1 tablet (20 mg total) by mouth every 6 (six) hours as needed (abd pain), Disp: 120 tablet, Rfl: 5  •  Evolocumab with Infusor (Repatha Pushtronex System) 420 MG/3 5ML SOCT, Take one dose every month/30 days, Disp: 3 5 mL, Rfl: 10  •  ferrous sulfate 324 (65 Fe) mg, Take 1 tablet (324 mg total) by mouth 2 (two) times a day before meals, Disp: 180 tablet, Rfl: 3  •  mesalamine (CANASA) 1,000 mg suppository, Insert 1 suppository (1,000 mg total) into the rectum daily at bedtime, Disp: 30 suppository, Rfl: 3  •  mesalamine (Delzicol) 400 mg, Take 3 capsules (1,200 mg total) by mouth 4 (four) times a day, Disp: 1080 capsule, Rfl: 3  •  mometasone (NASONEX) 50 mcg/act nasal spray, 2 sprays into each nostril daily, Disp: 17 g, Rfl: 2  •  predniSONE 20 mg tablet, Take 1 tablet (20 mg total) by mouth as needed, Disp: 30 tablet, Rfl: 0  •  zolpidem (AMBIEN) 5 mg tablet, Take 1 tablet (5 mg total) by mouth daily at bedtime as needed for sleep, Disp: 30 tablet, Rfl: 0  •  Cholecalciferol 14546 units TABS, Take 1 tablet (50,000 Units total) by mouth once a week (Patient not taking: Reported on 6/22/2022), Disp: 12 tablet, Rfl: 0  •  cyclobenzaprine (FLEXERIL) 5 mg tablet, Take 1 tablet (5 mg total) by mouth 3 (three) times a day as needed for muscle spasms (Patient not taking: Reported on 12/22/2022), Disp: 10 tablet, Rfl: 0  Allergies   Allergen Reactions   • Iodinated Diagnostic Agents Anaphylaxis, Hives and Swelling       Imaging: No results found  Review of Systems:  Review of Systems   Constitutional: Negative  HENT: Negative  Eyes: Negative  Respiratory: Negative  Cardiovascular: Negative  Musculoskeletal: Negative  Physical Exam:  /88 (BP Location: Left arm, Patient Position: Sitting, Cuff Size: Standard)   Pulse 83   Ht 5' 2 13" (1 578 m)   Wt 63 5 kg (140 lb)   LMP  (LMP Unknown)   SpO2 95%   BMI 25 50 kg/m²   Physical Exam  Constitutional:       General: She is not in acute distress  Appearance: Normal appearance  She is not ill-appearing  HENT:      Nose: Nose normal  No congestion or rhinorrhea  Mouth/Throat:      Mouth: Mucous membranes are moist       Pharynx: Oropharynx is clear  No oropharyngeal exudate or posterior oropharyngeal erythema  Neck:      Vascular: No carotid bruit  Cardiovascular:      Rate and Rhythm: Normal rate and regular rhythm  Pulses: Normal pulses  Heart sounds: No murmur heard  No friction rub  Pulmonary:      Effort: Pulmonary effort is normal  No respiratory distress  Breath sounds: No stridor  No wheezing or rhonchi  Musculoskeletal:         General: No swelling, tenderness, deformity or signs of injury  Normal range of motion  Cervical back: Normal range of motion  No rigidity or tenderness  Lymphadenopathy:      Cervical: No cervical adenopathy  Neurological:      Mental Status: She is alert  This note was completed in part utilizing [employer] direct voice recognition software  Grammatical errors, random word insertion, spelling mistakes, occasional wrong word or "sound-alike" substitutions and incomplete sentences may be an occasional consequence of the system secondary to software limitations, ambient noise and hardware issues  At the time of dictation, efforts were made to edit, clarify and /or correct errors  Please read the chart carefully and recognize, using context, where substitutions have occurred  If you have any questions or concerns about the context, text or information contained within the body of this dictation, please contact myself, the provider, for further clarification

## 2022-12-23 ENCOUNTER — HOSPITAL ENCOUNTER (OUTPATIENT)
Dept: RADIOLOGY | Age: 62
Discharge: HOME | End: 2022-12-23

## 2022-12-23 VITALS — WEIGHT: 140 LBS | HEIGHT: 62 IN | BODY MASS INDEX: 25.76 KG/M2

## 2022-12-23 DIAGNOSIS — Z12.31 SCREENING MAMMOGRAM FOR HIGH-RISK PATIENT: ICD-10-CM

## 2023-01-01 LAB
25(OH)D3 SERPL-MCNC: 28 NG/ML (ref 30–100)
BASOPHILS # BLD AUTO: 58 CELLS/UL (ref 0–200)
BASOPHILS NFR BLD AUTO: 0.9 %
CHOLEST SERPL-MCNC: 157 MG/DL
CHOLEST/HDLC SERPL: 2.6 (CALC)
CRP SERPL-MCNC: 3.6 MG/L
EOSINOPHIL # BLD AUTO: 269 CELLS/UL (ref 15–500)
EOSINOPHIL NFR BLD AUTO: 4.2 %
ERYTHROCYTE [DISTWIDTH] IN BLOOD BY AUTOMATED COUNT: 18.5 % (ref 11–15)
HBA1C MFR BLD: 5.4 % OF TOTAL HGB
HCT VFR BLD AUTO: 39.2 % (ref 35–45)
HDLC SERPL-MCNC: 60 MG/DL
HGB BLD-MCNC: 11.7 G/DL (ref 11.7–15.5)
LDLC SERPL CALC-MCNC: 71 MG/DL (CALC)
LYMPHOCYTES # BLD AUTO: 2496 CELLS/UL (ref 850–3900)
LYMPHOCYTES NFR BLD AUTO: 39 %
MCH RBC QN AUTO: 18.6 PG (ref 27–33)
MCHC RBC AUTO-ENTMCNC: 29.8 G/DL (ref 32–36)
MCV RBC AUTO: 62.4 FL (ref 80–100)
MONOCYTES # BLD AUTO: 474 CELLS/UL (ref 200–950)
MONOCYTES NFR BLD AUTO: 7.4 %
NEUTROPHILS # BLD AUTO: 3104 CELLS/UL (ref 1500–7800)
NEUTROPHILS NFR BLD AUTO: 48.5 %
NONHDLC SERPL-MCNC: 97 MG/DL (CALC)
PLATELET # BLD AUTO: 315 THOUSAND/UL (ref 140–400)
PMV BLD REES-ECKER: 11.4 FL (ref 7.5–12.5)
RBC # BLD AUTO: 6.28 MILLION/UL (ref 3.8–5.1)
TRIGL SERPL-MCNC: 180 MG/DL
WBC # BLD AUTO: 6.4 THOUSAND/UL (ref 3.8–10.8)

## 2023-01-03 ENCOUNTER — TELEPHONE (OUTPATIENT)
Dept: CARDIOLOGY CLINIC | Facility: CLINIC | Age: 63
End: 2023-01-03

## 2023-01-03 ENCOUNTER — TELEPHONE (OUTPATIENT)
Dept: FAMILY MEDICINE CLINIC | Facility: CLINIC | Age: 63
End: 2023-01-03

## 2023-01-03 NOTE — TELEPHONE ENCOUNTER
Patient called stating she had lab work done with gastro and Vit D was low - Gastro suggested she check with PCP for possible supplement    Please advise

## 2023-04-11 DIAGNOSIS — Z12.31 VISIT FOR SCREENING MAMMOGRAM: Primary | ICD-10-CM

## 2023-04-24 ENCOUNTER — TELEPHONE (OUTPATIENT)
Dept: OTHER | Facility: OTHER | Age: 63
End: 2023-04-24

## 2023-04-24 DIAGNOSIS — F51.01 PRIMARY INSOMNIA: ICD-10-CM

## 2023-04-24 RX ORDER — ZOLPIDEM TARTRATE 5 MG/1
5 TABLET ORAL
Qty: 30 TABLET | Refills: 0 | Status: SHIPPED | OUTPATIENT
Start: 2023-04-24

## 2023-04-24 NOTE — TELEPHONE ENCOUNTER
Michelle menezes called to obtain the correct diagnosis of the patient for her routine laboratory examination  They need the diagnosis that the exam can be covered 100%        Please contact [employer] at [phone]

## 2023-04-24 NOTE — TELEPHONE ENCOUNTER
Medication:Zolpidem  Dosage:5 mg  How Often:1 tab @ HS as needed  Quantity:30  Last Office Visit:11/23/2022  Next Office Visit:5/1/2023  Last refilled:8/25/2022  How many pills left:3  Gavin [id number] [place]

## 2023-04-24 NOTE — TELEPHONE ENCOUNTER
Medication:  PDMP   08/25/2022 08/25/2022 Zolpidem Tartrate (Tablet)  30 0 30 5 MG MICHELLE BAEZ     Active agreement on file -No

## 2023-04-25 ENCOUNTER — RA CDI HCC (OUTPATIENT)
Dept: OTHER | Facility: HOSPITAL | Age: 63
End: 2023-04-25

## 2023-04-25 NOTE — TELEPHONE ENCOUNTER
Spoke with Michelle at [facility], this message was in regards to labs collected 12/2022 they wanted the dx to be changed for preventative for them to be covered  I informed Michelle these labs were not ordered by PCP, they were ordered by Sundeep Casanova  She is going to see what she can do for the patient and she will reach out to her

## 2023-04-25 NOTE — PROGRESS NOTES
[facility]  coding opportunities       Chart reviewed, no opportunity found: CHART REVIEWED, NO OPPORTUNITY FOUND        Patients Insurance        Commercial Insurance: [employer]

## 2023-05-01 ENCOUNTER — OFFICE VISIT (OUTPATIENT)
Dept: FAMILY MEDICINE CLINIC | Facility: CLINIC | Age: 63
End: 2023-05-01

## 2023-05-01 VITALS
SYSTOLIC BLOOD PRESSURE: 142 MMHG | HEART RATE: 82 BPM | DIASTOLIC BLOOD PRESSURE: 90 MMHG | WEIGHT: 136 LBS | BODY MASS INDEX: 25.03 KG/M2 | HEIGHT: 62 IN | TEMPERATURE: 97.6 F

## 2023-05-01 DIAGNOSIS — R79.89 ABNORMAL TSH: ICD-10-CM

## 2023-05-01 DIAGNOSIS — E78.5 DYSLIPIDEMIA: ICD-10-CM

## 2023-05-01 DIAGNOSIS — I25.10 CORONARY ARTERY DISEASE INVOLVING NATIVE CORONARY ARTERY OF NATIVE HEART WITHOUT ANGINA PECTORIS: ICD-10-CM

## 2023-05-01 DIAGNOSIS — K51.011 ULCERATIVE PANCOLITIS WITH RECTAL BLEEDING (HCC): ICD-10-CM

## 2023-05-01 DIAGNOSIS — Z00.00 ANNUAL PHYSICAL EXAM: Primary | ICD-10-CM

## 2023-05-01 DIAGNOSIS — I10 ESSENTIAL HYPERTENSION: Chronic | ICD-10-CM

## 2023-05-01 DIAGNOSIS — J30.89 ALLERGIC RHINITIS DUE TO OTHER ALLERGIC TRIGGER, UNSPECIFIED SEASONALITY: ICD-10-CM

## 2023-05-01 LAB
ALBUMIN SERPL-MCNC: 4.2 G/DL (ref 3.6–5.1)
ALBUMIN/GLOB SERPL: 1.4 (CALC) (ref 1–2.5)
ALP SERPL-CCNC: 108 U/L (ref 37–153)
ALT SERPL-CCNC: 14 U/L (ref 6–29)
AST SERPL-CCNC: 18 U/L (ref 10–35)
BASOPHILS # BLD AUTO: 81 CELLS/UL (ref 0–200)
BASOPHILS NFR BLD AUTO: 1.3 %
BILIRUB SERPL-MCNC: 0.4 MG/DL (ref 0.2–1.2)
BUN SERPL-MCNC: 16 MG/DL (ref 7–25)
BUN/CREAT SERPL: NORMAL (CALC) (ref 6–22)
CALCIUM SERPL-MCNC: 9.6 MG/DL (ref 8.6–10.4)
CHLORIDE SERPL-SCNC: 105 MMOL/L (ref 98–110)
CHOLEST SERPL-MCNC: 134 MG/DL
CHOLEST/HDLC SERPL: 2.7 (CALC)
CO2 SERPL-SCNC: 30 MMOL/L (ref 20–32)
CREAT SERPL-MCNC: 0.72 MG/DL (ref 0.5–1.05)
EOSINOPHIL # BLD AUTO: 409 CELLS/UL (ref 15–500)
EOSINOPHIL NFR BLD AUTO: 6.6 %
ERYTHROCYTE [DISTWIDTH] IN BLOOD BY AUTOMATED COUNT: 18.6 % (ref 11–15)
GFR/BSA.PRED SERPLBLD CYS-BASED-ARV: 94 ML/MIN/1.73M2
GLOBULIN SER CALC-MCNC: 3.1 G/DL (CALC) (ref 1.9–3.7)
GLUCOSE SERPL-MCNC: 86 MG/DL (ref 65–99)
HBA1C MFR BLD: 5.5 % OF TOTAL HGB
HCT VFR BLD AUTO: 39 % (ref 35–45)
HDLC SERPL-MCNC: 50 MG/DL
HGB BLD-MCNC: 11.6 G/DL (ref 11.7–15.5)
LDLC SERPL CALC-MCNC: 55 MG/DL (CALC)
LYMPHOCYTES # BLD AUTO: 2021 CELLS/UL (ref 850–3900)
LYMPHOCYTES NFR BLD AUTO: 32.6 %
MCH RBC QN AUTO: 18.9 PG (ref 27–33)
MCHC RBC AUTO-ENTMCNC: 29.7 G/DL (ref 32–36)
MCV RBC AUTO: 63.6 FL (ref 80–100)
MONOCYTES # BLD AUTO: 546 CELLS/UL (ref 200–950)
MONOCYTES NFR BLD AUTO: 8.8 %
NEUTROPHILS # BLD AUTO: 3143 CELLS/UL (ref 1500–7800)
NEUTROPHILS NFR BLD AUTO: 50.7 %
NONHDLC SERPL-MCNC: 84 MG/DL (CALC)
PLATELET # BLD AUTO: 307 THOUSAND/UL (ref 140–400)
PMV BLD REES-ECKER: 11.1 FL (ref 7.5–12.5)
POTASSIUM SERPL-SCNC: 4 MMOL/L (ref 3.5–5.3)
PROT SERPL-MCNC: 7.3 G/DL (ref 6.1–8.1)
RBC # BLD AUTO: 6.13 MILLION/UL (ref 3.8–5.1)
SODIUM SERPL-SCNC: 141 MMOL/L (ref 135–146)
TRIGL SERPL-MCNC: 236 MG/DL
TSH SERPL-ACNC: 1.71 MIU/L (ref 0.4–4.5)
WBC # BLD AUTO: 6.2 THOUSAND/UL (ref 3.8–10.8)

## 2023-05-01 RX ORDER — MOMETASONE FUROATE 50 UG/1
2 SPRAY, METERED NASAL DAILY
Qty: 17 G | Refills: 2 | Status: SHIPPED | OUTPATIENT
Start: 2023-05-01

## 2023-05-01 NOTE — PROGRESS NOTES
[address] [facility]    NAME: Sofia De La Paz  AGE: 60 y o  SEX: female  : 1960     DATE: 2023     Assessment and Plan:     Problem List Items Addressed This Visit        Digestive    Ulcerative pancolitis with rectal bleeding (HCC)     Stable on current meds             Respiratory    Allergic rhinitis    Relevant Medications    mometasone (NASONEX) 50 mcg/act nasal spray       Cardiovascular and Mediastinum    Essential hypertension (Chronic)     Not controlled  She refused medications  Discussed risks of stroke and heart attack          Coronary artery disease involving native coronary artery of native heart without angina pectoris     Elevated today  Patient refused BP medications              Other    Abnormal TSH     Due for labs          Dyslipidemia     Ordered labs today  repatha as ordered         Other Visit Diagnoses     Annual physical exam    -  Primary    Relevant Orders    CBC and differential    Comprehensive metabolic panel    Lipid panel    TSH, 3rd generation with Free T4 reflex    Hemoglobin A1C          Immunizations and preventive care screenings were discussed with patient today  Appropriate education was printed on patient's after visit summary  Counseling:   Alcohol/drug use: discussed moderation in alcohol intake, the recommendations for healthy alcohol use, and avoidance of illicit drug use   Dental Health: discussed importance of regular tooth brushing, flossing, and dental visits   Injury prevention: discussed safety/seat belts, safety helmets, smoke detectors, carbon dioxide detectors, and smoking near bedding or upholstery   Sexual health: discussed sexually transmitted diseases, partner selection, use of condoms, avoidance of unintended pregnancy, and contraceptive alternatives  · Exercise: the importance of regular exercise/physical activity was discussed   Recommend exercise 3-5 times per week for at least 30 minutes  Depression Screening and Follow-up Plan: Patient was screened for depression during today's encounter  They screened negative with a PHQ-2 score of 0     she will call back to schedule for Prevnar 20 in the future     No follow-ups on file  Chief Complaint:     Chief Complaint   Patient presents with    Physical Exam     Annual Exam       History of Present Illness:     Adult Annual Physical   Patient here for a comprehensive physical exam  The patient reports no problems  Diet and Physical Activity  · Diet/Nutrition: well balanced diet and low carb diet  · Exercise: walking  Depression Screening  PHQ-2/9 Depression Screening    Little interest or pleasure in doing things: 0 - not at all  Feeling down, depressed, or hopeless: 0 - not at all  PHQ-2 Score: 0  PHQ-2 Interpretation: Negative depression screen       General Health  · Sleep: sleeps well and gets 7-8 hours of sleep on average  · Hearing: normal - bilateral   · Vision: goes for regular eye exams  · Dental: regular dental visits and brushes teeth twice daily  /GYN Health  · Patient is: postmenopausal  · Last menstrual period: had hysterectomy   · Contraceptive method: none  Review of Systems:     Review of Systems   Constitutional: Negative  HENT: Negative  Eyes: Negative  Respiratory: Negative  Cardiovascular: Negative  Gastrointestinal: Negative  Endocrine: Negative  Genitourinary: Negative  Musculoskeletal: Negative  Skin: Negative  Allergic/Immunologic: Negative  Neurological: Negative  Hematological: Negative  Psychiatric/Behavioral: Negative         Past Medical History:     Past Medical History:   Diagnosis Date    Allergic rhinitis     Breast CA ([address] )     R breast CA-chemo/radiaition    Diarrhea     for 6 weeks-colonosocpy today 9/26/2016 and EGD    History of radiation therapy     Hyperlipidemia     Hypertension     Thalassemia     Vitamin D deficiency       Past Surgical History:     Past Surgical History:   Procedure Laterality Date    BREAST LUMPECTOMY Right     BREAST LUMPECTOMY Right     BREAST LUMPECTOMY Right     with lymph nodes  2007    COLONOSCOPY      COLONOSCOPY N/A 4/23/2018    Procedure: COLONOSCOPY;  Surgeon: Kerry Dai MD;  [facility]; Service: Gastroenterology    CORONARY ARTERY BYPASS GRAFT      CORONARY ARTERY BYPASS GRAFT      x3, 11/2015    HYSTERECTOMY      b/l ovaries removed    OOPHORECTOMY      LA COLONOSCOPY FLX DX W/COLLJ SPEC WHEN PFRMD N/A 9/26/2016    Procedure: COLONOSCOPY;  Surgeon: Pili Trujillo DO;  Location: [facility]; Service: Gastroenterology      Social History:     Social History     Socioeconomic History    Marital status: /Civil Union     Spouse name: None    Number of children: None    Years of education: None    Highest education level: None   Occupational History    None   Tobacco Use    Smoking status: Never    Smokeless tobacco: Never   Vaping Use    Vaping Use: Never used   Substance and Sexual Activity    Alcohol use:  Yes     Alcohol/week: 4 0 standard drinks     Types: 4 Glasses of wine per week     Comment: per year    Drug use: No    Sexual activity: None   Other Topics Concern    None   Social History Narrative    None     Social Determinants of Health     Financial Resource Strain: Not on file   Food Insecurity: Not on file   Transportation Needs: Not on file   Physical Activity: Not on file   Stress: Not on file   Social Connections: Not on file   Intimate Partner Violence: Not on file   Housing Stability: Not on file      Family History:     Family History   Problem Relation Age of Onset    Stroke Father     Heart attack Brother     Sudden death Brother         cardiac    Heart attack Maternal Uncle     Hypertension Family     Hyperlipidemia Family     No Known Problems Mother     No Known Problems Sister     No Known Problems Daughter     No Known Problems Maternal Grandmother     No Known Problems Maternal Grandfather     No Known Problems Paternal Grandmother     No Known Problems Paternal Grandfather     No Known Problems Sister     No Known Problems Maternal Aunt     No Known Problems Maternal Aunt     No Known Problems Maternal Aunt     No Known Problems Paternal Aunt     No Known Problems Paternal Aunt     No Known Problems Paternal Aunt     No Known Problems Paternal Aunt     No Known Problems Son     Clotting disorder Neg Hx     Anuerysm Neg Hx       Current Medications:     Current Outpatient Medications   Medication Sig Dispense Refill    dicyclomine (BENTYL) 20 mg tablet Take 1 tablet (20 mg total) by mouth every 6 (six) hours as needed (abd pain) 120 tablet 5    Evolocumab with Infusor (Repatha Pushtronex System) 420 MG/3 5ML SOCT Take one dose every month/30 days 3 5 mL 10    ferrous sulfate 324 (65 Fe) mg TAKE 1 TABLET (324 MG TOTAL) BY MOUTH TWO (TWO) TIMES A DAY BEFORE MEALS 180 tablet 2    mesalamine (CANASA) 1,000 mg suppository Insert 1 suppository (1,000 mg total) into the rectum daily at bedtime 30 suppository 3    mesalamine (ROWASA) 4 g Insert 60 mL (4 g total) into the rectum daily at bedtime 1800 mL 3    mometasone (NASONEX) 50 mcg/act nasal spray 2 sprays into each nostril daily 17 g 2    predniSONE 20 mg tablet Take 1 tablet (20 mg total) by mouth as needed (ulcerative colitis flare up) 30 tablet 0    zolpidem (AMBIEN) 5 mg tablet Take 1 tablet (5 mg total) by mouth daily at bedtime as needed for sleep 30 tablet 0    Cholecalciferol 53881 units TABS Take 1 tablet (50,000 Units total) by mouth once a week (Patient not taking: Reported on 6/22/2022) 12 tablet 0    cyclobenzaprine (FLEXERIL) 5 mg tablet Take 1 tablet (5 mg total) by mouth 3 (three) times a day as needed for muscle spasms (Patient not taking: Reported on 12/22/2022) 10 tablet 0    mesalamine (Delzicol) 400 mg Take 3 "capsules (1,200 mg total) by mouth 4 (four) times a day 1080 capsule 3     No current facility-administered medications for this visit  Allergies: Allergies   Allergen Reactions    Iodinated Contrast Media Anaphylaxis, Hives and Swelling      Physical Exam:     /90 (BP Location: Left arm, Patient Position: Sitting, Cuff Size: Standard)   Pulse 82   Temp 97 6 °F (36 4 °C)   Ht 5' 2 13\" (1 578 m)   Wt 61 7 kg (136 lb)   LMP  (LMP Unknown)   BMI 24 77 kg/m²     Physical Exam  Vitals and nursing note reviewed  Constitutional:       Appearance: Normal appearance  She is well-developed  HENT:      Head: Normocephalic and atraumatic  Right Ear: Tympanic membrane normal       Left Ear: Tympanic membrane normal       Mouth/Throat:      Mouth: Mucous membranes are moist    Eyes:      Pupils: Pupils are equal, round, and reactive to light  Cardiovascular:      Rate and Rhythm: Normal rate and regular rhythm  Pulses: Normal pulses  Heart sounds: Normal heart sounds  Pulmonary:      Effort: Pulmonary effort is normal  No respiratory distress  Breath sounds: Normal breath sounds  No wheezing  Abdominal:      General: Bowel sounds are normal       Palpations: Abdomen is soft  Musculoskeletal:         General: No deformity  Normal range of motion  Cervical back: Normal range of motion and neck supple  Skin:     General: Skin is warm  Neurological:      General: No focal deficit present  Mental Status: She is alert and oriented to person, place, and time     Psychiatric:         Mood and Affect: Mood normal          Behavior: Behavior normal           Anastasiya Whitten MD  [address] [facility]  "

## 2023-05-01 NOTE — PATIENT INSTRUCTIONS
Wellness Visit for Adults   AMBULATORY CARE:   A wellness visit  is when you see your healthcare provider to get screened for health problems  Your healthcare provider will also give you advice on how to stay healthy  Write down your questions so you remember to ask them  Ask your healthcare provider how often you should have a wellness visit  What happens at a wellness visit:  Your healthcare provider will ask about your health, and your family history of health problems  This includes high blood pressure, heart disease, and cancer  He or she will ask if you have symptoms that concern you, if you smoke, and about your mood  You may also be asked about your intake of medicines, supplements, food, and alcohol  Any of the following may be done:   Your weight  will be checked  Your height may also be checked so your body mass index (BMI) can be calculated  Your BMI shows if you are at a healthy weight   Your blood pressure  and heart rate will be checked  Your temperature may also be checked   Blood and urine tests  may be done  Blood tests may be done to check your cholesterol levels  Abnormal cholesterol levels increase your risk for heart disease and stroke  You may also need a blood or urine test to check for diabetes if you are at increased risk  Urine tests may be done to look for signs of an infection or kidney disease   A physical exam  includes checking your heartbeat and lungs with a stethoscope  Your healthcare provider may also check your skin to look for sun damage   Screening tests  may be recommended  A screening test is done to check for diseases that may not cause symptoms  The screening tests you may need depend on your age, gender, family history, and lifestyle habits  For example, colorectal screening may be recommended if you are 48years old or older  Screening tests you need if you are a woman:    A Pap smear  is used to screen for cervical cancer   Pap smears are usually done every 3 to 5 years depending on your age  You may need them more often if you have had abnormal Pap smear test results in the past  Ask your healthcare provider how often you should have a Pap smear   A mammogram  is an x-ray of your breasts to screen for breast cancer  Experts recommend mammograms every 2 years starting at age 48 years  You may need a mammogram at age 52 years or younger if you have an increased risk for breast cancer  Talk to your healthcare provider about when you should start having mammograms and how often you need them  Vaccines you may need:    Get an influenza vaccine  every year  The influenza vaccine protects you from the flu  Several types of viruses cause the flu  The viruses change over time, so new vaccines are made each year   Get a tetanus-diphtheria (Td) booster vaccine  every 10 years  This vaccine protects you against tetanus and diphtheria  Tetanus is a severe infection that may cause painful muscle spasms and lockjaw  Diphtheria is a severe bacterial infection that causes a thick covering in the back of your mouth and throat   Get a human papillomavirus (HPV) vaccine  if you are female and aged 23 to 32 or male 23 to 24 and never received it  This vaccine protects you from HPV infection  HPV is the most common infection spread by sexual contact  HPV may also cause vaginal, penile, and anal cancers   Get a pneumococcal vaccine  if you are aged 72 years or older  The pneumococcal vaccine is an injection given to protect you from pneumococcal disease  Pneumococcal disease is an infection caused by pneumococcal bacteria  The infection may cause pneumonia, meningitis, or an ear infection   Get a shingles vaccine  if you are 61 or older, even if you have had shingles before  The shingles vaccine is an injection to protect you from the varicella-zoster virus  This is the same virus that causes chickenpox   Shingles is a painful rash that develops in people who had chickenpox or have been exposed to the virus  How to eat healthy:  My Plate is a model for planning healthy meals  It shows the types and amounts of foods that should go on your plate  Fruits and vegetables make up about half of your plate, and grains and protein make up the other half  A serving of dairy is included on the side of your plate  The amount of calories and serving sizes you need depends on your age, gender, weight, and height  Examples of healthy foods are listed below:   Eat a variety of vegetables  such as dark green, red, and orange vegetables  You can also include canned vegetables low in sodium (salt) and frozen vegetables without added butter or sauces   Eat a variety of fresh fruits , canned fruit in 100% juice, frozen fruit, and dried fruit   Include whole grains  At least half of the grains you eat should be whole grains  Examples include whole-wheat bread, wheat pasta, brown rice, and whole-grain cereals such as oatmeal      Eat a variety of protein foods such as seafood (fish and shellfish), lean meat, and poultry without skin (turkey and chicken)  Examples of lean meats include pork leg, shoulder, or tenderloin, and beef round, sirloin, tenderloin, and extra lean ground beef  Other protein foods include eggs and egg substitutes, beans, peas, soy products, nuts, and seeds   Choose low-fat dairy products such as skim or 1% milk or low-fat yogurt, cheese, and cottage cheese   Limit unhealthy fats  such as butter, hard margarine, and shortening  Exercise:  Exercise at least 30 minutes per day on most days of the week  Some examples of exercise include walking, biking, dancing, and swimming  You can also fit in more physical activity by taking the stairs instead of the elevator or parking farther away from stores  Include muscle strengthening activities 2 days each week  Regular exercise provides many health benefits   It helps you manage your weight, and decreases your risk for type 2 diabetes, heart disease, stroke, and high blood pressure  Exercise can also help improve your mood  Ask your healthcare provider about the best exercise plan for you  General health and safety guidelines:    Do not smoke  Nicotine and other chemicals in cigarettes and cigars can cause lung damage  Ask your healthcare provider for information if you currently smoke and need help to quit  E-cigarettes or smokeless tobacco still contain nicotine  Talk to your healthcare provider before you use these products   Limit alcohol  A drink of alcohol is 12 ounces of beer, 5 ounces of wine, or 1½ ounces of liquor   Lose weight, if needed  Being overweight increases your risk of certain health conditions  These include heart disease, high blood pressure, type 2 diabetes, and certain types of cancer   Protect your skin  Do not sunbathe or use tanning beds  Use sunscreen with a SPF 15 or higher  Apply sunscreen at least 15 minutes before you go outside  Reapply sunscreen every 2 hours  Wear protective clothing, hats, and sunglasses when you are outside   Drive safely  Always wear your seatbelt  Make sure everyone in your car wears a seatbelt  A seatbelt can save your life if you are in an accident  Do not use your cell phone when you are driving  This could distract you and cause an accident  Pull over if you need to make a call or send a text message   Practice safe sex  Use latex condoms if are sexually active and have more than one partner  Your healthcare provider may recommend screening tests for sexually transmitted infections (STIs)   Wear helmets, lifejackets, and protective gear  Always wear a helmet when you ride a bike or motorcycle, go skiing, or play sports that could cause a head injury  Wear protective equipment when you play sports  Wear a lifejacket when you are on a boat or doing water sports      © Copyright [employer]ative 2022 Information is for End User's use only and may not be sold, redistributed or otherwise used for commercial purposes  The above information is an  only  It is not intended as medical advice for individual conditions or treatments  Talk to your doctor, nurse or pharmacist before following any medical regimen to see if it is safe and effective for you

## 2023-07-17 DIAGNOSIS — J30.89 ALLERGIC RHINITIS DUE TO OTHER ALLERGIC TRIGGER, UNSPECIFIED SEASONALITY: ICD-10-CM

## 2023-07-17 RX ORDER — MOMETASONE FUROATE 50 UG/1
2 SPRAY, METERED NASAL DAILY
Qty: 17 G | Refills: 1 | Status: SHIPPED | OUTPATIENT
Start: 2023-07-17

## 2023-08-14 DIAGNOSIS — K51.011 ULCERATIVE PANCOLITIS WITH RECTAL BLEEDING (HCC): ICD-10-CM

## 2023-08-14 RX ORDER — MESALAMINE 4 G/60ML
4 ENEMA RECTAL
Qty: 1800 ML | Refills: 3 | Status: SHIPPED | OUTPATIENT
Start: 2023-08-14 | End: 2023-09-13

## 2023-08-22 ENCOUNTER — HOSPITAL ENCOUNTER (OUTPATIENT)
Dept: RADIOLOGY | Age: 63
Discharge: HOME | End: 2023-08-22
Payer: COMMERCIAL

## 2023-08-22 DIAGNOSIS — Z13.820 ENCOUNTER FOR OSTEOPOROSIS SCREENING IN ASYMPTOMATIC POSTMENOPAUSAL PATIENT: ICD-10-CM

## 2023-08-22 DIAGNOSIS — Z78.0 ENCOUNTER FOR OSTEOPOROSIS SCREENING IN ASYMPTOMATIC POSTMENOPAUSAL PATIENT: ICD-10-CM

## 2023-08-22 PROCEDURE — 77080 DXA BONE DENSITY AXIAL: CPT

## 2023-08-29 DIAGNOSIS — M81.0 AGE-RELATED OSTEOPOROSIS WITHOUT CURRENT PATHOLOGICAL FRACTURE: Primary | ICD-10-CM

## 2023-08-29 RX ORDER — ERGOCALCIFEROL 1.25 MG/1
50000 CAPSULE ORAL WEEKLY
Qty: 8 CAPSULE | Refills: 3 | Status: SHIPPED | OUTPATIENT
Start: 2023-08-29

## 2023-09-29 DIAGNOSIS — F51.01 PRIMARY INSOMNIA: ICD-10-CM

## 2023-09-29 RX ORDER — ZOLPIDEM TARTRATE 5 MG/1
5 TABLET ORAL
Qty: 30 TABLET | Refills: 0 | Status: SHIPPED | OUTPATIENT
Start: 2023-09-29

## 2023-10-03 ENCOUNTER — TELEPHONE (OUTPATIENT)
Dept: FAMILY MEDICINE CLINIC | Facility: CLINIC | Age: 63
End: 2023-10-03

## 2023-10-03 NOTE — TELEPHONE ENCOUNTER
Called patient to inform her she was approved for Prolia but could still have an out of pocket expense because she has not met her deductible. I did inform her of applying for the copay card.     At this time, patient decline to get Prolia injections due to researching it and does not agree that it will be beneficial for her. She will continue Vitamin D and exercising and will consult with GI to see if Calcium is okay to take with her stomach issues that she has.

## 2023-10-06 DIAGNOSIS — K51.011 ULCERATIVE PANCOLITIS WITH RECTAL BLEEDING (HCC): ICD-10-CM

## 2023-10-06 RX ORDER — DICYCLOMINE HCL 20 MG
20 TABLET ORAL EVERY 6 HOURS PRN
Qty: 120 TABLET | Refills: 5 | Status: SHIPPED | OUTPATIENT
Start: 2023-10-06 | End: 2023-12-19 | Stop reason: SDUPTHER

## 2023-10-18 ENCOUNTER — TELEPHONE (OUTPATIENT)
Age: 63
End: 2023-10-18

## 2023-10-18 NOTE — TELEPHONE ENCOUNTER
PT calling to inquire if she is due to receive the prevnar vaccine. Please f/u with PT to inform and schedule.     Thank You

## 2023-10-31 ENCOUNTER — OFFICE VISIT (OUTPATIENT)
Dept: CARDIOLOGY CLINIC | Facility: CLINIC | Age: 63
End: 2023-10-31
Payer: COMMERCIAL

## 2023-10-31 VITALS
SYSTOLIC BLOOD PRESSURE: 126 MMHG | WEIGHT: 140 LBS | HEIGHT: 62 IN | DIASTOLIC BLOOD PRESSURE: 76 MMHG | HEART RATE: 76 BPM | BODY MASS INDEX: 25.76 KG/M2 | OXYGEN SATURATION: 94 %

## 2023-10-31 DIAGNOSIS — E78.1 HIGH TRIGLYCERIDES: ICD-10-CM

## 2023-10-31 DIAGNOSIS — I25.10 CORONARY ARTERY DISEASE INVOLVING NATIVE CORONARY ARTERY OF NATIVE HEART WITHOUT ANGINA PECTORIS: ICD-10-CM

## 2023-10-31 DIAGNOSIS — Z95.1 HX OF CABG: ICD-10-CM

## 2023-10-31 DIAGNOSIS — I10 ESSENTIAL HYPERTENSION: Primary | ICD-10-CM

## 2023-10-31 DIAGNOSIS — E78.5 DYSLIPIDEMIA: ICD-10-CM

## 2023-10-31 PROCEDURE — 99214 OFFICE O/P EST MOD 30 MIN: CPT | Performed by: INTERNAL MEDICINE

## 2023-10-31 PROCEDURE — 3074F SYST BP LT 130 MM HG: CPT | Performed by: INTERNAL MEDICINE

## 2023-10-31 PROCEDURE — 3078F DIAST BP <80 MM HG: CPT | Performed by: INTERNAL MEDICINE

## 2023-10-31 RX ORDER — EVOLOCUMAB 420 MG/3.5
KIT SUBCUTANEOUS
Qty: 3.5 ML | Refills: 10 | Status: SHIPPED | OUTPATIENT
Start: 2023-10-31

## 2023-10-31 NOTE — PATIENT INSTRUCTIONS
If you plant to use over-the-counter Fish oil - the concentration of EPA and DHA-at least 800 mg per capsule. Will need at least 2-4 g of (EPA/DHA) fish oil per day for substantial triglyceride reduction. Dietary changes were discussed in detail to lower triglycerides, printed information was also provided. Overall decrease intake of carbohydrates and substitute with salads/fresh fruit/greens. Decrease intake of saturated fat-butter, cheese, cream, red meat. Substitute  whole wheat products (with more fiber) instead of refined carbohydrate such as white rice, white pasta, sugar, white bread etc.   Increase overall intake of fiber/salads. If you are a diabetic, better control of his diabetes will also help-the above dietary changes should also help improve Diabetes control  Limit intake of Alcohol to a minimum - no more than 2 drinks per week. Increase intake of fish such as [place] and [place] - which have omega-3 fatty acids  Increase activity level-exercise-can start at 10 min a day and increase to 30 min a day -5 times a week. Weight loss from the about dietary changes and exercise will further help to lower your triglycerides. This should also lower your risk of developing diabetes and if you are a diabetic, will also help your glucose control.

## 2023-10-31 NOTE — PROGRESS NOTES
Cardiology Follow Up    Shona Castellanos  1960  [id number]  [facility] [address]  [phone]    1. Essential hypertension  CANCELED: POCT ECG      2. Coronary artery disease involving native coronary artery of native heart without angina pectoris        3. Hx of CABG        4. Dyslipidemia            Diagnoses and all orders for this visit:    Essential hypertension  -     Cancel: POCT ECG    Coronary artery disease involving native coronary artery of native heart without angina pectoris    Hx of CABG    Dyslipidemia    I had the pleasure of seeing Shona Castellanos for a follow up visit. INTERVAL HISTORY: here for her Repatha    History of the presenting illness, Discussion/Summary and My Plan are as follows:::     Adriana Hicks is a pleasant 70-year-old with hypertension, dyslipidemia, no diabetes, diagnosis of coronary artery disease after a positive stress test in 2015, with coronary angiography showing triple-vessel coronary disease and subsequently underwent coronary artery bypass grafting with LIMA-LAD, SVG-PDA, SVG-OM 3.  I had originally seen her at the time of the stress test.  And subsequently she has followed with Dr. Suzanne Encarnacion and more recently with Dr. Refugio Vieyra. She also has a history of intolerance to statins, had been on atorvastatin 10 mg with ankle pains and Livalo even at a dose of 2 mg with fatigue and aching. Ultimately she was approved for Repatha on which she remains with a good and appropriate reduction in her LDL. 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 She remains physically active, exercising 7 days a week-[employer] 30 to 45 minutes a day/treadmill, walking and anaerobic workouts without any change in physical capacity.  Left arm pain a month ago, not since despite exercise, SOB sometimes with stairs - advised stress test - she would not like to do it. Cardiac exam is unremarkable except for elevated blood pressures. Plan:    History of coronary artery disease and CABG:  Would recommend ASA daily   Clinically stable, she self discontinued aspirin as well as Coreg  Felt Metoprolol had caused hair loss. Clinically stable. Agreed to an ECG at her next visit. Left arm pain a month ago, not since despite exercise, SOB sometimes with stairs - advised stress test - she would not like to do it    Hypertension: was controlled in the 967K systolic at home, no changes    Dyslipidemia: Repatha was renewed.    Increased TG for the last 2 checks, no assoc steroid use, gained a little weight, A1C is stable, minimal Etoh  Check TSH, cut down sugary snacks    Discussed dietary modifications to prevent further weight gain, especially decreasing snacks-chips and cookies advised to start looking at food labels as well,    Follow-up in 12 months     Latest Reference Range & Units 11/21/20 10:00 12/11/21 08:33 12/31/22 07:07 05/01/23 08:56   Cholesterol <200 mg/dL 138 139 157 134   Triglycerides <150 mg/dL 106 102 180 (H) 236 (H)   HDL > OR = 50 mg/dL 60 63 60 50   Non-HDL Cholesterol <130 mg/dL (calc) 78 76 97 84   LDL Calculated mg/dL (calc) 59 57 71 55   Chol HDLC Ratio <5.0 (calc) 2.3 2.2 2.6 2.7   (H): Data is abnormally high   Latest Reference Range & Units 11/21/20 10:00 12/11/21 08:33 12/31/22 07:07 05/01/23 08:56   Hemoglobin A1C <5.7 % of total Hgb 5.5 5.5 5.4 5.5        Latest Reference Range & Units 08/01/14 06:48 08/12/16 08:04 12/22/17 06:57 11/02/18 06:51 11/21/20 10:00 12/11/21 08:33   Cholesterol <200 mg/dL 211 (H) 123 (L) 173  173 228 (H) 138 139   Triglycerides <150 mg/dL 262 (H) 130 133  133 148 106 102   HDL > OR = 50 mg/dL 48 45 (L) 58  58 61 60 63   Non-HDL Cholesterol <130 mg/dL (calc) 163 (H) 78 115  115 167 (H) 78 76   LDL CHOLESTEROL mg/dL (calc)  mg/dL (calc)  52 92  92      LDL Calculated mg/dL (calc)    140 (H) 59 57   LDL CHOLESTEROL DIRECT <130 mg/dL 110        Chol HDLC Ratio <5.0 (calc) 4.4 2.7 3.0  3.0 3.7 2.3 2.2   (H): Data is abnormally high  (L): Data is abnormally low     Latest Reference Range & Units 11/21/20 10:00   Hemoglobin A1C <5.7 % of total Hgb 5.5   eAG, EST AVG Glucose (calc)  (calc) 111  6.2     Patient Active Problem List   Diagnosis    CRP elevated    Coronary artery disease involving native coronary artery of native heart without angina pectoris    Essential hypertension    Elevated d-dimer    Abnormal TSH    Beta thalassemia minor    Dyslipidemia    Ulcerative pancolitis with rectal bleeding (HCC)    Hx of CABG    Allergic rhinitis     Past Medical History:   Diagnosis Date    Allergic rhinitis     Breast CA (HCC)     R breast CA-chemo/radiaition    Diarrhea     for 6 weeks-colonosocpy today 9/26/2016 and EGD    History of radiation therapy     Hyperlipidemia     Hypertension     Thalassemia     Vitamin D deficiency      Social History     Socioeconomic History    Marital status: /Civil Union     Spouse name: Not on file    Number of children: Not on file    Years of education: Not on file    Highest education level: Not on file   Occupational History    Not on file   Tobacco Use    Smoking status: Never    Smokeless tobacco: Never   Vaping Use    Vaping Use: Never used   Substance and Sexual Activity    Alcohol use:  Yes     Alcohol/week: 4.0 standard drinks of alcohol     Types: 4 Glasses of wine per week     Comment: per year    Drug use: No    Sexual activity: Not on file   Other Topics Concern    Not on file   Social History Narrative    Not on file     Social Determinants of Health     Financial Resource Strain: Not on file   Food Insecurity: Not on file   Transportation Needs: Not on file   Physical Activity: Not on file   Stress: Not on file   Social Connections: Not on file   Intimate Partner Violence: Not on file   Housing Stability: Not on file      Family History   Problem Relation Age of Onset    Stroke Father     Heart attack Brother     Sudden death Brother         cardiac    Heart attack Maternal Uncle     Hypertension Family     Hyperlipidemia Family     No Known Problems Mother     No Known Problems Sister     No Known Problems Daughter     No Known Problems Maternal Grandmother     No Known Problems Maternal Grandfather     No Known Problems Paternal Grandmother     No Known Problems Paternal Grandfather     No Known Problems Sister     No Known Problems Maternal Aunt     No Known Problems Maternal Aunt     No Known Problems Maternal Aunt     No Known Problems Paternal Aunt     No Known Problems Paternal Aunt     No Known Problems Paternal Aunt     No Known Problems Paternal Aunt     No Known Problems Son     Clotting disorder Neg Hx     Anuerysm Neg Hx      Past Surgical History:   Procedure Laterality Date    BREAST LUMPECTOMY Right     BREAST LUMPECTOMY Right     BREAST LUMPECTOMY Right     with lymph nodes  2007    COLONOSCOPY      COLONOSCOPY N/A 4/23/2018    Procedure: COLONOSCOPY;  Surgeon: Raine Feliz MD;  Location: [facility]; Service: Gastroenterology    CORONARY ARTERY BYPASS GRAFT      CORONARY ARTERY BYPASS GRAFT      x3, 11/2015    HYSTERECTOMY      b/l ovaries removed    OOPHORECTOMY      FL COLONOSCOPY FLX DX W/COLLJ SPEC WHEN PFRMD N/A 9/26/2016    Procedure: COLONOSCOPY;  Surgeon: Art Antony DO;  Location: [facility];   Service: Gastroenterology       Current Outpatient Medications:     dicyclomine (BENTYL) 20 mg tablet, Take 1 tablet (20 mg total) by mouth every 6 (six) hours as needed (abd pain), Disp: 120 tablet, Rfl: 5    ergocalciferol (VITAMIN D2) 50,000 units, Take 1 capsule (50,000 Units total) by mouth once a week, Disp: 8 capsule, Rfl: 3    Evolocumab with Infusor (Repatha Pushtronex System) 420 MG/3.5ML SOCT, Take one dose every month/30 days, Disp: 3.5 mL, Rfl: 10    ferrous sulfate 324 (65 Fe) mg, TAKE 1 TABLET (324 MG TOTAL) BY MOUTH TWO (TWO) TIMES A DAY BEFORE MEALS, Disp: 180 tablet, Rfl: 2    mesalamine (CANASA) 1,000 mg suppository, Insert 1 suppository (1,000 mg total) into the rectum daily at bedtime, Disp: 30 suppository, Rfl: 3    mesalamine (Delzicol) 400 mg, Take 3 capsules (1,200 mg total) by mouth 4 (four) times a day, Disp: 1080 capsule, Rfl: 3    mesalamine (ROWASA) 4 g, Insert 60 mL (4 g total) into the rectum daily at bedtime, Disp: 1800 mL, Rfl: 3    mometasone (NASONEX) 50 mcg/act nasal spray, 2 SPRAYS INTO EACH NOSTRIL DAILY, Disp: 17 g, Rfl: 1    predniSONE 20 mg tablet, Take 1 tablet (20 mg total) by mouth as needed (ulcerative colitis flare up), Disp: 30 tablet, Rfl: 0    zolpidem (AMBIEN) 5 mg tablet, Take 1 tablet (5 mg total) by mouth daily at bedtime as needed for sleep, Disp: 30 tablet, Rfl: 0    cyclobenzaprine (FLEXERIL) 5 mg tablet, Take 1 tablet (5 mg total) by mouth 3 (three) times a day as needed for muscle spasms (Patient not taking: Reported on 12/22/2022), Disp: 10 tablet, Rfl: 0  Allergies   Allergen Reactions    Iodinated Contrast Media Anaphylaxis, Hives and Swelling       Imaging: No results found. Review of Systems:  Review of Systems   Constitutional: Negative. HENT: Negative. Eyes: Negative. Respiratory: Negative. Cardiovascular: Negative. Musculoskeletal: Negative. Physical Exam:  /76 (BP Location: Left arm, Patient Position: Sitting, Cuff Size: Standard)   Pulse 76   Ht 5' 2" (1.575 m)   Wt 63.5 kg (140 lb)   LMP  (LMP Unknown)   SpO2 94%   BMI 25.61 kg/m²   Physical Exam  Constitutional:       General: She is not in acute distress. Appearance: Normal appearance. She is not ill-appearing. HENT:      Nose: Nose normal. No congestion or rhinorrhea. Mouth/Throat:      Mouth: Mucous membranes are moist.      Pharynx: Oropharynx is clear. No oropharyngeal exudate or posterior oropharyngeal erythema.    Neck: Vascular: No carotid bruit. Cardiovascular:      Rate and Rhythm: Normal rate and regular rhythm. Pulses: Normal pulses. Heart sounds: No murmur heard. No friction rub. Pulmonary:      Effort: Pulmonary effort is normal. No respiratory distress. Breath sounds: No stridor. No wheezing or rhonchi. Musculoskeletal:         General: No swelling, tenderness, deformity or signs of injury. Normal range of motion. Cervical back: Normal range of motion. No rigidity or tenderness. Lymphadenopathy:      Cervical: No cervical adenopathy. Neurological:      Mental Status: She is alert. This note was completed in part utilizing m-modal fluency direct voice recognition software. Grammatical errors, random word insertion, spelling mistakes, occasional wrong word or "sound-alike" substitutions and incomplete sentences may be an occasional consequence of the system secondary to software limitations, ambient noise and hardware issues. At the time of dictation, efforts were made to edit, clarify and /or correct errors. Please read the chart carefully and recognize, using context, where substitutions have occurred. If you have any questions or concerns about the context, text or information contained within the body of this dictation, please contact myself, the provider, for further clarification.

## 2023-10-31 NOTE — LETTER
October 31, 2023     Kevin Baca MD  [address]  [address] [id number]    Patient: Blanca Reed   YOB: 1960   Date of Visit: 10/31/2023       Dear Dr. Casey Wright:    Thank you for referring Blanca Reed to me for evaluation. Below are my notes for this consultation. If you have questions, please do not hesitate to call me. I look forward to following your patient along with you. Sincerely,        Maria Del Carmen Narvaez MD        CC: No Recipients    Maria Del Carmen Narvaez MD  10/31/2023 11:29 AM  Incomplete                                             Cardiology Follow Up    Blanca Reed  1960  [id number]  [address] [place] [address] Drive  [address] [facility]  [address] [id number] [phone]    1. Essential hypertension  CANCELED: POCT ECG      2. Coronary artery disease involving native coronary artery of native heart without angina pectoris        3. Hx of CABG        4. Dyslipidemia            Diagnoses and all orders for this visit:    Essential hypertension  -     Cancel: POCT ECG    Coronary artery disease involving native coronary artery of native heart without angina pectoris    Hx of CABG    Dyslipidemia    I had the pleasure of seeing Blanca Reed for a follow up visit. INTERVAL HISTORY: none - switching providers    History of the presenting illness, Discussion/Summary and My Plan are as follows:::     Jomar Morrison is a pleasant 53-year-old with hypertension, dyslipidemia, no diabetes, diagnosis of coronary artery disease after a positive stress test in 2015, with coronary angiography showing triple-vessel coronary disease and subsequently underwent coronary artery bypass grafting with LIMA-LAD, SVG-PDA, SVG-OM 3.  I had originally seen her at the time of the stress test.  And subsequently she has followed with Dr. Homer Kuo and more recently with Dr. Ara Jacob.     She also has a history of intolerance to statins, had been on atorvastatin 10 mg with ankle pains and Livalo even at a dose of 2 mg with fatigue and aching. Ultimately she was approved for Repatha on which she remains with a good and appropriate reduction in her LDL. She remains physically active, exercising 7 days a week-[employer] 30 to 45 minutes a day/treadmill, walking and anaerobic workouts without any change in physical capacity. 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 Cardiac exam is unremarkable except for elevated blood pressures. Plan:    History of coronary artery disease and CABG: Clinically stable, she self discontinued aspirin as well as Coreg in the past.  Would recommend ASA daily  Felt Metoprolol had caused hair loss. Clinically stable. Agreed to an ECG at her next visit. Hypertension: was controlled in the 656T systolic at home, no changes    Dyslipidemia: Repatha was renewed.    Increased TG for the last 2 checks, no assoc steroid use, gained a little weight, A1C is stable, minimal Etoh  Check TSH    Discussed dietary modifications to prevent further weight gain, especially decreasing snacks-chips and cookies advised to start looking at food labels as well,    Follow-up in 9 months     Latest Reference Range & Units 11/21/20 10:00 12/11/21 08:33 12/31/22 07:07 05/01/23 08:56   Cholesterol <200 mg/dL 138 139 157 134   Triglycerides <150 mg/dL 106 102 180 (H) 236 (H)   HDL > OR = 50 mg/dL 60 63 60 50   Non-HDL Cholesterol <130 mg/dL (calc) 78 76 97 84   LDL Calculated mg/dL (calc) 59 57 71 55   Chol HDLC Ratio <5.0 (calc) 2.3 2.2 2.6 2.7   (H): Data is abnormally high   Latest Reference Range & Units 11/21/20 10:00 12/11/21 08:33 12/31/22 07:07 05/01/23 08:56   Hemoglobin A1C <5.7 % of total Hgb 5.5 5.5 5.4 5.5        Latest Reference Range & Units 08/01/14 06:48 08/12/16 08:04 12/22/17 06:57 11/02/18 06:51 11/21/20 10:00 12/11/21 08:33   Cholesterol <200 mg/dL 211 (H) 123 (L) 173  173 228 (H) 138 139   Triglycerides <150 mg/dL 262 (H) 130 133  133 148 106 102   HDL > OR = 50 mg/dL 48 45 (L) 58  58 61 60 63   Non-HDL Cholesterol <130 mg/dL (calc) 163 (H) 78 115  115 167 (H) 78 76   LDL CHOLESTEROL mg/dL (calc)  mg/dL (calc)  52 92  92      LDL Calculated mg/dL (calc)    140 (H) 59 57   LDL CHOLESTEROL DIRECT <130 mg/dL 110        Chol HDLC Ratio <5.0 (calc) 4.4 2.7 3.0  3.0 3.7 2.3 2.2   (H): Data is abnormally high  (L): Data is abnormally low     Latest Reference Range & Units 11/21/20 10:00   Hemoglobin A1C <5.7 % of total Hgb 5.5   eAG, EST AVG Glucose (calc)  (calc) 111  6.2     Patient Active Problem List   Diagnosis   • CRP elevated   • Coronary artery disease involving native coronary artery of native heart without angina pectoris   • Essential hypertension   • Elevated d-dimer   • Abnormal TSH   • Beta thalassemia minor   • Dyslipidemia   • Ulcerative pancolitis with rectal bleeding (HCC)   • Hx of CABG   • Allergic rhinitis     Past Medical History:   Diagnosis Date   • Allergic rhinitis    • Breast CA (HCC)     R breast CA-chemo/radiaition   • Diarrhea     for 6 weeks-colonosocpy today 9/26/2016 and EGD   • History of radiation therapy    • Hyperlipidemia    • Hypertension    • Thalassemia    • Vitamin D deficiency      Social History     Socioeconomic History   • Marital status: /Civil Union     Spouse name: Not on file   • Number of children: Not on file   • Years of education: Not on file   • Highest education level: Not on file   Occupational History   • Not on file   Tobacco Use   • Smoking status: Never   • Smokeless tobacco: Never   Vaping Use   • Vaping Use: Never used   Substance and Sexual Activity   • Alcohol use:  Yes     Alcohol/week: 4.0 standard drinks of alcohol     Types: 4 Glasses of wine per week     Comment: per year   • Drug use: No   • Sexual activity: Not on file   Other Topics Concern   • Not on file   Social History Narrative   • Not on file     Social Determinants of Health     Financial Resource Strain: Not on file   Food Insecurity: Not on file   Transportation Needs: Not on file   Physical Activity: Not on file   Stress: Not on file   Social Connections: Not on file   Intimate Partner Violence: Not on file   Housing Stability: Not on file      Family History   Problem Relation Age of Onset   • Stroke Father    • Heart attack Brother    • Sudden death Brother         cardiac   • Heart attack Maternal Uncle    • Hypertension Family    • Hyperlipidemia Family    • No Known Problems Mother    • No Known Problems Sister    • No Known Problems Daughter    • No Known Problems Maternal Grandmother    • No Known Problems Maternal Grandfather    • No Known Problems Paternal Grandmother    • No Known Problems Paternal Grandfather    • No Known Problems Sister    • No Known Problems Maternal Aunt    • No Known Problems Maternal Aunt    • No Known Problems Maternal Aunt    • No Known Problems Paternal Aunt    • No Known Problems Paternal Aunt    • No Known Problems Paternal Aunt    • No Known Problems Paternal Aunt    • No Known Problems Son    • Clotting disorder Neg Hx    • Anuerysm Neg Hx      Past Surgical History:   Procedure Laterality Date   • BREAST LUMPECTOMY Right    • BREAST LUMPECTOMY Right    • BREAST LUMPECTOMY Right     with lymph nodes  2007   • COLONOSCOPY     • COLONOSCOPY N/A 4/23/2018    Procedure: COLONOSCOPY;  Surgeon: Shweta Orozco MD;  Location: [facility]; Service: Gastroenterology   • CORONARY ARTERY BYPASS GRAFT     • CORONARY ARTERY BYPASS GRAFT      x3, 11/2015   • HYSTERECTOMY      b/l ovaries removed   • OOPHORECTOMY     • AR COLONOSCOPY FLX DX W/COLLJ SPEC WHEN PFRMD N/A 9/26/2016    Procedure: COLONOSCOPY;  Surgeon: Nancy Christensen DO;  Location: [facility];   Service: Gastroenterology       Current Outpatient Medications:   •  dicyclomine (BENTYL) 20 mg tablet, Take 1 tablet (20 mg total) by mouth every 6 (six) hours as needed (abd pain), Disp: 120 tablet, Rfl: 5  •  ergocalciferol (VITAMIN D2) 50,000 units, Take 1 capsule (50,000 Units total) by mouth once a week, Disp: 8 capsule, Rfl: 3  •  Evolocumab with Infusor (Repatha Pushtronex System) 420 MG/3.5ML SOCT, Take one dose every month/30 days, Disp: 3.5 mL, Rfl: 10  •  ferrous sulfate 324 (65 Fe) mg, TAKE 1 TABLET (324 MG TOTAL) BY MOUTH TWO (TWO) TIMES A DAY BEFORE MEALS, Disp: 180 tablet, Rfl: 2  •  mesalamine (CANASA) 1,000 mg suppository, Insert 1 suppository (1,000 mg total) into the rectum daily at bedtime, Disp: 30 suppository, Rfl: 3  •  mesalamine (Delzicol) 400 mg, Take 3 capsules (1,200 mg total) by mouth 4 (four) times a day, Disp: 1080 capsule, Rfl: 3  •  mesalamine (ROWASA) 4 g, Insert 60 mL (4 g total) into the rectum daily at bedtime, Disp: 1800 mL, Rfl: 3  •  mometasone (NASONEX) 50 mcg/act nasal spray, 2 SPRAYS INTO EACH NOSTRIL DAILY, Disp: 17 g, Rfl: 1  •  predniSONE 20 mg tablet, Take 1 tablet (20 mg total) by mouth as needed (ulcerative colitis flare up), Disp: 30 tablet, Rfl: 0  •  zolpidem (AMBIEN) 5 mg tablet, Take 1 tablet (5 mg total) by mouth daily at bedtime as needed for sleep, Disp: 30 tablet, Rfl: 0  •  cyclobenzaprine (FLEXERIL) 5 mg tablet, Take 1 tablet (5 mg total) by mouth 3 (three) times a day as needed for muscle spasms (Patient not taking: Reported on 12/22/2022), Disp: 10 tablet, Rfl: 0  Allergies   Allergen Reactions   • Iodinated Contrast Media Anaphylaxis, Hives and Swelling       Imaging: No results found. Review of Systems:  Review of Systems   Constitutional: Negative. HENT: Negative. Eyes: Negative. Respiratory: Negative. Cardiovascular: Negative. Musculoskeletal: Negative.         Physical Exam:  /76 (BP Location: Left arm, Patient Position: Sitting, Cuff Size: Standard)   Pulse 76   Ht 5' 2" (1.575 m)   Wt 63.5 kg (140 lb)   LMP  (LMP Unknown)   SpO2 94%   BMI 25.61 kg/m²   Physical Exam  Constitutional:       General: She is not in acute distress. Appearance: Normal appearance. She is not ill-appearing. HENT:      Nose: Nose normal. No congestion or rhinorrhea. Mouth/Throat:      Mouth: Mucous membranes are moist.      Pharynx: Oropharynx is clear. No oropharyngeal exudate or posterior oropharyngeal erythema. Neck:      Vascular: No carotid bruit. Cardiovascular:      Rate and Rhythm: Normal rate and regular rhythm. Pulses: Normal pulses. Heart sounds: No murmur heard. No friction rub. Pulmonary:      Effort: Pulmonary effort is normal. No respiratory distress. Breath sounds: No stridor. No wheezing or rhonchi. Musculoskeletal:         General: No swelling, tenderness, deformity or signs of injury. Normal range of motion. Cervical back: Normal range of motion. No rigidity or tenderness. Lymphadenopathy:      Cervical: No cervical adenopathy. Neurological:      Mental Status: She is alert. This note was completed in part utilizing [employer] direct voice recognition software. Grammatical errors, random word insertion, spelling mistakes, occasional wrong word or "sound-alike" substitutions and incomplete sentences may be an occasional consequence of the system secondary to software limitations, ambient noise and hardware issues. At the time of dictation, efforts were made to edit, clarify and /or correct errors. Please read the chart carefully and recognize, using context, where substitutions have occurred. If you have any questions or concerns about the context, text or information contained within the body of this dictation, please contact myself, the provider, for further clarification.

## 2023-11-14 ENCOUNTER — TELEPHONE (OUTPATIENT)
Age: 63
End: 2023-11-14

## 2023-11-14 DIAGNOSIS — K51.011 ULCERATIVE PANCOLITIS WITH RECTAL BLEEDING (HCC): ICD-10-CM

## 2023-11-14 RX ORDER — FERROUS SULFATE 324(65)MG
TABLET, DELAYED RELEASE (ENTERIC COATED) ORAL
Qty: 180 TABLET | Refills: 1 | Status: SHIPPED | OUTPATIENT
Start: 2023-11-14

## 2023-11-14 NOTE — TELEPHONE ENCOUNTER
The patient is schedule for 12/01/2023 with the nurse and would like to see a provider as well to check a rash that she have under her breast     Patient request a female doctor

## 2023-12-01 ENCOUNTER — OFFICE VISIT (OUTPATIENT)
Dept: FAMILY MEDICINE CLINIC | Facility: CLINIC | Age: 63
End: 2023-12-01
Payer: COMMERCIAL

## 2023-12-01 VITALS
OXYGEN SATURATION: 98 % | HEIGHT: 62 IN | WEIGHT: 141.2 LBS | HEART RATE: 88 BPM | DIASTOLIC BLOOD PRESSURE: 80 MMHG | SYSTOLIC BLOOD PRESSURE: 120 MMHG | TEMPERATURE: 97.2 F | RESPIRATION RATE: 18 BRPM | BODY MASS INDEX: 25.98 KG/M2

## 2023-12-01 DIAGNOSIS — Z23 ENCOUNTER FOR IMMUNIZATION: ICD-10-CM

## 2023-12-01 DIAGNOSIS — B36.0 TINEA VERSICOLOR: Primary | ICD-10-CM

## 2023-12-01 PROCEDURE — 90471 IMMUNIZATION ADMIN: CPT

## 2023-12-01 PROCEDURE — 90677 PCV20 VACCINE IM: CPT

## 2023-12-01 PROCEDURE — 99214 OFFICE O/P EST MOD 30 MIN: CPT | Performed by: NURSE PRACTITIONER

## 2023-12-01 RX ORDER — KETOCONAZOLE 2 G/100G
AEROSOL, FOAM TOPICAL 2 TIMES DAILY
Qty: 100 G | Refills: 1 | Status: SHIPPED | OUTPATIENT
Start: 2023-12-01

## 2023-12-01 NOTE — PROGRESS NOTES
Name: Theodore Tapia      : 1960      MRN: [id number]  Encounter Provider: TIA Estrada  Encounter Date: 2023   Encounter department: [facility]     1. Tinea versicolor  Assessment & Plan:  Generalized patches on trunk, extremities of hypopigmentation, lights up yellow-green under woods lamp consistent with tinea versicolor. Treat with ketoconazole foam bid for a month, referral to derm for follow up should this not resolve or if it recurs    Orders:  -     ketoconazole (EXTINA) 2 % foam; Apply topically 2 (two) times a day  -     Ambulatory Referral to Dermatology; Future    2. Encounter for immunization  -     Pneumococcal Conjugate Vaccine 20-valent (Pcv20)        Depression Screening and Follow-up Plan: Patient was screened for depression during today's encounter. They screened negative with a PHQ-2 score of 0. Subjective      Pt is a 58 y.o. y/o female who is seen today for evaluation of rash. Past medical history of ulcerative pancolitis, HTN, CAD, beta thalassemia minor, dyslipidemia, CABG. She had this rash for about 2 weeks, it was very itchy so she started to put eucerin cream on it and it has gotten much better. Review of Systems   Constitutional:  Negative for chills and fever. Skin:  Positive for color change and rash.        Current Outpatient Medications on File Prior to Visit   Medication Sig    dicyclomine (BENTYL) 20 mg tablet Take 1 tablet (20 mg total) by mouth every 6 (six) hours as needed (abd pain)    ergocalciferol (VITAMIN D2) 50,000 units Take 1 capsule (50,000 Units total) by mouth once a week    Evolocumab with Infusor (Repatha Pushtronex System) 420 MG/3.5ML SOCT Take one dose every month/30 days    ferrous sulfate 324 (65 Fe) mg TAKE 1 TABLET BY MOUTH TWO (TWO) TIMES A DAY BEFORE MEALS    mesalamine (CANASA) 1,000 mg suppository Insert 1 suppository (1,000 mg total) into the rectum daily at bedtime mesalamine (Delzicol) 400 mg Take 3 capsules (1,200 mg total) by mouth 4 (four) times a day    mesalamine (ROWASA) 4 g Insert 60 mL (4 g total) into the rectum daily at bedtime    mometasone (NASONEX) 50 mcg/act nasal spray 2 SPRAYS INTO EACH NOSTRIL DAILY    predniSONE 20 mg tablet Take 1 tablet (20 mg total) by mouth as needed (ulcerative colitis flare up)    zolpidem (AMBIEN) 5 mg tablet Take 1 tablet (5 mg total) by mouth daily at bedtime as needed for sleep    cyclobenzaprine (FLEXERIL) 5 mg tablet Take 1 tablet (5 mg total) by mouth 3 (three) times a day as needed for muscle spasms (Patient not taking: Reported on 12/22/2022)       Objective     /80 (BP Location: Left arm, Patient Position: Sitting, Cuff Size: Standard)   Pulse 88   Temp (!) 97.2 °F (36.2 °C) (Tympanic)   Resp 18   Ht 5' 2" (1.575 m)   Wt 64 kg (141 lb 3.2 oz)   LMP  (LMP Unknown)   SpO2 98%   BMI 25.83 kg/m²     Physical Exam  Vitals reviewed. Constitutional:       General: She is awake. She is not in acute distress. Appearance: Normal appearance. She is well-developed and well-groomed. She is not ill-appearing or diaphoretic. Eyes:      General: Lids are normal.      Conjunctiva/sclera: Conjunctivae normal.   Pulmonary:      Effort: Pulmonary effort is normal.   Skin:     General: Skin is dry. Findings: Rash (hypopigmentation in patchy distribution on abdomen, back, extremities; yellow/green under woods lamp) present. Neurological:      Mental Status: She is alert and oriented to person, place, and time. Psychiatric:         Attention and Perception: Attention normal.         Mood and Affect: Mood normal.         Speech: Speech normal.         Behavior: Behavior normal. Behavior is cooperative. Thought Content:  Thought content normal.         Cognition and Memory: Cognition normal.         Judgment: Judgment normal.       TIA Mercado

## 2023-12-01 NOTE — ASSESSMENT & PLAN NOTE
Generalized patches on trunk, extremities of hypopigmentation, lights up yellow-green under woods lamp consistent with tinea versicolor.   Treat with ketoconazole foam bid for a month, referral to derm for follow up should this not resolve or if it recurs

## 2023-12-19 ENCOUNTER — TELEPHONE (OUTPATIENT)
Dept: GASTROENTEROLOGY | Facility: CLINIC | Age: 63
End: 2023-12-19

## 2023-12-19 ENCOUNTER — OFFICE VISIT (OUTPATIENT)
Dept: GASTROENTEROLOGY | Facility: CLINIC | Age: 63
End: 2023-12-19
Payer: COMMERCIAL

## 2023-12-19 VITALS
BODY MASS INDEX: 25.95 KG/M2 | SYSTOLIC BLOOD PRESSURE: 128 MMHG | WEIGHT: 141 LBS | HEART RATE: 80 BPM | DIASTOLIC BLOOD PRESSURE: 82 MMHG | HEIGHT: 62 IN

## 2023-12-19 DIAGNOSIS — B36.0 TINEA VERSICOLOR: ICD-10-CM

## 2023-12-19 DIAGNOSIS — F51.01 PRIMARY INSOMNIA: ICD-10-CM

## 2023-12-19 DIAGNOSIS — K51.011 ULCERATIVE PANCOLITIS WITH RECTAL BLEEDING (HCC): ICD-10-CM

## 2023-12-19 DIAGNOSIS — J30.89 ALLERGIC RHINITIS DUE TO OTHER ALLERGIC TRIGGER, UNSPECIFIED SEASONALITY: ICD-10-CM

## 2023-12-19 DIAGNOSIS — K51.011 ULCERATIVE PANCOLITIS WITH RECTAL BLEEDING (HCC): Primary | ICD-10-CM

## 2023-12-19 DIAGNOSIS — M81.0 AGE-RELATED OSTEOPOROSIS WITHOUT CURRENT PATHOLOGICAL FRACTURE: ICD-10-CM

## 2023-12-19 PROCEDURE — 99214 OFFICE O/P EST MOD 30 MIN: CPT | Performed by: INTERNAL MEDICINE

## 2023-12-19 RX ORDER — KETOCONAZOLE 2 G/100G
AEROSOL, FOAM TOPICAL 2 TIMES DAILY
Qty: 100 G | Refills: 0 | Status: SHIPPED | OUTPATIENT
Start: 2023-12-19

## 2023-12-19 RX ORDER — ERGOCALCIFEROL 1.25 MG/1
50000 CAPSULE ORAL WEEKLY
Qty: 8 CAPSULE | Refills: 0 | Status: SHIPPED | OUTPATIENT
Start: 2023-12-19

## 2023-12-19 RX ORDER — ZOLPIDEM TARTRATE 5 MG/1
5 TABLET ORAL
Qty: 30 TABLET | Refills: 0 | Status: SHIPPED | OUTPATIENT
Start: 2023-12-19

## 2023-12-19 RX ORDER — MESALAMINE 4 G/60ML
4 SUSPENSION RECTAL
Qty: 1800 ML | Refills: 3 | Status: SHIPPED | OUTPATIENT
Start: 2023-12-19 | End: 2024-03-18

## 2023-12-19 RX ORDER — MESALAMINE 400 MG/1
1200 CAPSULE, DELAYED RELEASE ORAL 4 TIMES DAILY
Qty: 1080 CAPSULE | Refills: 3 | Status: SHIPPED | OUTPATIENT
Start: 2023-12-19 | End: 2024-12-13

## 2023-12-19 RX ORDER — MOMETASONE FUROATE 50 UG/1
2 SPRAY, METERED NASAL DAILY
Qty: 17 G | Refills: 0 | Status: SHIPPED | OUTPATIENT
Start: 2023-12-19

## 2023-12-19 RX ORDER — FERROUS SULFATE 324(65)MG
324 TABLET, DELAYED RELEASE (ENTERIC COATED) ORAL
Qty: 90 TABLET | Refills: 3 | Status: SHIPPED | OUTPATIENT
Start: 2023-12-19 | End: 2023-12-22 | Stop reason: SDUPTHER

## 2023-12-19 RX ORDER — FERROUS SULFATE 324(65)MG
324 TABLET, DELAYED RELEASE (ENTERIC COATED) ORAL
Qty: 180 TABLET | Refills: 1 | Status: SHIPPED | OUTPATIENT
Start: 2023-12-19 | End: 2023-12-19 | Stop reason: SDUPTHER

## 2023-12-19 RX ORDER — DICYCLOMINE HCL 20 MG
20 TABLET ORAL EVERY 6 HOURS PRN
Qty: 120 TABLET | Refills: 5 | Status: SHIPPED | OUTPATIENT
Start: 2023-12-19

## 2023-12-19 RX ORDER — SODIUM PICOSULFATE, MAGNESIUM OXIDE, AND ANHYDROUS CITRIC ACID 12; 3.5; 1 G/175ML; G/175ML; MG/175ML
LIQUID ORAL
Qty: 350 ML | Refills: 0 | Status: SHIPPED | OUTPATIENT
Start: 2023-12-19

## 2023-12-19 NOTE — PATIENT INSTRUCTIONS
Scheduled date of colonoscopy (as of today):04.16.24  Physician performing colonoscopy:DR BRUCE  Location of colonoscopy:[facility]  Bowel prep reviewed with patient:CLENPIQ  Instructions reviewed with patient by:ALISA  Clearances: N/A

## 2023-12-19 NOTE — TELEPHONE ENCOUNTER
Medication:  PDMP     09/29/2023 09/29/2023 Zolpidem Tartrate (Tablet) 30.0 30 5 MG NA SAMUEL BAEZ     Active agreement on file -No

## 2023-12-19 NOTE — PROGRESS NOTES
[facility] - Outpatient Follow-up Note  Fredo Zhou 63 y.o. female MRN: [id number]  Encounter: [id number]      ASSESSMENT AND PLAN:  63 year old female here for follow up.      1. Ulcerative pancolitis with rectal bleeding (HCC)  -He is in clinical remission on mesalamine, we will repeat colonoscopy as it has been a few years, we will also start repeating colonoscopy in 1 to 2 years as she will have disease over 8 years  - mesalamine (ROWASA) 4 g; Insert 60 mL (4 g total) into the rectum daily at bedtime  Dispense: 1800 mL; Refill: 3  - mesalamine (Delzicol) 400 mg; Take 3 capsules (1,200 mg total) by mouth 4 (four) times a day  Dispense: 1080 capsule; Refill: 3  - dicyclomine (BENTYL) 20 mg tablet; Take 1 tablet (20 mg total) by mouth every 6 (six) hours as needed (abd pain)  Dispense: 120 tablet; Refill: 5  - ferrous sulfate 324 (65 Fe) mg; Take 1 tablet (324 mg total) by mouth daily before breakfast  Dispense: 90 tablet; Refill: 3  - Colonoscopy; Future  - sodium picosulfate, magnesium oxide, citric acid (Clenpiq) oral solution; Take 175 mL (1 bottle) the evening before the colonoscopy, between 5 PM and 9 PM, followed by a second 175 mL bottle 5 hours before the colonoscopy.  Dispense: 350 mL; Refill: 0    -trial of low fodmap diet     ___________________________________________________________________    SUBJECTIVE:  Pt here for follow up.  Feeling well without any rectal bleeding.  Can't tolerate brussel sprouts and oatmeal.  Interested in low fodmap diet information.            REVIEW OF SYSTEMS IS OTHERWISE NEGATIVE.      Historical Information   Past Medical History:   Diagnosis Date   • Allergic rhinitis    • Breast CA (HCC)     R breast CA-chemo/radiaition   • Diarrhea     for 6 weeks-colonosocpy today 9/26/2016 and EGD   • History of radiation therapy    • Hyperlipidemia    • Hypertension    • Thalassemia    • Tinnitus    • Vitamin D deficiency      Past Surgical History:    Procedure Laterality Date   • BREAST LUMPECTOMY Right    • BREAST LUMPECTOMY Right    • BREAST LUMPECTOMY Right     with lymph nodes  2007   • COLONOSCOPY     • COLONOSCOPY N/A 4/23/2018    Procedure: COLONOSCOPY;  Surgeon: Lul Virgen MD;  Location: [facility];  Service: Gastroenterology   • CORONARY ARTERY BYPASS GRAFT     • CORONARY ARTERY BYPASS GRAFT      x3, 11/2015   • HYSTERECTOMY      b/l ovaries removed   • OOPHORECTOMY     • NV COLONOSCOPY FLX DX W/COLLJ SPEC WHEN PFRMD N/A 9/26/2016    Procedure: COLONOSCOPY;  Surgeon: Heather Steinberg DO;  Location: [facility];  Service: Gastroenterology     Social History   Social History     Substance and Sexual Activity   Alcohol Use Yes   • Alcohol/week: 4.0 standard drinks of alcohol   • Types: 4 Glasses of wine per week    Comment: per year     Social History     Substance and Sexual Activity   Drug Use No     Social History     Tobacco Use   Smoking Status Never   Smokeless Tobacco Never     Family History   Problem Relation Age of Onset   • Stroke Father    • Heart attack Brother    • Sudden death Brother         cardiac   • Heart attack Maternal Uncle    • Hypertension Family    • Hyperlipidemia Family    • No Known Problems Mother    • No Known Problems Sister    • No Known Problems Daughter    • No Known Problems Maternal Grandmother    • No Known Problems Maternal Grandfather    • No Known Problems Paternal Grandmother    • No Known Problems Paternal Grandfather    • No Known Problems Sister    • No Known Problems Maternal Aunt    • No Known Problems Maternal Aunt    • No Known Problems Maternal Aunt    • No Known Problems Paternal Aunt    • No Known Problems Paternal Aunt    • No Known Problems Paternal Aunt    • No Known Problems Paternal Aunt    • No Known Problems Son    • Clotting disorder Neg Hx    • Anuerysm Neg Hx        Meds/Allergies       Current Outpatient Medications:   •  dicyclomine (BENTYL) 20 mg tablet  •  Evolocumab with Infusor (Repatha  "Pushtronex System) 420 MG/3.5ML SOCT  •  mesalamine (Delzicol) 400 mg  •  mesalamine (ROWASA) 4 g  •  mometasone (NASONEX) 50 mcg/act nasal spray  •  sodium picosulfate, magnesium oxide, citric acid (Clenpiq) oral solution  •  ergocalciferol (VITAMIN D2) 50,000 units  •  ferrous sulfate 324 (65 Fe) mg  •  ketoconazole (EXTINA) 2 % foam  •  zolpidem (AMBIEN) 5 mg tablet    Allergies   Allergen Reactions   • Iodinated Contrast Media Anaphylaxis, Hives and Swelling           Objective     Blood pressure 128/82, pulse 80, height 5' 2\" (1.575 m), weight 64 kg (141 lb), not currently breastfeeding. Body mass index is 25.79 kg/m².      PHYSICAL EXAM:      General Appearance:   Alert, cooperative, no distress   HEENT:   Normocephalic, atraumatic, anicteric.     Neck:  Supple, symmetrical, trachea midline   Lungs:   Clear to auscultation bilaterally; no rales, rhonchi or wheezing; respirations unlabored    Heart::   Regular rate and rhythm; no murmur, rub, or gallop.   Abdomen:   Soft, non-tender, non-distended; normal bowel sounds; no masses, no organomegaly    Genitalia:   Deferred    Rectal:   Deferred    Extremities:  No cyanosis, clubbing or edema    Pulses:  2+ and symmetric    Skin:  No jaundice, rashes, or lesions    Lymph nodes:  No palpable cervical lymphadenopathy        Lab Results:   No visits with results within 1 Day(s) from this visit.   Latest known visit with results is:   Office Visit on 05/01/2023   Component Date Value   • White Blood Cell Count 05/01/2023 6.2    • Red Blood Cell Count 05/01/2023 6.13 (H)    • Hemoglobin 05/01/2023 11.6 (L)    • HCT 05/01/2023 39.0    • MCV 05/01/2023 63.6 (L)    • MCH 05/01/2023 18.9 (L)    • MCHC 05/01/2023 29.7 (L)    • RDW 05/01/2023 18.6 (H)    • Platelet Count 05/01/2023 307    • SL AMB MPV 05/01/2023 11.1    • Neutrophils (Absolute) 05/01/2023 3,143    • Lymphocytes (Absolute) 05/01/2023 2,021    • Monocytes (Absolute) 05/01/2023 546    • Eosinophils (Absolute) " 05/01/2023 409    • Basophils ABS 05/01/2023 81    • Neutrophils 05/01/2023 50.7    • Lymphocytes 05/01/2023 32.6    • Monocytes 05/01/2023 8.8    • Eosinophils 05/01/2023 6.6    • Basophils PCT 05/01/2023 1.3    • Glucose, Random 05/01/2023 86    • BUN 05/01/2023 16    • Creatinine 05/01/2023 0.72    • eGFR 05/01/2023 94    • SL AMB BUN/CREATININE RA* 05/01/2023 NOT APPLICABLE    • Sodium 05/01/2023 141    • Potassium 05/01/2023 4.0    • Chloride 05/01/2023 105    • CO2 05/01/2023 30    • Calcium 05/01/2023 9.6    • Protein, Total 05/01/2023 7.3    • Albumin 05/01/2023 4.2    • Globulin 05/01/2023 3.1    • Albumin/Globulin Ratio 05/01/2023 1.4    • TOTAL BILIRUBIN 05/01/2023 0.4    • Alkaline Phosphatase 05/01/2023 108    • AST 05/01/2023 18    • ALT 05/01/2023 14    • Total Cholesterol 05/01/2023 134    • HDL 05/01/2023 50    • Triglycerides 05/01/2023 236 (H)    • LDL Calculated 05/01/2023 55    • Chol HDLC Ratio 05/01/2023 2.7    • Non-HDL Cholesterol 05/01/2023 84    • TSH W/RFX TO FREE T4 05/01/2023 1.71    • Hemoglobin A1C 05/01/2023 5.5    • RBC Morphology 05/01/2023       Radiology Results:   No results found.

## 2023-12-22 DIAGNOSIS — K51.011 ULCERATIVE PANCOLITIS WITH RECTAL BLEEDING (HCC): ICD-10-CM

## 2023-12-22 RX ORDER — FERROUS SULFATE 324(65)MG
324 TABLET, DELAYED RELEASE (ENTERIC COATED) ORAL
Qty: 90 TABLET | Refills: 3 | Status: SHIPPED | OUTPATIENT
Start: 2023-12-22

## 2023-12-29 ENCOUNTER — HOSPITAL ENCOUNTER (OUTPATIENT)
Dept: RADIOLOGY | Age: 63
Discharge: HOME | End: 2023-12-29
Payer: COMMERCIAL

## 2023-12-29 VITALS — HEIGHT: 62 IN | BODY MASS INDEX: 25.76 KG/M2 | WEIGHT: 140 LBS

## 2023-12-29 DIAGNOSIS — Z12.31 VISIT FOR SCREENING MAMMOGRAM: ICD-10-CM

## 2023-12-29 PROCEDURE — 77067 SCR MAMMO BI INCL CAD: CPT

## 2023-12-29 PROCEDURE — 77063 BREAST TOMOSYNTHESIS BI: CPT

## 2024-01-02 ENCOUNTER — HOSPITAL ENCOUNTER (OUTPATIENT)
Dept: ULTRASOUND IMAGING | Facility: CLINIC | Age: 64
Discharge: HOME | End: 2024-01-02
Payer: COMMERCIAL

## 2024-01-02 ENCOUNTER — TELEPHONE (OUTPATIENT)
Age: 64
End: 2024-01-02

## 2024-01-02 DIAGNOSIS — R92.8 ABNORMAL MAMMOGRAM: ICD-10-CM

## 2024-01-02 PROCEDURE — 76642 ULTRASOUND BREAST LIMITED: CPT

## 2024-01-02 NOTE — TELEPHONE ENCOUNTER
I ordered additional testing for her and she is getting them done today. We will f/u with results    Dr shaffer

## 2024-01-02 NOTE — TELEPHONE ENCOUNTER
Patient called stating she would like to discus her mammo results with Dr Carty today. I informed the patient her scheduled is booked today and she stated she absolutely needs to talk to her today to know what her next steps are. She declined any other appointments offered. I informed patient I would reach out to the office to see if there was anything we could do. Patient would also like another mammo ordered so that she can get a second opinion. Please call patient back as soon as possible.

## 2024-01-09 DIAGNOSIS — E78.5 DYSLIPIDEMIA: ICD-10-CM

## 2024-01-09 DIAGNOSIS — I25.10 CORONARY ARTERY DISEASE INVOLVING NATIVE CORONARY ARTERY OF NATIVE HEART WITHOUT ANGINA PECTORIS: ICD-10-CM

## 2024-01-09 RX ORDER — EVOLOCUMAB 420 MG/3.5
KIT SUBCUTANEOUS
Qty: 3.5 ML | Refills: 10 | Status: SHIPPED | OUTPATIENT
Start: 2024-01-09 | End: 2024-01-11 | Stop reason: SDUPTHER

## 2024-01-11 DIAGNOSIS — E78.5 DYSLIPIDEMIA: ICD-10-CM

## 2024-01-11 DIAGNOSIS — I25.10 CORONARY ARTERY DISEASE INVOLVING NATIVE CORONARY ARTERY OF NATIVE HEART WITHOUT ANGINA PECTORIS: ICD-10-CM

## 2024-01-11 RX ORDER — EVOLOCUMAB 420 MG/3.5
KIT SUBCUTANEOUS
Qty: 3.5 ML | Refills: 10 | Status: SHIPPED | OUTPATIENT
Start: 2024-01-11 | End: 2024-01-12 | Stop reason: SDUPTHER

## 2024-01-12 ENCOUNTER — TELEPHONE (OUTPATIENT)
Dept: CARDIOLOGY CLINIC | Facility: CLINIC | Age: 64
End: 2024-01-12

## 2024-01-12 DIAGNOSIS — E78.5 DYSLIPIDEMIA: ICD-10-CM

## 2024-01-12 DIAGNOSIS — I25.10 CORONARY ARTERY DISEASE INVOLVING NATIVE CORONARY ARTERY OF NATIVE HEART WITHOUT ANGINA PECTORIS: ICD-10-CM

## 2024-01-12 NOTE — TELEPHONE ENCOUNTER
Spoke with pt, request has already been submitted to Dr. Betts for approval. Pt verbalized understanding.

## 2024-01-12 NOTE — TELEPHONE ENCOUNTER
My phone is [phone]. I was requesting to call to send in a script for Push Repatha to [facility]. If you can do that for me, they'll be great. They are still waiting for One time script to [facility]. It's not a repeat. They have to replace my Repatha which was malfunctioned last week, last month. Any question? You can call me [phone]. Thanks. Beatrice.  You received a voice mail from [facility].

## 2024-01-15 ENCOUNTER — TELEPHONE (OUTPATIENT)
Dept: CARDIOLOGY CLINIC | Facility: CLINIC | Age: 64
End: 2024-01-15

## 2024-01-15 DIAGNOSIS — I25.10 CORONARY ARTERY DISEASE INVOLVING NATIVE CORONARY ARTERY OF NATIVE HEART WITHOUT ANGINA PECTORIS: ICD-10-CM

## 2024-01-15 DIAGNOSIS — E78.5 DYSLIPIDEMIA: ICD-10-CM

## 2024-01-15 RX ORDER — EVOLOCUMAB 420 MG/3.5
KIT SUBCUTANEOUS
Qty: 3.5 ML | Refills: 0 | Status: SHIPPED | OUTPATIENT
Start: 2024-01-15

## 2024-01-15 NOTE — TELEPHONE ENCOUNTER
Hi, my name is Tez. What number pharmacy we are shipping pharmacy for the  [employer] calling on a mutual patient Alice Mojica last name TALIA and her date of birth is [birth year]. Patient reported to the . She had one Repatha 420 milligram patronis that was damaged or malfunctioned in some way. The  would like to replace that one at no cost. We were trying to get a verbal approval to dispense that. The reference number is  [id number] 64-[id number] as in [place] as in [address]. Our phone number is [phone]. Have a great day.

## 2024-01-26 ENCOUNTER — COSMETIC (OUTPATIENT)
Dept: PLASTIC SURGERY | Facility: CLINIC | Age: 64
End: 2024-01-26

## 2024-01-26 DIAGNOSIS — Z41.1 ENCOUNTER FOR COSMETIC PROCEDURE: Primary | ICD-10-CM

## 2024-01-26 PROCEDURE — BOTOX1U PR BOTOX BY THE UNIT: Performed by: STUDENT IN AN ORGANIZED HEALTH CARE EDUCATION/TRAINING PROGRAM

## 2024-01-26 PROCEDURE — RECHECK: Performed by: STUDENT IN AN ORGANIZED HEALTH CARE EDUCATION/TRAINING PROGRAM

## 2024-01-26 PROCEDURE — BOTOX3 THREE OR MORE AREAS OR GREATER THAN 75 UNITS: Performed by: STUDENT IN AN ORGANIZED HEALTH CARE EDUCATION/TRAINING PROGRAM

## 2024-01-26 NOTE — PROGRESS NOTES
Botox Consult     First time?: no, had performed by me in 10/2022 with good results  Allergies: contrast dye  Blood thinners: none  Pregnant:no  Neuromuscular illnesses: no     Patient had botox with me before, wants same including lateral brow lift.     Will proceed with 24 units of botox     Risks and benefits discussed, patient agreed to proceed.     6 units to forehead  14 units to glabellum  2 units to each lateral brow lift     Total 24 units, 30% off employee     Patient tolerated well, f/u in 3 months        Stef Nelson MD   [facility]   [address] [place] [address], [address]   [place] [address]   Office: [phone]

## 2024-02-05 DIAGNOSIS — E78.5 DYSLIPIDEMIA: ICD-10-CM

## 2024-02-05 DIAGNOSIS — I25.10 CORONARY ARTERY DISEASE INVOLVING NATIVE CORONARY ARTERY OF NATIVE HEART WITHOUT ANGINA PECTORIS: ICD-10-CM

## 2024-02-06 ENCOUNTER — TELEPHONE (OUTPATIENT)
Dept: CARDIOLOGY CLINIC | Facility: CLINIC | Age: 64
End: 2024-02-06

## 2024-02-06 RX ORDER — EVOLOCUMAB 420 MG/3.5
KIT SUBCUTANEOUS
Qty: 3.5 ML | Refills: 11 | Status: SHIPPED | OUTPATIENT
Start: 2024-02-06

## 2024-02-19 DIAGNOSIS — I25.10 CORONARY ARTERY DISEASE INVOLVING NATIVE CORONARY ARTERY OF NATIVE HEART WITHOUT ANGINA PECTORIS: ICD-10-CM

## 2024-02-19 DIAGNOSIS — E78.5 DYSLIPIDEMIA: ICD-10-CM

## 2024-02-21 ENCOUNTER — TELEPHONE (OUTPATIENT)
Dept: CARDIOLOGY CLINIC | Facility: CLINIC | Age: 64
End: 2024-02-21

## 2024-02-21 RX ORDER — EVOLOCUMAB 420 MG/3.5
KIT SUBCUTANEOUS
Qty: 3.5 ML | Refills: 11 | Status: SHIPPED | OUTPATIENT
Start: 2024-02-21

## 2024-03-04 DIAGNOSIS — J30.89 ALLERGIC RHINITIS DUE TO OTHER ALLERGIC TRIGGER, UNSPECIFIED SEASONALITY: ICD-10-CM

## 2024-03-04 DIAGNOSIS — Z00.6 ENCOUNTER FOR EXAMINATION FOR NORMAL COMPARISON OR CONTROL IN CLINICAL RESEARCH PROGRAM: ICD-10-CM

## 2024-03-04 DIAGNOSIS — F51.01 PRIMARY INSOMNIA: ICD-10-CM

## 2024-03-04 RX ORDER — MOMETASONE FUROATE 50 UG/1
2 SPRAY, METERED NASAL DAILY
Qty: 17 G | Refills: 2 | Status: SHIPPED | OUTPATIENT
Start: 2024-03-04

## 2024-03-04 RX ORDER — ZOLPIDEM TARTRATE 5 MG/1
5 TABLET ORAL
Qty: 30 TABLET | Refills: 0 | Status: SHIPPED | OUTPATIENT
Start: 2024-03-04

## 2024-03-04 NOTE — TELEPHONE ENCOUNTER
Medication: Ambien 5 mg tablets  PDMP  12/19/2023 12/19/2023 Zolpidem Tartrate (Tablet) 30.0 30 5 MG NA SAMUEL ALVARADO  Active agreement on file -No

## 2024-03-04 NOTE — TELEPHONE ENCOUNTER
----- Message from Mary Zhou sent at 3/3/2024  4:09 PM EST -----  Regarding: refill  Contact: [phone]  Please send in refill for nasonex and zolpidem (Ambien ) to [facility] pharmacy Thanks  mary

## 2024-03-06 DIAGNOSIS — K51.011 ULCERATIVE PANCOLITIS WITH RECTAL BLEEDING (HCC): ICD-10-CM

## 2024-03-06 RX ORDER — MESALAMINE 4 G/60ML
4 SUSPENSION RECTAL
Qty: 1800 ML | Refills: 3 | Status: SHIPPED | OUTPATIENT
Start: 2024-03-06 | End: 2024-06-04

## 2024-03-08 ENCOUNTER — APPOINTMENT (OUTPATIENT)
Dept: LAB | Facility: CLINIC | Age: 64
End: 2024-03-08

## 2024-03-08 DIAGNOSIS — K51.011 ULCERATIVE PANCOLITIS WITH RECTAL BLEEDING (HCC): ICD-10-CM

## 2024-03-08 DIAGNOSIS — Z00.6 ENCOUNTER FOR EXAMINATION FOR NORMAL COMPARISON OR CONTROL IN CLINICAL RESEARCH PROGRAM: ICD-10-CM

## 2024-03-08 DIAGNOSIS — E78.1 HIGH TRIGLYCERIDES: ICD-10-CM

## 2024-03-08 PROCEDURE — 36415 COLL VENOUS BLD VENIPUNCTURE: CPT

## 2024-03-20 DIAGNOSIS — I25.10 CORONARY ARTERY DISEASE INVOLVING NATIVE CORONARY ARTERY OF NATIVE HEART WITHOUT ANGINA PECTORIS: ICD-10-CM

## 2024-03-20 DIAGNOSIS — E78.5 DYSLIPIDEMIA: ICD-10-CM

## 2024-03-20 RX ORDER — EVOLOCUMAB 420 MG/3.5
KIT SUBCUTANEOUS
Qty: 3.5 ML | Refills: 11 | Status: SHIPPED | OUTPATIENT
Start: 2024-03-20

## 2024-03-21 DIAGNOSIS — M81.0 AGE-RELATED OSTEOPOROSIS WITHOUT CURRENT PATHOLOGICAL FRACTURE: ICD-10-CM

## 2024-03-21 RX ORDER — ERGOCALCIFEROL 1.25 MG/1
50000 CAPSULE ORAL WEEKLY
Qty: 8 CAPSULE | Refills: 0 | Status: SHIPPED | OUTPATIENT
Start: 2024-03-21

## 2024-03-26 ENCOUNTER — TELEPHONE (OUTPATIENT)
Age: 64
End: 2024-03-26

## 2024-03-26 ENCOUNTER — CONSULT (OUTPATIENT)
Dept: DERMATOLOGY | Facility: CLINIC | Age: 64
End: 2024-03-26
Payer: COMMERCIAL

## 2024-03-26 DIAGNOSIS — B36.0 TINEA VERSICOLOR: ICD-10-CM

## 2024-03-26 DIAGNOSIS — L80 VITILIGO CAPITIS: Primary | ICD-10-CM

## 2024-03-26 PROCEDURE — 99204 OFFICE O/P NEW MOD 45 MIN: CPT | Performed by: DERMATOLOGY

## 2024-03-26 RX ORDER — RUXOLITINIB 15 MG/G
1 CREAM TOPICAL 2 TIMES DAILY
Qty: 60 G | Refills: 2 | Status: SHIPPED | OUTPATIENT
Start: 2024-03-26

## 2024-03-26 NOTE — TELEPHONE ENCOUNTER
PA for Opzelura 1.5% cream    Submitted via    []CMM-KEY   [x][employer]ivaty-Case ID # [id number]   []Faxed to plan   []Other website   []Phone call Case ID #     Office notes sent, clinical questions answered. Awaiting determination    Turnaround time for your insurance to make a decision on your Prior Authorization can take 7-21 business days.

## 2024-03-26 NOTE — PROGRESS NOTES
"[facility] Note     Patient Name: Fredo Zhou  Encounter Date: 3/26/24     Have you been cared for by a [facility] Dermatologist in the last 3 years and, if so, which description applies to you?    NO.   I am considered a \"new\" patient and must complete all patient intake questions. I am FEMALE/of child-bearing potential.    REVIEW OF SYSTEMS:  Have you recently had or currently have any of the following? Recent fever or chills? No  Any non-healing wound? No  Are you pregnant or planning to become pregnant? No  Are you currently or planning to be nursing or breast feeding? No   PAST MEDICAL HISTORY:  Have you personally ever had or currently have any of the following?  If \"YES,\" then please provide more detail. Skin cancer (such as Melanoma, Basal Cell Carcinoma, Squamous Cell Carcinoma?  No  Tuberculosis, HIV/AIDS, Hepatitis B or C: No  Radiation Treatment YES, in 2007   HISTORY OF IMMUNOSUPPRESSION:   Do you have a history of any of the following:  Systemic Immunosuppression such as Diabetes, Biologic or Immunotherapy, Chemotherapy, Organ Transplantation, Bone Marrow Transplantation?  YES, Breast cancer in 2007    Answering \"YES\" requires the addition of the dotphrase \"IMMUNOSUPPRESSED\" as the first diagnosis of the patient's visit.   FAMILY HISTORY:  Any \"first degree relatives\" (parent, brother, sister, or child) with the following?    Skin Cancer, Pancreatic or Other Cancer? No   PATIENT EXPERIENCE:    Do you want the Dermatologist to perform a COMPLETE skin exam today including a clinical examination under the \"bra and underwear\" areas?  NO  If necessary, do we have your permission to call and leave a detailed message on your Preferred Phone number that includes your specific medical information?  Yes      Allergies   Allergen Reactions    Iodinated Contrast Media Anaphylaxis, Hives and Swelling      Current Outpatient Medications:     dicyclomine (BENTYL) 20 mg tablet, Take 1 tablet (20 " mg total) by mouth every 6 (six) hours as needed (abd pain), Disp: 120 tablet, Rfl: 5    ergocalciferol (VITAMIN D2) 50,000 units, Take 1 capsule (50,000 Units total) by mouth once a week, Disp: 8 capsule, Rfl: 0    Evolocumab with Infusor (Repatha Pushtronex System) 420 MG/3.5ML SOCT, Take one dose every month/30 days, Disp: 3.5 mL, Rfl: 11    ferrous sulfate 324 (65 Fe) mg, Take 1 tablet (324 mg total) by mouth daily before breakfast, Disp: 90 tablet, Rfl: 3    ketoconazole (EXTINA) 2 % foam, Apply topically 2 (two) times a day, Disp: 100 g, Rfl: 0    mesalamine (Delzicol) 400 mg, Take 3 capsules (1,200 mg total) by mouth 4 (four) times a day, Disp: 1080 capsule, Rfl: 3    mesalamine (ROWASA) 4 g, Insert 60 mL (4 g total) into the rectum daily at bedtime, Disp: 1800 mL, Rfl: 3    mometasone (NASONEX) 50 mcg/act nasal spray, 2 sprays into each nostril daily, Disp: 17 g, Rfl: 2    sodium picosulfate, magnesium oxide, citric acid (Clenpiq) oral solution, Take 175 mL (1 bottle) the evening before the colonoscopy, between 5 PM and 9 PM, followed by a second 175 mL bottle 5 hours before the colonoscopy., Disp: 350 mL, Rfl: 0    zolpidem (AMBIEN) 5 mg tablet, Take 1 tablet (5 mg total) by mouth daily at bedtime as needed for sleep, Disp: 30 tablet, Rfl: 0          Whom besides the patient is providing clinical information about today's encounter?   NO ADDITIONAL HISTORIAN (patient alone provided history)    Physical Exam and Assessment/Plan by Diagnosis:  Patient also brought up hair loss and will consider scheduling a follow up for further evaluation of this issue. She also notes some thinning of the brows.    VITILIGO    Physical Exam:  Anatomic Location: Bilateral arms, torso, legs  Morphologic Description:  Well-defined white patches  CURRENT Body Surface Area Affected: 8-10%  Segmental distribution? No  Evidence of repigmentation? Focal islands on shins      Additional History of Present Condition:  Present since  about 2019. Has been slowly spreading. She denies a history of thyroid disease or family history of similar or other AI conditions.  Present since or around birth?  No  Family history of thyroid disorders or other autoimmunity: No  Has previously tried the following treatments: Triamcinolone ointment and Ketoconazole.      Latest Reference Range & Units 05/01/23 08:56   TSH W/RFX TO FREE T4 0.40 - 4.50 mIU/L 1.71       Plan:  Vitiligo is an autoimmune condition where the body's immune system attacks normal pigment-making cells (melanocytes) in the skin. Vitiligo may be linked to the development of other autoimmune conditions, including thyroid disease. Workup may include labs to check for specific antibodies and thyroid function.  Discussed potential disease courses. In 10-20% of cases, spontaneous repigmentation will occur. Darkening of the skin may occur in areas of repigmentation. Segmental vitiligo, localized to a single strip of skin, is usually not associated with widespread depigmentation.  Continue to monitor for any changes that arise. Return to clinic if patches increase or more patches develop. Avoid rubbing or applying friction to areas.  The affected areas of skin naturally have less protection from the sun and tend to sunburn. Discussed sun protection measures and avoiding tanning to minimize the difference in color contrast between normal and affected skin.  Discussed treatment options such as topical steroids, topical and oral SUSIE inhibitors, and Phototherapy. Patient is interested in watching and waiting for now, and will reach out to the office if interested in further treatment.   Advised patient to research Opzelura SUSIE inhibitor ointment. We will start a prior authorization today.   [email or web address]   Please let us know if you are interested in starting this medication  Risks of SUSIE inhibitors discussed and labeling on topical being same as oral medication reviewed (malignancy,  MACE, malignancy, increased mortality, clotting, infection etc(. Patient has a history of malignancy and already underwent a triple bypass so has concern about starting this medication. She would like to read a bit more about it, but we will start the PA process should she decide to proceed  Patient was also offered stronger topical steroids, Protopic, phototherapy  Thyroid WNL 5/2023 after vitiligo onset       PROCEDURES PERFORMED TODAY ASSOCIATED WITH THIS CONDITION:          NONE.     Medical Complexity:    CHRONIC ILLNESS (expected duration of >1 year):  EXACERBATION, PROGRESSION, OR SIDE EFFECTS OF TREATMENT.  Acutely worsening, poorly controlled, or progressing.  Intent is to control progression and requires additional supportive care or attention to treatment for side effects but not at level of consideration of hospital level of care.     Scribe Attestation      I,:  Ben Purvis am acting as a scribe while in the presence of the attending physician.:       I,:  Luisa Brandon MD personally performed the services described in this documentation    as scribed in my presence.:

## 2024-03-26 NOTE — PATIENT INSTRUCTIONS
VITILIGO        Plan:  Vitiligo is an autoimmune condition where the body's immune system attacks normal pigment-making cells (melanocytes) in the skin. Vitiligo may be linked to the development of other autoimmune conditions, including thyroid disease. Workup may include labs to check for specific antibodies and thyroid function.  Discussed potential disease courses. In 10-20% of cases, spontaneous repigmentation will occur. Darkening of the skin may occur in areas of repigmentation. Segmental vitiligo, localized to a single strip of skin, is usually not associated with widespread depigmentation.  Continue to monitor for any changes that arise. Return to clinic if patches increase or more patches develop. Avoid rubbing or applying friction to areas.  The affected areas of skin naturally have less protection from the sun and tend to sunburn. Discussed sun protection measures and avoiding tanning to minimize the difference in color contrast between normal and affected skin.  Discussed treatment options such as topical steroids, topical and oral SUSIE inhibitors, and Phototherapy. Patient is interested in watching and waiting for now, and will reach out to the office if interested in further treatment.   Advised patient to research Opzelura SUSIE inhibitor ointment. We will start a prior authorization today.   [email or web address]   Please let us know if you are interested in starting this medication

## 2024-03-26 NOTE — TELEPHONE ENCOUNTER
Received call from patient asking what pharmacy will her medication from today's visit be sent to?     Patient was made aware it will be sent to [facility].    Patient then stated that they have not received it.    Patient was made aware that the order has not been released to its destination.    Patient verbalized understanding.

## 2024-03-29 NOTE — TELEPHONE ENCOUNTER
PA for Opzelura 1.5% cream Denied    Reason:(Screenshot if applicable)        Message sent to office clinical pool Yes    Denial letter scanned into Media Yes    Appeal started No Waiting for providers response.

## 2024-04-02 ENCOUNTER — TELEPHONE (OUTPATIENT)
Age: 64
End: 2024-04-02

## 2024-04-02 ENCOUNTER — PATIENT MESSAGE (OUTPATIENT)
Dept: FAMILY MEDICINE CLINIC | Facility: CLINIC | Age: 64
End: 2024-04-02

## 2024-04-02 ENCOUNTER — COSMETIC (OUTPATIENT)
Dept: PLASTIC SURGERY | Facility: CLINIC | Age: 64
End: 2024-04-02

## 2024-04-02 DIAGNOSIS — I10 ESSENTIAL HYPERTENSION: Chronic | ICD-10-CM

## 2024-04-02 DIAGNOSIS — Z41.1 ENCOUNTER FOR COSMETIC PROCEDURE: Primary | ICD-10-CM

## 2024-04-02 DIAGNOSIS — K51.011 ULCERATIVE PANCOLITIS WITH RECTAL BLEEDING (HCC): Primary | ICD-10-CM

## 2024-04-02 DIAGNOSIS — E78.5 DYSLIPIDEMIA: Primary | ICD-10-CM

## 2024-04-02 DIAGNOSIS — K51.011 ULCERATIVE PANCOLITIS WITH RECTAL BLEEDING (HCC): ICD-10-CM

## 2024-04-02 PROCEDURE — BOTOX1U PR BOTOX BY THE UNIT: Performed by: STUDENT IN AN ORGANIZED HEALTH CARE EDUCATION/TRAINING PROGRAM

## 2024-04-02 PROCEDURE — BOTOX2 TWO AREAS OR 50 UNITS: Performed by: STUDENT IN AN ORGANIZED HEALTH CARE EDUCATION/TRAINING PROGRAM

## 2024-04-02 RX ORDER — BALSALAZIDE DISODIUM 750 MG/1
2250 CAPSULE ORAL 2 TIMES DAILY
Qty: 180 CAPSULE | Refills: 5 | Status: SHIPPED | OUTPATIENT
Start: 2024-04-02

## 2024-04-02 NOTE — TELEPHONE ENCOUNTER
Patient called to let us know that she is on her way for her appointment at 9:00 with Dr. Nelson in [place]

## 2024-04-02 NOTE — PROGRESS NOTES
Botox Consult     First time?: no, had performed by me in 1/26/24 with good results  Allergies: contrast dye  Blood thinners: none  Pregnant: no  Neuromuscular illnesses: no     Patient had botox with me before, wants same including lateral brow lift minus glabellar area.     Will proceed with 10 units of botox     Risks and benefits discussed, patient agreed to proceed.     6 units to forehead  2 units to each lateral brow lift     Total 10 units, 30% off employee     Patient tolerated well, f/u in 3 months        Stef Nelson MD   [facility]   [address] [place] [address], [address]   [place] [address]   Office: [phone]

## 2024-04-03 NOTE — TELEPHONE ENCOUNTER
Phone called to patient inform her of the above message. Patient states she will get back to us, after reads the side affect of medications

## 2024-04-12 DIAGNOSIS — Z13.1 DIABETES MELLITUS SCREENING: Primary | ICD-10-CM

## 2024-04-16 ENCOUNTER — ANESTHESIA (OUTPATIENT)
Dept: GASTROENTEROLOGY | Facility: AMBULARY SURGERY CENTER | Age: 64
End: 2024-04-16

## 2024-04-16 ENCOUNTER — ANESTHESIA EVENT (OUTPATIENT)
Dept: GASTROENTEROLOGY | Facility: AMBULARY SURGERY CENTER | Age: 64
End: 2024-04-16

## 2024-04-16 ENCOUNTER — HOSPITAL ENCOUNTER (OUTPATIENT)
Dept: GASTROENTEROLOGY | Facility: AMBULARY SURGERY CENTER | Age: 64
Setting detail: OUTPATIENT SURGERY
Discharge: HOME | End: 2024-04-16
Attending: INTERNAL MEDICINE
Payer: COMMERCIAL

## 2024-04-16 VITALS
WEIGHT: 137 LBS | DIASTOLIC BLOOD PRESSURE: 67 MMHG | RESPIRATION RATE: 11 BRPM | HEIGHT: 62 IN | OXYGEN SATURATION: 96 % | SYSTOLIC BLOOD PRESSURE: 122 MMHG | TEMPERATURE: 97.1 F | BODY MASS INDEX: 25.21 KG/M2 | HEART RATE: 64 BPM

## 2024-04-16 DIAGNOSIS — K51.011 ULCERATIVE PANCOLITIS WITH RECTAL BLEEDING (HCC): ICD-10-CM

## 2024-04-16 PROCEDURE — 88305 TISSUE EXAM BY PATHOLOGIST: CPT | Performed by: PATHOLOGY

## 2024-04-16 RX ORDER — MESALAMINE 4 G/60ML
4 SUSPENSION RECTAL
Qty: 1800 ML | Refills: 2 | Status: SHIPPED | OUTPATIENT
Start: 2024-04-16 | End: 2024-04-18 | Stop reason: SDUPTHER

## 2024-04-16 RX ORDER — PROPOFOL 10 MG/ML
INJECTION, EMULSION INTRAVENOUS AS NEEDED
Status: DISCONTINUED | OUTPATIENT
Start: 2024-04-16 | End: 2024-04-16

## 2024-04-16 RX ORDER — MESALAMINE 400 MG/1
800 CAPSULE, DELAYED RELEASE ORAL 3 TIMES DAILY
Qty: 540 CAPSULE | Refills: 3 | Status: SHIPPED | OUTPATIENT
Start: 2024-04-16 | End: 2024-04-18 | Stop reason: SDUPTHER

## 2024-04-16 RX ORDER — ONDANSETRON 2 MG/ML
INJECTION INTRAMUSCULAR; INTRAVENOUS AS NEEDED
Status: DISCONTINUED | OUTPATIENT
Start: 2024-04-16 | End: 2024-04-16

## 2024-04-16 RX ORDER — SODIUM CHLORIDE, SODIUM LACTATE, POTASSIUM CHLORIDE, CALCIUM CHLORIDE 600; 310; 30; 20 MG/100ML; MG/100ML; MG/100ML; MG/100ML
INJECTION, SOLUTION INTRAVENOUS CONTINUOUS PRN
Status: DISCONTINUED | OUTPATIENT
Start: 2024-04-16 | End: 2024-04-16

## 2024-04-16 RX ADMIN — PROPOFOL 50 MG: 10 INJECTION, EMULSION INTRAVENOUS at 10:24

## 2024-04-16 RX ADMIN — PROPOFOL 50 MG: 10 INJECTION, EMULSION INTRAVENOUS at 10:21

## 2024-04-16 RX ADMIN — PROPOFOL 100 MG: 10 INJECTION, EMULSION INTRAVENOUS at 10:15

## 2024-04-16 RX ADMIN — SODIUM CHLORIDE, SODIUM LACTATE, POTASSIUM CHLORIDE, AND CALCIUM CHLORIDE: .6; .31; .03; .02 INJECTION, SOLUTION INTRAVENOUS at 10:04

## 2024-04-16 RX ADMIN — PROPOFOL 50 MG: 10 INJECTION, EMULSION INTRAVENOUS at 10:27

## 2024-04-16 RX ADMIN — ONDANSETRON 4 MG: 2 INJECTION INTRAMUSCULAR; INTRAVENOUS at 10:13

## 2024-04-16 RX ADMIN — PROPOFOL 50 MG: 10 INJECTION, EMULSION INTRAVENOUS at 10:18

## 2024-04-16 NOTE — ANESTHESIA POSTPROCEDURE EVALUATION
Post-Op Assessment Note    CV Status:  Stable  Pain Score: 0    Pain management: adequate       Mental Status:  Arousable and sleepy   Hydration Status:  Stable   PONV Controlled:  Controlled   Airway Patency:  Patent     Post Op Vitals Reviewed: Yes    No anethesia notable event occurred.    Staff: CRNA               BP   128/73   Temp 97.6   Pulse 71   Resp 14   SpO2 99

## 2024-04-16 NOTE — ANESTHESIA PREPROCEDURE EVALUATION
Review of Systems/Medical History  Patient summary reviewed.  Chart reviewed.  History of anesthetic complications PONV    Cardiovascular   Exercise tolerance (METS): >4. Hyperlipidemia, Hypertension controlled, Valvular heart disease , mitral regurgitation, No past MI , CAD , History of CABG (2015)    Pulmonary  Negative pulmonary ROS        GI/Hepatic     Bowel prep            Endo/Other     GYN     Hysterectomy, Breast cancer (right)        Hematology   Anemia (thalassemia) ,     Musculoskeletal   Rheumatoid arthritis    Arthritis     Neurology   Psychology           Physical Exam    Airway    Mallampati score: III  TM Distance: >3 FB  Neck ROM: full     Dental   No notable dental hx     Cardiovascular      Pulmonary      Other Findings       Lab Results   Component Value Date    WBC 6.2 05/01/2023    HGB 11.6 (L) 05/01/2023     05/01/2023     Lab Results   Component Value Date     12/22/2017     12/22/2017    K 4.0 05/01/2023    BUN 16 05/01/2023    CREATININE 0.72 05/01/2023    GLUCOSE 129 12/09/2015     Anesthesia Plan  ASA Score- 3     Anesthesia Type- IV sedation with anesthesia with ASA Monitors.         Additional Monitors:     Airway Plan:            Plan Factors-Exercise tolerance (METS): >4.    Chart reviewed.    Patient summary reviewed.                  Induction- intravenous.    Postoperative Plan-     Informed Consent- Anesthetic plan and risks discussed with patient.  I personally reviewed this patient with the CRNA. Discussed and agreed on the Anesthesia Plan with the CRNA..

## 2024-04-16 NOTE — H&P
History and Physical -  [facility]  Fredo Zhou 63 y.o. female MRN: [id number]                  HPI: Fredo Zhou is a 63 y.o. year old female who presents for colonoscopy due to history of ulcerative colitis.      REVIEW OF SYSTEMS: Per the HPI, and otherwise unremarkable.    Historical Information   Past Medical History:   Diagnosis Date    Allergic rhinitis     Breast CA (HCC)     R breast CA-chemo/radiaition    Cancer (HCC) 2005    Diarrhea     for 6 weeks-colonosocpy today 9/26/2016 and EGD    Heart valve disease     Triple by pass heart surgery    History of radiation therapy     Hyperlipidemia     Hypertension     Thalassemia     Tinnitus     Vitamin D deficiency      Past Surgical History:   Procedure Laterality Date    BREAST LUMPECTOMY Right     BREAST LUMPECTOMY Right     BREAST LUMPECTOMY Right     with lymph nodes  2007    COLONOSCOPY      COLONOSCOPY N/A 04/23/2018    Procedure: COLONOSCOPY;  Surgeon: Lul Virgen MD;  [facility];  Service: Gastroenterology    CORONARY ARTERY BYPASS GRAFT      CORONARY ARTERY BYPASS GRAFT      x3, 11/2015    HYSTERECTOMY      b/l ovaries removed    OOPHORECTOMY      CO COLONOSCOPY FLX DX W/COLLJ SPEC WHEN PFRMD N/A 09/26/2016    Procedure: COLONOSCOPY;  Surgeon: Heather Steinberg DO;  Location: [facility];  Service: Gastroenterology     Social History   Social History     Substance and Sexual Activity   Alcohol Use Yes    Alcohol/week: 1.0 standard drink of alcohol    Types: 1 Glasses of wine per week    Comment: per year     Social History     Substance and Sexual Activity   Drug Use No     Social History     Tobacco Use   Smoking Status Never   Smokeless Tobacco Never     Family History   Problem Relation Age of Onset    Stroke Father     Heart attack Brother     Sudden death Brother         cardiac    Heart attack Maternal Uncle     Hypertension Family     Hyperlipidemia Family     No Known Problems Mother     No Known Problems  "Sister     No Known Problems Daughter     No Known Problems Maternal Grandmother     No Known Problems Maternal Grandfather     No Known Problems Paternal Grandmother     No Known Problems Paternal Grandfather     No Known Problems Sister     No Known Problems Maternal Aunt     No Known Problems Maternal Aunt     No Known Problems Maternal Aunt     No Known Problems Paternal Aunt     No Known Problems Paternal Aunt     No Known Problems Paternal Aunt     No Known Problems Paternal Aunt     No Known Problems Son     Clotting disorder Neg Hx     Anuerysm Neg Hx        Meds/Allergies       Current Outpatient Medications:     balsalazide (COLAZAL) 750 mg capsule    mometasone (NASONEX) 50 mcg/act nasal spray    dicyclomine (BENTYL) 20 mg tablet    ergocalciferol (VITAMIN D2) 50,000 units    Evolocumab with Infusor (Repatha Pushtronex System) 420 MG/3.5ML SOCT    ferrous sulfate 324 (65 Fe) mg    ketoconazole (EXTINA) 2 % foam    mesalamine (ROWASA) 4 g    Ruxolitinib Phosphate (Opzelura) 1.5 % CREA    zolpidem (AMBIEN) 5 mg tablet    Allergies   Allergen Reactions    Iodinated Contrast Media Anaphylaxis, Hives and Swelling       Objective     BP (!) 171/94   Pulse 69   Temp (!) 96.6 °F (35.9 °C) (Temporal)   Resp 18   Ht 5' 2\" (1.575 m)   Wt 62.1 kg (137 lb)   LMP  (LMP Unknown)   SpO2 97%   BMI 25.06 kg/m²       PHYSICAL EXAM    Gen: NAD  Head: NCAT  CV: RRR  CHEST: Clear  ABD: soft, NT/ND  EXT: no edema      ASSESSMENT/PLAN:  This is a 63 y.o. year old female here for colonoscopy, and she is stable and optimized for her procedure.        "

## 2024-04-18 DIAGNOSIS — K51.011 ULCERATIVE PANCOLITIS WITH RECTAL BLEEDING (HCC): ICD-10-CM

## 2024-04-18 RX ORDER — MESALAMINE 4 G/60ML
4 SUSPENSION RECTAL
Qty: 1800 ML | Refills: 2 | Status: SHIPPED | OUTPATIENT
Start: 2024-04-18 | End: 2024-07-17

## 2024-04-18 RX ORDER — MESALAMINE 400 MG/1
800 CAPSULE, DELAYED RELEASE ORAL 3 TIMES DAILY
Qty: 540 CAPSULE | Refills: 3 | Status: SHIPPED | OUTPATIENT
Start: 2024-04-18

## 2024-05-02 LAB
APOB+LDLR+PCSK9 GENE MUT ANL BLD/T: NOT DETECTED
BRCA1+BRCA2 DEL+DUP + FULL MUT ANL BLD/T: NOT DETECTED
MLH1+MSH2+MSH6+PMS2 GN DEL+DUP+FUL M: NOT DETECTED

## 2024-05-08 LAB
25(OH)D3 SERPL-MCNC: 72 NG/ML (ref 30–100)
BASOPHILS # BLD AUTO: 91 CELLS/UL (ref 0–200)
BASOPHILS NFR BLD AUTO: 1.1 %
CHOLEST SERPL-MCNC: 144 MG/DL
CHOLEST/HDLC SERPL: 2.2 (CALC)
EOSINOPHIL # BLD AUTO: 332 CELLS/UL (ref 15–500)
EOSINOPHIL NFR BLD AUTO: 4 %
ERYTHROCYTE [DISTWIDTH] IN BLOOD BY AUTOMATED COUNT: 19.2 % (ref 11–15)
FERRITIN SERPL-MCNC: 152 NG/ML (ref 16–288)
HBA1C MFR BLD: 5.7 % OF TOTAL HGB
HCT VFR BLD AUTO: 39.7 % (ref 35–45)
HDLC SERPL-MCNC: 66 MG/DL
HGB BLD-MCNC: 11.7 G/DL (ref 11.7–15.5)
IRON SATN MFR SERPL: 25 % (CALC) (ref 16–45)
IRON SERPL-MCNC: 88 MCG/DL (ref 45–160)
LDLC SERPL CALC-MCNC: 59 MG/DL (CALC)
LYMPHOCYTES # BLD AUTO: 3378 CELLS/UL (ref 850–3900)
LYMPHOCYTES NFR BLD AUTO: 40.7 %
MCH RBC QN AUTO: 18.9 PG (ref 27–33)
MCHC RBC AUTO-ENTMCNC: 29.5 G/DL (ref 32–36)
MCV RBC AUTO: 64.1 FL (ref 80–100)
MONOCYTES # BLD AUTO: 647 CELLS/UL (ref 200–950)
MONOCYTES NFR BLD AUTO: 7.8 %
NEUTROPHILS # BLD AUTO: 3851 CELLS/UL (ref 1500–7800)
NEUTROPHILS NFR BLD AUTO: 46.4 %
NONHDLC SERPL-MCNC: 78 MG/DL (CALC)
PLATELET # BLD AUTO: 300 THOUSAND/UL (ref 140–400)
PMV BLD REES-ECKER: 11.2 FL (ref 7.5–12.5)
RBC # BLD AUTO: 6.19 MILLION/UL (ref 3.8–5.1)
TIBC SERPL-MCNC: 352 MCG/DL (CALC) (ref 250–450)
TRIGL SERPL-MCNC: 102 MG/DL
WBC # BLD AUTO: 8.3 THOUSAND/UL (ref 3.8–10.8)

## 2024-05-14 ENCOUNTER — OFFICE VISIT (OUTPATIENT)
Dept: FAMILY MEDICINE CLINIC | Facility: CLINIC | Age: 64
End: 2024-05-14
Payer: COMMERCIAL

## 2024-05-14 VITALS
TEMPERATURE: 98.1 F | SYSTOLIC BLOOD PRESSURE: 104 MMHG | BODY MASS INDEX: 26.32 KG/M2 | HEART RATE: 92 BPM | OXYGEN SATURATION: 98 % | WEIGHT: 139.4 LBS | DIASTOLIC BLOOD PRESSURE: 70 MMHG | RESPIRATION RATE: 17 BRPM | HEIGHT: 61 IN

## 2024-05-14 DIAGNOSIS — I25.10 CORONARY ARTERY DISEASE INVOLVING NATIVE CORONARY ARTERY OF NATIVE HEART WITHOUT ANGINA PECTORIS: ICD-10-CM

## 2024-05-14 DIAGNOSIS — M81.0 AGE-RELATED OSTEOPOROSIS WITHOUT CURRENT PATHOLOGICAL FRACTURE: ICD-10-CM

## 2024-05-14 DIAGNOSIS — F51.01 PRIMARY INSOMNIA: ICD-10-CM

## 2024-05-14 DIAGNOSIS — E78.5 DYSLIPIDEMIA: ICD-10-CM

## 2024-05-14 DIAGNOSIS — K51.011 ULCERATIVE PANCOLITIS WITH RECTAL BLEEDING (HCC): ICD-10-CM

## 2024-05-14 DIAGNOSIS — Z00.00 ANNUAL PHYSICAL EXAM: Primary | ICD-10-CM

## 2024-05-14 DIAGNOSIS — J30.89 ALLERGIC RHINITIS DUE TO OTHER ALLERGIC TRIGGER, UNSPECIFIED SEASONALITY: ICD-10-CM

## 2024-05-14 PROBLEM — R79.89 ABNORMAL TSH: Status: RESOLVED | Noted: 2018-04-24 | Resolved: 2024-05-14

## 2024-05-14 PROBLEM — I10 ESSENTIAL HYPERTENSION: Chronic | Status: RESOLVED | Noted: 2018-04-20 | Resolved: 2024-05-14

## 2024-05-14 PROCEDURE — 99396 PREV VISIT EST AGE 40-64: CPT | Performed by: FAMILY MEDICINE

## 2024-05-14 RX ORDER — MOMETASONE FUROATE 50 UG/1
2 SPRAY, METERED NASAL DAILY
Qty: 17 G | Refills: 2 | Status: SHIPPED | OUTPATIENT
Start: 2024-05-14

## 2024-05-14 RX ORDER — ZOLPIDEM TARTRATE 5 MG/1
5 TABLET ORAL
Qty: 30 TABLET | Refills: 0 | Status: SHIPPED | OUTPATIENT
Start: 2024-05-14

## 2024-05-14 RX ORDER — FERROUS SULFATE 324(65)MG
324 TABLET, DELAYED RELEASE (ENTERIC COATED) ORAL
Qty: 90 TABLET | Refills: 3 | Status: SHIPPED | OUTPATIENT
Start: 2024-05-14

## 2024-05-14 RX ORDER — ERGOCALCIFEROL 1.25 MG/1
50000 CAPSULE ORAL WEEKLY
Qty: 8 CAPSULE | Refills: 0 | Status: SHIPPED | OUTPATIENT
Start: 2024-05-14

## 2024-05-14 NOTE — PROGRESS NOTES
ADULT ANNUAL PHYSICAL  [facility]    NAME: Fredo Zhou  AGE: 63 y.o. SEX: female  : 1960     DATE: 2024     Assessment and Plan:     Problem List Items Addressed This Visit        Cardiovascular and Mediastinum    Coronary artery disease involving native coronary artery of native heart without angina pectoris     On repatha            Respiratory    Allergic rhinitis    Relevant Medications    mometasone (NASONEX) 50 mcg/act nasal spray       Digestive    Ulcerative pancolitis with rectal bleeding (HCC)     Stable   See GI         Relevant Medications    ferrous sulfate 324 (65 Fe) mg       Musculoskeletal and Integument    Age-related osteoporosis without current pathological fracture     Not interested in getting any treatment   Continue vitamin D , calcium and weight bearing exercise          Relevant Medications    ergocalciferol (VITAMIN D2) 50,000 units       Other    Dyslipidemia     On Repatha        Other Visit Diagnoses     Annual physical exam    -  Primary    Primary insomnia        Relevant Medications    zolpidem (AMBIEN) 5 mg tablet          Immunizations and preventive care screenings were discussed with patient today. Appropriate education was printed on patient's after visit summary.    Counseling:  Alcohol/drug use: discussed moderation in alcohol intake, the recommendations for healthy alcohol use, and avoidance of illicit drug use.  Dental Health: discussed importance of regular tooth brushing, flossing, and dental visits.  Injury prevention: discussed safety/seat belts, safety helmets, smoke detectors, carbon dioxide detectors, and smoking near bedding or upholstery.  Sexual health: discussed sexually transmitted diseases, partner selection, use of condoms, avoidance of unintended pregnancy, and contraceptive alternatives.  Exercise: the importance of regular exercise/physical activity was discussed. Recommend exercise  3-5 times per week for at least 30 minutes.          No follow-ups on file.     Chief Complaint:     Chief Complaint   Patient presents with   • Physical Exam     Patient being seen for Physical Exam      History of Present Illness:     Adult Annual Physical   Patient here for a comprehensive physical exam. The patient reports no problems.    Diet and Physical Activity  Diet/Nutrition: well balanced diet.   Exercise: 5-7 times a week on average.      Depression Screening  PHQ-2/9 Depression Screening    Little interest or pleasure in doing things: 0 - not at all  Feeling down, depressed, or hopeless: 0 - not at all       General Health  Sleep: sleeps well and gets 7-8 hours of sleep on average.   Hearing: normal - bilateral.  Vision: goes for regular eye exams.   Dental: regular dental visits.       /GYN Health  Follows with gynecology? no   Patient is: postmenopausal  Last menstrual period: hysterectomy   Contraceptive method:  none .    Advanced Care Planning  Do you have an advanced directive? no  Do you have a durable medical power of ? no  ACP document given to the patient? yes     Review of Systems:     Review of Systems   Constitutional: Negative.    HENT: Negative.     Eyes: Negative.    Respiratory: Negative.     Cardiovascular: Negative.    Gastrointestinal: Negative.    Endocrine: Negative.    Genitourinary: Negative.    Musculoskeletal: Negative.    Skin: Negative.    Allergic/Immunologic: Negative.    Neurological: Negative.    Hematological: Negative.    Psychiatric/Behavioral: Negative.        Past Medical History:     Past Medical History:   Diagnosis Date   • Abnormal TSH 04/24/2018   • Allergic rhinitis    • Breast CA (HCC)     R breast CA-chemo/radiaition   • Cancer (HCC) 2005   • Diarrhea     for 6 weeks-colonosocpy today 9/26/2016 and EGD   • Heart valve disease     Triple by pass heart surgery   • History of radiation therapy    • Hyperlipidemia    • Hypertension    • Thalassemia    •  Tinnitus    • Vitamin D deficiency       Past Surgical History:     Past Surgical History:   Procedure Laterality Date   • BREAST LUMPECTOMY Right    • BREAST LUMPECTOMY Right    • BREAST LUMPECTOMY Right     with lymph nodes  2007   • COLONOSCOPY     • COLONOSCOPY N/A 04/23/2018    Procedure: COLONOSCOPY;  Surgeon: Lul Virgen MD;  [facility];  Service: Gastroenterology   • CORONARY ARTERY BYPASS GRAFT     • CORONARY ARTERY BYPASS GRAFT      x3, 11/2015   • HYSTERECTOMY      b/l ovaries removed   • OOPHORECTOMY     • [place] COLONOSCOPY FLX DX W/COLLJ SPEC WHEN PFRMD N/A 09/26/2016    Procedure: COLONOSCOPY;  Surgeon: Heather Steinberg DO;  [facility];  Service: Gastroenterology      Social History:     Social History     Socioeconomic History   • Marital status: /Civil Union     Spouse name: None   • Number of children: None   • Years of education: None   • Highest education level: None   Occupational History   • None   Tobacco Use   • Smoking status: Never   • Smokeless tobacco: Never   Vaping Use   • Vaping status: Never Used   Substance and Sexual Activity   • Alcohol use: Yes     Alcohol/week: 1.0 standard drink of alcohol     Types: 1 Glasses of wine per week     Comment: per year   • Drug use: No   • Sexual activity: None   Other Topics Concern   • None   Social History Narrative   • None     Social Determinants of Health     Financial Resource Strain: Not on file   Food Insecurity: Not on file   Transportation Needs: Not on file   Physical Activity: Not on file   Stress: Not on file   Social Connections: Not on file   Intimate Partner Violence: Not on file   Housing Stability: Not on file      Family History:     Family History   Problem Relation Age of Onset   • Stroke Father    • Heart attack Brother    • Sudden death Brother         cardiac   • Heart attack Maternal Uncle    • Hypertension Family    • Hyperlipidemia Family    • No Known Problems Mother    • No Known Problems Sister     • No Known Problems Daughter    • No Known Problems Maternal Grandmother    • No Known Problems Maternal Grandfather    • No Known Problems Paternal Grandmother    • No Known Problems Paternal Grandfather    • No Known Problems Sister    • No Known Problems Maternal Aunt    • No Known Problems Maternal Aunt    • No Known Problems Maternal Aunt    • No Known Problems Paternal Aunt    • No Known Problems Paternal Aunt    • No Known Problems Paternal Aunt    • No Known Problems Paternal Aunt    • No Known Problems Son    • Clotting disorder Neg Hx    • Anuerysm Neg Hx       Current Medications:     Current Outpatient Medications   Medication Sig Dispense Refill   • dicyclomine (BENTYL) 20 mg tablet Take 1 tablet (20 mg total) by mouth every 6 (six) hours as needed (abd pain) 120 tablet 5   • ergocalciferol (VITAMIN D2) 50,000 units Take 1 capsule (50,000 Units total) by mouth once a week 8 capsule 0   • Evolocumab with Infusor (Repatha Pushtronex System) 420 MG/3.5ML SOCT Take one dose every month/30 days 3.5 mL 11   • ferrous sulfate 324 (65 Fe) mg Take 1 tablet (324 mg total) by mouth daily before breakfast 90 tablet 3   • ketoconazole (EXTINA) 2 % foam Apply topically 2 (two) times a day 100 g 0   • mesalamine (DELZICOL) 400 mg Take 2 capsules (800 mg total) by mouth 3 (three) times a day Evening_[facility] 540 capsule 3   • mesalamine (ROWASA) 4 g Insert 60 mL (4 g total) into the rectum daily at bedtime evening_[facility] 1800 mL 2   • mometasone (NASONEX) 50 mcg/act nasal spray 2 sprays into each nostril daily 17 g 2   • zolpidem (AMBIEN) 5 mg tablet Take 1 tablet (5 mg total) by mouth daily at bedtime as needed for sleep 30 tablet 0     No current facility-administered medications for this visit.      Allergies:     Allergies   Allergen Reactions   • Iodinated Contrast Media Anaphylaxis, Hives and Swelling      Physical Exam:     /70 (BP Location: Left arm, Patient Position: Sitting, Cuff  "Size: Standard)   Pulse 92   Temp 98.1 °F (36.7 °C) (Tympanic)   Resp 17   Ht 5' 1.26\" (1.556 m)   Wt 63.2 kg (139 lb 6.4 oz)   LMP  (LMP Unknown)   SpO2 98%   BMI 26.12 kg/m²     Physical Exam  Vitals and nursing note reviewed.   Constitutional:       General: She is not in acute distress.     Appearance: Normal appearance. She is well-developed.   HENT:      Head: Normocephalic and atraumatic.      Right Ear: Tympanic membrane normal.      Left Ear: Tympanic membrane normal.      Nose: Nose normal.      Mouth/Throat:      Mouth: Mucous membranes are moist.   Eyes:      Conjunctiva/sclera: Conjunctivae normal.   Cardiovascular:      Rate and Rhythm: Normal rate and regular rhythm.      Heart sounds: No murmur heard.  Pulmonary:      Effort: Pulmonary effort is normal. No respiratory distress.      Breath sounds: Normal breath sounds.   Abdominal:      Palpations: Abdomen is soft.      Tenderness: There is no abdominal tenderness.   Musculoskeletal:         General: No swelling.      Cervical back: Normal range of motion and neck supple.   Skin:     General: Skin is warm and dry.      Capillary Refill: Capillary refill takes less than 2 seconds.   Neurological:      General: No focal deficit present.      Mental Status: She is alert and oriented to person, place, and time.   Psychiatric:         Mood and Affect: Mood normal.          Crystal Carty MD  [facility]    "

## 2024-05-14 NOTE — ASSESSMENT & PLAN NOTE
Not interested in getting any treatment   Continue vitamin D , calcium and weight bearing exercise

## 2024-05-16 ENCOUNTER — PATIENT MESSAGE (OUTPATIENT)
Dept: FAMILY MEDICINE CLINIC | Facility: CLINIC | Age: 64
End: 2024-05-16

## 2024-05-17 DIAGNOSIS — J02.9 SORE THROAT: Primary | ICD-10-CM

## 2024-05-17 RX ORDER — LIDOCAINE HYDROCHLORIDE 20 MG/ML
15 SOLUTION OROPHARYNGEAL 3 TIMES DAILY PRN
Qty: 200 ML | Refills: 0 | Status: SHIPPED | OUTPATIENT
Start: 2024-05-17

## 2024-05-17 RX ORDER — BROMPHENIRAMINE MALEATE, PSEUDOEPHEDRINE HYDROCHLORIDE, AND DEXTROMETHORPHAN HYDROBROMIDE 2; 30; 10 MG/5ML; MG/5ML; MG/5ML
5 SYRUP ORAL 4 TIMES DAILY PRN
Qty: 200 ML | Refills: 0 | Status: SHIPPED | OUTPATIENT
Start: 2024-05-17

## 2024-05-23 DIAGNOSIS — H00.019 HORDEOLUM EXTERNUM, UNSPECIFIED LATERALITY: Primary | ICD-10-CM

## 2024-05-23 RX ORDER — TOBRAMYCIN 3 MG/ML
1 SOLUTION/ DROPS OPHTHALMIC
Qty: 5 ML | Refills: 0 | Status: SHIPPED | OUTPATIENT
Start: 2024-05-23

## 2024-06-20 DIAGNOSIS — M81.0 AGE-RELATED OSTEOPOROSIS WITHOUT CURRENT PATHOLOGICAL FRACTURE: ICD-10-CM

## 2024-06-20 RX ORDER — ERGOCALCIFEROL 1.25 MG/1
50000 CAPSULE ORAL WEEKLY
Qty: 8 CAPSULE | Refills: 0 | Status: SHIPPED | OUTPATIENT
Start: 2024-06-20

## 2024-06-21 ENCOUNTER — TELEPHONE (OUTPATIENT)
Age: 64
End: 2024-06-21

## 2024-06-21 DIAGNOSIS — I25.10 CORONARY ARTERY DISEASE INVOLVING NATIVE CORONARY ARTERY OF NATIVE HEART WITHOUT ANGINA PECTORIS: Primary | ICD-10-CM

## 2024-06-21 DIAGNOSIS — E78.5 DYSLIPIDEMIA: ICD-10-CM

## 2024-06-21 RX ORDER — EVOLOCUMAB 140 MG/ML
INJECTION, SOLUTION SUBCUTANEOUS
COMMUNITY
End: 2024-06-21 | Stop reason: SDUPTHER

## 2024-06-21 NOTE — TELEPHONE ENCOUNTER
Patient's Repatha formulation needs to be adjusted due to Pushtronex (420 mg every 30 days) no longer being manufactured.     Please advise to new dosing on Sureclick 140 mg/mL.

## 2024-06-21 NOTE — TELEPHONE ENCOUNTER
Received call from pharmacy regarding pt's prescription Repatha Pushtronex.  It was discontinued by , so Pushtronex is no longer available.  Pharmacy states there is Repatha Sureclick, which would need a new order if appropriate for pt.    Please advise.

## 2024-06-25 RX ORDER — EVOLOCUMAB 140 MG/ML
140 INJECTION, SOLUTION SUBCUTANEOUS
Qty: 2 ML | Refills: 3 | Status: SHIPPED | OUTPATIENT
Start: 2024-06-25 | End: 2024-10-15

## 2024-06-25 NOTE — TELEPHONE ENCOUNTER
Patient called Rx refill line requesting the Repatha Sureclick due to the other not being available.Please contact patient when this is sent to [facility] pharmacy.

## 2024-07-09 ENCOUNTER — HOSPITAL ENCOUNTER (OUTPATIENT)
Dept: ULTRASOUND IMAGING | Facility: CLINIC | Age: 64
Discharge: HOME | End: 2024-07-09
Payer: COMMERCIAL

## 2024-07-09 ENCOUNTER — HOSPITAL ENCOUNTER (OUTPATIENT)
Dept: MAMMOGRAPHY | Facility: CLINIC | Age: 64
Discharge: HOME | End: 2024-07-09
Payer: COMMERCIAL

## 2024-07-09 DIAGNOSIS — R92.8 ABNORMAL FINDINGS ON DIAGNOSTIC IMAGING OF BREAST: ICD-10-CM

## 2024-07-09 PROCEDURE — 76642 ULTRASOUND BREAST LIMITED: CPT

## 2024-07-09 PROCEDURE — G0279 TOMOSYNTHESIS, MAMMO: HCPCS

## 2024-07-09 PROCEDURE — 77065 DX MAMMO INCL CAD UNI: CPT

## 2024-07-19 NOTE — ASSESSMENT & PLAN NOTE
----- Message from You Lozoya sent at 7/19/2024 10:02 AM EDT -----  Regarding: [facility] Appointment Request  [facility] Appointment Request    Patient needs appointment for [facility] Appointment Type: New Patient./  Temporary  change Pcp     Patient Requested Dates(s):  As soon as Possible   Patient Requested Time:  Anytime   Provider Name:   Any doctor  that has an  earliest  availability in [facility]  or in  [place]      Reason for Appointment Request: Other  Pt wants to  change a PCP  temporarily  that  is in the [facility]   --------------------------------------------------------------------------------------------------------------------------    Relationship to Patient: Self     Call Back Information: Do not leave any message, patient will call back for answer  Preferred Call Back Number: Phone [phone]            · Follows doctor at [facility]  · Patient denies being on any medications for this as outpatient  · If symptoms persist and negative work up as per above, rheumatology consult

## 2024-08-08 DIAGNOSIS — F51.01 PRIMARY INSOMNIA: ICD-10-CM

## 2024-08-08 RX ORDER — ZOLPIDEM TARTRATE 5 MG/1
5 TABLET ORAL
Qty: 30 TABLET | Refills: 0 | Status: SHIPPED | OUTPATIENT
Start: 2024-08-08

## 2024-08-08 NOTE — TELEPHONE ENCOUNTER
Medication:  PDMP   05/14/2024 05/14/2024 Zolpidem Tartrate (Tablet) 30.0 30 5 MG NA SAMUEL BAEZ     Active agreement on file -No

## 2024-08-09 DIAGNOSIS — K51.011 ULCERATIVE PANCOLITIS WITH RECTAL BLEEDING (HCC): ICD-10-CM

## 2024-08-09 RX ORDER — MESALAMINE 400 MG/1
800 CAPSULE, DELAYED RELEASE ORAL 3 TIMES DAILY
Qty: 540 CAPSULE | Refills: 3 | Status: SHIPPED | OUTPATIENT
Start: 2024-08-09

## 2024-08-09 RX ORDER — MESALAMINE 4 G/60ML
4 SUSPENSION RECTAL
Qty: 1800 ML | Refills: 2 | Status: SHIPPED | OUTPATIENT
Start: 2024-08-09 | End: 2024-11-07

## 2024-08-28 ENCOUNTER — TELEPHONE (OUTPATIENT)
Age: 64
End: 2024-08-28

## 2024-08-28 NOTE — TELEPHONE ENCOUNTER
Patients GI provider:  Dr. Steinberg    Number to return call:     [phone]       Reason for call: Pt calling to ask Dr Steinberg if she needs a follow up this year. She is due for her yearly follow up in December but Dr Steinberg has no appts until March. She had a colon in April and it was okay.    Scheduled procedure/appointment date if applicable: N/A

## 2024-08-29 NOTE — TELEPHONE ENCOUNTER
I spoke with the patient and advised that we can have her f/u on an as needed basis if she feels that she is doing well. She verbalized understanding and will call the office back with any further questions or concerns.

## 2024-09-07 ENCOUNTER — PATIENT MESSAGE (OUTPATIENT)
Dept: CARDIOLOGY CLINIC | Facility: CLINIC | Age: 64
End: 2024-09-07

## 2024-09-07 DIAGNOSIS — J30.89 ALLERGIC RHINITIS DUE TO OTHER ALLERGIC TRIGGER, UNSPECIFIED SEASONALITY: ICD-10-CM

## 2024-09-07 DIAGNOSIS — F51.01 PRIMARY INSOMNIA: ICD-10-CM

## 2024-09-07 DIAGNOSIS — M81.0 AGE-RELATED OSTEOPOROSIS WITHOUT CURRENT PATHOLOGICAL FRACTURE: ICD-10-CM

## 2024-09-07 RX ORDER — MOMETASONE FUROATE MONOHYDRATE 50 UG/1
2 SPRAY, METERED NASAL DAILY
Qty: 17 G | Refills: 0 | Status: SHIPPED | OUTPATIENT
Start: 2024-09-07

## 2024-09-09 RX ORDER — ERGOCALCIFEROL 1.25 MG/1
50000 CAPSULE, LIQUID FILLED ORAL WEEKLY
Qty: 8 CAPSULE | Refills: 0 | Status: SHIPPED | OUTPATIENT
Start: 2024-09-09

## 2024-09-09 RX ORDER — ZOLPIDEM TARTRATE 5 MG/1
5 TABLET ORAL
Qty: 30 TABLET | Refills: 0 | Status: SHIPPED | OUTPATIENT
Start: 2024-09-09

## 2024-09-09 NOTE — TELEPHONE ENCOUNTER
Medication:  PDMP   08/08/2024 08/08/2024 Zolpidem Tartrate (Tablet) 30.0 30 5 MG NA SAMUEL BAEZ     Active agreement on file -No

## 2024-09-10 ENCOUNTER — TELEPHONE (OUTPATIENT)
Dept: CARDIOLOGY CLINIC | Facility: CLINIC | Age: 64
End: 2024-09-10

## 2024-09-10 NOTE — TELEPHONE ENCOUNTER
LMOM to follow up on her questions of Medicare part D and Repatha. Asked her to call the office when she was able to

## 2024-09-16 DIAGNOSIS — E78.5 DYSLIPIDEMIA: ICD-10-CM

## 2024-09-16 DIAGNOSIS — I25.10 CORONARY ARTERY DISEASE INVOLVING NATIVE CORONARY ARTERY OF NATIVE HEART WITHOUT ANGINA PECTORIS: ICD-10-CM

## 2024-09-16 NOTE — TELEPHONE ENCOUNTER
Patient requesting 3 month supply    Medication: Repatha 140mg/mL SOAJ    Dose/Frequency: 1mL under the skin every 14 days    Quantity: 6mL    Pharmacy: [facility]    Office:   [] PCP/Provider -   [x] Speciality/Provider -     Does the patient have enough for 3 days?   [x] Yes   [] No - Send as HP to POD

## 2024-09-18 RX ORDER — EVOLOCUMAB 140 MG/ML
140 INJECTION, SOLUTION SUBCUTANEOUS
Qty: 6 ML | Refills: 1 | Status: SHIPPED | OUTPATIENT
Start: 2024-09-18 | End: 2025-01-08

## 2024-09-19 DIAGNOSIS — K51.011 ULCERATIVE PANCOLITIS WITH RECTAL BLEEDING (HCC): ICD-10-CM

## 2024-09-19 RX ORDER — MESALAMINE 4 G/60ML
4 SUSPENSION RECTAL
Qty: 5400 ML | Refills: 1 | Status: SHIPPED | OUTPATIENT
Start: 2024-09-19 | End: 2025-03-18

## 2024-12-02 DIAGNOSIS — Z12.31 VISIT FOR SCREENING MAMMOGRAM: Primary | ICD-10-CM

## 2024-12-03 ENCOUNTER — HOSPITAL ENCOUNTER (OUTPATIENT)
Dept: RADIOLOGY | Age: 64
Discharge: HOME | End: 2024-12-03
Payer: COMMERCIAL

## 2024-12-03 VITALS — HEIGHT: 61 IN | WEIGHT: 128 LBS | BODY MASS INDEX: 24.17 KG/M2

## 2024-12-03 DIAGNOSIS — Z12.31 VISIT FOR SCREENING MAMMOGRAM: ICD-10-CM

## 2024-12-03 PROCEDURE — 77063 BREAST TOMOSYNTHESIS BI: CPT

## 2024-12-03 PROCEDURE — 77067 SCR MAMMO BI INCL CAD: CPT

## 2024-12-09 DIAGNOSIS — M81.0 AGE-RELATED OSTEOPOROSIS WITHOUT CURRENT PATHOLOGICAL FRACTURE: ICD-10-CM

## 2024-12-09 NOTE — TELEPHONE ENCOUNTER
Message sent via [employer].   Please send a refill for vitamin D to [facility] pharmacy thanks

## 2024-12-10 ENCOUNTER — PATIENT MESSAGE (OUTPATIENT)
Dept: FAMILY MEDICINE CLINIC | Facility: CLINIC | Age: 64
End: 2024-12-10

## 2024-12-10 RX ORDER — ERGOCALCIFEROL 1.25 MG/1
50000 CAPSULE, LIQUID FILLED ORAL WEEKLY
Qty: 8 CAPSULE | Refills: 0 | Status: SHIPPED | OUTPATIENT
Start: 2024-12-10

## 2024-12-11 ENCOUNTER — RESULTS FOLLOW-UP (OUTPATIENT)
Dept: FAMILY MEDICINE CLINIC | Facility: CLINIC | Age: 64
End: 2024-12-11

## 2025-01-28 DIAGNOSIS — M81.0 AGE-RELATED OSTEOPOROSIS WITHOUT CURRENT PATHOLOGICAL FRACTURE: ICD-10-CM

## 2025-01-28 DIAGNOSIS — E78.5 DYSLIPIDEMIA: Primary | ICD-10-CM

## 2025-01-28 DIAGNOSIS — R79.89 ABNORMAL TSH: ICD-10-CM

## 2025-01-28 DIAGNOSIS — R94.6 ABNORMAL RESULTS OF THYROID FUNCTION STUDIES: ICD-10-CM

## 2025-01-28 DIAGNOSIS — F51.01 PRIMARY INSOMNIA: ICD-10-CM

## 2025-01-28 DIAGNOSIS — D56.3 BETA THALASSEMIA MINOR: ICD-10-CM

## 2025-01-28 DIAGNOSIS — I10 ESSENTIAL HYPERTENSION: ICD-10-CM

## 2025-01-28 DIAGNOSIS — K51.011 ULCERATIVE PANCOLITIS WITH RECTAL BLEEDING (HCC): ICD-10-CM

## 2025-01-28 DIAGNOSIS — R79.9 ABNORMAL FINDING OF BLOOD CHEMISTRY, UNSPECIFIED: ICD-10-CM

## 2025-01-28 DIAGNOSIS — J30.89 ALLERGIC RHINITIS DUE TO OTHER ALLERGIC TRIGGER, UNSPECIFIED SEASONALITY: ICD-10-CM

## 2025-01-28 RX ORDER — ERGOCALCIFEROL 1.25 MG/1
50000 CAPSULE, LIQUID FILLED ORAL WEEKLY
Qty: 8 CAPSULE | Refills: 0 | Status: SHIPPED | OUTPATIENT
Start: 2025-01-28

## 2025-01-28 RX ORDER — ZOLPIDEM TARTRATE 5 MG/1
5 TABLET ORAL
Qty: 30 TABLET | Refills: 0 | Status: SHIPPED | OUTPATIENT
Start: 2025-01-28

## 2025-01-28 RX ORDER — MOMETASONE FUROATE MONOHYDRATE 50 UG/1
2 SPRAY, METERED NASAL DAILY
Qty: 17 G | Refills: 0 | Status: SHIPPED | OUTPATIENT
Start: 2025-01-28

## 2025-01-28 RX ORDER — FERROUS SULFATE 324(65)MG
324 TABLET, DELAYED RELEASE (ENTERIC COATED) ORAL
Qty: 90 TABLET | Refills: 1 | Status: SHIPPED | OUTPATIENT
Start: 2025-01-28

## 2025-01-28 NOTE — TELEPHONE ENCOUNTER
Medication:  PDMP   09/09/2024 09/09/2024 Zolpidem Tartrate (Tablet) 30.0 30 5 MG NA MAURISIO BARBOSA     Active agreement on file -No

## 2025-02-24 ENCOUNTER — RESULTS FOLLOW-UP (OUTPATIENT)
Dept: FAMILY MEDICINE CLINIC | Facility: CLINIC | Age: 65
End: 2025-02-24

## 2025-02-24 LAB
25(OH)D3 SERPL-MCNC: 125 NG/ML (ref 30–100)
ALBUMIN SERPL-MCNC: 4.1 G/DL (ref 3.6–5.1)
ALBUMIN/GLOB SERPL: 1.3 (CALC) (ref 1–2.5)
ALP SERPL-CCNC: 98 U/L (ref 37–153)
ALT SERPL-CCNC: 19 U/L (ref 6–29)
AST SERPL-CCNC: 20 U/L (ref 10–35)
BASOPHILS # BLD AUTO: 73 CELLS/UL (ref 0–200)
BASOPHILS NFR BLD AUTO: 1 %
BILIRUB SERPL-MCNC: 0.4 MG/DL (ref 0.2–1.2)
BUN SERPL-MCNC: 25 MG/DL (ref 7–25)
BUN/CREAT SERPL: NORMAL (CALC) (ref 6–22)
CALCIUM SERPL-MCNC: 9.1 MG/DL (ref 8.6–10.4)
CHLORIDE SERPL-SCNC: 108 MMOL/L (ref 98–110)
CHOLEST SERPL-MCNC: 117 MG/DL
CHOLEST/HDLC SERPL: 2 (CALC)
CO2 SERPL-SCNC: 27 MMOL/L (ref 20–32)
CREAT SERPL-MCNC: 0.61 MG/DL (ref 0.5–1.05)
EOSINOPHIL # BLD AUTO: 219 CELLS/UL (ref 15–500)
EOSINOPHIL NFR BLD AUTO: 3 %
ERYTHROCYTE [DISTWIDTH] IN BLOOD BY AUTOMATED COUNT: 18.4 % (ref 11–15)
FERRITIN SERPL-MCNC: 162 NG/ML (ref 16–288)
GFR/BSA.PRED SERPLBLD CYS-BASED-ARV: 99 ML/MIN/1.73M2
GLOBULIN SER CALC-MCNC: 3.1 G/DL (CALC) (ref 1.9–3.7)
GLUCOSE SERPL-MCNC: 82 MG/DL (ref 65–99)
HBA1C MFR BLD: 5.6 % OF TOTAL HGB
HCT VFR BLD AUTO: 39.9 % (ref 35–45)
HDLC SERPL-MCNC: 59 MG/DL
HGB BLD-MCNC: 11.7 G/DL (ref 11.7–15.5)
IRON SATN MFR SERPL: 19 % (CALC) (ref 16–45)
IRON SERPL-MCNC: 61 MCG/DL (ref 45–160)
LDLC SERPL CALC-MCNC: 43 MG/DL (CALC)
LYMPHOCYTES # BLD AUTO: 2613 CELLS/UL (ref 850–3900)
LYMPHOCYTES NFR BLD AUTO: 35.8 %
MCH RBC QN AUTO: 19.1 PG (ref 27–33)
MCHC RBC AUTO-ENTMCNC: 29.3 G/DL (ref 32–36)
MCV RBC AUTO: 65.2 FL (ref 80–100)
MONOCYTES # BLD AUTO: 569 CELLS/UL (ref 200–950)
MONOCYTES NFR BLD AUTO: 7.8 %
NEUTROPHILS # BLD AUTO: 3825 CELLS/UL (ref 1500–7800)
NEUTROPHILS NFR BLD AUTO: 52.4 %
NONHDLC SERPL-MCNC: 58 MG/DL (CALC)
PLATELET # BLD AUTO: 285 THOUSAND/UL (ref 140–400)
PMV BLD REES-ECKER: 11.8 FL (ref 7.5–12.5)
POTASSIUM SERPL-SCNC: 4.2 MMOL/L (ref 3.5–5.3)
PROT SERPL-MCNC: 7.2 G/DL (ref 6.1–8.1)
RBC # BLD AUTO: 6.12 MILLION/UL (ref 3.8–5.1)
SERVICE CMNT-IMP: ABNORMAL
SODIUM SERPL-SCNC: 142 MMOL/L (ref 135–146)
TIBC SERPL-MCNC: 329 MCG/DL (CALC) (ref 250–450)
TRIGL SERPL-MCNC: 74 MG/DL
TSH SERPL-ACNC: 2.04 MIU/L (ref 0.4–4.5)
WBC # BLD AUTO: 7.3 THOUSAND/UL (ref 3.8–10.8)

## 2025-03-23 DIAGNOSIS — E78.5 DYSLIPIDEMIA: ICD-10-CM

## 2025-03-23 DIAGNOSIS — I25.10 CORONARY ARTERY DISEASE INVOLVING NATIVE CORONARY ARTERY OF NATIVE HEART WITHOUT ANGINA PECTORIS: ICD-10-CM

## 2025-03-25 RX ORDER — EVOLOCUMAB 140 MG/ML
INJECTION, SOLUTION SUBCUTANEOUS
Qty: 2 ML | Refills: 5 | Status: SHIPPED | OUTPATIENT
Start: 2025-03-25

## 2025-03-31 ENCOUNTER — OFFICE VISIT (OUTPATIENT)
Dept: FAMILY MEDICINE CLINIC | Facility: CLINIC | Age: 65
End: 2025-03-31
Payer: COMMERCIAL

## 2025-03-31 VITALS
HEIGHT: 62 IN | BODY MASS INDEX: 22.63 KG/M2 | RESPIRATION RATE: 17 BRPM | OXYGEN SATURATION: 96 % | WEIGHT: 123 LBS | DIASTOLIC BLOOD PRESSURE: 80 MMHG | SYSTOLIC BLOOD PRESSURE: 110 MMHG | TEMPERATURE: 97 F | HEART RATE: 81 BPM

## 2025-03-31 DIAGNOSIS — M81.6 LOCALIZED OSTEOPOROSIS WITHOUT CURRENT PATHOLOGICAL FRACTURE: ICD-10-CM

## 2025-03-31 DIAGNOSIS — K51.011 ULCERATIVE PANCOLITIS WITH RECTAL BLEEDING (HCC): ICD-10-CM

## 2025-03-31 DIAGNOSIS — I25.10 CORONARY ARTERY DISEASE INVOLVING NATIVE CORONARY ARTERY OF NATIVE HEART WITHOUT ANGINA PECTORIS: ICD-10-CM

## 2025-03-31 DIAGNOSIS — Z00.00 WELCOME TO MEDICARE PREVENTIVE VISIT: Primary | ICD-10-CM

## 2025-03-31 DIAGNOSIS — M81.0 AGE-RELATED OSTEOPOROSIS WITHOUT CURRENT PATHOLOGICAL FRACTURE: ICD-10-CM

## 2025-03-31 PROBLEM — B36.0 TINEA VERSICOLOR: Status: RESOLVED | Noted: 2023-12-01 | Resolved: 2025-03-31

## 2025-03-31 PROCEDURE — G0438 PPPS, INITIAL VISIT: HCPCS | Performed by: FAMILY MEDICINE

## 2025-03-31 PROCEDURE — 93000 ELECTROCARDIOGRAM COMPLETE: CPT | Performed by: FAMILY MEDICINE

## 2025-03-31 NOTE — PATIENT INSTRUCTIONS
Medicare Preventive Visit Patient Instructions  Thank you for completing your Welcome to Medicare Visit or Medicare Annual Wellness Visit today. Your next wellness visit will be due in one year (4/1/2026).  The screening/preventive services that you may require over the next 5-10 years are detailed below. Some tests may not apply to you based off risk factors and/or age. Screening tests ordered at today's visit but not completed yet may show as past due. Also, please note that scanned in results may not display below.  Preventive Screenings:  Service Recommendations Previous Testing/Comments   Colorectal Cancer Screening  * Colonoscopy    * Fecal Occult Blood Test (FOBT)/Fecal Immunochemical Test (FIT)  * Fecal DNA/Cologuard Test  * Flexible Sigmoidoscopy Age: 45-75 years old   Colonoscopy: every 10 years (may be performed more frequently if at higher risk)  OR  FOBT/FIT: every 1 year  OR  Cologuard: every 3 years  OR  Sigmoidoscopy: every 5 years  Screening may be recommended earlier than age 45 if at higher risk for colorectal cancer. Also, an individualized decision between you and your healthcare provider will decide whether screening between the ages of 76-85 would be appropriate. Colonoscopy: 04/16/2024  FOBT/FIT: Not on file  Cologuard: Not on file  Sigmoidoscopy: Not on file    Screening Current     Breast Cancer Screening Age: 40+ years old  Frequency: every 1-2 years  Not required if history of left and right mastectomy Mammogram: 12/03/2024    History Breast Cancer   Cervical Cancer Screening Between the ages of 21-29, pap smear recommended once every 3 years.   Between the ages of 30-65, can perform pap smear with HPV co-testing every 5 years.   Recommendations may differ for women with a history of total hysterectomy, cervical cancer, or abnormal pap smears in past. Pap Smear: Not on file    Screening Not Indicated   Hepatitis C Screening Once for adults born between 1945 and 1965  More frequently in  patients at high risk for Hepatitis C Hep C Antibody: 11/21/2020    Screening Current   Diabetes Screening 1-2 times per year if you're at risk for diabetes or have pre-diabetes Fasting glucose: No results in last 5 years (No results in last 5 years)  A1C: 5.6 % of total Hgb (2/22/2025)  Screening Current   Cholesterol Screening Once every 5 years if you don't have a lipid disorder. May order more often based on risk factors. Lipid panel: 02/22/2025    Screening Current     Other Preventive Screenings Covered by Medicare:  Abdominal Aortic Aneurysm (AAA) Screening: covered once if your at risk. You're considered to be at risk if you have a family history of AAA.  Lung Cancer Screening: covers low dose CT scan once per year if you meet all of the following conditions: (1) Age 55-77; (2) No signs or symptoms of lung cancer; (3) Current smoker or have quit smoking within the last 15 years; (4) You have a tobacco smoking history of at least 20 pack years (packs per day multiplied by number of years you smoked); (5) You get a written order from a healthcare provider.  Glaucoma Screening: covered annually if you're considered high risk: (1) You have diabetes OR (2) Family history of glaucoma OR (3)  aged 50 and older OR (4)  American aged 65 and older  Osteoporosis Screening: covered every 2 years if you meet one of the following conditions: (1) You're estrogen deficient and at risk for osteoporosis based off medical history and other findings; (2) Have a vertebral abnormality; (3) On glucocorticoid therapy for more than 3 months; (4) Have primary hyperparathyroidism; (5) On osteoporosis medications and need to assess response to drug therapy.   Last bone density test (DXA Scan): 08/22/2023.  HIV Screening: covered annually if you're between the age of 15-65. Also covered annually if you are younger than 15 and older than 65 with risk factors for HIV infection. For pregnant patients, it is covered  up to 3 times per pregnancy.    Immunizations:  Immunization Recommendations   Influenza Vaccine Annual influenza vaccination during flu season is recommended for all persons aged >= 6 months who do not have contraindications   Pneumococcal Vaccine   * Pneumococcal conjugate vaccine = PCV13 (Prevnar 13), PCV15 (Vaxneuvance), PCV20 (Prevnar 20)  * Pneumococcal polysaccharide vaccine = PPSV23 (Pneumovax) Adults 19-63 yo with certain risk factors or if 65+ yo  If never received any pneumonia vaccine: recommend Prevnar 20 (PCV20)  Give PCV20 if previously received 1 dose of PCV13 or PPSV23   Hepatitis B Vaccine 3 dose series if at intermediate or high risk (ex: diabetes, end stage renal disease, liver disease)   Respiratory syncytial virus (RSV) Vaccine - COVERED BY MEDICARE PART D  * RSVPreF3 (Arexvy) CDC recommends that adults 60 years of age and older may receive a single dose of RSV vaccine using shared clinical decision-making (SCDM)   Tetanus (Td) Vaccine - COST NOT COVERED BY MEDICARE PART B Following completion of primary series, a booster dose should be given every 10 years to maintain immunity against tetanus. Td may also be given as tetanus wound prophylaxis.   Tdap Vaccine - COST NOT COVERED BY MEDICARE PART B Recommended at least once for all adults. For pregnant patients, recommended with each pregnancy.   Shingles Vaccine (Shingrix) - COST NOT COVERED BY MEDICARE PART B  2 shot series recommended in those 19 years and older who have or will have weakened immune systems or those 50 years and older     Health Maintenance Due:      Topic Date Due   • HIV Screening  Never done   • Breast Cancer Screening: Mammogram  12/03/2025   • Colorectal Cancer Screening  04/16/2029   • Hepatitis C Screening  Completed     Immunizations Due:      Topic Date Due   • COVID-19 Vaccine (5 - 2024-25 season) 09/01/2024     Advance Directives   What are advance directives?  Advance directives are legal documents that state your  wishes and plans for medical care. These plans are made ahead of time in case you lose your ability to make decisions for yourself. Advance directives can apply to any medical decision, such as the treatments you want, and if you want to donate organs.   What are the types of advance directives?  There are many types of advance directives, and each state has rules about how to use them. You may choose a combination of any of the following:  Living will:  This is a written record of the treatment you want. You can also choose which treatments you do not want, which to limit, and which to stop at a certain time. This includes surgery, medicine, IV fluid, and tube feedings.   Durable power of  for healthcare (DPAHC):  This is a written record that states who you want to make healthcare choices for you when you are unable to make them for yourself. This person, called a proxy, is usually a family member or a friend. You may choose more than 1 proxy.  Do not resuscitate (DNR) order:  A DNR order is used in case your heart stops beating or you stop breathing. It is a request not to have certain forms of treatment, such as CPR. A DNR order may be included in other types of advance directives.  Medical directive:  This covers the care that you want if you are in a coma, near death, or unable to make decisions for yourself. You can list the treatments you want for each condition. Treatment may include pain medicine, surgery, blood transfusions, dialysis, IV or tube feedings, and a ventilator (breathing machine).  Values history:  This document has questions about your views, beliefs, and how you feel and think about life. This information can help others choose the care that you would choose.  Why are advance directives important?  An advance directive helps you control your care. Although spoken wishes may be used, it is better to have your wishes written down. Spoken wishes can be misunderstood, or not followed.  Treatments may be given even if you do not want them. An advance directive may make it easier for your family to make difficult choices about your care.       © Copyright [employer] 2018 Information is for End User's use only and may not be sold, redistributed or otherwise used for commercial purposes. All illustrations and images included in CareNotes® are the copyrighted property of [employer] or [email or web address]

## 2025-03-31 NOTE — PROGRESS NOTES
Name: Fredo Zhou      : 1960      MRN: [id number]  Encounter Provider: Crystal Carty MD  Encounter Date: 3/31/2025   Encounter department: [facility]    Assessment & Plan  Welcome to Medicare preventive visit    Orders:  •  POCT ECG    Ulcerative pancolitis with rectal bleeding (HCC)  Stable   Continue to monitor   Sees GI         Localized osteoporosis without current pathological fracture    Orders:  •  DXA bone density spine hip and pelvis; Future    Coronary artery disease involving native coronary artery of native heart without angina pectoris  On repatha  Sees cardiologist       Age-related osteoporosis without current pathological fracture  Due for dexa scan   Refused osteoporosis screening           Preventive health issues were discussed with patient, and age appropriate screening tests were ordered as noted in patient's After Visit Summary. Personalized health advice and appropriate referrals for health education or preventive services given if needed, as noted in patient's After Visit Summary.    History of Present Illness     HPI   Here for medicare wellness   Feels well overall    Patient Care Team:  Crystal Carty MD as PCP - General (Family Medicine)  MD DANIA Gao MD Peter Puleo, MD Sobhan Kodali, MD Kimberly Jegel Chaput, DO Vasu Singh, MD Chatargy Kaza, MD as Endoscopist    Review of Systems   Constitutional: Negative.  Negative for chills and fever.   HENT: Negative.  Negative for ear pain and sore throat.    Eyes:  Negative for pain and visual disturbance.   Respiratory:  Negative for cough and shortness of breath.    Cardiovascular:  Negative for chest pain and palpitations.   Gastrointestinal:  Negative for abdominal pain and vomiting.   Endocrine: Negative.    Genitourinary:  Negative for dysuria and hematuria.   Musculoskeletal:  Negative for arthralgias and back pain.   Skin:  Negative for color change and rash.    Allergic/Immunologic: Negative.    Neurological: Negative.  Negative for seizures and syncope.   Hematological: Negative.    All other systems reviewed and are negative.    Medical History Reviewed by provider this encounter:  Meds  Problems       Annual Wellness Visit Questionnaire   Fredo is here for her Welcome to Medicare visit.     Health Risk Assessment:   Patient rates overall health as good. Patient feels that their physical health rating is much better. Patient is very satisfied with their life. Eyesight was rated as same. Hearing was rated as same. Patient feels that their emotional and mental health rating is much better. Patients states they are never, rarely angry. Patient states they are never, rarely unusually tired/fatigued. Pain experienced in the last 7 days has been none. Patient states that she has experienced no weight loss or gain in last 6 months.     Depression Screening:   PHQ-2 Score: 0      Fall Risk Screening:   In the past year, patient has experienced: no history of falling in past year      Urinary Incontinence Screening:   Patient has not leaked urine accidently in the last six months.     Home Safety:  Patient does not have trouble with stairs inside or outside of their home. Patient has working smoke alarms and has working carbon monoxide detector. Home safety hazards include: none.     Nutrition:   Current diet is Regular.     Medications:   Patient is currently taking over-the-counter supplements. OTC medications include: see medication list. Patient is able to manage medications.     Activities of Daily Living (ADLs)/Instrumental Activities of Daily Living (IADLs):   Walk and transfer into and out of bed and chair?: Yes  Dress and groom yourself?: Yes    Bathe or shower yourself?: Yes    Feed yourself? Yes  Do your laundry/housekeeping?: Yes  Manage your money, pay your bills and track your expenses?: Yes  Make your own meals?: Yes    Do your own shopping?: Yes    Previous  Hospitalizations:   Any hospitalizations or ED visits within the last 12 months?: No      PREVENTIVE SCREENINGS      Cardiovascular Screening:    General: Screening Current      Diabetes Screening:     General: Screening Current      Colorectal Cancer Screening:     General: Screening Current      Breast Cancer Screening:     General: History Breast Cancer      Cervical Cancer Screening:    General: Screening Not Indicated      Osteoporosis Screening:    General: Screening Not Indicated and History Osteoporosis      Lung Cancer Screening:     General: Screening Not Indicated      Hepatitis C Screening:    General: Screening Current    Screening, Brief Intervention, and Referral to Treatment (SBIRT)     Screening  Typical number of drinks in a day: 0  Typical number of drinks in a week: 0  Interpretation: Low risk drinking behavior.    Single Item Drug Screening:  How often have you used an illegal drug (including marijuana) or a prescription medication for non-medical reasons in the past year? never    Single Item Drug Screen Score: 0  Interpretation: Negative screen for possible drug use disorder    Social Drivers of Health     Food Insecurity: No Food Insecurity (3/31/2025)    Hunger Vital Sign    • Worried About Running Out of Food in the Last Year: Never true    • Ran Out of Food in the Last Year: Never true   Transportation Needs: No Transportation Needs (3/31/2025)    PRAPARE - Transportation    • Lack of Transportation (Medical): No    • Lack of Transportation (Non-Medical): No   Housing Stability: Low Risk  (3/31/2025)    Housing Stability Vital Sign    • Unable to Pay for Housing in the Last Year: No    • Number of Times Moved in the Last Year: 1    • Homeless in the Last Year: No   Utilities: Not At Risk (3/31/2025)    [facility] Utilities    • Threatened with loss of utilities: No     Vision Screening    Right eye Left eye Both eyes   Without correction 20\40 20\40 20\30   With correction          Objective   BP  "110/80 (BP Location: Left arm, Patient Position: Sitting, Cuff Size: Standard)   Pulse 81   Temp (!) 97 °F (36.1 °C) (Tympanic)   Resp 17   Ht 5' 1.85\" (1.571 m)   Wt 55.8 kg (123 lb)   LMP  (LMP Unknown)   SpO2 96%   BMI 22.61 kg/m²     Physical Exam  Vitals and nursing note reviewed.   Constitutional:       Appearance: Normal appearance. She is well-developed.   HENT:      Head: Normocephalic and atraumatic.      Right Ear: Tympanic membrane normal.      Left Ear: Tympanic membrane normal.      Nose: Nose normal.      Mouth/Throat:      Mouth: Mucous membranes are moist.      Pharynx: No oropharyngeal exudate.   Eyes:      Pupils: Pupils are equal, round, and reactive to light.   Neck:      Thyroid: No thyromegaly.   Cardiovascular:      Rate and Rhythm: Normal rate and regular rhythm.      Heart sounds: No murmur heard.  Pulmonary:      Effort: Pulmonary effort is normal.      Breath sounds: Normal breath sounds.   Abdominal:      General: Bowel sounds are normal.      Palpations: Abdomen is soft.   Musculoskeletal:         General: No deformity. Normal range of motion.      Cervical back: Normal range of motion and neck supple.   Skin:     General: Skin is warm.      Capillary Refill: Capillary refill takes less than 2 seconds.      Findings: No erythema or rash.   Neurological:      General: No focal deficit present.      Mental Status: She is alert and oriented to person, place, and time.      Deep Tendon Reflexes: Reflexes are normal and symmetric.   Psychiatric:         Mood and Affect: Mood normal.         Behavior: Behavior normal.       Vision Screening    Right eye Left eye Both eyes   Without correction 20\40 20\40 20\30   With correction         "

## 2025-04-02 ENCOUNTER — TELEPHONE (OUTPATIENT)
Age: 65
End: 2025-04-02

## 2025-04-03 ENCOUNTER — DOCUMENTATION (OUTPATIENT)
Dept: CARDIOLOGY CLINIC | Facility: CLINIC | Age: 65
End: 2025-04-03

## 2025-04-03 NOTE — TELEPHONE ENCOUNTER
PA for REPATHA 140 MG/ML SUBMITTED to [employer]     via    []CMM-KEY:   []Surescripts-Case ID #   []Availity-Auth ID # NDC #   [x]Faxed to plan CASE ID [id number]  []Other website   []Phone call Case ID #     []PA sent as URGENT    All office notes, labs and other pertaining documents and studies sent. Clinical questions answered. Awaiting determination from insurance company.     Turnaround time for your insurance to make a decision on your Prior Authorization can take 7-21 business days.

## 2025-04-03 NOTE — PROGRESS NOTES
Fredo is a pleasant 64-year-old with hypertension, dyslipidemia, no diabetes, diagnosis of coronary artery disease after a positive stress test in 2015, with coronary angiography showing triple-vessel coronary disease and subsequently underwent coronary artery bypass grafting with LIMA-LAD, SVG-PDA, SVG-OM 3 as well as a history of statin intolerance.  Prior to that her baseline non-HDL was around 163 and total cholesterol around 211-August 2014     She also has a history of intolerance to statins, had been on atorvastatin 10 mg with ankle pains and Livalo/Pitavastatin even at a dose of 2 mg with fatigue and aching.  Ultimately she was approved for Repatha on which she remains with a good and appropriate reduction in her LDL.    In summary, pt has known coronary artery disease and history of coronary artery bypass grafting, with stenting, with a history of documented intolerance to multiple statins with goal LDL less than 70, goal non-HDL less than 100.    Considering the extent of non-HDL reduction needed to achieve therapeutic goal, Fredo Zhou meets candidacy for a PCSK-9 inhibitor as per the ACC/AHA guidelines, for secondary prevention of coronary artery disease.    This is a request for approval of her current medication-Repatha 140 mg SC Q 2 weeks (pending insurance reapproval), alternatively she may be approved for Praluent 75 mg SC Q 2weeks.    Sincerely,    William Betts MD  Non-invasive Cardiology and Clinical Lipidology  [facility],  [place], [place]  Member, National Lipid Association and American College of Cardiology

## 2025-04-03 NOTE — TELEPHONE ENCOUNTER
Called plan regarding paperwork for prior authorization. The plan states they have already denied the case due to not hearing back from the providers office, an appeal will need to be submitted. Appeal paperwork to be faxed to the pod at [phone]

## 2025-04-07 NOTE — TELEPHONE ENCOUNTER
PA for Repatha 140 mg/ml  APPROVED     Date(s) approved 4/3/25-4/4/25    Case #[id number]    Patient advised by          [x][place]!hart Message  []Phone call   [x]LMOM  []L/M to call office as no active Communication consent on file  []Unable to leave detailed message as VM not approved on Communication consent       Pharmacy advised by    [x]Fax  []Phone call  []Secure Chat    Specialty Pharmacy    []     Approval letter scanned into Media Yes

## 2025-04-14 ENCOUNTER — PATIENT MESSAGE (OUTPATIENT)
Dept: FAMILY MEDICINE CLINIC | Facility: CLINIC | Age: 65
End: 2025-04-14

## 2025-04-14 NOTE — PATIENT COMMUNICATION
Pt called in after getting our message. I spoke to Uli to see if there was any room in the schedule to see the pt today. At the time of the call there was not. I offered to schedule the pt for tomorrow or another day this week, however pt declined. She states she can only come in today. I also advised [facility] as an option, but the pt stated she didn't want to go there it will cost more money, she would like to see her provider. She wanted to know if  would be willing to do a telemedicine visit with her in order to get the antibiotic she is requesting. She states she is only having a sore throat, no fever, no congestion.  Pt was taking Zyrtec, but it did not help  Added pt to wait list for today  Please advise,thank you

## 2025-04-14 NOTE — PATIENT COMMUNICATION
Pt called in after sending a message to see if ANY provider had an opening today, or if she could do a telemedicine visit. I spoke to Uli and at the time of the call , there were no more openings for today. Spoke to one of the nurses, and with the pt having a sore throat, she didn't think telemedicine was appropriate. Pt then requested an appointment for tomorrow before 12. I tried to reach the  at the time of the call to see if this would be possible ,but they were assisting other pts at the time of the call.  Pt would be willing to see any provider either today, or tomorrow before 12.     Please advise

## 2025-04-15 ENCOUNTER — OFFICE VISIT (OUTPATIENT)
Dept: FAMILY MEDICINE CLINIC | Facility: CLINIC | Age: 65
End: 2025-04-15
Payer: COMMERCIAL

## 2025-04-15 VITALS
DIASTOLIC BLOOD PRESSURE: 80 MMHG | WEIGHT: 123 LBS | BODY MASS INDEX: 22.63 KG/M2 | HEIGHT: 62 IN | OXYGEN SATURATION: 98 % | SYSTOLIC BLOOD PRESSURE: 120 MMHG | RESPIRATION RATE: 17 BRPM | HEART RATE: 74 BPM | TEMPERATURE: 98.1 F

## 2025-04-15 DIAGNOSIS — J20.9 ACUTE BRONCHITIS, UNSPECIFIED ORGANISM: Primary | ICD-10-CM

## 2025-04-15 DIAGNOSIS — J30.89 ALLERGIC RHINITIS DUE TO OTHER ALLERGIC TRIGGER, UNSPECIFIED SEASONALITY: ICD-10-CM

## 2025-04-15 DIAGNOSIS — K51.011 ULCERATIVE PANCOLITIS WITH RECTAL BLEEDING (HCC): ICD-10-CM

## 2025-04-15 PROCEDURE — G2211 COMPLEX E/M VISIT ADD ON: HCPCS | Performed by: FAMILY MEDICINE

## 2025-04-15 PROCEDURE — 99213 OFFICE O/P EST LOW 20 MIN: CPT | Performed by: FAMILY MEDICINE

## 2025-04-15 RX ORDER — AZITHROMYCIN 250 MG/1
TABLET, FILM COATED ORAL
Qty: 6 TABLET | Refills: 0 | Status: SHIPPED | OUTPATIENT
Start: 2025-04-15 | End: 2025-04-19

## 2025-04-15 RX ORDER — MOMETASONE FUROATE MONOHYDRATE 50 UG/1
2 SPRAY, METERED NASAL DAILY
Qty: 17 G | Refills: 0 | Status: SHIPPED | OUTPATIENT
Start: 2025-04-15

## 2025-04-15 RX ORDER — MESALAMINE 4 G/60ML
4 SUSPENSION RECTAL
Qty: 5400 ML | Refills: 1 | Status: SHIPPED | OUTPATIENT
Start: 2025-04-15 | End: 2025-10-12

## 2025-04-15 RX ORDER — BENZONATATE 200 MG/1
200 CAPSULE ORAL 3 TIMES DAILY PRN
Qty: 20 CAPSULE | Refills: 0 | Status: SHIPPED | OUTPATIENT
Start: 2025-04-15

## 2025-04-15 NOTE — ASSESSMENT & PLAN NOTE
Orders:  •  mesalamine (ROWASA) 4 g; Insert 60 mL (4 g total) into the rectum daily at bedtime [email or web address]

## 2025-04-15 NOTE — PROGRESS NOTES
Name: Fredo Zhou      : 1960      MRN: [id number]  Encounter Provider: Crystal Carty MD  Encounter Date: 4/15/2025   Encounter department: [facility]  :  Assessment & Plan  Acute bronchitis, unspecified organism    Orders:  •  benzonatate (TESSALON) 200 MG capsule; Take 1 capsule (200 mg total) by mouth 3 (three) times a day as needed for cough  •  azithromycin (ZITHROMAX) 250 mg tablet; Take 2 tablets today then 1 tablet daily x 4 days    Ulcerative pancolitis with rectal bleeding (HCC)    Orders:  •  mesalamine (ROWASA) 4 g; Insert 60 mL (4 g total) into the rectum daily at bedtime [email or web address]    Allergic rhinitis due to other allergic trigger, unspecified seasonality    Orders:  •  mometasone (NASONEX) 50 mcg/act nasal spray; 2 sprays into each nostril daily           History of Present Illness   Cough  This is a new problem. The current episode started in the past 7 days. The cough is Productive of sputum. Associated symptoms include nasal congestion, postnasal drip and a sore throat. Pertinent negatives include no chest pain, chills, ear congestion, ear pain, fever, headaches, heartburn, hemoptysis, myalgias, rash, rhinorrhea, shortness of breath, sweats, weight loss or wheezing.   Sore Throat   Associated symptoms include coughing. Pertinent negatives include no ear pain, headaches or shortness of breath.     Review of Systems   Constitutional:  Negative for chills, fever and weight loss.   HENT:  Positive for postnasal drip and sore throat. Negative for ear pain and rhinorrhea.    Respiratory:  Positive for cough. Negative for hemoptysis, shortness of breath and wheezing.    Cardiovascular:  Negative for chest pain.   Gastrointestinal:  Negative for heartburn.   Musculoskeletal:  Negative for myalgias.   Skin:  Negative for rash.   Neurological:  Negative for headaches.       Objective   /80 (BP Location: Left arm, Patient Position: Sitting, Cuff Size:  "Standard)   Pulse 74   Temp 98.1 °F (36.7 °C) (Tympanic)   Resp 17   Ht 5' 1.85\" (1.571 m)   Wt 55.8 kg (123 lb)   LMP  (LMP Unknown)   SpO2 98%   BMI 22.61 kg/m²      Physical Exam  Constitutional:       Appearance: Normal appearance. She is well-developed.   HENT:      Nose: Congestion present. No rhinorrhea.   Cardiovascular:      Rate and Rhythm: Normal rate and regular rhythm.      Pulses: Normal pulses.      Heart sounds: Normal heart sounds.   Pulmonary:      Effort: Pulmonary effort is normal.      Breath sounds: Normal breath sounds.   Musculoskeletal:      Cervical back: No rigidity or tenderness.   Neurological:      General: No focal deficit present.      Mental Status: She is alert and oriented to person, place, and time.   Psychiatric:         Mood and Affect: Mood normal.         Behavior: Behavior normal.         "

## 2025-04-18 DIAGNOSIS — Z95.1 HX OF CABG: ICD-10-CM

## 2025-04-18 DIAGNOSIS — I25.10 CORONARY ARTERY DISEASE INVOLVING NATIVE CORONARY ARTERY OF NATIVE HEART WITHOUT ANGINA PECTORIS: Primary | ICD-10-CM

## 2025-04-18 DIAGNOSIS — E78.5 DYSLIPIDEMIA: ICD-10-CM

## 2025-04-18 DIAGNOSIS — I10 ESSENTIAL HYPERTENSION: ICD-10-CM

## 2025-04-18 DIAGNOSIS — E78.1 HIGH TRIGLYCERIDES: ICD-10-CM

## 2025-04-21 ENCOUNTER — TELEPHONE (OUTPATIENT)
Age: 65
End: 2025-04-21

## 2025-04-21 NOTE — TELEPHONE ENCOUNTER
Patient called the RX Refill Line. She accepted another call from [facility] and put writer on hold before explaining what her call was about. Call was disconnected.

## 2025-04-23 DIAGNOSIS — I10 ESSENTIAL HYPERTENSION: ICD-10-CM

## 2025-04-23 DIAGNOSIS — E78.5 DYSLIPIDEMIA: ICD-10-CM

## 2025-04-23 DIAGNOSIS — I25.10 CORONARY ARTERY DISEASE INVOLVING NATIVE CORONARY ARTERY OF NATIVE HEART WITHOUT ANGINA PECTORIS: ICD-10-CM

## 2025-04-23 DIAGNOSIS — Z95.1 HX OF CABG: ICD-10-CM

## 2025-04-23 DIAGNOSIS — E78.1 HIGH TRIGLYCERIDES: ICD-10-CM

## 2025-04-25 NOTE — TELEPHONE ENCOUNTER
Dr Elo Rios pt    Pt called in stating she notice blood in her urine today, she thinks it is from the medication she was prescribed by Dr Dread Holley   Please call back to discuss thank you [phone]
No I totally agree, if having what she thinks blood in urine should call PCP        Thanks,  Kerwin Tomas
Spoke with patient  H/O Ulcerative pancolitis with rectal bleeding, iron deficiency  Patient c/o occasional dark urine over the last few weeks in the morning  She states its not red but she feels it could be blood in her urine  During the day her urine returns to normal   Denies pain, n/v, fever, chills, dizziness  Reports normal BM since starting the mesalamine, and enemas  Her concern was that it is related to her mesalamine and enemas  Advised patient this is unlikely considering she has been on this for few months  Advised to drink plenty of water, follow-up with her PCP for possible urinalysis  Okay to leave  for patient  Any other suggestions?
SCD's to B/L LE

## 2025-04-28 DIAGNOSIS — K21.9 GASTROESOPHAGEAL REFLUX DISEASE WITHOUT ESOPHAGITIS: Primary | ICD-10-CM

## 2025-04-28 RX ORDER — PANTOPRAZOLE SODIUM 40 MG/1
40 TABLET, DELAYED RELEASE ORAL DAILY
Qty: 30 TABLET | Refills: 3 | Status: SHIPPED | OUTPATIENT
Start: 2025-04-28 | End: 2025-04-29 | Stop reason: SDUPTHER

## 2025-04-29 DIAGNOSIS — K51.011 ULCERATIVE PANCOLITIS WITH RECTAL BLEEDING (HCC): ICD-10-CM

## 2025-04-29 DIAGNOSIS — K21.9 GASTROESOPHAGEAL REFLUX DISEASE WITHOUT ESOPHAGITIS: ICD-10-CM

## 2025-04-29 RX ORDER — PANTOPRAZOLE SODIUM 40 MG/1
40 TABLET, DELAYED RELEASE ORAL DAILY
Qty: 30 TABLET | Refills: 0 | Status: SHIPPED | OUTPATIENT
Start: 2025-04-29 | End: 2025-04-30 | Stop reason: SDUPTHER

## 2025-04-29 NOTE — TELEPHONE ENCOUNTER
Patient of  Heather Steinberg DO Gastroenterology called in requesting a refill for prednisone 20 mg. Patient made aware that she needs a follow up for continued medication refills.   Patient requested to speak with the office. Patient was transferred for an appointment assistance.

## 2025-04-29 NOTE — TELEPHONE ENCOUNTER
Reason for call:   [x] Refill   [] Prior Auth  [] Other:     Office:   [x] PCP/Provider -  Crystal Carty Md  [] Specialty/Provider -     Medication:   ferrous sulfate 324 (65 Fe) mg     Dose/Frequency: Take 1 tablet (324 mg total) by mouth daily before breakfast     Quantity: 90 tablet     Pharmacy: [facility]        Does the patient have enough for 3 days?   [x] Yes   [] No - Send as HP to POD

## 2025-04-29 NOTE — TELEPHONE ENCOUNTER
L:M for patient to call back and schedule a office  visit for a follow up for continued medication refills.

## 2025-04-29 NOTE — TELEPHONE ENCOUNTER
Reason for call:   [x] Refill   [] Prior Auth  [] Other:     Office:   [] PCP/Provider -   [x] Specialty/Provider - TIA Hsu    Medication:   pantoprazole (PROTONIX) 40 mg tablet     Dose/Frequency: Take 1 tablet (40 mg total) by mouth daily     Quantity: 30     Pharmacy: [facility]     Does the patient have enough for 3 days?   [x] Yes   [] No - Send as HP to POD

## 2025-04-30 DIAGNOSIS — J02.9 SORE THROAT: ICD-10-CM

## 2025-04-30 DIAGNOSIS — K21.9 GASTROESOPHAGEAL REFLUX DISEASE WITHOUT ESOPHAGITIS: ICD-10-CM

## 2025-04-30 DIAGNOSIS — J20.9 ACUTE BRONCHITIS, UNSPECIFIED ORGANISM: ICD-10-CM

## 2025-04-30 RX ORDER — FERROUS SULFATE 324(65)MG
324 TABLET, DELAYED RELEASE (ENTERIC COATED) ORAL
Qty: 90 TABLET | Refills: 1 | Status: SHIPPED | OUTPATIENT
Start: 2025-04-30

## 2025-04-30 RX ORDER — BENZONATATE 200 MG/1
200 CAPSULE ORAL 3 TIMES DAILY PRN
Qty: 20 CAPSULE | Refills: 0 | Status: SHIPPED | OUTPATIENT
Start: 2025-04-30

## 2025-04-30 RX ORDER — BROMPHENIRAMINE MALEATE, PSEUDOEPHEDRINE HYDROCHLORIDE, AND DEXTROMETHORPHAN HYDROBROMIDE 2; 30; 10 MG/5ML; MG/5ML; MG/5ML
5 SYRUP ORAL 4 TIMES DAILY PRN
Qty: 200 ML | Refills: 0 | Status: CANCELLED | OUTPATIENT
Start: 2025-04-30

## 2025-04-30 RX ORDER — PANTOPRAZOLE SODIUM 40 MG/1
40 TABLET, DELAYED RELEASE ORAL DAILY
Qty: 30 TABLET | Refills: 5 | Status: SHIPPED | OUTPATIENT
Start: 2025-04-30

## 2025-04-30 NOTE — TELEPHONE ENCOUNTER
Patient called to request a refill for their Benzonatate advised a refill was requested on 04/30/25 and is pending approval. Patient verbalized understanding and is in agreement.     Does the patient have enough for 3 days?   [] Yes   [x] No - Send as HP to POD

## 2025-07-06 DIAGNOSIS — K21.9 GASTROESOPHAGEAL REFLUX DISEASE WITHOUT ESOPHAGITIS: ICD-10-CM

## 2025-07-07 RX ORDER — PANTOPRAZOLE SODIUM 40 MG/1
40 TABLET, DELAYED RELEASE ORAL DAILY
Qty: 90 TABLET | Refills: 2 | Status: SHIPPED | OUTPATIENT
Start: 2025-07-07

## 2025-07-14 DIAGNOSIS — K51.011 ULCERATIVE PANCOLITIS WITH RECTAL BLEEDING (HCC): ICD-10-CM

## 2025-07-14 DIAGNOSIS — J30.89 ALLERGIC RHINITIS DUE TO OTHER ALLERGIC TRIGGER, UNSPECIFIED SEASONALITY: ICD-10-CM

## 2025-07-15 ENCOUNTER — TELEPHONE (OUTPATIENT)
Dept: GASTROENTEROLOGY | Facility: CLINIC | Age: 65
End: 2025-07-15

## 2025-07-15 RX ORDER — DICYCLOMINE HCL 20 MG
20 TABLET ORAL EVERY 6 HOURS PRN
Qty: 120 TABLET | Refills: 0 | Status: SHIPPED | OUTPATIENT
Start: 2025-07-15 | End: 2025-07-30 | Stop reason: SDUPTHER

## 2025-07-16 RX ORDER — MESALAMINE 4 G/60ML
4 SUSPENSION RECTAL
Qty: 5400 ML | Refills: 0 | Status: SHIPPED | OUTPATIENT
Start: 2025-07-16 | End: 2026-01-12

## 2025-07-16 RX ORDER — MOMETASONE FUROATE MONOHYDRATE 50 UG/1
2 SPRAY, METERED NASAL DAILY
Qty: 51 G | Refills: 1 | Status: SHIPPED | OUTPATIENT
Start: 2025-07-16

## 2025-07-21 ENCOUNTER — OFFICE VISIT (OUTPATIENT)
Dept: FAMILY MEDICINE CLINIC | Facility: CLINIC | Age: 65
End: 2025-07-21
Payer: COMMERCIAL

## 2025-07-21 VITALS
HEART RATE: 78 BPM | RESPIRATION RATE: 17 BRPM | DIASTOLIC BLOOD PRESSURE: 78 MMHG | SYSTOLIC BLOOD PRESSURE: 110 MMHG | BODY MASS INDEX: 22.74 KG/M2 | WEIGHT: 123.6 LBS | HEIGHT: 62 IN | OXYGEN SATURATION: 98 % | TEMPERATURE: 98.1 F

## 2025-07-21 DIAGNOSIS — N64.4 BREAST PAIN: Primary | ICD-10-CM

## 2025-07-21 PROCEDURE — 99213 OFFICE O/P EST LOW 20 MIN: CPT | Performed by: FAMILY MEDICINE

## 2025-07-21 PROCEDURE — G2211 COMPLEX E/M VISIT ADD ON: HCPCS | Performed by: FAMILY MEDICINE

## 2025-07-21 NOTE — PROGRESS NOTES
"Name: Fredo Zhou      : 1960      MRN: [id number]  Encounter Provider: Crystal Carty MD  Encounter Date: 2025   Encounter department: [facility]  :  Assessment & Plan  Breast pain    Orders:  •  US breast left limited (diagnostic); Future  •  US breast right limited (diagnostic); Future           History of Present Illness   HPI  On and off bilateral breast pain for the last 2 months  sharp pain in nature and it goes away on its own     Review of Systems   Constitutional: Negative.    HENT: Negative.     Eyes: Negative.    Respiratory: Negative.     Cardiovascular: Negative.    Gastrointestinal: Negative.    Musculoskeletal: Negative.    Allergic/Immunologic: Negative.        Objective   /78 (BP Location: Left arm, Patient Position: Sitting, Cuff Size: Standard)   Pulse 78   Temp 98.1 °F (36.7 °C) (Tympanic)   Resp 17   Ht 5' 1.85\" (1.571 m)   Wt 56.1 kg (123 lb 9.6 oz)   LMP  (LMP Unknown)   SpO2 98%   BMI 22.72 kg/m²      Physical Exam  Vitals and nursing note reviewed.   Constitutional:       Appearance: Normal appearance.   Pulmonary:      Effort: Pulmonary effort is normal.   Chest:      Chest wall: No mass, swelling or tenderness.   Breasts:     Right: No swelling, bleeding, inverted nipple, mass, skin change or tenderness.      Left: No swelling, bleeding, inverted nipple, mass, skin change or tenderness.     Neurological:      General: No focal deficit present.      Mental Status: She is alert and oriented to person, place, and time.     Psychiatric:         Mood and Affect: Mood normal.         Behavior: Behavior normal.         "

## 2025-07-28 ENCOUNTER — TELEPHONE (OUTPATIENT)
Dept: GASTROENTEROLOGY | Facility: CLINIC | Age: 65
End: 2025-07-28

## 2025-07-30 ENCOUNTER — OFFICE VISIT (OUTPATIENT)
Dept: GASTROENTEROLOGY | Facility: CLINIC | Age: 65
End: 2025-07-30
Payer: COMMERCIAL

## 2025-07-30 VITALS
TEMPERATURE: 98 F | BODY MASS INDEX: 22.26 KG/M2 | SYSTOLIC BLOOD PRESSURE: 110 MMHG | DIASTOLIC BLOOD PRESSURE: 74 MMHG | HEIGHT: 62 IN | WEIGHT: 121 LBS

## 2025-07-30 DIAGNOSIS — K51.011 ULCERATIVE PANCOLITIS WITH RECTAL BLEEDING (HCC): Primary | ICD-10-CM

## 2025-07-30 DIAGNOSIS — K21.9 GASTROESOPHAGEAL REFLUX DISEASE WITHOUT ESOPHAGITIS: ICD-10-CM

## 2025-07-30 DIAGNOSIS — K58.8 OTHER IRRITABLE BOWEL SYNDROME: ICD-10-CM

## 2025-07-30 PROCEDURE — 99214 OFFICE O/P EST MOD 30 MIN: CPT | Performed by: INTERNAL MEDICINE

## 2025-07-30 PROCEDURE — G2211 COMPLEX E/M VISIT ADD ON: HCPCS | Performed by: INTERNAL MEDICINE

## 2025-07-30 RX ORDER — PANTOPRAZOLE SODIUM 40 MG/1
40 TABLET, DELAYED RELEASE ORAL DAILY
Qty: 90 TABLET | Refills: 2 | Status: SHIPPED | OUTPATIENT
Start: 2025-07-30

## 2025-07-30 RX ORDER — MESALAMINE 400 MG/1
800 CAPSULE, DELAYED RELEASE ORAL 3 TIMES DAILY
Qty: 540 CAPSULE | Refills: 3 | Status: SHIPPED | OUTPATIENT
Start: 2025-07-30

## 2025-07-30 RX ORDER — PREDNISONE 20 MG/1
20 TABLET ORAL DAILY
Qty: 30 TABLET | Refills: 1 | Status: SHIPPED | OUTPATIENT
Start: 2025-07-30 | End: 2025-08-29

## 2025-07-30 RX ORDER — FERROUS SULFATE 324(65)MG
324 TABLET, DELAYED RELEASE (ENTERIC COATED) ORAL
Qty: 90 TABLET | Refills: 1 | Status: SHIPPED | OUTPATIENT
Start: 2025-07-30

## 2025-07-30 RX ORDER — DICYCLOMINE HCL 20 MG
20 TABLET ORAL EVERY 6 HOURS PRN
Qty: 120 TABLET | Refills: 3 | Status: SHIPPED | OUTPATIENT
Start: 2025-07-30